# Patient Record
Sex: FEMALE | Race: WHITE | NOT HISPANIC OR LATINO | Employment: OTHER | URBAN - METROPOLITAN AREA
[De-identification: names, ages, dates, MRNs, and addresses within clinical notes are randomized per-mention and may not be internally consistent; named-entity substitution may affect disease eponyms.]

---

## 2017-06-05 RX ORDER — ESTRADIOL 0.5 MG/1
TABLET ORAL
Qty: 30 TABLET | Refills: 1 | Status: SHIPPED | OUTPATIENT
Start: 2017-06-05 | End: 2017-06-28 | Stop reason: SDUPTHER

## 2017-06-13 ENCOUNTER — APPOINTMENT (OUTPATIENT)
Dept: WOMENS IMAGING | Facility: HOSPITAL | Age: 62
End: 2017-06-13

## 2017-06-13 ENCOUNTER — PROCEDURE VISIT (OUTPATIENT)
Dept: OBSTETRICS AND GYNECOLOGY | Facility: CLINIC | Age: 62
End: 2017-06-13

## 2017-06-13 DIAGNOSIS — Z12.31 VISIT FOR SCREENING MAMMOGRAM: Primary | ICD-10-CM

## 2017-06-13 PROCEDURE — 77067 SCR MAMMO BI INCL CAD: CPT | Performed by: RADIOLOGY

## 2017-06-13 PROCEDURE — 77067 SCR MAMMO BI INCL CAD: CPT | Performed by: OBSTETRICS & GYNECOLOGY

## 2017-06-19 ENCOUNTER — TELEPHONE (OUTPATIENT)
Dept: OBSTETRICS AND GYNECOLOGY | Facility: CLINIC | Age: 62
End: 2017-06-19

## 2017-06-19 NOTE — TELEPHONE ENCOUNTER
----- Message from Diaz Joseph MD sent at 6/17/2017  1:27 PM EDT -----  Let the patient know that her mammogram was normal

## 2017-06-20 ENCOUNTER — TELEPHONE (OUTPATIENT)
Dept: OBSTETRICS AND GYNECOLOGY | Facility: CLINIC | Age: 62
End: 2017-06-20

## 2017-06-21 ENCOUNTER — TELEPHONE (OUTPATIENT)
Dept: OBSTETRICS AND GYNECOLOGY | Facility: CLINIC | Age: 62
End: 2017-06-21

## 2017-06-28 ENCOUNTER — OFFICE VISIT (OUTPATIENT)
Dept: OBSTETRICS AND GYNECOLOGY | Facility: CLINIC | Age: 62
End: 2017-06-28

## 2017-06-28 VITALS — WEIGHT: 190 LBS | HEART RATE: 82 BPM | DIASTOLIC BLOOD PRESSURE: 84 MMHG | SYSTOLIC BLOOD PRESSURE: 130 MMHG

## 2017-06-28 DIAGNOSIS — Z01.419 VISIT FOR GYNECOLOGIC EXAMINATION: Primary | ICD-10-CM

## 2017-06-28 DIAGNOSIS — N95.1 VASOMOTOR SYMPTOMS DUE TO MENOPAUSE: ICD-10-CM

## 2017-06-28 PROCEDURE — 99396 PREV VISIT EST AGE 40-64: CPT | Performed by: OBSTETRICS & GYNECOLOGY

## 2017-06-28 RX ORDER — ESTRADIOL 0.5 MG/1
0.5 TABLET ORAL DAILY
Qty: 30 TABLET | Refills: 12 | Status: SHIPPED | OUTPATIENT
Start: 2017-06-28 | End: 2018-05-23 | Stop reason: SDUPTHER

## 2017-06-28 NOTE — PROGRESS NOTES
Subjective   Taylor Larsen is a 61 y.o. female   CC: Pt here for annual.  History of Present Illness  Pt here for annual.  She is without gynecologic complaints today.  She takes oral Estrace and oral progesterone and 4 vasomotor symptoms.  She reports that her symptoms well-controlled on these medications.  She did a mammogram about 2 weeks ago that was normal.  She is uncertain of her last colonoscopy.  She has never had a DEXA scan performed.  She has no major changes in her health history since her last visit.  She takes a daily calcium supplement.  She performs regular self breast exams.  She wears her seatbelt regularly.    The following portions of the patient's history were reviewed and updated as appropriate: allergies, current medications, past family history, past medical history, past social history, past surgical history and problem list.    Review of Systems  General: No fever or chills  Constitutional: No weight loss or gain, no hair loss  HENT: No headache, no hearing loss, no tinnitus  Eyes: normal vision, no eye pain  Lungs: No cough, no shortness of breath  Heart: No chest pain, no palpitations  Abdomen: No nausea, vomiting, or diarrhea; Pos constipation  : No dysuria, no hematuria  Skin: No rashes  Lymph: No swelling  Neuro: No parathesia, no weakness  Psych: Normal though content, no hallucinations, no SI/HI    Objective   Physical Exam  Vitals:    06/28/17 1538   BP: 130/84   Pulse: 82   Weight: 190 lb (86.2 kg)   Gen: No acute distress, awake and oriented times three  HENT: Normocephalic, atraumatic, Moist mucous membranes  Eyes: PERRLA, EOMI  Neck: Supple, normal range of motion, no thyromegaly  Lungs: Normal work of breathing, lungs clear bilaterally, no crackles/wheezes  Heart: Regular rate and rhythm, no murmurs  Abdomen: soft, nontender, non distended, normoactive bowel sounds  Breast: Symmetrical. No skin changes or nipple retractions. No lumps or masses bilaterally. No tenderness  bilaterally.  Pelvic:   Normal external female genitalia, no lesions  Vagina: No blood or discharge; hardened stool noted in the rectal vault during bimanual exam  Cervix: No cervical motion tenderness, no lesions, no active bleeding, nonfriable  Uterus: Anteverted, normal size and shape, nontender  Adnexa: No masses or tenderness  Rectal: Deferred  Skin: Warm and dry, no rashes  Psych: Good judgement and insight, normal affect and mood  Neuro: CN 2-12 intact, no gross deficits    Assessment/Plan   Diagnoses and all orders for this visit:    Visit for gynecologic examination    Vasomotor symptoms due to menopause  -     estradiol (ESTRACE) 0.5 MG tablet; Take 1 tablet by mouth Daily.     patient is to continue oral progesterone as well.  She is unaware of the medication and dosage.  She will call our office when she gets home to update us.    Patient is not due for Pap smear this year.  Next Pap smear will be due after April 2018.  Mammogram was just recently done.  The patient's instructed to discuss with her primary care physician about scheduling for a screening colonoscopy if she is due.  DEXA scans starting at age 65.  Encouraged self breast exams.  Encourage weightbearing exercises.  Continue calcium supplementation.  Return to the office in one year for annual exam or sooner as needed.  Continue oral estradiol and oral progesterone for prevention of vasomotor symptoms.  Patient also with some significant constipation.  Discussed over-the-counter use of fiber supplements and stool softeners.

## 2017-06-30 ENCOUNTER — TELEPHONE (OUTPATIENT)
Dept: OBSTETRICS AND GYNECOLOGY | Facility: CLINIC | Age: 62
End: 2017-06-30

## 2017-06-30 DIAGNOSIS — N95.1 VASOMOTOR SYMPTOMS DUE TO MENOPAUSE: Primary | ICD-10-CM

## 2017-06-30 RX ORDER — MEDROXYPROGESTERONE ACETATE 2.5 MG/1
2.5 TABLET ORAL DAILY
Qty: 30 TABLET | Refills: 11 | Status: SHIPPED | OUTPATIENT
Start: 2017-06-30 | End: 2018-05-23 | Stop reason: SDUPTHER

## 2017-06-30 NOTE — TELEPHONE ENCOUNTER
Notify the patient that I sent in a prescription for this medication as well.    ----- Message from Kailee Bond sent at 6/30/2017  8:54 AM EDT -----  Contact: patient  Patient called to let you know her progesterone medication is Medroxyprogesterone 2.5 mg.

## 2018-05-23 ENCOUNTER — TELEPHONE (OUTPATIENT)
Dept: OBSTETRICS AND GYNECOLOGY | Facility: CLINIC | Age: 63
End: 2018-05-23

## 2018-05-23 DIAGNOSIS — N95.1 VASOMOTOR SYMPTOMS DUE TO MENOPAUSE: ICD-10-CM

## 2018-05-23 RX ORDER — ESTRADIOL 0.5 MG/1
0.5 TABLET ORAL DAILY
Qty: 30 TABLET | Refills: 2 | Status: SHIPPED | OUTPATIENT
Start: 2018-05-23 | End: 2018-07-25 | Stop reason: SDUPTHER

## 2018-05-23 RX ORDER — MEDROXYPROGESTERONE ACETATE 2.5 MG/1
2.5 TABLET ORAL DAILY
Qty: 30 TABLET | Refills: 2 | Status: SHIPPED | OUTPATIENT
Start: 2018-05-23 | End: 2018-07-25

## 2018-05-23 NOTE — TELEPHONE ENCOUNTER
I sent estradiol and medroxyprogesterone pills, with enough refills to last her until her next appointment with me

## 2018-07-25 ENCOUNTER — OFFICE VISIT (OUTPATIENT)
Dept: OBSTETRICS AND GYNECOLOGY | Facility: CLINIC | Age: 63
End: 2018-07-25

## 2018-07-25 VITALS
HEART RATE: 76 BPM | BODY MASS INDEX: 32.27 KG/M2 | SYSTOLIC BLOOD PRESSURE: 105 MMHG | HEIGHT: 64 IN | WEIGHT: 189 LBS | DIASTOLIC BLOOD PRESSURE: 69 MMHG

## 2018-07-25 DIAGNOSIS — N95.1 VASOMOTOR SYMPTOMS DUE TO MENOPAUSE: ICD-10-CM

## 2018-07-25 DIAGNOSIS — Z01.419 VISIT FOR GYNECOLOGIC EXAMINATION: Primary | ICD-10-CM

## 2018-07-25 DIAGNOSIS — Z12.4 SCREENING FOR CERVICAL CANCER: ICD-10-CM

## 2018-07-25 PROCEDURE — 99396 PREV VISIT EST AGE 40-64: CPT | Performed by: OBSTETRICS & GYNECOLOGY

## 2018-07-25 RX ORDER — MEDROXYPROGESTERONE ACETATE 2.5 MG/1
2.5 TABLET ORAL DAILY
Qty: 30 TABLET | Refills: 12 | Status: SHIPPED | OUTPATIENT
Start: 2018-07-25 | End: 2019-08-05 | Stop reason: SDUPTHER

## 2018-07-25 RX ORDER — ESTRADIOL 0.5 MG/1
0.5 TABLET ORAL DAILY
Qty: 30 TABLET | Refills: 12 | Status: SHIPPED | OUTPATIENT
Start: 2018-07-25 | End: 2019-08-05 | Stop reason: SDUPTHER

## 2018-07-25 NOTE — PROGRESS NOTES
"Subjective   Taylor Larsen is a 62 y.o. female.   Cc: Pt here for annual.   History of Present Illness   Pt here for annual.  Last Pap was April 2015 and was normal.  Patient is currently on combined hormone replacement therapy estradiol 0.5 mg and medroxyprogesterone 2.5 mg daily for hot flashes.  These have been working well and she would like to continue them.  She takes a daily vitamin D calcium supplement.  She is not a smoker.  She performs routine self breast exams.  Her last mmg was June 2017.    Social History   Substance Use Topics   • Smoking status: Former Smoker   • Smokeless tobacco: Never Used   • Alcohol use No     The following portions of the patient's history were reviewed and updated as appropriate: allergies, current medications, past family history, past medical history, past social history, past surgical history and problem list.    Review of Systems  General: No fever or chills  Constitutional: No weight loss or gain, no hair loss  HENT: No headache, no hearing loss, no tinnitus  Eyes: normal vision, no eye pain  Lungs: No cough, no shortness of breath  Heart: No chest pain, no palpitations  Abdomen: No nausea, vomiting, constipation or diarrhea  : No dysuria, no hematuria  Skin: No rashes  Lymph: No swelling  Neuro: No parathesia, no weakness  Psych: Normal though content, no hallucinations, no SI/HI    Objective   Physical Exam  Vitals:    07/25/18 1518   BP: 105/69   Pulse: 76   Weight: 85.7 kg (189 lb)   Height: 161.3 cm (63.5\")   Exam performed in the presence of a female chaperone  Patient has provided verbal consent to proceed with exam.    Gen: No acute distress, awake and oriented times three  HENT: Normocephalic, atraumatic, Moist mucous membranes  Eyes: PERRLA, EOMI  Neck: Supple, normal range of motion, no thyromegaly  Lungs: Normal work of breathing, lungs clear bilaterally, no crackles/wheezes  Heart: Regular rate and rhythm, no murmurs  Abdomen: soft, nontender, non distended, " normoactive bowel sounds  Breast: Symmetrical. No skin changes or nipple retractions. No lumps or masses bilaterally. No tenderness bilaterally.  Pelvic:   Normal external female genitalia, no lesions  Vagina: No blood or discharge; atrophic changes noted  Cervix: No cervical motion tenderness, no lesions, no active bleeding, nonfriable  Uterus: Anteverted, normal size and shape, nontender  Adnexa: No masses or tenderness  Rectal: Deferred  Skin: Warm and dry, no rashes  Psych: Good judgement and insight, normal affect and mood  Neuro: CN 2-12 intact, no gross deficits    Assessment/Plan   Diagnoses and all orders for this visit:    Visit for gynecologic examination  -     IGP, Apt HPV,rfx 16 / 18,45    Vasomotor symptoms due to menopause  -     medroxyPROGESTERone (PROVERA) 2.5 MG tablet; Take 1 tablet by mouth Daily.  -     estradiol (ESTRACE) 0.5 MG tablet; Take 1 tablet by mouth Daily.    Screening for cervical cancer  -     IGP, Apt HPV,rfx 16 / 18,45    Pap smear performed today.  She would like to continue on her hormone replacement therapy.  She needs to schedule mammogram.  She is not due for a DEXA scan.  Patient's encouraged to continue routine self breast exams. RTC 1 yr annual or sooner prn.

## 2018-07-30 LAB
CYTOLOGIST CVX/VAG CYTO: NORMAL
CYTOLOGY CVX/VAG DOC THIN PREP: NORMAL
DX ICD CODE: NORMAL
HIV 1 & 2 AB SER-IMP: NORMAL
HPV I/H RISK 4 DNA CVX QL PROBE+SIG AMP: NEGATIVE
OTHER STN SPEC: NORMAL
PATH REPORT.FINAL DX SPEC: NORMAL
STAT OF ADQ CVX/VAG CYTO-IMP: NORMAL

## 2018-08-01 ENCOUNTER — TELEPHONE (OUTPATIENT)
Dept: OBSTETRICS AND GYNECOLOGY | Facility: CLINIC | Age: 63
End: 2018-08-01

## 2018-08-01 NOTE — TELEPHONE ENCOUNTER
----- Message from Diaz Joseph MD sent at 7/30/2018  4:16 PM EDT -----  Notify pt that her Pap was normal.

## 2018-08-06 ENCOUNTER — PROCEDURE VISIT (OUTPATIENT)
Dept: OBSTETRICS AND GYNECOLOGY | Facility: CLINIC | Age: 63
End: 2018-08-06

## 2018-08-06 ENCOUNTER — APPOINTMENT (OUTPATIENT)
Dept: WOMENS IMAGING | Facility: HOSPITAL | Age: 63
End: 2018-08-06

## 2018-08-06 DIAGNOSIS — Z12.31 VISIT FOR SCREENING MAMMOGRAM: Primary | ICD-10-CM

## 2018-08-06 PROCEDURE — 77067 SCR MAMMO BI INCL CAD: CPT | Performed by: RADIOLOGY

## 2018-08-06 PROCEDURE — 77067 SCR MAMMO BI INCL CAD: CPT | Performed by: OBSTETRICS & GYNECOLOGY

## 2019-08-05 ENCOUNTER — TELEPHONE (OUTPATIENT)
Dept: OBSTETRICS AND GYNECOLOGY | Facility: CLINIC | Age: 64
End: 2019-08-05

## 2019-08-05 DIAGNOSIS — N95.1 VASOMOTOR SYMPTOMS DUE TO MENOPAUSE: ICD-10-CM

## 2019-08-05 RX ORDER — ESTRADIOL 0.5 MG/1
0.5 TABLET ORAL DAILY
Qty: 30 TABLET | Refills: 0 | Status: SHIPPED | OUTPATIENT
Start: 2019-08-05 | End: 2019-08-26 | Stop reason: SDUPTHER

## 2019-08-05 RX ORDER — MEDROXYPROGESTERONE ACETATE 2.5 MG/1
2.5 TABLET ORAL DAILY
Qty: 30 TABLET | Refills: 0 | Status: SHIPPED | OUTPATIENT
Start: 2019-08-05 | End: 2019-08-26 | Stop reason: SDUPTHER

## 2019-08-05 NOTE — TELEPHONE ENCOUNTER
Refill for 1 month has been sent to the patient's pharmacy.  This should get her through to her scheduled exam

## 2019-08-05 NOTE — TELEPHONE ENCOUNTER
Patient called she is former Jake pt scheduled to see Maura at the end of this month for her annual and would like to know if her prescription for estradiol (ESTRACE) 0.5 MG tablet    And medroxyPROGESTERone (PROVERA) 2.5 MG tablet     Can be refilled.

## 2019-08-26 ENCOUNTER — PROCEDURE VISIT (OUTPATIENT)
Dept: OBSTETRICS AND GYNECOLOGY | Facility: CLINIC | Age: 64
End: 2019-08-26

## 2019-08-26 ENCOUNTER — OFFICE VISIT (OUTPATIENT)
Dept: OBSTETRICS AND GYNECOLOGY | Facility: CLINIC | Age: 64
End: 2019-08-26

## 2019-08-26 ENCOUNTER — APPOINTMENT (OUTPATIENT)
Dept: WOMENS IMAGING | Facility: HOSPITAL | Age: 64
End: 2019-08-26

## 2019-08-26 VITALS
SYSTOLIC BLOOD PRESSURE: 122 MMHG | WEIGHT: 189 LBS | BODY MASS INDEX: 33.49 KG/M2 | HEIGHT: 63 IN | DIASTOLIC BLOOD PRESSURE: 80 MMHG

## 2019-08-26 DIAGNOSIS — N95.1 VASOMOTOR SYMPTOMS DUE TO MENOPAUSE: ICD-10-CM

## 2019-08-26 DIAGNOSIS — Z12.31 VISIT FOR SCREENING MAMMOGRAM: Primary | ICD-10-CM

## 2019-08-26 DIAGNOSIS — Z01.419 VISIT FOR GYNECOLOGIC EXAMINATION: Primary | ICD-10-CM

## 2019-08-26 DIAGNOSIS — Z12.11 COLON CANCER SCREENING: ICD-10-CM

## 2019-08-26 LAB
DEVELOPER EXPIRATION DATE: NORMAL
DEVELOPER LOT NUMBER: NORMAL
EXPIRATION DATE: NORMAL
FECAL OCCULT BLOOD SCREEN, POC: NEGATIVE
Lab: NORMAL
NEGATIVE CONTROL: NEGATIVE
POSITIVE CONTROL: POSITIVE

## 2019-08-26 PROCEDURE — G0328 FECAL BLOOD SCRN IMMUNOASSAY: HCPCS | Performed by: OBSTETRICS & GYNECOLOGY

## 2019-08-26 PROCEDURE — 77067 SCR MAMMO BI INCL CAD: CPT | Performed by: RADIOLOGY

## 2019-08-26 PROCEDURE — 99396 PREV VISIT EST AGE 40-64: CPT | Performed by: OBSTETRICS & GYNECOLOGY

## 2019-08-26 PROCEDURE — 77067 SCR MAMMO BI INCL CAD: CPT | Performed by: OBSTETRICS & GYNECOLOGY

## 2019-08-26 RX ORDER — ESTRADIOL 0.5 MG/1
0.5 TABLET ORAL DAILY
Qty: 30 TABLET | Refills: 11 | Status: SHIPPED | OUTPATIENT
Start: 2019-08-26 | End: 2019-10-29

## 2019-08-26 RX ORDER — MEDROXYPROGESTERONE ACETATE 2.5 MG/1
2.5 TABLET ORAL DAILY
Qty: 30 TABLET | Refills: 11 | Status: SHIPPED | OUTPATIENT
Start: 2019-08-26 | End: 2019-10-29

## 2019-08-26 NOTE — PROGRESS NOTES
"[facility]  [address], [address]  [place], [place] [address]  Phone: [phone] / Fax:  [phone]      2019    [address] [place], [place] [address] [id number]    Tommy Osborn MD    Chief Complaint   Patient presents with   • Gynecologic Exam     Annual Exam, last pap 7-25-18 nl, mammogram today, colonoscopy 11 years ago. Patient needs refill on hormone replacement therapy.       Taylor Larsen is here for annual gynecologic exam.  HPI - Patient did mammogram today.  Patient is due for colonoscopy.  She requests refill on HRT.    History reviewed. No pertinent past medical history.    Past Surgical History:   Procedure Laterality Date   • KNEE SURGERY     • LAPAROSCOPIC CHOLECYSTECTOMY     • TUBAL ABDOMINAL LIGATION         No Known Allergies    Social History     Socioeconomic History   • Marital status:      Spouse name: Not on file   • Number of children: Not on file   • Years of education: Not on file   • Highest education level: Not on file   Tobacco Use   • Smoking status: Former Smoker   • Smokeless tobacco: Never Used   Substance and Sexual Activity   • Alcohol use: No   • Drug use: No   • Sexual activity: No     Birth control/protection: Post-menopausal       Family History   Problem Relation Age of Onset   • No Known Problems Father    • No Known Problems Mother        No LMP recorded. Patient is postmenopausal.    OB History      Para Term  AB Living    2 0 0 0 0 2    SAB TAB Ectopic Molar Multiple Live Births    0 0 0   0            Vitals:    19 1438   BP: 122/80   Weight: 85.7 kg (189 lb)   Height: 160 cm (63\")       Physical Exam   Constitutional: She appears well-developed and well-nourished.   Genitourinary: Rectum normal, vagina normal and uterus normal. Pelvic exam was performed with patient supine. There is no tenderness or lesion on the right labia. There is no tenderness or lesion on the left labia. Right adnexum does not display tenderness and does " not display fullness. Left adnexum does not display tenderness and does not display fullness. Cervix does not exhibit motion tenderness or lesion. Rectal exam shows no external hemorrhoid and guaiac negative stool.   HENT:   Right Ear: External ear normal.   Left Ear: External ear normal.   Nose: Nose normal.   Eyes: Conjunctivae are normal.   Neck: Normal range of motion. Neck supple. No thyromegaly present.   Cardiovascular: Normal rate, regular rhythm and normal heart sounds.   Pulmonary/Chest: Effort normal. No stridor. She has no wheezes. Right breast exhibits no mass and no nipple discharge. Left breast exhibits no mass and no nipple discharge.   Abdominal: Soft. There is no tenderness. There is no guarding.   Musculoskeletal: Normal range of motion. She exhibits no edema.   Neurological: She is alert. Coordination normal.   Skin: Skin is warm and dry.   Psychiatric: She has a normal mood and affect. Her behavior is normal. Judgment and thought content normal.   Vitals reviewed.      Taylor was seen today for gynecologic exam.    Diagnoses and all orders for this visit:    Visit for gynecologic examination        -      Discussed importance of regular screening and breast awareness.  Mammogram today.  Colon cancer screening  -     Ambulatory Referral to Gastroenterology  -     FOBT negative.  Vasomotor symptoms due to menopause  -     estradiol (ESTRACE) 0.5 MG tablet; Take 1 tablet by mouth Daily.  -     medroxyPROGESTERone (PROVERA) 2.5 MG tablet; Take 1 tablet by mouth Daily.        Eduardo Lorenzo MD

## 2019-09-05 ENCOUNTER — TELEPHONE (OUTPATIENT)
Dept: OBSTETRICS AND GYNECOLOGY | Facility: CLINIC | Age: 64
End: 2019-09-05

## 2019-09-05 DIAGNOSIS — N63.10 BREAST MASS, RIGHT: Primary | ICD-10-CM

## 2019-09-13 ENCOUNTER — APPOINTMENT (OUTPATIENT)
Dept: WOMENS IMAGING | Facility: HOSPITAL | Age: 64
End: 2019-09-13

## 2019-09-13 DIAGNOSIS — R92.8 ABNORMAL MAMMOGRAM: Primary | ICD-10-CM

## 2019-09-13 PROCEDURE — 77061 BREAST TOMOSYNTHESIS UNI: CPT | Performed by: RADIOLOGY

## 2019-09-13 PROCEDURE — 76641 ULTRASOUND BREAST COMPLETE: CPT | Performed by: RADIOLOGY

## 2019-09-13 PROCEDURE — G0279 TOMOSYNTHESIS, MAMMO: HCPCS | Performed by: RADIOLOGY

## 2019-09-13 PROCEDURE — 77065 DX MAMMO INCL CAD UNI: CPT | Performed by: RADIOLOGY

## 2019-09-16 ENCOUNTER — APPOINTMENT (OUTPATIENT)
Dept: WOMENS IMAGING | Facility: HOSPITAL | Age: 64
End: 2019-09-16

## 2019-09-16 DIAGNOSIS — R92.8 ABNORMAL MAMMOGRAM: ICD-10-CM

## 2019-09-16 DIAGNOSIS — N63.10 BREAST MASS, RIGHT: ICD-10-CM

## 2019-09-16 PROCEDURE — 19083 BX BREAST 1ST LESION US IMAG: CPT | Performed by: RADIOLOGY

## 2019-09-16 PROCEDURE — 19000 PUNCTURE ASPIR CYST BREAST: CPT | Performed by: RADIOLOGY

## 2019-09-19 ENCOUNTER — TELEPHONE (OUTPATIENT)
Dept: OBSTETRICS AND GYNECOLOGY | Facility: CLINIC | Age: 64
End: 2019-09-19

## 2019-09-19 DIAGNOSIS — R92.8 ABNORMAL MAMMOGRAM: ICD-10-CM

## 2019-09-19 NOTE — TELEPHONE ENCOUNTER
Patient and I discussed results.  She has an appointment with Dr Ramos.  I faxed her results to her.                Regarding: FW: Test Results Question  Contact: [phone]   See e-mail from patient   ----- Message -----  From: Taylor Larsen  Sent: 9/19/2019  11:51 AM  To: [facility]  Subject: Test Results Question                            ----- Message from Synoste Oyhart, Generic sent at 9/19/2019 11:51 AM EDT -----    When will my test results be uploaded into this system?

## 2019-09-26 ENCOUNTER — OFFICE VISIT (OUTPATIENT)
Dept: MAMMOGRAPHY | Facility: CLINIC | Age: 64
End: 2019-09-26

## 2019-09-26 ENCOUNTER — DOCUMENTATION (OUTPATIENT)
Dept: OTHER | Facility: HOSPITAL | Age: 64
End: 2019-09-26

## 2019-09-26 VITALS
HEIGHT: 63 IN | BODY MASS INDEX: 32.78 KG/M2 | SYSTOLIC BLOOD PRESSURE: 132 MMHG | DIASTOLIC BLOOD PRESSURE: 70 MMHG | WEIGHT: 185 LBS | OXYGEN SATURATION: 98 % | HEART RATE: 79 BPM

## 2019-09-26 DIAGNOSIS — Z17.0 MALIGNANT NEOPLASM OF UPPER-OUTER QUADRANT OF RIGHT BREAST IN FEMALE, ESTROGEN RECEPTOR POSITIVE (HCC): Primary | ICD-10-CM

## 2019-09-26 DIAGNOSIS — C50.411 MALIGNANT NEOPLASM OF UPPER-OUTER QUADRANT OF RIGHT BREAST IN FEMALE, ESTROGEN RECEPTOR POSITIVE (HCC): Primary | ICD-10-CM

## 2019-09-26 PROCEDURE — 99205 OFFICE O/P NEW HI 60 MIN: CPT | Performed by: SURGERY

## 2019-09-26 RX ORDER — CHOLECALCIFEROL (VITAMIN D3) 125 MCG
10 CAPSULE ORAL NIGHTLY PRN
COMMUNITY

## 2019-09-26 NOTE — PROGRESS NOTES
Chief Complaint: Taylor Larsen is a 64 y.o.. female here today for Breast Cancer (IDC Right breast)        History of Present Illness:  Patient presents with newly diagnosed breast cancer. Right IDC  She is a nice 63-year-old white female who underwent screening mammograms several weeks ago.  She was found to have a new around the mass measuring 5 mm in the right breast at the 12 o'clock position.  She went for diagnostic mammography and the small round mass became more defined.  Ultrasound revealed a 4 mm round solid mass.  At the 11 o'clock position there was also a complicated cyst versus solid mass measuring 5 mm.  The patient underwent aspiration of the 11 o'clock position which revealed no malignant cells.  There were changes consistent with benign fibrocystic disease.  The area at 12:00 was biopsied and revealed a grade 1 invasive ductal cancer that was ER positive at 90%, TN positive at 95%, and HER-2 negative.  The Ki-67 was 4.6%.  The patient's family history is significant for a maternal aunt who has had breast cancer twice.  There are also 2 maternal second cousins.  She has a brother with throat and prostate cancer.  The patient has not had any further breast biopsies and had no symptoms prior to these mammogram findings.    Review of Systems:  Review of Systems   Skin:        The patient denies any noticeable changes to the skin of the breast.    All other systems reviewed and are negative.       Past Medical and Surgical History:  Breast Biopsy History:  Patient has had the following breast biopsies:2019 Right-malignant  Breast Cancer HIstory:  Patient does not have a past medical history of breast cancer.  Breast Operations, and year:  None  Social History     Tobacco Use   Smoking Status Former Smoker   Smokeless Tobacco Never Used     Obstetric History:  Patient is postmenopausal, entered menopause naturally at age: 42   Number of pregnancies:2  Number of live births: 2  Number of abortions or  miscarriages: 0  Age of delivery of first child: 23  Patient did not breast feed.  Length of time taking birth control pills:20 years  Patient took hormone replacement during the following dates: 15+ years. Stopped 9/2019    Past Surgical History:   Procedure Laterality Date   • BREAST BIOPSY Right 09/2019    malignant   • KNEE SURGERY     • LAPAROSCOPIC CHOLECYSTECTOMY     • TUBAL ABDOMINAL LIGATION         Past Medical History:   Diagnosis Date   • Breast cancer (CMS/HCC) 09/2019    Right IDC       Prior Hospitalizations, other than for surgery or childbirth, and year:  Complications due to gallbladder surgery     Social History:  Patient is .  Patient has one daughters. and Patient has one sons.    Family History:  Family History   Problem Relation Age of Onset   • Alcohol abuse Father    • Heart failure Mother    • Diabetes Brother    • Prostate cancer Brother    • Throat cancer Brother    • Breast cancer Maternal Aunt    • Breast cancer Maternal Cousin    • Breast cancer Maternal Cousin        Vital Signs:  Vitals:    09/26/19 1334   BP: 132/70   Pulse: 79   SpO2: 98%       Medications:    Current Outpatient Prescriptions:     Current Outpatient Medications:   •  melatonin 5 MG tablet tablet, Take 10 mg by mouth., Disp: , Rfl:   •  doxylamine (UNISOM) 25 MG tablet, Take  by mouth., Disp: , Rfl:   •  estradiol (ESTRACE) 0.5 MG tablet, Take 1 tablet by mouth Daily., Disp: 30 tablet, Rfl: 11  •  medroxyPROGESTERone (PROVERA) 2.5 MG tablet, Take 1 tablet by mouth Daily., Disp: 30 tablet, Rfl: 11    Physical Examination:  General Appearance:   Patient is in no distress.  She is well kept and has an obese build.   Psychiatric:  Patient with appropriate mood and affect. Alert and oriented to self, time, and place.    Breast, RIGHT:  medium sized, symmetric with the contralateral side.  Breast skin is without erythema, edema, rashes.  There are no visible abnormalities upon inspection during the arm-raising  maneuver or with hands on hips in the sitting position. There is no nipple retraction, discharge or nipple/areolar skin changes.There is significant bruising across the upper half of the breast.  She also has a large 8 cm hematoma in the 12 o'clock position.  This certainly hampered his physical examination.    Breast, LEFT:  medium sized, symmetric with the contralateral side.  Breast skin is without erythema, edema, rashes.  There are no visible abnormalities upon inspection during the arm-raising maneuver or with hands on hips in the sitting position. There is no nipple retraction, discharge or nipple/areolar skin changes.There are no masses palpable in the sitting or supine positions.    Lymphatic:  There is no axillary, cervical, infraclavicular, or supraclavicular adenopathy bilaterally.  Eyes:  Pupils are round and reactive to light.  Cardiovascular:  Heart rate and rhythm are regular.  Respiratory:  Lungs are clear bilaterally with no crackles or wheezes in any lung field.  Gastrointestinal:  Abdomen is soft, nondistended, and nontender.  No obvious hepatosplenomegaly or abdominal mass.  There are no scars from previous surgery.    Musculoskeletal:  Good strength in all 4 extremities.   There is good range of motion in both shoulders.    Skin:  No new skin lesions or rashes on the skin excluding the breast (see breast exam above).    Cancer Staging  Primary Tumor: T1a  Regional Lymph Nodes:  N0  Distant Mets: M0  Clinical Stage Group: IA    Assessment:  1. Malignant neoplasm of upper-outer quadrant of right breast in female, estrogen receptor positive (CMS/HCC)          Plan:  She was accompanied today by her  and 2 children.  The office visit lasted 1 hour and 5 minutes with 50 minutes spent in face-to-face consultation.    We began the conversation discussing her pathology report.  We talked about the origin of most of breast cancers from either the ducts or the lobules.  The difference between  invasive and in situ disease was explained and the visual was drawn for her.  When invasion has occurred, the lymph nodes need to be evaluated.  When there is in situ disease the lymph nodes should be considered if the area of concern is widespread or it is high-grade.  We also discussed the significance of hormone receptors.  They often can give us some idea how the breast cancer will behave and potentially lead us to offer neoadjuvant chemotherapy.    Next we discussed the surgical options which include breast conserving therapy versus a mastectomy.  With breast conserving therapy we are talking about a lumpectomy with margins, lymph node evaluation, and radiation treatment.  The radiation treatment is generally given to the entire breast for 6 weeks.  The side effects consist of local skin change and potential injury to nearby structures such as the lung or the heart.  A mastectomy would involve removing the breast tissues with preservation of the pectoral muscles and evaluation of the lymph nodes if indicated.  The potential for reconstruction by either an implant or autologous tissue was discussed.  This could be performed on a delayed basis or immediately depending on a multitude of factors.  The survival rates for these 2 procedures are equivalent but there are incidences where one may be favored over the other.  There are times when a lumpectomy is not possible due to the large tumor to breast ratio or the location of the tumor.  Previous radiation to the chest wall or collagen disorders such as scleroderma would make radiation treatment and possible and therefore exclude breast conserving therapy as an option.    The patient would appear to be a candidate for genetic testing and would like to pursue that.  I also think an MRI might benefit us.  She has had second area that was biopsied and she unfortunately developed a large hematoma following her biopsy.  She is most interested in breast conserving surgery  and less some of these upcoming evaluations change things.  We have discussed what is involved with a sentinel lymph node biopsy and the possibility of positive margins requiring a return to surgery.  I will call her when the results come back.      CPT coding:    Next Appointment:  No Follow-up on file.            EMR Dragon/transcription disclaimer:    Much of this encounter note is an electronic transcription/translocation of spoken language to printed text.  The electronic translation of spoken language may permit erroneous, or at times, nonsensical words or phrases to be inadvertently transcribed.  Although I have reviewed the note from such areas, some may still exist.

## 2019-09-26 NOTE — PROGRESS NOTES
Referral received from Dr. Ramos's office. Met Taylor and her family during her surgery consult. I introduced myself and navigational services. After the consult she is leaning toward having a lumpectomy but will have an MRI and Genetic testing done prior to making a decision. Her genetic appointment is set up for Oct 1 at 8:30 am. She is comfortable with this plan and has no questions or concerns following the consult. She has a good understanding of her pathology and treatment options presented to her by Dr. Ramos.     We discussed her support system and she stated her family has been very supportive along the way and she has great friends who have been through cancer before. She stated she felt well cared for. We discussed integrative therapies and other services [facility]. I gave her a navigation folder with the following information:    [facility] Cancer Support Network, Sharing Our Stories Breast Cancer Support Group, Cancer and Restorative Exercise (CARE), [facility], Together for Breast Cancer Survival, For Women Facing Breast Cancer, Road to Recovery, [employer], [facility], Massage Therapy, Reiki Therapy, [facility] [employer] [place], Cancer Nutrition, Survivorship Clinic, Genetic Counseling, Cancer and Career.     She verbalized appreciation for navigational services and she has my contact information and will call with any questions that arise.

## 2019-09-27 ENCOUNTER — TELEPHONE (OUTPATIENT)
Dept: MAMMOGRAPHY | Facility: CLINIC | Age: 64
End: 2019-09-27

## 2019-09-27 NOTE — TELEPHONE ENCOUNTER
Patient scheduled for MRI breast on 10/3/19 arriving at 8:15 am for an 845 am appointment    Patient notified and gave v/u

## 2019-10-01 ENCOUNTER — CLINICAL SUPPORT (OUTPATIENT)
Dept: OTHER | Facility: HOSPITAL | Age: 64
End: 2019-10-01

## 2019-10-01 DIAGNOSIS — Z80.3 FAMILY HISTORY OF MALIGNANT NEOPLASM OF BREAST: ICD-10-CM

## 2019-10-01 DIAGNOSIS — Z17.0 MALIGNANT NEOPLASM OF UPPER-OUTER QUADRANT OF RIGHT BREAST IN FEMALE, ESTROGEN RECEPTOR POSITIVE (HCC): Primary | ICD-10-CM

## 2019-10-01 DIAGNOSIS — C50.411 MALIGNANT NEOPLASM OF UPPER-OUTER QUADRANT OF RIGHT BREAST IN FEMALE, ESTROGEN RECEPTOR POSITIVE (HCC): Primary | ICD-10-CM

## 2019-10-01 DIAGNOSIS — Z80.42 FAMILY HISTORY OF MALIGNANT NEOPLASM OF PROSTATE: ICD-10-CM

## 2019-10-01 PROCEDURE — G0463 HOSPITAL OUTPT CLINIC VISIT: HCPCS

## 2019-10-01 NOTE — PATIENT INSTRUCTIONS
Pt seen by Dr. Ogden for genetic counseling and testing. Lab drawn with 21g butterfly needle in Left AC x 1 attempt. Sent to [facility] for a STAT panel. Pt informed she would receive a phone call when test results.

## 2019-10-02 ENCOUNTER — TELEPHONE (OUTPATIENT)
Dept: MAMMOGRAPHY | Facility: CLINIC | Age: 64
End: 2019-10-02

## 2019-10-02 NOTE — TELEPHONE ENCOUNTER
Patient will determine surgery based on results of MRI and Genetic testing, which are scheduled for 10/1/19 and 10/3/19

## 2019-10-03 ENCOUNTER — HOSPITAL ENCOUNTER (OUTPATIENT)
Dept: MRI IMAGING | Facility: HOSPITAL | Age: 64
Discharge: HOME | End: 2019-10-03
Admitting: SURGERY

## 2019-10-03 DIAGNOSIS — Z17.0 MALIGNANT NEOPLASM OF UPPER-OUTER QUADRANT OF RIGHT BREAST IN FEMALE, ESTROGEN RECEPTOR POSITIVE (HCC): ICD-10-CM

## 2019-10-03 DIAGNOSIS — C50.411 MALIGNANT NEOPLASM OF UPPER-OUTER QUADRANT OF RIGHT BREAST IN FEMALE, ESTROGEN RECEPTOR POSITIVE (HCC): ICD-10-CM

## 2019-10-03 LAB — CREAT BLDA-MCNC: 0.8 MG/DL (ref 0.6–1.3)

## 2019-10-03 PROCEDURE — 0 GADOBENATE DIMEGLUMINE 529 MG/ML SOLUTION: Performed by: SURGERY

## 2019-10-03 PROCEDURE — 77049 MRI BREAST C-+ W/CAD BI: CPT

## 2019-10-03 PROCEDURE — A9577 INJ MULTIHANCE: HCPCS | Performed by: SURGERY

## 2019-10-03 PROCEDURE — 82565 ASSAY OF CREATININE: CPT

## 2019-10-03 RX ADMIN — GADOBENATE DIMEGLUMINE 18 ML: 529 INJECTION, SOLUTION INTRAVENOUS at 09:59

## 2019-10-07 ENCOUNTER — TELEPHONE (OUTPATIENT)
Dept: MAMMOGRAPHY | Facility: CLINIC | Age: 64
End: 2019-10-07

## 2019-10-07 NOTE — TELEPHONE ENCOUNTER
I told her the MRIs shows a 5 cm hematoma cavity with the clip in place.  There was some enhancement posteriorly but it is felt that the enhancement is reactive rather than due to cancer.  She is still prefers breast conserving surgery and I think she is a candidate for that.  Her genetics should be back in the next couple of days and she will call us when she gets the results.

## 2019-10-09 ENCOUNTER — PREP FOR SURGERY (OUTPATIENT)
Dept: OTHER | Facility: HOSPITAL | Age: 64
End: 2019-10-09

## 2019-10-09 ENCOUNTER — TELEPHONE (OUTPATIENT)
Dept: MAMMOGRAPHY | Facility: CLINIC | Age: 64
End: 2019-10-09

## 2019-10-09 DIAGNOSIS — Z17.0 MALIGNANT NEOPLASM OF UPPER-OUTER QUADRANT OF RIGHT BREAST IN FEMALE, ESTROGEN RECEPTOR POSITIVE (HCC): Primary | ICD-10-CM

## 2019-10-09 DIAGNOSIS — C50.411 MALIGNANT NEOPLASM OF UPPER-OUTER QUADRANT OF RIGHT BREAST IN FEMALE, ESTROGEN RECEPTOR POSITIVE (HCC): Primary | ICD-10-CM

## 2019-10-09 RX ORDER — ACETAMINOPHEN 500 MG
1000 TABLET ORAL ONCE
Status: CANCELLED | OUTPATIENT
Start: 2019-11-04 | End: 2019-10-09

## 2019-10-09 RX ORDER — CEFAZOLIN SODIUM 2 G/100ML
2 INJECTION, SOLUTION INTRAVENOUS ONCE
Status: CANCELLED | OUTPATIENT
Start: 2019-11-04 | End: 2019-10-09

## 2019-10-09 RX ORDER — LIDOCAINE AND PRILOCAINE 25; 25 MG/G; MG/G
CREAM TOPICAL ONCE
Status: CANCELLED | OUTPATIENT
Start: 2019-11-04 | End: 2019-10-09

## 2019-10-09 RX ORDER — DIAZEPAM 5 MG/1
10 TABLET ORAL ONCE
Status: CANCELLED | OUTPATIENT
Start: 2019-11-04 | End: 2019-10-09

## 2019-10-09 RX ORDER — SODIUM CHLORIDE, SODIUM LACTATE, POTASSIUM CHLORIDE, CALCIUM CHLORIDE 600; 310; 30; 20 MG/100ML; MG/100ML; MG/100ML; MG/100ML
100 INJECTION, SOLUTION INTRAVENOUS CONTINUOUS
Status: CANCELLED | OUTPATIENT
Start: 2019-11-04

## 2019-10-10 DIAGNOSIS — Z17.0 MALIGNANT NEOPLASM OF UPPER-OUTER QUADRANT OF RIGHT BREAST IN FEMALE, ESTROGEN RECEPTOR POSITIVE (HCC): Primary | ICD-10-CM

## 2019-10-10 DIAGNOSIS — C50.411 MALIGNANT NEOPLASM OF UPPER-OUTER QUADRANT OF RIGHT BREAST IN FEMALE, ESTROGEN RECEPTOR POSITIVE (HCC): Primary | ICD-10-CM

## 2019-10-14 PROBLEM — Z17.0 MALIGNANT NEOPLASM OF UPPER-OUTER QUADRANT OF RIGHT BREAST IN FEMALE, ESTROGEN RECEPTOR POSITIVE: Status: ACTIVE | Noted: 2019-10-14

## 2019-10-14 PROBLEM — C50.411 MALIGNANT NEOPLASM OF UPPER-OUTER QUADRANT OF RIGHT BREAST IN FEMALE, ESTROGEN RECEPTOR POSITIVE: Status: ACTIVE | Noted: 2019-10-14

## 2019-10-16 ENCOUNTER — TELEPHONE (OUTPATIENT)
Dept: MAMMOGRAPHY | Facility: CLINIC | Age: 64
End: 2019-10-16

## 2019-10-16 NOTE — TELEPHONE ENCOUNTER
Completed and returned to fax number provided by patient FMLA paperwork.     I have also mailed the original back to the patient at her home address.

## 2019-10-29 ENCOUNTER — APPOINTMENT (OUTPATIENT)
Dept: PREADMISSION TESTING | Facility: HOSPITAL | Age: 64
End: 2019-10-29

## 2019-10-29 VITALS
RESPIRATION RATE: 20 BRPM | OXYGEN SATURATION: 99 % | SYSTOLIC BLOOD PRESSURE: 127 MMHG | DIASTOLIC BLOOD PRESSURE: 77 MMHG | HEART RATE: 83 BPM | WEIGHT: 191.6 LBS | TEMPERATURE: 97.3 F | HEIGHT: 63 IN | BODY MASS INDEX: 33.95 KG/M2

## 2019-10-29 DIAGNOSIS — C50.411 MALIGNANT NEOPLASM OF UPPER-OUTER QUADRANT OF RIGHT BREAST IN FEMALE, ESTROGEN RECEPTOR POSITIVE (HCC): ICD-10-CM

## 2019-10-29 DIAGNOSIS — Z17.0 MALIGNANT NEOPLASM OF UPPER-OUTER QUADRANT OF RIGHT BREAST IN FEMALE, ESTROGEN RECEPTOR POSITIVE (HCC): ICD-10-CM

## 2019-10-29 LAB
ALBUMIN SERPL-MCNC: 3.8 G/DL (ref 3.5–5.2)
ALBUMIN/GLOB SERPL: 0.9 G/DL
ALP SERPL-CCNC: 97 U/L (ref 39–117)
ALT SERPL W P-5'-P-CCNC: 29 U/L (ref 1–33)
ANION GAP SERPL CALCULATED.3IONS-SCNC: 9.8 MMOL/L (ref 5–15)
AST SERPL-CCNC: 30 U/L (ref 1–32)
BILIRUB SERPL-MCNC: 0.4 MG/DL (ref 0.2–1.2)
BUN BLD-MCNC: 17 MG/DL (ref 8–23)
BUN/CREAT SERPL: 20.5 (ref 7–25)
CALCIUM SPEC-SCNC: 8.9 MG/DL (ref 8.6–10.5)
CHLORIDE SERPL-SCNC: 105 MMOL/L (ref 98–107)
CO2 SERPL-SCNC: 25.2 MMOL/L (ref 22–29)
CREAT BLD-MCNC: 0.83 MG/DL (ref 0.57–1)
DEPRECATED RDW RBC AUTO: 42.9 FL (ref 37–54)
ERYTHROCYTE [DISTWIDTH] IN BLOOD BY AUTOMATED COUNT: 12.3 % (ref 12.3–15.4)
GFR SERPL CREATININE-BSD FRML MDRD: 69 ML/MIN/1.73
GLOBULIN UR ELPH-MCNC: 4.3 GM/DL
GLUCOSE BLD-MCNC: 97 MG/DL (ref 65–99)
HCT VFR BLD AUTO: 38.6 % (ref 34–46.6)
HGB BLD-MCNC: 12.8 G/DL (ref 12–15.9)
MCH RBC QN AUTO: 31.1 PG (ref 26.6–33)
MCHC RBC AUTO-ENTMCNC: 33.2 G/DL (ref 31.5–35.7)
MCV RBC AUTO: 93.7 FL (ref 79–97)
PLATELET # BLD AUTO: 197 10*3/MM3 (ref 140–450)
PMV BLD AUTO: 11 FL (ref 6–12)
POTASSIUM BLD-SCNC: 3.9 MMOL/L (ref 3.5–5.2)
PROT SERPL-MCNC: 8.1 G/DL (ref 6–8.5)
RBC # BLD AUTO: 4.12 10*6/MM3 (ref 3.77–5.28)
SODIUM BLD-SCNC: 140 MMOL/L (ref 136–145)
WBC NRBC COR # BLD: 7.85 10*3/MM3 (ref 3.4–10.8)

## 2019-10-29 PROCEDURE — 36415 COLL VENOUS BLD VENIPUNCTURE: CPT

## 2019-10-29 PROCEDURE — 85027 COMPLETE CBC AUTOMATED: CPT | Performed by: SURGERY

## 2019-10-29 PROCEDURE — 80053 COMPREHEN METABOLIC PANEL: CPT | Performed by: SURGERY

## 2019-10-29 PROCEDURE — 93010 ELECTROCARDIOGRAM REPORT: CPT | Performed by: INTERNAL MEDICINE

## 2019-10-29 PROCEDURE — 93005 ELECTROCARDIOGRAM TRACING: CPT

## 2019-10-29 RX ORDER — ACETAMINOPHEN 325 MG/1
650 TABLET ORAL EVERY 6 HOURS PRN
COMMUNITY

## 2019-10-29 RX ORDER — CHLORHEXIDINE GLUCONATE 500 MG/1
CLOTH TOPICAL
COMMUNITY
End: 2019-11-04 | Stop reason: HOSPADM

## 2019-10-29 RX ORDER — IBUPROFEN 200 MG
200 TABLET ORAL EVERY 6 HOURS PRN
COMMUNITY
End: 2019-11-10

## 2019-11-04 ENCOUNTER — HOSPITAL ENCOUNTER (OUTPATIENT)
Dept: NUCLEAR MEDICINE | Facility: HOSPITAL | Age: 64
Discharge: HOME | End: 2019-11-04

## 2019-11-04 ENCOUNTER — HOSPITAL ENCOUNTER (OUTPATIENT)
Facility: HOSPITAL | Age: 64
Setting detail: HOSPITAL OUTPATIENT SURGERY
Discharge: HOME | End: 2019-11-04
Attending: SURGERY | Admitting: SURGERY

## 2019-11-04 ENCOUNTER — ANESTHESIA EVENT (OUTPATIENT)
Dept: PERIOP | Facility: HOSPITAL | Age: 64
End: 2019-11-04

## 2019-11-04 ENCOUNTER — APPOINTMENT (OUTPATIENT)
Dept: GENERAL RADIOLOGY | Facility: HOSPITAL | Age: 64
End: 2019-11-04

## 2019-11-04 ENCOUNTER — APPOINTMENT (OUTPATIENT)
Dept: MAMMOGRAPHY | Facility: HOSPITAL | Age: 64
End: 2019-11-04

## 2019-11-04 ENCOUNTER — HOSPITAL ENCOUNTER (OUTPATIENT)
Dept: MAMMOGRAPHY | Facility: HOSPITAL | Age: 64
Discharge: HOME | End: 2019-11-04

## 2019-11-04 ENCOUNTER — ANESTHESIA (OUTPATIENT)
Dept: PERIOP | Facility: HOSPITAL | Age: 64
End: 2019-11-04

## 2019-11-04 VITALS
DIASTOLIC BLOOD PRESSURE: 87 MMHG | TEMPERATURE: 97.1 F | RESPIRATION RATE: 16 BRPM | OXYGEN SATURATION: 95 % | BODY MASS INDEX: 33.39 KG/M2 | HEART RATE: 81 BPM | WEIGHT: 188.49 LBS | SYSTOLIC BLOOD PRESSURE: 145 MMHG

## 2019-11-04 DIAGNOSIS — Z17.0 MALIGNANT NEOPLASM OF UPPER-OUTER QUADRANT OF RIGHT BREAST IN FEMALE, ESTROGEN RECEPTOR POSITIVE (HCC): Primary | ICD-10-CM

## 2019-11-04 DIAGNOSIS — Z17.0 MALIGNANT NEOPLASM OF UPPER-OUTER QUADRANT OF RIGHT BREAST IN FEMALE, ESTROGEN RECEPTOR POSITIVE (HCC): ICD-10-CM

## 2019-11-04 DIAGNOSIS — C50.411 MALIGNANT NEOPLASM OF UPPER-OUTER QUADRANT OF RIGHT BREAST IN FEMALE, ESTROGEN RECEPTOR POSITIVE (HCC): ICD-10-CM

## 2019-11-04 DIAGNOSIS — C50.411 MALIGNANT NEOPLASM OF UPPER-OUTER QUADRANT OF RIGHT BREAST IN FEMALE, ESTROGEN RECEPTOR POSITIVE (HCC): Primary | ICD-10-CM

## 2019-11-04 PROCEDURE — 25010000002 FENTANYL CITRATE (PF) 100 MCG/2ML SOLUTION: Performed by: NURSE ANESTHETIST, CERTIFIED REGISTERED

## 2019-11-04 PROCEDURE — 25010000002 MIDAZOLAM PER 1 MG: Performed by: ANESTHESIOLOGY

## 2019-11-04 PROCEDURE — 25010000003 CEFAZOLIN IN DEXTROSE 2-4 GM/100ML-% SOLUTION: Performed by: SURGERY

## 2019-11-04 PROCEDURE — 88342 IMHCHEM/IMCYTCHM 1ST ANTB: CPT | Performed by: SURGERY

## 2019-11-04 PROCEDURE — 25010000002 ONDANSETRON PER 1 MG: Performed by: NURSE ANESTHETIST, CERTIFIED REGISTERED

## 2019-11-04 PROCEDURE — 0 TECHNETIUM FILTERED SULFUR COLLOID: Performed by: SURGERY

## 2019-11-04 PROCEDURE — 38525 BIOPSY/REMOVAL LYMPH NODES: CPT | Performed by: SURGERY

## 2019-11-04 PROCEDURE — 88307 TISSUE EXAM BY PATHOLOGIST: CPT | Performed by: SURGERY

## 2019-11-04 PROCEDURE — 25010000003 LIDOCAINE 1 % SOLUTION: Performed by: SURGERY

## 2019-11-04 PROCEDURE — 19301 PARTIAL MASTECTOMY: CPT | Performed by: SURGERY

## 2019-11-04 PROCEDURE — A9541 TC99M SULFUR COLLOID: HCPCS | Performed by: SURGERY

## 2019-11-04 PROCEDURE — 25010000002 DEXAMETHASONE PER 1 MG: Performed by: NURSE ANESTHETIST, CERTIFIED REGISTERED

## 2019-11-04 PROCEDURE — 38792 RA TRACER ID OF SENTINL NODE: CPT

## 2019-11-04 PROCEDURE — 76098 X-RAY EXAM SURGICAL SPECIMEN: CPT

## 2019-11-04 PROCEDURE — 25010000002 FENTANYL CITRATE (PF) 100 MCG/2ML SOLUTION: Performed by: ANESTHESIOLOGY

## 2019-11-04 PROCEDURE — 38900 IO MAP OF SENT LYMPH NODE: CPT | Performed by: SURGERY

## 2019-11-04 PROCEDURE — 25010000002 PROPOFOL 10 MG/ML EMULSION: Performed by: NURSE ANESTHETIST, CERTIFIED REGISTERED

## 2019-11-04 RX ORDER — EPHEDRINE SULFATE 50 MG/ML
5 INJECTION, SOLUTION INTRAVENOUS ONCE AS NEEDED
Status: DISCONTINUED | OUTPATIENT
Start: 2019-11-04 | End: 2019-11-04 | Stop reason: HOSPADM

## 2019-11-04 RX ORDER — FLUMAZENIL 0.1 MG/ML
0.2 INJECTION INTRAVENOUS AS NEEDED
Status: DISCONTINUED | OUTPATIENT
Start: 2019-11-04 | End: 2019-11-04 | Stop reason: HOSPADM

## 2019-11-04 RX ORDER — CEFAZOLIN SODIUM 2 G/100ML
2 INJECTION, SOLUTION INTRAVENOUS ONCE
Status: COMPLETED | OUTPATIENT
Start: 2019-11-04 | End: 2019-11-04

## 2019-11-04 RX ORDER — PROMETHAZINE HYDROCHLORIDE 25 MG/ML
6.25 INJECTION, SOLUTION INTRAMUSCULAR; INTRAVENOUS
Status: DISCONTINUED | OUTPATIENT
Start: 2019-11-04 | End: 2019-11-04 | Stop reason: HOSPADM

## 2019-11-04 RX ORDER — PROMETHAZINE HYDROCHLORIDE 25 MG/1
25 SUPPOSITORY RECTAL ONCE AS NEEDED
Status: DISCONTINUED | OUTPATIENT
Start: 2019-11-04 | End: 2019-11-04 | Stop reason: HOSPADM

## 2019-11-04 RX ORDER — HYDROCODONE BITARTRATE AND ACETAMINOPHEN 7.5; 325 MG/1; MG/1
1 TABLET ORAL ONCE AS NEEDED
Status: DISCONTINUED | OUTPATIENT
Start: 2019-11-04 | End: 2019-11-04 | Stop reason: HOSPADM

## 2019-11-04 RX ORDER — BUPIVACAINE HYDROCHLORIDE AND EPINEPHRINE 2.5; 5 MG/ML; UG/ML
INJECTION, SOLUTION EPIDURAL; INFILTRATION; INTRACAUDAL; PERINEURAL AS NEEDED
Status: DISCONTINUED | OUTPATIENT
Start: 2019-11-04 | End: 2019-11-04 | Stop reason: HOSPADM

## 2019-11-04 RX ORDER — LIDOCAINE AND PRILOCAINE 25; 25 MG/G; MG/G
CREAM TOPICAL ONCE
Status: COMPLETED | OUTPATIENT
Start: 2019-11-04 | End: 2019-11-04

## 2019-11-04 RX ORDER — DIPHENHYDRAMINE HCL 25 MG
25 CAPSULE ORAL
Status: DISCONTINUED | OUTPATIENT
Start: 2019-11-04 | End: 2019-11-04 | Stop reason: HOSPADM

## 2019-11-04 RX ORDER — ONDANSETRON 2 MG/ML
4 INJECTION INTRAMUSCULAR; INTRAVENOUS ONCE AS NEEDED
Status: COMPLETED | OUTPATIENT
Start: 2019-11-04 | End: 2019-11-04

## 2019-11-04 RX ORDER — HYDROMORPHONE HYDROCHLORIDE 1 MG/ML
0.5 INJECTION, SOLUTION INTRAMUSCULAR; INTRAVENOUS; SUBCUTANEOUS
Status: DISCONTINUED | OUTPATIENT
Start: 2019-11-04 | End: 2019-11-04 | Stop reason: HOSPADM

## 2019-11-04 RX ORDER — DIPHENHYDRAMINE HYDROCHLORIDE 50 MG/ML
12.5 INJECTION INTRAMUSCULAR; INTRAVENOUS
Status: DISCONTINUED | OUTPATIENT
Start: 2019-11-04 | End: 2019-11-04 | Stop reason: HOSPADM

## 2019-11-04 RX ORDER — OXYCODONE AND ACETAMINOPHEN 7.5; 325 MG/1; MG/1
1 TABLET ORAL ONCE AS NEEDED
Status: COMPLETED | OUTPATIENT
Start: 2019-11-04 | End: 2019-11-04

## 2019-11-04 RX ORDER — ACETAMINOPHEN 650 MG/1
650 SUPPOSITORY RECTAL ONCE AS NEEDED
Status: DISCONTINUED | OUTPATIENT
Start: 2019-11-04 | End: 2019-11-04 | Stop reason: HOSPADM

## 2019-11-04 RX ORDER — LIDOCAINE HYDROCHLORIDE 10 MG/ML
3 INJECTION, SOLUTION INFILTRATION; PERINEURAL ONCE
Status: COMPLETED | OUTPATIENT
Start: 2019-11-04 | End: 2019-11-04

## 2019-11-04 RX ORDER — PROPOFOL 10 MG/ML
VIAL (ML) INTRAVENOUS AS NEEDED
Status: DISCONTINUED | OUTPATIENT
Start: 2019-11-04 | End: 2019-11-04 | Stop reason: SURG

## 2019-11-04 RX ORDER — HYDROCODONE BITARTRATE AND ACETAMINOPHEN 5; 325 MG/1; MG/1
1-2 TABLET ORAL EVERY 4 HOURS PRN
Qty: 15 TABLET | Refills: 0 | Status: SHIPPED | OUTPATIENT
Start: 2019-11-04 | End: 2019-11-10

## 2019-11-04 RX ORDER — DIAZEPAM 5 MG/1
10 TABLET ORAL ONCE
Status: COMPLETED | OUTPATIENT
Start: 2019-11-04 | End: 2019-11-04

## 2019-11-04 RX ORDER — ACETAMINOPHEN 500 MG
1000 TABLET ORAL ONCE
Status: COMPLETED | OUTPATIENT
Start: 2019-11-04 | End: 2019-11-04

## 2019-11-04 RX ORDER — SODIUM CHLORIDE 0.9 % (FLUSH) 0.9 %
3 SYRINGE (ML) INJECTION EVERY 12 HOURS SCHEDULED
Status: DISCONTINUED | OUTPATIENT
Start: 2019-11-04 | End: 2019-11-04 | Stop reason: HOSPADM

## 2019-11-04 RX ORDER — PROMETHAZINE HYDROCHLORIDE 25 MG/1
25 TABLET ORAL ONCE AS NEEDED
Status: DISCONTINUED | OUTPATIENT
Start: 2019-11-04 | End: 2019-11-04 | Stop reason: HOSPADM

## 2019-11-04 RX ORDER — FENTANYL CITRATE 50 UG/ML
50 INJECTION, SOLUTION INTRAMUSCULAR; INTRAVENOUS
Status: DISCONTINUED | OUTPATIENT
Start: 2019-11-04 | End: 2019-11-04 | Stop reason: HOSPADM

## 2019-11-04 RX ORDER — LABETALOL HYDROCHLORIDE 5 MG/ML
5 INJECTION, SOLUTION INTRAVENOUS
Status: DISCONTINUED | OUTPATIENT
Start: 2019-11-04 | End: 2019-11-04 | Stop reason: HOSPADM

## 2019-11-04 RX ORDER — LIDOCAINE HYDROCHLORIDE 10 MG/ML
0.5 INJECTION, SOLUTION EPIDURAL; INFILTRATION; INTRACAUDAL; PERINEURAL ONCE AS NEEDED
Status: DISCONTINUED | OUTPATIENT
Start: 2019-11-04 | End: 2019-11-04 | Stop reason: HOSPADM

## 2019-11-04 RX ORDER — DEXAMETHASONE SODIUM PHOSPHATE 10 MG/ML
INJECTION INTRAMUSCULAR; INTRAVENOUS AS NEEDED
Status: DISCONTINUED | OUTPATIENT
Start: 2019-11-04 | End: 2019-11-04 | Stop reason: SURG

## 2019-11-04 RX ORDER — NALOXONE HCL 0.4 MG/ML
0.2 VIAL (ML) INJECTION AS NEEDED
Status: DISCONTINUED | OUTPATIENT
Start: 2019-11-04 | End: 2019-11-04 | Stop reason: HOSPADM

## 2019-11-04 RX ORDER — HYDRALAZINE HYDROCHLORIDE 20 MG/ML
5 INJECTION INTRAMUSCULAR; INTRAVENOUS
Status: DISCONTINUED | OUTPATIENT
Start: 2019-11-04 | End: 2019-11-04 | Stop reason: HOSPADM

## 2019-11-04 RX ORDER — PROMETHAZINE HYDROCHLORIDE 25 MG/ML
12.5 INJECTION, SOLUTION INTRAMUSCULAR; INTRAVENOUS ONCE AS NEEDED
Status: DISCONTINUED | OUTPATIENT
Start: 2019-11-04 | End: 2019-11-04 | Stop reason: HOSPADM

## 2019-11-04 RX ORDER — MIDAZOLAM HYDROCHLORIDE 1 MG/ML
1 INJECTION INTRAMUSCULAR; INTRAVENOUS
Status: DISCONTINUED | OUTPATIENT
Start: 2019-11-04 | End: 2019-11-04 | Stop reason: HOSPADM

## 2019-11-04 RX ORDER — FAMOTIDINE 10 MG/ML
20 INJECTION, SOLUTION INTRAVENOUS ONCE
Status: COMPLETED | OUTPATIENT
Start: 2019-11-04 | End: 2019-11-04

## 2019-11-04 RX ORDER — SODIUM CHLORIDE, SODIUM LACTATE, POTASSIUM CHLORIDE, CALCIUM CHLORIDE 600; 310; 30; 20 MG/100ML; MG/100ML; MG/100ML; MG/100ML
100 INJECTION, SOLUTION INTRAVENOUS CONTINUOUS
Status: DISCONTINUED | OUTPATIENT
Start: 2019-11-04 | End: 2019-11-04 | Stop reason: HOSPADM

## 2019-11-04 RX ORDER — MIDAZOLAM HYDROCHLORIDE 1 MG/ML
2 INJECTION INTRAMUSCULAR; INTRAVENOUS
Status: DISCONTINUED | OUTPATIENT
Start: 2019-11-04 | End: 2019-11-04 | Stop reason: HOSPADM

## 2019-11-04 RX ORDER — SODIUM CHLORIDE, SODIUM LACTATE, POTASSIUM CHLORIDE, CALCIUM CHLORIDE 600; 310; 30; 20 MG/100ML; MG/100ML; MG/100ML; MG/100ML
9 INJECTION, SOLUTION INTRAVENOUS CONTINUOUS
Status: DISCONTINUED | OUTPATIENT
Start: 2019-11-04 | End: 2019-11-04 | Stop reason: HOSPADM

## 2019-11-04 RX ORDER — MAGNESIUM HYDROXIDE 1200 MG/15ML
LIQUID ORAL AS NEEDED
Status: DISCONTINUED | OUTPATIENT
Start: 2019-11-04 | End: 2019-11-04 | Stop reason: HOSPADM

## 2019-11-04 RX ORDER — ACETAMINOPHEN 325 MG/1
650 TABLET ORAL ONCE AS NEEDED
Status: DISCONTINUED | OUTPATIENT
Start: 2019-11-04 | End: 2019-11-04 | Stop reason: HOSPADM

## 2019-11-04 RX ORDER — LIDOCAINE HYDROCHLORIDE 20 MG/ML
INJECTION, SOLUTION INFILTRATION; PERINEURAL AS NEEDED
Status: DISCONTINUED | OUTPATIENT
Start: 2019-11-04 | End: 2019-11-04 | Stop reason: SURG

## 2019-11-04 RX ORDER — GLYCOPYRROLATE 0.2 MG/ML
INJECTION INTRAMUSCULAR; INTRAVENOUS AS NEEDED
Status: DISCONTINUED | OUTPATIENT
Start: 2019-11-04 | End: 2019-11-04 | Stop reason: SURG

## 2019-11-04 RX ORDER — SODIUM CHLORIDE 0.9 % (FLUSH) 0.9 %
3-10 SYRINGE (ML) INJECTION AS NEEDED
Status: DISCONTINUED | OUTPATIENT
Start: 2019-11-04 | End: 2019-11-04 | Stop reason: HOSPADM

## 2019-11-04 RX ADMIN — FENTANYL CITRATE 25 MCG: 50 INJECTION INTRAMUSCULAR; INTRAVENOUS at 12:50

## 2019-11-04 RX ADMIN — OXYCODONE HYDROCHLORIDE AND ACETAMINOPHEN 1 TABLET: 7.5; 325 TABLET ORAL at 14:23

## 2019-11-04 RX ADMIN — FENTANYL CITRATE 25 MCG: 50 INJECTION INTRAMUSCULAR; INTRAVENOUS at 12:52

## 2019-11-04 RX ADMIN — DIAZEPAM 10 MG: 5 TABLET ORAL at 08:22

## 2019-11-04 RX ADMIN — LIDOCAINE HYDROCHLORIDE 40 MG: 20 INJECTION, SOLUTION INFILTRATION; PERINEURAL at 11:53

## 2019-11-04 RX ADMIN — ACETAMINOPHEN 1000 MG: 500 TABLET, FILM COATED ORAL at 08:22

## 2019-11-04 RX ADMIN — FAMOTIDINE 20 MG: 10 INJECTION INTRAVENOUS at 10:22

## 2019-11-04 RX ADMIN — DEXAMETHASONE SODIUM PHOSPHATE 8 MG: 10 INJECTION INTRAMUSCULAR; INTRAVENOUS at 12:01

## 2019-11-04 RX ADMIN — FENTANYL CITRATE 50 MCG: 50 INJECTION, SOLUTION INTRAMUSCULAR; INTRAVENOUS at 14:22

## 2019-11-04 RX ADMIN — FENTANYL CITRATE 25 MCG: 50 INJECTION INTRAMUSCULAR; INTRAVENOUS at 12:10

## 2019-11-04 RX ADMIN — TECHNETIUM TC 99M SULFUR COLLOID 1 DOSE: KIT at 10:11

## 2019-11-04 RX ADMIN — MIDAZOLAM 1 MG: 1 INJECTION INTRAMUSCULAR; INTRAVENOUS at 10:23

## 2019-11-04 RX ADMIN — FENTANYL CITRATE 25 MCG: 50 INJECTION INTRAMUSCULAR; INTRAVENOUS at 11:57

## 2019-11-04 RX ADMIN — GLYCOPYRROLATE 0.2 MG: 0.2 INJECTION INTRAMUSCULAR; INTRAVENOUS at 11:50

## 2019-11-04 RX ADMIN — LIDOCAINE AND PRILOCAINE: 25; 25 CREAM TOPICAL at 08:21

## 2019-11-04 RX ADMIN — PROPOFOL 200 MG: 10 INJECTION, EMULSION INTRAVENOUS at 11:53

## 2019-11-04 RX ADMIN — CEFAZOLIN SODIUM 2 G: 2 INJECTION, SOLUTION INTRAVENOUS at 12:00

## 2019-11-04 RX ADMIN — ONDANSETRON HYDROCHLORIDE 4 MG: 2 SOLUTION INTRAMUSCULAR; INTRAVENOUS at 11:50

## 2019-11-04 RX ADMIN — LIDOCAINE HYDROCHLORIDE 3 ML: 10 INJECTION, SOLUTION INFILTRATION; PERINEURAL at 09:42

## 2019-11-04 RX ADMIN — SODIUM CHLORIDE, POTASSIUM CHLORIDE, SODIUM LACTATE AND CALCIUM CHLORIDE 100 ML/HR: 600; 310; 30; 20 INJECTION, SOLUTION INTRAVENOUS at 10:23

## 2019-11-04 RX ADMIN — FENTANYL CITRATE 50 MCG: 50 INJECTION, SOLUTION INTRAMUSCULAR; INTRAVENOUS at 15:02

## 2019-11-04 NOTE — ANESTHESIA POSTPROCEDURE EVALUATION
Patient: Taylor Larsen    Procedure Summary     Date:  11/04/19 Room / Location:   [facility]  /  [facility]    Anesthesia Start:  1147 Anesthesia Stop:  1344    Procedure:  BREAST LUMPECTOMY WITH SENTINEL NODE BIOPSY AND NEEDLE LOCALIZATION And possible axillary dissection (Right Breast) Diagnosis:       Malignant neoplasm of upper-outer quadrant of right breast in female, estrogen receptor positive (CMS/HCC)      (Malignant neoplasm of upper-outer quadrant of right breast in female, estrogen receptor positive (CMS/HCC) [C50.411, Z17.0])    Surgeon:  Jean-Paul Ramos MD Provider:  Julia Smith MD    Anesthesia Type:  general ASA Status:  3          Anesthesia Type: general  Last vitals  BP   145/87 (11/04/19 1532)   Temp   36.2 °C (97.1 °F) (11/04/19 1500)   Pulse   81 (11/04/19 1532)   Resp   16 (11/04/19 1532)     SpO2   95 % (11/04/19 1532)     Post Anesthesia Care and Evaluation    Patient location during evaluation: PHASE II  Patient participation: complete - patient participated  Level of consciousness: awake  Pain management: adequate  Airway patency: patent  Anesthetic complications: No anesthetic complications    Cardiovascular status: acceptable  Respiratory status: acceptable  Hydration status: acceptable    Comments: /87 (BP Location: Left arm, Patient Position: Lying)   Pulse 81   Temp 36.2 °C (97.1 °F) (Temporal)   Resp 16   Wt 85.5 kg (188 lb 7.9 oz)   SpO2 95%   BMI 33.39 kg/m²

## 2019-11-04 NOTE — OP NOTE
Needle Localization Breast lumpectomy, [place] Node Biopsy Procedure Note    Name: Taylor Larsen  Age: 64 y.o.  Sex: female  :  1955  MRN: [id number]      Pre-operative Diagnosis:rightBreast cancer,     Post-operative Diagnosis: Same    Procedure:right Needle Localization Breast lumpectomy, [place] Node Biopsy with blue dye and radioactive sulfur colloid injection    Surgeon: Jean-Paul Ramos MD    Assistants: none    Anesthesia: General    Indications: This patient recently was diagnosed with right breast cancer after presenting with a mammogram abnormality.  Subsequent biopsy has revealed invasive ductal cancer.  An MRI does not show any residual enhancement and the patient prefers breast conserving surgery.      Procedure Details   The patient was seen in the Holding Room. The risks, benefits, complications, treatment options, and expected outcomes were discussed with the patient. The possibilities of reaction to medication, pulmonary aspiration, bleeding, infection, the need for additional procedures, failure to diagnose a condition, and creating a complication requiring transfusion or operation were discussed with the patient. The patient concurred with the proposed plan, giving informed consent.  The site of surgery properly noted/marked.    The patient underwent preoperative guidewire localization of a mammographic abnormality in the upper outer aspect of the right breast.  The patient was also taken to the nuclear med department where a radioisotope injection was performed in the periareolar area of the affected breast  in the usual fashion.     The patient was then taken to Operating Room; identified patient as Taylor Larsen  and the procedure verified as rightNeedle Localization  Breast lumpectomy, with sentinel node biopsy, possible axillary dissection.   A Time Out was held and the above information confirmed.    3 cc of blue dye were injected into the breast near the localization  site.     The breast was prepped and draped in standard fashion. Marcaine  0.50% with epinephrine was used to anesthetize the skin at the site of the guidewire placement and in the axilla.  A total of 30 cc was used.  An axillary incision was made in the area of the hotspot, found with the neoprobe.  2 sentinel node(s) was/were found.  Both sentinel lymph nodes were deep.  The lymph node with the highest counts was 5100.  This lymph node also had some blue.. No other nodes were found with counts over 510 and there were no other blue nodes.  Frozen section was performed.   Skin closure was performed in layers with vicryl.     A curvilinear incision was created on the breast near the localization site.  Dissection was carried down to the localized area which was completely excised.  A specimen radiograph confirmed inclusion of the preoperatively identified mammographic lesion/ clip.  Additional shave margins were obtained from areas thought to be close.  This included the superior, inferior, medial, lateral, and posterior margins. Hemostasis was achieved with cautery.  Closure was performed  with interrupted 3-0 Vicryl sutures for the deeper layers and a 4-0 Vicryl subcuticular closure.      Steri-Strips were applied. At the end of the operation, all sponge, instrument, and needle counts were correct.    Findings:  There were no unexpected findings  Estimated Blood Loss:  Minimal           Drains: none          Specimens:   ID Type Source Tests Collected by Time   A : single short stitch=superior, single long stitch=lateral, double stitch=anterior Tissue Breast, Right TISSUE PATHOLOGY EXAM Jean-Paul Ramos MD 11/4/2019 1218   B : superior; stitich marks new margin Tissue Breast, Right TISSUE PATHOLOGY EXAM Jean-Paul Ramos MD 11/4/2019 1226   C : inferior; stitch marks new margin Tissue Breast, Right TISSUE PATHOLOGY EXAM Jean-Paul Ramos MD 11/4/2019 1229   D : medial: stitch marks new margin Tissue  Breast, Right TISSUE PATHOLOGY EXAM Jean-Paul Ramos MD 11/4/2019 1232   E : lateral; stitch marks new margin Tissue Breast, Right TISSUE PATHOLOGY EXAM Jean-Paul Ramos MD 11/4/2019 1233   F : posterior; stitch marks new margin Tissue Breast, Right TISSUE PATHOLOGY EXAM Jean-Paul Ramos MD 11/4/2019 1235   G : RIGHT SENTINEL NODE #1 Tissue [place] Lymph Node TISSUE PATHOLOGY EXAM Jean-Paul Ramos MD 11/4/2019 1251   H : RIGHT SENTINEL NODE #2 Tissue [place] Lymph Node TISSUE PATHOLOGY EXAM Jean-Paul Ramos MD 11/4/2019 1302       Complications:  None; patient tolerated the procedure well.           Condition: stable

## 2019-11-04 NOTE — ANESTHESIA PROCEDURE NOTES
Airway  Urgency: elective    Date/Time: 11/4/2019 11:54 AM  Airway not difficult    General Information and Staff    Patient location during procedure: OR  Anesthesiologist: Jean-Paul Alvarenga MD  CRNA: Yudelka Castorena CRNA    Indications and Patient Condition  Indications for airway management: airway protection    Preoxygenated: yes  Mask difficulty assessment: 1 - vent by mask    Final Airway Details  Final airway type: supraglottic airway      Successful airway: unique  Size 4    Number of attempts at approach: 1  Assessment: lips, teeth, and gum same as pre-op and atraumatic intubation

## 2019-11-04 NOTE — ANESTHESIA PREPROCEDURE EVALUATION
Anesthesia Evaluation     Patient summary reviewed and Nursing notes reviewed   history of anesthetic complications: PONV  NPO Solid Status: > 8 hours  NPO Liquid Status: > 2 hours           Airway   Mallampati: II  TM distance: >3 FB  Neck ROM: full  no difficulty expected  Dental - normal exam     Pulmonary - negative pulmonary ROS and normal exam    breath sounds clear to auscultation  (-) decreased breath sounds, wheezes  Cardiovascular - normal exam  Exercise tolerance: good (4-7 METS)    Rhythm: regular  Rate: normal    (-) hypertension      Neuro/Psych- negative ROS  (-) seizures, CVA  GI/Hepatic/Renal/Endo    (+) obesity,     (-) diabetes    Musculoskeletal (-) negative ROS    Abdominal  - normal exam   Substance History - negative use  (-) alcohol use, drug use     OB/GYN negative ob/gyn ROS         Other      history of cancer                  Anesthesia Plan    ASA 3     general     intravenous induction   Anesthetic plan, all risks, benefits, and alternatives have been provided, discussed and informed consent has been obtained with: patient.    Plan discussed with CRNA.

## 2019-11-04 NOTE — H&P
History of Present Illness:    She is a nice 63-year-old white female who underwent screening mammograms several weeks ago.  She was found to have a new around the mass measuring 5 mm in the right breast at the 12 o'clock position.  She went for diagnostic mammography and the small round mass became more defined.  Ultrasound revealed a 4 mm round solid mass.  At the 11 o'clock position there was also a complicated cyst versus solid mass measuring 5 mm.  The patient underwent aspiration of the 11 o'clock position which revealed no malignant cells.  There were changes consistent with benign fibrocystic disease.  The area at 12:00 was biopsied and revealed a grade 1 invasive ductal cancer that was ER positive at 90%, MI positive at 95%, and HER-2 negative.  The Ki-67 was 4.6%.      An MRI was then performed and revealed a 5 cm hematoma cavity but no residual suspicious enhancement.          Past Medical and Surgical History:       Surgical History         Past Surgical History:   Procedure Laterality Date   • BREAST BIOPSY Right 09/2019     malignant   • KNEE SURGERY       • LAPAROSCOPIC CHOLECYSTECTOMY       • TUBAL ABDOMINAL LIGATION                Medical History        Past Medical History:   Diagnosis Date   • Breast cancer (CMS/HCC) 09/2019     Right IDC                 Social History:  Patient is .  Patient has one daughters. and Patient has one sons.       Vital Signs: Blood pressure 139/87, pulse 90, temperature 97.9       Medications:     Current Outpatient Prescriptions:      Current Outpatient Medications:   •  melatonin 5 MG tablet tablet, Take 10 mg by mouth., Disp: , Rfl:   •  doxylamine (UNISOM) 25 MG tablet, Take  by mouth., Disp: , Rfl:   •  estradiol (ESTRACE) 0.5 MG tablet, Take 1 tablet by mouth Daily., Disp: 30 tablet, Rfl: 11  •  medroxyPROGESTERone (PROVERA) 2.5 MG tablet, Take 1 tablet by mouth Daily., Disp: 30 tablet, Rfl: 11     Physical Examination:  General Appearance:   Patient is  in no distress.  She is well kept and has an obese build.   Psychiatric:  Patient with appropriate mood and affect. Alert and oriented to self, time, and place.     Breast, RIGHT:  medium sized,  Breast skin is without erythema, edema, rashes.  There is a cup in place from her recent localization         Cardiovascular:  Heart rate and rhythm are regular.  Respiratory:  Lungs are clear bilaterally with no crackles or wheezes in any lung field.  Gastrointestinal:  Abdomen is soft, nondistended, and nontender.  No obvious hepatosplenomegaly or abdominal mass.  There are no scars from previous surgery.               Assessment:  1. Malignant neoplasm of upper-outer quadrant of right breast in female, estrogen receptor positive (CMS/HCC)      Plan-the patient prefers breast conserving surgery and appears to be a good candidate for that.  She understands the risk of her return to surgery for positive margins.  She is also aware of the risk of infection, bleeding, or an anesthetic complication.

## 2019-11-06 ENCOUNTER — TELEPHONE (OUTPATIENT)
Dept: MAMMOGRAPHY | Facility: CLINIC | Age: 64
End: 2019-11-06

## 2019-11-08 ENCOUNTER — TELEPHONE (OUTPATIENT)
Dept: MAMMOGRAPHY | Facility: CLINIC | Age: 64
End: 2019-11-08

## 2019-11-08 NOTE — TELEPHONE ENCOUNTER
Daughter (Shanon Bhagat) called stating Ally has had diarrhea since Wednesday. She took Imodium yesterday and it is not helping. Daughter is worried she's dehydrated. [phone] or [phone]

## 2019-11-08 NOTE — TELEPHONE ENCOUNTER
Called and spoke to daughter. I told her Dr. Ramos also had recommended a probiotic. She is going to get that and try more fluids. I told her Dr. Ramos suggested calling her PCP or the emergency room if she feels she is too dehydrated.

## 2019-11-10 ENCOUNTER — APPOINTMENT (OUTPATIENT)
Dept: GENERAL RADIOLOGY | Facility: HOSPITAL | Age: 64
End: 2019-11-10

## 2019-11-10 ENCOUNTER — APPOINTMENT (OUTPATIENT)
Dept: CT IMAGING | Facility: HOSPITAL | Age: 64
End: 2019-11-10

## 2019-11-10 ENCOUNTER — HOSPITAL ENCOUNTER (INPATIENT)
Facility: HOSPITAL | Age: 64
LOS: 5 days | Discharge: HOME | End: 2019-11-15
Attending: EMERGENCY MEDICINE | Admitting: HOSPITALIST

## 2019-11-10 DIAGNOSIS — K52.9 ACUTE COLITIS: Primary | ICD-10-CM

## 2019-11-10 PROBLEM — D72.829 LEUKOCYTOSIS: Status: ACTIVE | Noted: 2019-11-10

## 2019-11-10 PROBLEM — R16.0 LIVER MASS: Status: ACTIVE | Noted: 2019-11-10

## 2019-11-10 PROBLEM — R19.7 DIARRHEA: Status: ACTIVE | Noted: 2019-11-10

## 2019-11-10 PROBLEM — E87.6 HYPOKALEMIA: Status: ACTIVE | Noted: 2019-11-10

## 2019-11-10 LAB
ALBUMIN SERPL-MCNC: 3.1 G/DL (ref 3.5–5.2)
ALBUMIN/GLOB SERPL: 0.7 G/DL
ALP SERPL-CCNC: 103 U/L (ref 39–117)
ALT SERPL W P-5'-P-CCNC: 15 U/L (ref 1–33)
ANION GAP SERPL CALCULATED.3IONS-SCNC: 12.3 MMOL/L (ref 5–15)
AST SERPL-CCNC: 19 U/L (ref 1–32)
BASOPHILS # BLD AUTO: 0.08 10*3/MM3 (ref 0–0.2)
BASOPHILS NFR BLD AUTO: 0.4 % (ref 0–1.5)
BILIRUB SERPL-MCNC: 0.6 MG/DL (ref 0.2–1.2)
BILIRUB UR QL STRIP: NEGATIVE
BUN BLD-MCNC: 15 MG/DL (ref 8–23)
BUN/CREAT SERPL: 16.9 (ref 7–25)
CALCIUM SPEC-SCNC: 8.4 MG/DL (ref 8.6–10.5)
CHLORIDE SERPL-SCNC: 94 MMOL/L (ref 98–107)
CLARITY UR: CLEAR
CO2 SERPL-SCNC: 29.7 MMOL/L (ref 22–29)
COLOR UR: YELLOW
CREAT BLD-MCNC: 0.89 MG/DL (ref 0.57–1)
D-LACTATE SERPL-SCNC: 1.7 MMOL/L (ref 0.5–2)
DEPRECATED RDW RBC AUTO: 40.6 FL (ref 37–54)
EOSINOPHIL # BLD AUTO: 0.23 10*3/MM3 (ref 0–0.4)
EOSINOPHIL NFR BLD AUTO: 1 % (ref 0.3–6.2)
ERYTHROCYTE [DISTWIDTH] IN BLOOD BY AUTOMATED COUNT: 12.2 % (ref 12.3–15.4)
GFR SERPL CREATININE-BSD FRML MDRD: 64 ML/MIN/1.73
GLOBULIN UR ELPH-MCNC: 4.3 GM/DL
GLUCOSE BLD-MCNC: 105 MG/DL (ref 65–99)
GLUCOSE UR STRIP-MCNC: NEGATIVE MG/DL
HCT VFR BLD AUTO: 38.8 % (ref 34–46.6)
HGB BLD-MCNC: 13.5 G/DL (ref 12–15.9)
HGB UR QL STRIP.AUTO: NEGATIVE
IMM GRANULOCYTES # BLD AUTO: 0.21 10*3/MM3 (ref 0–0.05)
IMM GRANULOCYTES NFR BLD AUTO: 0.9 % (ref 0–0.5)
KETONES UR QL STRIP: NEGATIVE
LEUKOCYTE ESTERASE UR QL STRIP.AUTO: NEGATIVE
LIPASE SERPL-CCNC: 35 U/L (ref 13–60)
LYMPHOCYTES # BLD AUTO: 3.78 10*3/MM3 (ref 0.7–3.1)
LYMPHOCYTES NFR BLD AUTO: 17 % (ref 19.6–45.3)
MCH RBC QN AUTO: 31.6 PG (ref 26.6–33)
MCHC RBC AUTO-ENTMCNC: 34.8 G/DL (ref 31.5–35.7)
MCV RBC AUTO: 90.9 FL (ref 79–97)
MONOCYTES # BLD AUTO: 2.5 10*3/MM3 (ref 0.1–0.9)
MONOCYTES NFR BLD AUTO: 11.2 % (ref 5–12)
NEUTROPHILS # BLD AUTO: 15.47 10*3/MM3 (ref 1.7–7)
NEUTROPHILS NFR BLD AUTO: 69.5 % (ref 42.7–76)
NITRITE UR QL STRIP: NEGATIVE
NRBC BLD AUTO-RTO: 0 /100 WBC (ref 0–0.2)
PH UR STRIP.AUTO: 6 [PH] (ref 5–8)
PLATELET # BLD AUTO: 238 10*3/MM3 (ref 140–450)
PMV BLD AUTO: 10.2 FL (ref 6–12)
POTASSIUM BLD-SCNC: 3 MMOL/L (ref 3.5–5.2)
PROCALCITONIN SERPL-MCNC: 0.14 NG/ML (ref 0.1–0.25)
PROT SERPL-MCNC: 7.4 G/DL (ref 6–8.5)
PROT UR QL STRIP: ABNORMAL
RBC # BLD AUTO: 4.27 10*6/MM3 (ref 3.77–5.28)
SODIUM BLD-SCNC: 136 MMOL/L (ref 136–145)
SP GR UR STRIP: >=1.03 (ref 1–1.03)
UROBILINOGEN UR QL STRIP: ABNORMAL
WBC NRBC COR # BLD: 22.27 10*3/MM3 (ref 3.4–10.8)

## 2019-11-10 PROCEDURE — 87040 BLOOD CULTURE FOR BACTERIA: CPT | Performed by: EMERGENCY MEDICINE

## 2019-11-10 PROCEDURE — 81003 URINALYSIS AUTO W/O SCOPE: CPT | Performed by: EMERGENCY MEDICINE

## 2019-11-10 PROCEDURE — 99284 EMERGENCY DEPT VISIT MOD MDM: CPT

## 2019-11-10 PROCEDURE — 25810000003 SODIUM CHLORIDE 0.9 % WITH KCL 20 MEQ 20-0.9 MEQ/L-% SOLUTION: Performed by: HOSPITALIST

## 2019-11-10 PROCEDURE — 71046 X-RAY EXAM CHEST 2 VIEWS: CPT

## 2019-11-10 PROCEDURE — 84145 PROCALCITONIN (PCT): CPT | Performed by: EMERGENCY MEDICINE

## 2019-11-10 PROCEDURE — 80053 COMPREHEN METABOLIC PANEL: CPT | Performed by: EMERGENCY MEDICINE

## 2019-11-10 PROCEDURE — 74177 CT ABD & PELVIS W/CONTRAST: CPT

## 2019-11-10 PROCEDURE — 83690 ASSAY OF LIPASE: CPT | Performed by: EMERGENCY MEDICINE

## 2019-11-10 PROCEDURE — 87493 C DIFF AMPLIFIED PROBE: CPT | Performed by: EMERGENCY MEDICINE

## 2019-11-10 PROCEDURE — 85025 COMPLETE CBC W/AUTO DIFF WBC: CPT | Performed by: EMERGENCY MEDICINE

## 2019-11-10 PROCEDURE — 0097U HC BIOFIRE FILMARRAY GI PANEL: CPT | Performed by: EMERGENCY MEDICINE

## 2019-11-10 PROCEDURE — 83605 ASSAY OF LACTIC ACID: CPT | Performed by: EMERGENCY MEDICINE

## 2019-11-10 PROCEDURE — 25010000002 LEVOFLOXACIN PER 250 MG: Performed by: EMERGENCY MEDICINE

## 2019-11-10 PROCEDURE — 25010000002 IOPAMIDOL 61 % SOLUTION: Performed by: EMERGENCY MEDICINE

## 2019-11-10 RX ORDER — ACETAMINOPHEN 325 MG/1
650 TABLET ORAL EVERY 6 HOURS PRN
Status: DISCONTINUED | OUTPATIENT
Start: 2019-11-10 | End: 2019-11-15 | Stop reason: HOSPADM

## 2019-11-10 RX ORDER — SODIUM CHLORIDE 9 MG/ML
125 INJECTION, SOLUTION INTRAVENOUS CONTINUOUS
Status: DISCONTINUED | OUTPATIENT
Start: 2019-11-10 | End: 2019-11-10

## 2019-11-10 RX ORDER — LEVOFLOXACIN 5 MG/ML
500 INJECTION, SOLUTION INTRAVENOUS ONCE
Status: COMPLETED | OUTPATIENT
Start: 2019-11-10 | End: 2019-11-10

## 2019-11-10 RX ORDER — CHOLECALCIFEROL (VITAMIN D3) 125 MCG
10 CAPSULE ORAL NIGHTLY PRN
Status: DISCONTINUED | OUTPATIENT
Start: 2019-11-10 | End: 2019-11-15 | Stop reason: HOSPADM

## 2019-11-10 RX ORDER — SODIUM CHLORIDE AND POTASSIUM CHLORIDE 150; 900 MG/100ML; MG/100ML
125 INJECTION, SOLUTION INTRAVENOUS CONTINUOUS
Status: DISCONTINUED | OUTPATIENT
Start: 2019-11-10 | End: 2019-11-15 | Stop reason: HOSPADM

## 2019-11-10 RX ORDER — ONDANSETRON 2 MG/ML
4 INJECTION INTRAMUSCULAR; INTRAVENOUS EVERY 6 HOURS PRN
Status: DISCONTINUED | OUTPATIENT
Start: 2019-11-10 | End: 2019-11-15 | Stop reason: HOSPADM

## 2019-11-10 RX ORDER — SODIUM CHLORIDE 0.9 % (FLUSH) 0.9 %
10 SYRINGE (ML) INJECTION EVERY 12 HOURS SCHEDULED
Status: DISCONTINUED | OUTPATIENT
Start: 2019-11-10 | End: 2019-11-15 | Stop reason: HOSPADM

## 2019-11-10 RX ORDER — FAMOTIDINE 10 MG/ML
20 INJECTION, SOLUTION INTRAVENOUS EVERY 12 HOURS SCHEDULED
Status: DISCONTINUED | OUTPATIENT
Start: 2019-11-10 | End: 2019-11-15 | Stop reason: HOSPADM

## 2019-11-10 RX ORDER — SODIUM CHLORIDE 0.9 % (FLUSH) 0.9 %
10 SYRINGE (ML) INJECTION AS NEEDED
Status: DISCONTINUED | OUTPATIENT
Start: 2019-11-10 | End: 2019-11-15 | Stop reason: HOSPADM

## 2019-11-10 RX ORDER — POTASSIUM CHLORIDE 750 MG/1
40 CAPSULE, EXTENDED RELEASE ORAL ONCE
Status: COMPLETED | OUTPATIENT
Start: 2019-11-10 | End: 2019-11-10

## 2019-11-10 RX ORDER — ONDANSETRON 4 MG/1
4 TABLET, FILM COATED ORAL EVERY 6 HOURS PRN
Status: DISCONTINUED | OUTPATIENT
Start: 2019-11-10 | End: 2019-11-15 | Stop reason: HOSPADM

## 2019-11-10 RX ADMIN — LEVOFLOXACIN 500 MG: 5 INJECTION, SOLUTION INTRAVENOUS at 18:22

## 2019-11-10 RX ADMIN — POTASSIUM CHLORIDE 40 MEQ: 750 CAPSULE, EXTENDED RELEASE ORAL at 21:04

## 2019-11-10 RX ADMIN — SODIUM CHLORIDE 125 ML/HR: 9 INJECTION, SOLUTION INTRAVENOUS at 17:07

## 2019-11-10 RX ADMIN — METRONIDAZOLE 500 MG: 500 INJECTION, SOLUTION INTRAVENOUS at 17:07

## 2019-11-10 RX ADMIN — SODIUM CHLORIDE, PRESERVATIVE FREE 10 ML: 5 INJECTION INTRAVENOUS at 21:05

## 2019-11-10 RX ADMIN — ACETAMINOPHEN 650 MG: 325 TABLET, FILM COATED ORAL at 23:20

## 2019-11-10 RX ADMIN — SODIUM CHLORIDE 1000 ML: 9 INJECTION, SOLUTION INTRAVENOUS at 15:09

## 2019-11-10 RX ADMIN — POTASSIUM CHLORIDE AND SODIUM CHLORIDE 125 ML/HR: 900; 150 INJECTION, SOLUTION INTRAVENOUS at 21:04

## 2019-11-10 RX ADMIN — IOPAMIDOL 85 ML: 612 INJECTION, SOLUTION INTRAVENOUS at 16:27

## 2019-11-10 RX ADMIN — FAMOTIDINE 20 MG: 10 INJECTION INTRAVENOUS at 23:20

## 2019-11-10 RX ADMIN — Medication 10 MG: at 23:20

## 2019-11-10 NOTE — ED NOTES
Pt had breast lumpectomy six days ago. Since then, she has had diarrhea despite taking imodium and probiotics.      Cha Galloway, RN  11/10/19 3025

## 2019-11-10 NOTE — ED PROVIDER NOTES
EMERGENCY DEPARTMENT ENCOUNTER    CHIEF COMPLAINT  Chief Complaint: Abd pain  History given by: Patient  History limited by: None  Room Number: 25/25  PMD: Tommy Osborn MD      HPI:  Pt is a 64 y.o. female who presents complaining of gradual onset of non-radiating intermittent diffuse abd pain associated with N/V/D and fever since 11/05/19. She reports that her sxs have been progressively worsening since onset. Pt denies SOA, CP, diaphoresis or urinary sxs. Pt denies any aggravating or alleviating factors. Pt denies any triggering factors. Pt denies having similar sxs in the past. Pt denies taking any medications for her current sxs PTA. Pt denies Being around ill contacts. Pt denies recent use of abx. Pt reports she had a breast lumpectomy on 11/04/19. Family at bedside.    Duration:  Since 11/05/19  Onset: Gradual  Timing: Intermittent   Location: Diffuse abd  Radiation: None  Intensity/Severity: Moderate  Progression: Worsening   Associated Symptoms: N/V/D and fever  Aggravating Factors: None  Alleviating Factors: None  Previous Episodes: None  Treatment before arrival: None    PAST MEDICAL HISTORY  Active Ambulatory Problems     Diagnosis Date Noted   • Visit for gynecologic examination 06/28/2017   • Vasomotor symptoms due to menopause 06/28/2017   • Screening for cervical cancer 07/25/2018   • Malignant neoplasm of upper-outer quadrant of right breast in female, estrogen receptor positive (CMS/HCC) 10/14/2019     Resolved Ambulatory Problems     Diagnosis Date Noted   • No Resolved Ambulatory Problems     Past Medical History:   Diagnosis Date   • Breast cancer (CMS/HCC) 09/2019   • GERD (gastroesophageal reflux disease)    • PONV (postoperative nausea and vomiting)    • Situational stress    • Stress headaches        PAST SURGICAL HISTORY  Past Surgical History:   Procedure Laterality Date   • BILE DUCT STENT PLACEMENT  2014   • BREAST BIOPSY Right 09/2019    malignant   • BREAST LUMPECTOMY WITH  SENTINEL NODE BIOPSY Right 2019    Procedure: BREAST LUMPECTOMY WITH SENTINEL NODE BIOPSY AND NEEDLE LOCALIZATION And possible axillary dissection;  Surgeon: Jean-Paul Ramos MD;  [place] [facility] [place] [facility]  Service: General   • KNEE ARTHROSCOPY Left    • LAPAROSCOPIC CHOLECYSTECTOMY     • TUBAL ABDOMINAL LIGATION         FAMILY HISTORY  Family History   Problem Relation Age of Onset   • Alcohol abuse Father    • Heart failure Mother    • Diabetes Brother    • Prostate cancer Brother    • Throat cancer Brother    • Breast cancer Maternal Aunt    • Breast cancer Maternal Cousin    • Breast cancer Maternal Cousin    • Malig Hyperthermia Neg Hx        SOCIAL HISTORY  Social History     Socioeconomic History   • Marital status:      Spouse name: Not on file   • Number of children: 2   • Years of education: Not on file   • Highest education level: Not on file   Occupational History     Employer: [employer]   Tobacco Use   • Smoking status: Former Smoker     Packs/day: 0.50     Years: 10.00     Pack years: 5.00     Types: Cigarettes     Last attempt to quit:      Years since quittin.8   • Smokeless tobacco: Former User   Substance and Sexual Activity   • Alcohol use: No   • Drug use: No   • Sexual activity: No     Birth control/protection: Post-menopausal       ALLERGIES  Patient has no known allergies.    REVIEW OF SYSTEMS  Review of Systems   Constitutional: Positive for fever. Negative for diaphoresis.   HENT: Negative for sore throat.    Eyes: Negative.    Respiratory: Negative for cough and shortness of breath.    Cardiovascular: Negative for chest pain.   Gastrointestinal: Positive for abdominal pain (diffuse), diarrhea, nausea and vomiting.   Genitourinary: Negative.  Negative for dysuria.   Musculoskeletal: Negative for neck pain.   Skin: Negative for rash.   Allergic/Immunologic: Negative.    Neurological: Negative for weakness, numbness and headaches.   Hematological: Negative.     Psychiatric/Behavioral: Negative.    All other systems reviewed and are negative.      PHYSICAL EXAM  ED Triage Vitals   Temp Heart Rate Resp BP SpO2   11/10/19 1313 11/10/19 1313 11/10/19 1313 11/10/19 1400 11/10/19 1313   100.3 °F (37.9 °C) 116 20 116/63 97 %      Temp src Heart Rate Source Patient Position BP Location FiO2 (%)   11/10/19 1313 -- -- -- --   Tympanic             Physical Exam   Constitutional: She is oriented to person, place, and time. No distress.   HENT:   Head: Normocephalic and atraumatic.   Eyes: EOM are normal. Pupils are equal, round, and reactive to light.   Neck: Normal range of motion. Neck supple.   Cardiovascular: Normal rate, regular rhythm, normal heart sounds and intact distal pulses.   Pulmonary/Chest: Effort normal and breath sounds normal. No respiratory distress.   Abdominal: Soft. She exhibits no distension. There is no tenderness. There is no rebound and no guarding.   Musculoskeletal: Normal range of motion. She exhibits no edema.   Neurological: She is alert and oriented to person, place, and time. She has normal sensation and normal strength.   Skin: Skin is warm and dry. No rash noted.   Psychiatric: Mood and affect normal.   Nursing note and vitals reviewed.      LAB RESULTS  Lab Results (last 24 hours)     Procedure Component Value Units Date/Time    CBC & Differential [[id number]] Collected:  11/10/19 1506    Specimen:  Blood Updated:  11/10/19 1516    Narrative:       The following orders were created for panel order CBC & Differential.  Procedure                               Abnormality         Status                     ---------                               -----------         ------                     CBC Auto Differential[[id number]]        Abnormal            Final result                 Please view results for these tests on the individual orders.    Comprehensive Metabolic Panel [[id number]]  (Abnormal) Collected:  11/10/19 1506    Specimen:  Blood Updated:   11/10/19 1546     Glucose 105 mg/dL      BUN 15 mg/dL      Creatinine 0.89 mg/dL      Sodium 136 mmol/L      Potassium 3.0 mmol/L      Chloride 94 mmol/L      CO2 29.7 mmol/L      Calcium 8.4 mg/dL      Total Protein 7.4 g/dL      Albumin 3.10 g/dL      ALT (SGPT) 15 U/L      AST (SGOT) 19 U/L      Alkaline Phosphatase 103 U/L      Total Bilirubin 0.6 mg/dL      eGFR Non African Amer 64 mL/min/1.73      Globulin 4.3 gm/dL      A/G Ratio 0.7 g/dL      BUN/Creatinine Ratio 16.9     Anion Gap 12.3 mmol/L     Narrative:       GFR Normal >60  Chronic Kidney Disease <60  Kidney Failure <15    Lipase [[id number]]  (Normal) Collected:  11/10/19 1506    Specimen:  Blood Updated:  11/10/19 1534     Lipase 35 U/L     CBC Auto Differential [[id number]]  (Abnormal) Collected:  11/10/19 1506    Specimen:  Blood Updated:  11/10/19 1516     WBC 22.27 10*3/mm3      RBC 4.27 10*6/mm3      Hemoglobin 13.5 g/dL      Hematocrit 38.8 %      MCV 90.9 fL      MCH 31.6 pg      MCHC 34.8 g/dL      RDW 12.2 %      RDW-SD 40.6 fl      MPV 10.2 fL      Platelets 238 10*3/mm3      Neutrophil % 69.5 %      Lymphocyte % 17.0 %      Monocyte % 11.2 %      Eosinophil % 1.0 %      Basophil % 0.4 %      Immature Grans % 0.9 %      Neutrophils, Absolute 15.47 10*3/mm3      Lymphocytes, Absolute 3.78 10*3/mm3      Monocytes, Absolute 2.50 10*3/mm3      Eosinophils, Absolute 0.23 10*3/mm3      Basophils, Absolute 0.08 10*3/mm3      Immature Grans, Absolute 0.21 10*3/mm3      nRBC 0.0 /100 WBC     Procalcitonin [[id number]] Collected:  11/10/19 1506    Specimen:  Blood Updated:  11/10/19 1751    Urinalysis With Microscopic If Indicated (No Culture) - Urine, Clean Catch [[id number]]  (Abnormal) Collected:  11/10/19 1644    Specimen:  Urine, Clean Catch Updated:  11/10/19 1716     Color, UA Yellow     Appearance, UA Clear     pH, UA 6.0     Specific Gravity, UA >=1.030     Glucose, UA Negative     Ketones, UA Negative     Bilirubin, UA Negative     Blood, UA  Negative     Protein, UA Trace     Leuk Esterase, UA Negative     Nitrite, UA Negative     Urobilinogen, UA 1.0 E.U./dL    Narrative:       Urine microscopic not indicated.    Lactic Acid, Plasma [[id number]] Collected:  11/10/19 1648    Specimen:  Blood Updated:  11/10/19 1655    Blood Culture - Blood, Arm, Right [[id number]] Collected:  11/10/19 1654    Specimen:  Blood from Arm, Right Updated:  11/10/19 1704    Blood Culture - Blood, Hand, Right [[id number]] Collected:  11/10/19 1654    Specimen:  Blood from Hand, Right Updated:  11/10/19 1703          I ordered the above labs and reviewed the results    RADIOLOGY  CT Abdomen Pelvis With Contrast   Final Result           1. Diffuse colitis.   2. Abnormal appearance of the liver that may reflect postsurgical change   (correlate clinically), or potentially malignant neoplasm; if this   finding has not already been definitively characterized, initial further   evaluation with hepatic MRI would be recommended.       Discussed by telephone with Dr. Velasco at 1645, 11/10/2019.       This report was finalized on 11/10/2019 4:55 PM by Dr. Franky Osullivan M.D.          XR Chest 2 View   Final Result   1. No acute process.       This report was finalized on 11/10/2019 4:39 PM by Dr. Rudi Gonzalez M.D.               I ordered the above noted radiological studies. Interpreted by radiologist. Discussed with radiologist (Dr. Osullivan). Reviewed by me in PACS.       PROCEDURES  Procedures      PROGRESS AND CONSULTS        1452: Ordered labs and CXR for further evaluation and management.    1536: Ordered CT abd/pelvis for further evaluation and management.    1646: Discussed pt case with Dr. Osullivan (radiology) who states that CT abd/pelvis show diffuse colitis.     1647: Rechecked pt. Pt is resting comfortably. Notified pt of results of CT abd/pelvis showing diffuse colitis. Discussed the plan to admit the pt for further evaluation and management. Pt and family agree  with the plan and all questions were addressed.    1649: Ordered Flagyl and Levaquin for colitis.     1706: Discussed pt case with Dr. Rodrigues ([facility]) who will admit the patient for further evaluation and management.      MEDICAL DECISION MAKING  Results were reviewed/discussed with the patient and they were also made aware of online access. Pt also made aware that some labs, such as cultures, will not be resulted during ER visit and follow up with PMD is necessary.     MDM  Number of Diagnoses or Management Options  Acute colitis:      Amount and/or Complexity of Data Reviewed  Clinical lab tests: ordered and reviewed (UA protein trace   Lipase 35  Creatinine 0.89  WBC 22.27)  Tests in the radiology section of CPT®: ordered and reviewed (CT abd/pelvis show diffuse colitis. CXR negative acute.)  Decide to obtain previous medical records or to obtain history from someone other than the patient: yes  Discuss the patient with other providers: yes (Dr. Osullivan (radiology) and Dr. Rodrigues ([facility]))           DIAGNOSIS  Final diagnoses:   Acute colitis       DISPOSITION  ADMISSION    Discussed treatment plan and reason for admission with pt/family and admitting physician.  Pt/family voiced understanding of the plan for admission for further testing/treatment as needed.         Latest Documented Vital Signs:  As of 5:52 PM  BP- 131/69 HR- 91 Temp- 100.3 °F (37.9 °C) (Tympanic) O2 sat- 93%    --  Documentation assistance provided by filemon Sanchez for Dr. Flynn Velasco MD.  Information recorded by the scribpaula was done at my direction and has been verified and validated by me.         Maryam Sanchez  11/10/19 4348       Flynn Velasco MD  11/10/19 4479

## 2019-11-11 ENCOUNTER — INPATIENT HOSPITAL (OUTPATIENT)
Dept: URBAN - METROPOLITAN AREA HOSPITAL 113 | Facility: HOSPITAL | Age: 64
End: 2019-11-11
Payer: COMMERCIAL

## 2019-11-11 ENCOUNTER — APPOINTMENT (OUTPATIENT)
Dept: MRI IMAGING | Facility: HOSPITAL | Age: 64
End: 2019-11-11

## 2019-11-11 ENCOUNTER — APPOINTMENT (OUTPATIENT)
Dept: ULTRASOUND IMAGING | Facility: HOSPITAL | Age: 64
End: 2019-11-11

## 2019-11-11 DIAGNOSIS — K52.9 NONINFECTIVE GASTROENTERITIS AND COLITIS, UNSPECIFIED: ICD-10-CM

## 2019-11-11 DIAGNOSIS — A04.72 ENTEROCOLITIS DUE TO CLOSTRIDIUM DIFFICILE, NOT SPECIFIED AS: ICD-10-CM

## 2019-11-11 DIAGNOSIS — R63.4 ABNORMAL WEIGHT LOSS: ICD-10-CM

## 2019-11-11 DIAGNOSIS — R93.2 ABNORMAL FINDINGS ON DIAGNOSTIC IMAGING OF LIVER AND BILIARY: ICD-10-CM

## 2019-11-11 DIAGNOSIS — R10.12 LEFT UPPER QUADRANT PAIN: ICD-10-CM

## 2019-11-11 DIAGNOSIS — R10.32 LEFT LOWER QUADRANT PAIN: ICD-10-CM

## 2019-11-11 LAB
ADV 40+41 DNA STL QL NAA+NON-PROBE: NOT DETECTED
ALBUMIN SERPL-MCNC: 2.8 G/DL (ref 3.5–5.2)
ALBUMIN/GLOB SERPL: 0.7 G/DL
ALP SERPL-CCNC: 99 U/L (ref 39–117)
ALPHA-FETOPROTEIN: 1.86 NG/ML (ref 0–8.3)
ALPHA-FETOPROTEIN: 2.24 NG/ML (ref 0–8.3)
ALT SERPL W P-5'-P-CCNC: 13 U/L (ref 1–33)
ANION GAP SERPL CALCULATED.3IONS-SCNC: 10 MMOL/L (ref 5–15)
ANION GAP SERPL CALCULATED.3IONS-SCNC: 11.7 MMOL/L (ref 5–15)
AST SERPL-CCNC: 16 U/L (ref 1–32)
ASTRO TYP 1-8 RNA STL QL NAA+NON-PROBE: NOT DETECTED
BILIRUB SERPL-MCNC: 0.5 MG/DL (ref 0.2–1.2)
BUN BLD-MCNC: 12 MG/DL (ref 8–23)
BUN BLD-MCNC: 12 MG/DL (ref 8–23)
BUN/CREAT SERPL: 18.2 (ref 7–25)
BUN/CREAT SERPL: 20.7 (ref 7–25)
C CAYETANENSIS DNA STL QL NAA+NON-PROBE: NOT DETECTED
C DIFF TOX GENS STL QL NAA+PROBE: POSITIVE
CALCIUM SPEC-SCNC: 7.6 MG/DL (ref 8.6–10.5)
CALCIUM SPEC-SCNC: 8.3 MG/DL (ref 8.6–10.5)
CAMPY SP DNA.DIARRHEA STL QL NAA+PROBE: NOT DETECTED
CHLORIDE SERPL-SCNC: 101 MMOL/L (ref 98–107)
CHLORIDE SERPL-SCNC: 101 MMOL/L (ref 98–107)
CO2 SERPL-SCNC: 24 MMOL/L (ref 22–29)
CO2 SERPL-SCNC: 25.3 MMOL/L (ref 22–29)
CREAT BLD-MCNC: 0.58 MG/DL (ref 0.57–1)
CREAT BLD-MCNC: 0.66 MG/DL (ref 0.57–1)
CRYPTOSP STL CULT: NOT DETECTED
DEPRECATED RDW RBC AUTO: 38.9 FL (ref 37–54)
E COLI DNA SPEC QL NAA+PROBE: NOT DETECTED
E HISTOLYT AG STL-ACNC: NOT DETECTED
EAEC PAA PLAS AGGR+AATA ST NAA+NON-PRB: NOT DETECTED
EC STX1 + STX2 GENES STL NAA+PROBE: NOT DETECTED
EPEC EAE GENE STL QL NAA+NON-PROBE: NOT DETECTED
ERYTHROCYTE [DISTWIDTH] IN BLOOD BY AUTOMATED COUNT: 12 % (ref 12.3–15.4)
ETEC LTA+ST1A+ST1B TOX ST NAA+NON-PROBE: NOT DETECTED
G LAMBLIA DNA SPEC QL NAA+PROBE: NOT DETECTED
GFR SERPL CREATININE-BSD FRML MDRD: 105 ML/MIN/1.73
GFR SERPL CREATININE-BSD FRML MDRD: 90 ML/MIN/1.73
GLOBULIN UR ELPH-MCNC: 4 GM/DL
GLUCOSE BLD-MCNC: 82 MG/DL (ref 65–99)
GLUCOSE BLD-MCNC: 92 MG/DL (ref 65–99)
HCT VFR BLD AUTO: 32.7 % (ref 34–46.6)
HGB BLD-MCNC: 11.3 G/DL (ref 12–15.9)
MAGNESIUM SERPL-MCNC: 2.1 MG/DL (ref 1.6–2.4)
MCH RBC QN AUTO: 31 PG (ref 26.6–33)
MCHC RBC AUTO-ENTMCNC: 34.6 G/DL (ref 31.5–35.7)
MCV RBC AUTO: 89.6 FL (ref 79–97)
NOROVIRUS GI+II RNA STL QL NAA+NON-PROBE: NOT DETECTED
P SHIGELLOIDES DNA STL QL NAA+PROBE: NOT DETECTED
PLATELET # BLD AUTO: 209 10*3/MM3 (ref 140–450)
PMV BLD AUTO: 10.9 FL (ref 6–12)
POTASSIUM BLD-SCNC: 3.4 MMOL/L (ref 3.5–5.2)
POTASSIUM BLD-SCNC: 3.4 MMOL/L (ref 3.5–5.2)
PROT SERPL-MCNC: 6.8 G/DL (ref 6–8.5)
RBC # BLD AUTO: 3.65 10*6/MM3 (ref 3.77–5.28)
RV RNA STL NAA+PROBE: NOT DETECTED
SALMONELLA DNA SPEC QL NAA+PROBE: NOT DETECTED
SAPO I+II+IV+V RNA STL QL NAA+NON-PROBE: NOT DETECTED
SHIGELLA SP+EIEC IPAH STL QL NAA+PROBE: NOT DETECTED
SODIUM BLD-SCNC: 135 MMOL/L (ref 136–145)
SODIUM BLD-SCNC: 138 MMOL/L (ref 136–145)
V CHOLERAE DNA SPEC QL NAA+PROBE: NOT DETECTED
VIBRIO DNA SPEC NAA+PROBE: NOT DETECTED
WBC NRBC COR # BLD: 18.65 10*3/MM3 (ref 3.4–10.8)
YERSINIA STL CULT: NOT DETECTED

## 2019-11-11 PROCEDURE — A9577 INJ MULTIHANCE: HCPCS | Performed by: INTERNAL MEDICINE

## 2019-11-11 PROCEDURE — 80048 BASIC METABOLIC PNL TOTAL CA: CPT | Performed by: HOSPITALIST

## 2019-11-11 PROCEDURE — 82105 ALPHA-FETOPROTEIN SERUM: CPT | Performed by: INTERNAL MEDICINE

## 2019-11-11 PROCEDURE — 0 GADOBENATE DIMEGLUMINE 529 MG/ML SOLUTION: Performed by: INTERNAL MEDICINE

## 2019-11-11 PROCEDURE — 25810000003 SODIUM CHLORIDE 0.9 % WITH KCL 20 MEQ 20-0.9 MEQ/L-% SOLUTION: Performed by: HOSPITALIST

## 2019-11-11 PROCEDURE — 76705 ECHO EXAM OF ABDOMEN: CPT

## 2019-11-11 PROCEDURE — 83735 ASSAY OF MAGNESIUM: CPT | Performed by: HOSPITALIST

## 2019-11-11 PROCEDURE — 74183 MRI ABD W/O CNTR FLWD CNTR: CPT

## 2019-11-11 PROCEDURE — 85027 COMPLETE CBC AUTOMATED: CPT | Performed by: HOSPITALIST

## 2019-11-11 PROCEDURE — 80053 COMPREHEN METABOLIC PANEL: CPT | Performed by: INTERNAL MEDICINE

## 2019-11-11 PROCEDURE — 82105 ALPHA-FETOPROTEIN SERUM: CPT | Performed by: HOSPITALIST

## 2019-11-11 PROCEDURE — 99254 IP/OBS CNSLTJ NEW/EST MOD 60: CPT | Performed by: INTERNAL MEDICINE

## 2019-11-11 RX ADMIN — SODIUM CHLORIDE, PRESERVATIVE FREE 10 ML: 5 INJECTION INTRAVENOUS at 09:14

## 2019-11-11 RX ADMIN — SODIUM CHLORIDE, PRESERVATIVE FREE 10 ML: 5 INJECTION INTRAVENOUS at 20:13

## 2019-11-11 RX ADMIN — VANCOMYCIN 125 MG: KIT at 09:14

## 2019-11-11 RX ADMIN — METRONIDAZOLE 500 MG: 500 INJECTION, SOLUTION INTRAVENOUS at 09:14

## 2019-11-11 RX ADMIN — POTASSIUM CHLORIDE AND SODIUM CHLORIDE 125 ML/HR: 900; 150 INJECTION, SOLUTION INTRAVENOUS at 06:29

## 2019-11-11 RX ADMIN — GADOBENATE DIMEGLUMINE 17 ML: 529 INJECTION, SOLUTION INTRAVENOUS at 11:17

## 2019-11-11 RX ADMIN — POTASSIUM CHLORIDE AND SODIUM CHLORIDE 125 ML/HR: 900; 150 INJECTION, SOLUTION INTRAVENOUS at 20:12

## 2019-11-11 RX ADMIN — VANCOMYCIN 125 MG: KIT at 20:12

## 2019-11-11 RX ADMIN — METRONIDAZOLE 500 MG: 500 INJECTION, SOLUTION INTRAVENOUS at 16:48

## 2019-11-11 RX ADMIN — ACETAMINOPHEN 650 MG: 325 TABLET, FILM COATED ORAL at 18:10

## 2019-11-11 RX ADMIN — VANCOMYCIN 125 MG: KIT at 03:42

## 2019-11-11 RX ADMIN — METRONIDAZOLE 500 MG: 500 INJECTION, SOLUTION INTRAVENOUS at 00:37

## 2019-11-11 RX ADMIN — VANCOMYCIN 125 MG: KIT at 14:29

## 2019-11-11 RX ADMIN — FAMOTIDINE 20 MG: 10 INJECTION INTRAVENOUS at 20:12

## 2019-11-11 RX ADMIN — FAMOTIDINE 20 MG: 10 INJECTION INTRAVENOUS at 09:14

## 2019-11-11 NOTE — PAYOR COMM NOTE
"Taylor Monaco (64 y.o. Female)     ATTN: NURSE REVIEW    REF#[id number]  PLEASE CALL BACK TO KIT [phone] OR [phone]  THANKS!   KIT      Date of Birth Social Security Number Address Home Phone MRN    1955  [address] [address] [phone] [id number]    Congregation Marital Status          Mosque        Admission Date Admission Type Admitting Provider Attending Provider Department, Room/Bed    11/10/19 Emergency Sean Mullen MD Cecil, Joseph R, MD [facility] [address] [place], 85/1    Discharge Date Discharge Disposition Discharge Destination                       Attending Provider:  Brian Draper MD    Allergies:  No Known Allergies    Isolation:  Spore   Infection:  C.difficile (19)   Code Status:  CPR    Ht:  160 cm (63\")   Wt:  83.9 kg (185 lb)    Admission Cmt:  None   Principal Problem:  Acute colitis [K52.9]                 Active Insurance as of 11/10/2019     Primary Coverage     Payor Plan Insurance Group Employer/Plan Group    [employer]HEM BLUE CROSS ANTHEM BLUE CROSS BLUE SHIELD PPO [id number]     Payor Plan Address Payor Plan Phone Number Payor Plan Fax Number Effective Dates    [address] [phone]  2017 - None Entered    [place] [address]       Subscriber Name Subscriber Birth Date Member ID       TAYLOR MONACO 1955 [id number]                 Emergency Contacts      (Rel.) Home Phone Work Phone Mobile Phone    Shanon Bhagat (Daughter) -- -- [phone]               History & Physical      Sean Mullen MD at 11/10/19 2000          HISTORY AND PHYSICAL   [facility]        Patient Identification:  Name: Taylor Monaco  Age: 64 y.o.  Sex: female  :  1955  MRN: [id number]                     Primary Care Physician: Tommy Osborn MD    Chief Complaint: Diarrhea    History of Present Illness:   Mrs Monaco is a pleasant 64-year-old female who just underwent " right-sided lumpectomy per Dr. Ramos this week.  She was found to have breast cancer but told she had no lymph node involvement.  She developed diarrhea which got progressively worse throughout the week.  She said to many bouts of diarrhea to count.  She states the majority of which has been completely watery though there has been some form stool as well.  She denies any abdominal pains nausea or vomiting.  She has not been taking any prolonged antibiotic regimen though she did receive perioperative antibiotics but I do not know exactly what she received.  She denies any blood in her stool denies any recent travels or changes to her diet.  In the ER CT demonstrated colitis and she was given Levaquin and Flagyl.  Additional stool studies were ordered with GI panel and C. difficile that time.  I later discussed with ER physician additional findings on the CT that was concerning for a liver mass.  Patient denies any operative issues or events in that area where the lesion was noted.  Patient states diarrhea was treated this week with probiotics and Imodium.    Past Medical History:  Past Medical History:   Diagnosis Date   • Breast cancer (CMS/HCC) 09/2019    Right IDC   • GERD (gastroesophageal reflux disease)    • PONV (postoperative nausea and vomiting)    • Situational stress     DEATH OF BROTHER 10/2019, RECENT DIAGNOSIS   • Stress headaches      Past Surgical History:  Past Surgical History:   Procedure Laterality Date   • BILE DUCT STENT PLACEMENT  2014   • BREAST BIOPSY Right 09/2019    malignant   • BREAST LUMPECTOMY WITH SENTINEL NODE BIOPSY Right 11/4/2019    Procedure: BREAST LUMPECTOMY WITH SENTINEL NODE BIOPSY AND NEEDLE LOCALIZATION And possible axillary dissection;  Surgeon: Jean-Paul Ramos MD;  [facility]: [facility];  Service: General   • KNEE ARTHROSCOPY Left    • LAPAROSCOPIC CHOLECYSTECTOMY  2014   • TUBAL ABDOMINAL LIGATION        Home Meds:  Medications Prior to Admission   Medication  Sig Dispense Refill Last Dose   • acetaminophen (TYLENOL) 325 MG tablet Take 650 mg by mouth Every 6 (Six) Hours As Needed for Mild Pain .   10/29/2019   • doxylamine (UNISOM) 25 MG tablet Take 25 mg by mouth At Night As Needed.   10/29/2019   • melatonin 5 MG tablet tablet Take 10 mg by mouth At Night As Needed.   11/3/2019 at Unknown time       Allergies:  No Known Allergies  Immunizations:    There is no immunization history on file for this patient.  Social History:   Social History     Social History Narrative   • Not on file     Social History     Socioeconomic History   • Marital status:      Spouse name: Not on file   • Number of children: 2   • Years of education: Not on file   • Highest education level: Not on file   Occupational History     Employer: [employer]   Tobacco Use   • Smoking status: Former Smoker     Packs/day: 0.50     Years: 10.00     Pack years: 5.00     Types: Cigarettes     Last attempt to quit:      Years since quittin.8   • Smokeless tobacco: Former User   Substance and Sexual Activity   • Alcohol use: No   • Drug use: No   • Sexual activity: No     Birth control/protection: Post-menopausal       Family History:  Family History   Problem Relation Age of Onset   • Alcohol abuse Father    • Heart failure Mother    • Diabetes Brother    • Prostate cancer Brother    • Throat cancer Brother    • Breast cancer Maternal Aunt    • Breast cancer Maternal Cousin    • Breast cancer Maternal Cousin    • Malig Hyperthermia Neg Hx         Review of Systems  See history of present illness and past medical history.  Patient denies fever chills night sweats.  Denies nausea vomiting or abdominal pain but admits to diarrhea.  Denies problems swallowing headache admits to generalized weakness and some dizziness at times but denies chest pain palpitations cough shortness of breath bruising bleeding loss of consciousness or any loss of function.  Remainder of ROS is  "negative.    Objective:  tMax 24 hrs: Temp (24hrs), Av.2 °F (37.9 °C), Min:100.1 °F (37.8 °C), Max:100.3 °F (37.9 °C)    Vitals Ranges:   Temp:  [100.1 °F (37.8 °C)-100.3 °F (37.9 °C)] 100.1 °F (37.8 °C)  Heart Rate:  [] 93  Resp:  [16-20] 16  BP: (110-135)/(58-74) 110/58      Exam:  /58 (BP Location: Right arm, Patient Position: Lying)   Pulse 93   Temp 100.1 °F (37.8 °C) (Oral)   Resp 16   Ht 160 cm (63\")   Wt 83.9 kg (185 lb)   SpO2 96%   BMI 32.77 kg/m²      General Appearance:    Alert, cooperative, conversational though sickly, AO x3, spouse at bedside   Head:    Normocephalic, without obvious abnormality, atraumatic   Eyes:    PERRL, conjunctiva/corneas clear, EOM's intact, both eyes   Ears:    Normal external ear canals, both ears   Nose:   Nares normal, septum midline, mucosa normal, no drainage    or sinus tenderness   Throat:   Lips, mucosa, and tongue normal though mucous membranes are quite dry   Neck:   Supple, symmetrical, trachea midline, no adenopathy;     thyroid:  no enlargement/tenderness/nodules; no carotid    bruit or JVD   Back:     Symmetric, no curvature   Lungs:     Clear to auscultation bilaterally, respirations unlabored   Chest Wall:   Right breast surgical lesions are clean and dry without any infection    Heart:    Regular rate and rhythm, S1 and S2 normal, no murmur, rub   or gallop   Abdomen:     Soft, non-tender, bowel sounds active all four quadrants,     no masses, no hepatomegaly, no splenomegaly   Extremities:   Extremities normal, atraumatic, no cyanosis or edema   Pulses:   2+ and symmetric all extremities           Neurologic:   CNII-XII intact, normal strength, no focal deficit, normal gait      .    Data Review:  Labs in chart were reviewed.             Imaging Results (All)     Procedure Component Value Units Date/Time    CT Abdomen Pelvis With Contrast [[id number]] Collected:  11/10/19 1637     Updated:  11/10/19 1658    Narrative:       CT ABDOMEN " PELVIS W CONTRAST-     INDICATIONS: Diarrhea     TECHNIQUE: Radiation dose reduction techniques were utilized, including  automated exposure control and exposure modulation based on body size.  Enhanced ABDOMEN AND PELVIS CT     COMPARISON: None available     FINDINGS:     Posterior aspect of the liver appears truncated, and heterogeneous  posteriorly, that may reflect partial right hepatectomy change,  correlate with surgical history. If there is no such surgical history,  then neoplasm would be suspected, considering presence of heterogeneity  and ductal dilatation apparent along the posterior margin of the liver,  for example a 3.2 cm region on image 26 of series 1. Recommend  comparison with prior imaging (if it can be obtained) to characterize  stability/exclude possibility of a lesion; if this finding has not  already been definitively characterized, initial further evaluation with  hepatic MRI would be recommended to assess for possible malignant  neoplasm.     Gallbladder is surgically absent                 Otherwise unremarkable appearance of the liver, spleen, adrenal glands,  pancreas, kidneys, bladder.     No bowel obstruction . Diffuse wall thickening is apparent in the colon,  suggesting nonspecific colitis in the setting of diarrhea. Appendix does  not appear inflamed.     No free intraperitoneal gas or free fluid.     Scattered small mesenteric and para-aortic lymph nodes are seen that are  not significant by size criteria.     Abdominal aorta is not aneurysmal. Aortic and other arterial  calcifications are present.     The lung bases show minimal atelectasis.     Degenerative changes are seen in the spine. No acute fracture is  identified.             Impression:             1. Diffuse colitis.  2. Abnormal appearance of the liver that may reflect postsurgical change  (correlate clinically), or potentially malignant neoplasm; if this  finding has not already been definitively characterized, initial  further  evaluation with hepatic MRI would be recommended.     Discussed by telephone with Dr. Velasco at 1645, 11/10/2019.     This report was finalized on 11/10/2019 4:55 PM by Dr. Franky Osullivan M.D.       XR Chest 2 View [[id number]] Collected:  11/10/19 1639     Updated:  11/10/19 1642    Narrative:       XR CHEST 2 VW-  11/10/2019     HISTORY: Chills, elevated white blood cell count.     Heart size is within normal limits. Lungs appear free of acute  infiltrates. Bones and soft tissues are unremarkable.       Impression:       1. No acute process.     This report was finalized on 11/10/2019 4:39 PM by Dr. Rudi Gonzalez M.D.               Assessment:    Acute colitis    Malignant neoplasm of upper-outer quadrant of right breast in female, estrogen receptor positive (CMS/HCC)    Diarrhea    Liver mass    Hypokalemia    Leukocytosis      Plan:    Colitis with liver lesion and recent past history of right-sided breast cancer   -Given Levaquin/Flagyl in ER.  Due to possibility of C. difficile colitis we will continue with Flagyl only for now until stool is further evaluated with assay/GI panel   -MRI liver - LFTs normal as is bilirubin at 0.6 - check AFP   -Consult GI   -IVF with potassium   -No sepsis present at admission    Breast cancer right-sided   -Surgical biopsy site healing appropriately and patient has outpatient follow-up with Dr. Ramos    [facility] for DVT prophylaxis    Further recommendations to follow his clinical course unfolds    Sean Mullen MD  11/10/2019  8:00 PM    Electronically signed by Sean Mullen MD at 11/10/19 2010          Emergency Department Notes      Cha Galloway RN at 11/10/19 1312        Pt had breast lumpectomy six days ago. Since then, she has had diarrhea despite taking imodium and probiotics.      Cha Galloway RN  11/10/19 1312      Electronically signed by Cha Galloway RN at 11/10/19 1312     Flynn Velasco MD at 11/10/19 7613            EMERGENCY DEPARTMENT ENCOUNTER    CHIEF COMPLAINT  Chief Complaint: Abd pain  History given by: Patient  History limited by: None  Room Number: 25/25  PMD: Tommy Osborn MD      HPI:  Pt is a 64 y.o. female who presents complaining of gradual onset of non-radiating intermittent diffuse abd pain associated with N/V/D and fever since 11/05/19. She reports that her sxs have been progressively worsening since onset. Pt denies SOA, CP, diaphoresis or urinary sxs. Pt denies any aggravating or alleviating factors. Pt denies any triggering factors. Pt denies having similar sxs in the past. Pt denies taking any medications for her current sxs PTA. Pt denies Being around ill contacts. Pt denies recent use of abx. Pt reports she had a breast lumpectomy on 11/04/19. Family at bedside.    Duration:  Since 11/05/19  Onset: Gradual  Timing: Intermittent   Location: Diffuse abd  Radiation: None  Intensity/Severity: Moderate  Progression: Worsening   Associated Symptoms: N/V/D and fever  Aggravating Factors: None  Alleviating Factors: None  Previous Episodes: None  Treatment before arrival: None    PAST MEDICAL HISTORY  Active Ambulatory Problems     Diagnosis Date Noted   • Visit for gynecologic examination 06/28/2017   • Vasomotor symptoms due to menopause 06/28/2017   • Screening for cervical cancer 07/25/2018   • Malignant neoplasm of upper-outer quadrant of right breast in female, estrogen receptor positive (CMS/HCC) 10/14/2019     Resolved Ambulatory Problems     Diagnosis Date Noted   • No Resolved Ambulatory Problems     Past Medical History:   Diagnosis Date   • Breast cancer (CMS/HCC) 09/2019   • GERD (gastroesophageal reflux disease)    • PONV (postoperative nausea and vomiting)    • Situational stress    • Stress headaches        PAST SURGICAL HISTORY  Past Surgical History:   Procedure Laterality Date   • BILE DUCT STENT PLACEMENT  2014   • BREAST BIOPSY Right 09/2019    malignant   • BREAST LUMPECTOMY WITH  SENTINEL NODE BIOPSY Right 2019    Procedure: BREAST LUMPECTOMY WITH SENTINEL NODE BIOPSY AND NEEDLE LOCALIZATION And possible axillary dissection;  Surgeon: Jean-Paul Ramos MD;  Location: [facility] OR [place];  Service: General   • KNEE ARTHROSCOPY Left    • LAPAROSCOPIC CHOLECYSTECTOMY     • TUBAL ABDOMINAL LIGATION         FAMILY HISTORY  Family History   Problem Relation Age of Onset   • Alcohol abuse Father    • Heart failure Mother    • Diabetes Brother    • Prostate cancer Brother    • Throat cancer Brother    • Breast cancer Maternal Aunt    • Breast cancer Maternal Cousin    • Breast cancer Maternal Cousin    • Malig Hyperthermia Neg Hx        SOCIAL HISTORY  Social History     Socioeconomic History   • Marital status:      Spouse name: Not on file   • Number of children: 2   • Years of education: Not on file   • Highest education level: Not on file   Occupational History     Employer: [employer]   Tobacco Use   • Smoking status: Former Smoker     Packs/day: 0.50     Years: 10.00     Pack years: 5.00     Types: Cigarettes     Last attempt to quit:      Years since quittin.8   • Smokeless tobacco: Former User   Substance and Sexual Activity   • Alcohol use: No   • Drug use: No   • Sexual activity: No     Birth control/protection: Post-menopausal       ALLERGIES  Patient has no known allergies.    REVIEW OF SYSTEMS  Review of Systems   Constitutional: Positive for fever. Negative for diaphoresis.   HENT: Negative for sore throat.    Eyes: Negative.    Respiratory: Negative for cough and shortness of breath.    Cardiovascular: Negative for chest pain.   Gastrointestinal: Positive for abdominal pain (diffuse), diarrhea, nausea and vomiting.   Genitourinary: Negative.  Negative for dysuria.   Musculoskeletal: Negative for neck pain.   Skin: Negative for rash.   Allergic/Immunologic: Negative.    Neurological: Negative for weakness, numbness and headaches.   Hematological: Negative.     Psychiatric/Behavioral: Negative.    All other systems reviewed and are negative.      PHYSICAL EXAM  ED Triage Vitals   Temp Heart Rate Resp BP SpO2   11/10/19 1313 11/10/19 1313 11/10/19 1313 11/10/19 1400 11/10/19 1313   100.3 °F (37.9 °C) 116 20 116/63 97 %      Temp src Heart Rate Source Patient Position BP Location FiO2 (%)   11/10/19 1313 -- -- -- --   Tympanic             Physical Exam   Constitutional: She is oriented to person, place, and time. No distress.   HENT:   Head: Normocephalic and atraumatic.   Eyes: EOM are normal. Pupils are equal, round, and reactive to light.   Neck: Normal range of motion. Neck supple.   Cardiovascular: Normal rate, regular rhythm, normal heart sounds and intact distal pulses.   Pulmonary/Chest: Effort normal and breath sounds normal. No respiratory distress.   Abdominal: Soft. She exhibits no distension. There is no tenderness. There is no rebound and no guarding.   Musculoskeletal: Normal range of motion. She exhibits no edema.   Neurological: She is alert and oriented to person, place, and time. She has normal sensation and normal strength.   Skin: Skin is warm and dry. No rash noted.   Psychiatric: Mood and affect normal.   Nursing note and vitals reviewed.      LAB RESULTS  Lab Results (last 24 hours)     Procedure Component Value Units Date/Time    CBC & Differential [[id number]] Collected:  11/10/19 1506    Specimen:  Blood Updated:  11/10/19 1516    Narrative:       The following orders were created for panel order CBC & Differential.  Procedure                               Abnormality         Status                     ---------                               -----------         ------                     CBC Auto Differential[[id number]]        Abnormal            Final result                 Please view results for these tests on the individual orders.    Comprehensive Metabolic Panel [[id number]]  (Abnormal) Collected:  11/10/19 1506    Specimen:  Blood Updated:   11/10/19 1546     Glucose 105 mg/dL      BUN 15 mg/dL      Creatinine 0.89 mg/dL      Sodium 136 mmol/L      Potassium 3.0 mmol/L      Chloride 94 mmol/L      CO2 29.7 mmol/L      Calcium 8.4 mg/dL      Total Protein 7.4 g/dL      Albumin 3.10 g/dL      ALT (SGPT) 15 U/L      AST (SGOT) 19 U/L      Alkaline Phosphatase 103 U/L      Total Bilirubin 0.6 mg/dL      eGFR Non African Amer 64 mL/min/1.73      Globulin 4.3 gm/dL      A/G Ratio 0.7 g/dL      BUN/Creatinine Ratio 16.9     Anion Gap 12.3 mmol/L     Narrative:       GFR Normal >60  Chronic Kidney Disease <60  Kidney Failure <15    Lipase [[id number]]  (Normal) Collected:  11/10/19 1506    Specimen:  Blood Updated:  11/10/19 1534     Lipase 35 U/L     CBC Auto Differential [[id number]]  (Abnormal) Collected:  11/10/19 1506    Specimen:  Blood Updated:  11/10/19 1516     WBC 22.27 10*3/mm3      RBC 4.27 10*6/mm3      Hemoglobin 13.5 g/dL      Hematocrit 38.8 %      MCV 90.9 fL      MCH 31.6 pg      MCHC 34.8 g/dL      RDW 12.2 %      RDW-SD 40.6 fl      MPV 10.2 fL      Platelets 238 10*3/mm3      Neutrophil % 69.5 %      Lymphocyte % 17.0 %      Monocyte % 11.2 %      Eosinophil % 1.0 %      Basophil % 0.4 %      Immature Grans % 0.9 %      Neutrophils, Absolute 15.47 10*3/mm3      Lymphocytes, Absolute 3.78 10*3/mm3      Monocytes, Absolute 2.50 10*3/mm3      Eosinophils, Absolute 0.23 10*3/mm3      Basophils, Absolute 0.08 10*3/mm3      Immature Grans, Absolute 0.21 10*3/mm3      nRBC 0.0 /100 WBC     Procalcitonin [[id number]] Collected:  11/10/19 1506    Specimen:  Blood Updated:  11/10/19 1751    Urinalysis With Microscopic If Indicated (No Culture) - Urine, Clean Catch [[id number]]  (Abnormal) Collected:  11/10/19 1644    Specimen:  Urine, Clean Catch Updated:  11/10/19 1716     Color, UA Yellow     Appearance, UA Clear     pH, UA 6.0     Specific Gravity, UA >=1.030     Glucose, UA Negative     Ketones, UA Negative     Bilirubin, UA Negative     Blood, UA  Negative     Protein, UA Trace     Leuk Esterase, UA Negative     Nitrite, UA Negative     Urobilinogen, UA 1.0 E.U./dL    Narrative:       Urine microscopic not indicated.    Lactic Acid, Plasma [[id number]] Collected:  11/10/19 1648    Specimen:  Blood Updated:  11/10/19 1655    Blood Culture - Blood, Arm, Right [[id number]] Collected:  11/10/19 1654    Specimen:  Blood from Arm, Right Updated:  11/10/19 1704    Blood Culture - Blood, Hand, Right [[id number]] Collected:  11/10/19 1654    Specimen:  Blood from Hand, Right Updated:  11/10/19 1703          I ordered the above labs and reviewed the results    RADIOLOGY  CT Abdomen Pelvis With Contrast   Final Result           1. Diffuse colitis.   2. Abnormal appearance of the liver that may reflect postsurgical change   (correlate clinically), or potentially malignant neoplasm; if this   finding has not already been definitively characterized, initial further   evaluation with hepatic MRI would be recommended.       Discussed by telephone with Dr. Velasco at 1645, 11/10/2019.       This report was finalized on 11/10/2019 4:55 PM by Dr. Franky Osullivan M.D.          XR Chest 2 View   Final Result   1. No acute process.       This report was finalized on 11/10/2019 4:39 PM by Dr. Rudi Gonzalez M.D.               I ordered the above noted radiological studies. Interpreted by radiologist. Discussed with radiologist (Dr. Osullivan). Reviewed by me in PACS.       PROCEDURES  Procedures      PROGRESS AND CONSULTS        1452: Ordered labs and CXR for further evaluation and management.    1536: Ordered CT abd/pelvis for further evaluation and management.    1646: Discussed pt case with Dr. Osullivan (radiology) who states that CT abd/pelvis show diffuse colitis.     1647: Rechecked pt. Pt is resting comfortably. Notified pt of results of CT abd/pelvis showing diffuse colitis. Discussed the plan to admit the pt for further evaluation and management. Pt and family agree  with the plan and all questions were addressed.    1649: Ordered Flagyl and Levaquin for colitis.     1706: Discussed pt case with Dr. Rodrigues ([facility]) who will admit the patient for further evaluation and management.      MEDICAL DECISION MAKING  Results were reviewed/discussed with the patient and they were also made aware of online access. Pt also made aware that some labs, such as cultures, will not be resulted during ER visit and follow up with PMD is necessary.     MDM  Number of Diagnoses or Management Options  Acute colitis:      Amount and/or Complexity of Data Reviewed  Clinical lab tests: ordered and reviewed (UA protein trace   Lipase 35  Creatinine 0.89  WBC 22.27)  Tests in the radiology section of CPT®:  ordered and reviewed (CT abd/pelvis show diffuse colitis. CXR negative acute.)  Decide to obtain previous medical records or to obtain history from someone other than the patient: yes  Discuss the patient with other providers: yes (Dr. Osullivan (radiology) and Dr. Rodrigues ([facility]))           DIAGNOSIS  Final diagnoses:   Acute colitis       DISPOSITION  ADMISSION    Discussed treatment plan and reason for admission with pt/family and admitting physician.  Pt/family voiced understanding of the plan for admission for further testing/treatment as needed.         Latest Documented Vital Signs:  As of 5:52 PM  BP- 131/69 HR- 91 Temp- 100.3 °F (37.9 °C) (Tympanic) O2 sat- 93%    --  Documentation assistance provided by filemon Sanchez for Dr. Flynn Velasco MD.  Information recorded by the scribe was done at my direction and has been verified and validated by me.         Maryam Sanchez  11/10/19 1752       Flynn Vleasco MD  11/10/19 1831      Electronically signed by Flynn Velasco MD at 11/10/19 1831     Lilibeth Rubio, RN at 11/10/19 1753        Nurse unavailable for report, will call back     Lilibeth Rubio, RN  11/10/19 1753      Electronically signed by Lilibeth Rubio, RN at 11/10/19  1753     Lilibeth Rubio, RN at 11/10/19 1814        Nurse unavailable for report, transport called     Lilibeth Rubio RN  11/10/19 1815      Electronically signed by Lilibeth Rubio RN at 11/10/19 1815       Intake & Output (last 2 days)       11/09 0701 - 11/10 0700 11/10 0701 - 11/11 0700 11/11 0701 - 11/12 0700    P.O.  660 200    I.V. (mL/kg)  1000 (11.9)     IV Piggyback  300     Total Intake(mL/kg)  1960 (23.4) 200 (2.4)    Urine (mL/kg/hr)  800 350 (0.5)    Stool  0 0    Total Output  800 350    Net  +1160 -150           Urine Unmeasured Occurrence  4 x     Stool Unmeasured Occurrence  1 x 1 x        Hospital Medications (active)       Dose Frequency Start End    acetaminophen (TYLENOL) tablet 650 mg 650 mg Every 6 Hours PRN 11/10/2019     Sig - Route: Take 2 tablets by mouth Every 6 (Six) Hours As Needed for Mild Pain . - Oral    famotidine (PEPCID) injection 20 mg 20 mg Every 12 Hours Scheduled 11/10/2019     Sig - Route: Infuse 2 mL into a venous catheter Every 12 (Twelve) Hours. - Intravenous    gadobenate dimeglumine (MULTIHANCE) injection 17 mL 17 mL Once in Imaging 11/11/2019 11/11/2019    Sig - Route: Infuse 17 mL into a venous catheter Once. - Intravenous    iopamidol (ISOVUE-300) 61 % injection 100 mL 100 mL Once in Imaging 11/10/2019 11/10/2019    Sig - Route: Infuse 100 mL into a venous catheter Once. - Intravenous    levoFLOXacin (LEVAQUIN) 500 mg/100 mL D5W (premix) (LEVAQUIN) 500 mg 500 mg Once 11/10/2019 11/10/2019    Sig - Route: Infuse 100 mL into a venous catheter 1 (One) Time. - Intravenous    melatonin tablet 10 mg 10 mg Nightly PRN 11/10/2019     Sig - Route: Take 2 tablets by mouth At Night As Needed for Sleep. - Oral    metroNIDAZOLE (FLAGYL) 500 mg/100mL IVPB 500 mg Once 11/10/2019 11/10/2019    Sig - Route: Infuse 100 mL into a venous catheter 1 (One) Time. - Intravenous    metroNIDAZOLE (FLAGYL) 500 mg/100mL IVPB 500 mg Every 8 Hours 11/11/2019 11/18/2019    Sig -  "Route: Infuse 100 mL into a venous catheter Every 8 (Eight) Hours. - Intravenous    ondansetron (ZOFRAN) injection 4 mg 4 mg Every 6 Hours PRN 11/10/2019     Sig - Route: Infuse 2 mL into a venous catheter Every 6 (Six) Hours As Needed for Nausea or Vomiting. - Intravenous    Linked Group 1:  \"Or\" Linked Group Details        ondansetron (ZOFRAN) tablet 4 mg 4 mg Every 6 Hours PRN 11/10/2019     Sig - Route: Take 1 tablet by mouth Every 6 (Six) Hours As Needed for Nausea or Vomiting. - Oral    Linked Group 1:  \"Or\" Linked Group Details        Pharmacy Consult  Continuous PRN 11/11/2019     Sig - Route: Continuous As Needed for Consult. - Does not apply    potassium chloride (MICRO-K) CR capsule 40 mEq 40 mEq Once 11/10/2019 11/10/2019    Sig - Route: Take 4 capsules by mouth 1 (One) Time. - Oral    sodium chloride 0.9 % bolus 1,000 mL 1,000 mL Once 11/10/2019 11/10/2019    Sig - Route: Infuse 1,000 mL into a venous catheter 1 (One) Time. - Intravenous    sodium chloride 0.9 % flush 10 mL 10 mL Every 12 Hours Scheduled 11/10/2019     Sig - Route: Infuse 10 mL into a venous catheter Every 12 (Twelve) Hours. - Intravenous    sodium chloride 0.9 % flush 10 mL 10 mL As Needed 11/10/2019     Sig - Route: Infuse 10 mL into a venous catheter As Needed for Line Care. - Intravenous    sodium chloride 0.9 % with KCl 20 mEq/L infusion 125 mL/hr Continuous 11/10/2019     Sig - Route: Infuse 125 mL/hr into a venous catheter Continuous. - Intravenous    vancomycin oral solution reconstituted 125 mg 125 mg Every 6 Hours Scheduled 11/11/2019 11/20/2019    Sig - Route: Take 2.5 mL by mouth Every 6 (Six) Hours. - Oral    sodium chloride 0.9 % infusion (Discontinued) 125 mL/hr Continuous 11/10/2019 11/10/2019    Sig - Route: Infuse 125 mL/hr into a venous catheter Continuous. - Intravenous    vancomycin oral solution reconstituted 125 mg (Discontinued) 125 mg Every 6 Hours Scheduled 11/11/2019 11/11/2019    Sig - Route: Take 2.5 mL by " mouth Every 6 (Six) Hours. - Oral             Physician Progress Notes (last 48 hours) (Notes from 19 1534 through 19 1534)      Brian Draper MD at 19 1212          Name: Taylor Larsen ADMIT: 11/10/2019   : 1955  PCP: Tommy Osborn MD    MRN: [id number] LOS: 1 days   AGE/SEX: 64 y.o. female  ROOM: [facility]     Subjective   Subjective   Mrs. with entered the hospital with malaise abdominal cramping recurrent bowel movements and diarrhea.  Last Monday she had undergone  lumpectomy of the right breast cancer of the breast.  At that time she received antibiotics and was discharged home.  On Tuesday she began to have loose stools frequent stools.  She tried probiotics but diarrhea persisted he came to the emergency room yesterday was admitted.  She denies localized abdominal pain fever or chills and presented to the emergency room only because she could not control the diarrhea.    The emergency room she was found to have a fever and leukocytosis; abd CT scan was performed and demonstrated:  1. Diffuse colitis.Diffuse wall thickening is apparent in the colon  2. Abnormal appearance of the liver that may reflect postsurgical change  (correlate clinically), or potentially malignant neoplasm; if this  finding has not already been definitively characterized, initial further  evaluation with hepatic MRI would be recommended.    Objective   Objective   Vital Signs  Temp:  [97.8 °F (36.6 °C)-100.3 °F (37.9 °C)] 98.9 °F (37.2 °C)  Heart Rate:  [] 91  Resp:  [16-20] 16  BP: (102-135)/(52-74) 102/52  SpO2:  [93 %-97 %] 95 %  on   ;   Device (Oxygen Therapy): room air  Body mass index is 32.77 kg/m².  Physical Exam  Gen: appears normal, anicteric in NACRD  HEENT:normocephalic, anicteric EOM intact,  Neck: supple, no thyromegaly, no JVD, no bruit  Chest clear, normal respirations, no use of accessory muscles of respiration, no cough no wheeze, no rales; breast not examined  CV: normal  precordium, normal S1, S2, no murmur  Abdomen, soft nontender, no organosplenomegaly    Neuro: cr nn II-XII intact, moves all 4 extremities well, normal station and gait  Reflexes 2+, Babinski absent,   Extremities: no edema, cyanosis or clubbing  Skin no rash    Results Review:       I reviewed the patient's new clinical results.  Results from last 7 days   Lab Units 11/11/19  0448 11/10/19  1506   WBC 10*3/mm3 18.65* 22.27*   HEMOGLOBIN g/dL 11.3* 13.5   HEMATOCRIT % 32.7* 38.8   PLATELETS 10*3/mm3 209 238     Results from last 7 days   Lab Units 11/11/19  0448 11/10/19  1506   SODIUM mmol/L 135* 136   POTASSIUM mmol/L 3.4* 3.0*   CHLORIDE mmol/L 101 94*   CO2 mmol/L 24.0 29.7*   BUN mg/dL 12 15   CREATININE mg/dL 0.58 0.89   GLUCOSE mg/dL 82 105*   Estimated Creatinine Clearance: 100.6 mL/min (by C-G formula based on SCr of 0.58 mg/dL).  Results from last 7 days   Lab Units 11/10/19  1506   ALBUMIN g/dL 3.10*   BILIRUBIN mg/dL 0.6   ALK PHOS U/L 103   AST (SGOT) U/L 19   ALT (SGPT) U/L 15     Results from last 7 days   Lab Units 11/11/19  0448 11/10/19  1506   CALCIUM mg/dL 7.6* 8.4*   ALBUMIN g/dL  --  3.10*   MAGNESIUM mg/dL 2.1  --      Results from last 7 days   Lab Units 11/10/19  1648 11/10/19  1506   PROCALCITONIN ng/mL  --  0.14   LACTATE mmol/L 1.7  --      No results found for: HGBA1C, POCGLU      famotidine 20 mg Intravenous Q12H   metroNIDAZOLE 500 mg Intravenous Q8H   sodium chloride 10 mL Intravenous Q12H   vancomycin 125 mg Oral Q6H       Pharmacy Consult     sodium chloride 0.9 % with KCl 20 mEq 125 mL/hr Last Rate: 125 mL/hr (11/11/19 0629)   Diet Regular      Assessment/Plan     Active Hospital Problems    Diagnosis  POA   • **Acute colitis [K52.9]  Yes   • Diarrhea [R19.7]  Unknown   • Liver mass [R16.0]  Unknown   • Hypokalemia [E87.6]  Unknown   • Leukocytosis [D72.829]  Unknown   • Malignant neoplasm of upper-outer quadrant of right breast in female, estrogen receptor positive (CMS/HCC)  [C50.411, Z17.0]  Not Applicable      Resolved Hospital Problems   No resolved problems to display.       Ms. Larsen is a 64 y.o.  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      Brian Draper MD  [facility]  11/11/19  12:12 PM          Electronically signed by Brian Draper MD at 11/11/19 1229       Consult Notes (last 48 hours) (Notes from 11/09/19 1534 through 11/11/19 1534)     No notes of this type exist for this encounter.

## 2019-11-11 NOTE — PLAN OF CARE
Problem: Patient Care Overview  Goal: Plan of Care Review  Outcome: Ongoing (interventions implemented as appropriate)   11/11/19 9583   Coping/Psychosocial   Plan of Care Reviewed With patient;spouse   Plan of Care Review   Progress improving   OTHER   Outcome Summary pt positive cdiff, on IV and PO abx, MRCP done today, US liver ordered for evening, no c/o pain, in cont spore isol, clear liq diet when not NPO for testing     Goal: Individualization and Mutuality  Outcome: Ongoing (interventions implemented as appropriate)    Goal: Discharge Needs Assessment  Outcome: Ongoing (interventions implemented as appropriate)      Problem: Diarrhea (Adult)  Goal: Identify Related Risk Factors and Signs and Symptoms  Outcome: Outcome(s) achieved Date Met: 11/11/19    Goal: Improved/Reduced Symptoms  Outcome: Ongoing (interventions implemented as appropriate)      Problem: Infection, Risk/Actual (Adult)  Goal: Identify Related Risk Factors and Signs and Symptoms  Outcome: Outcome(s) achieved Date Met: 11/11/19    Goal: Infection Prevention/Resolution  Outcome: Ongoing (interventions implemented as appropriate)

## 2019-11-11 NOTE — PAYOR COMM NOTE
"Taylor Monaco (64 y.o. Female)     ATTN: NURSE REVIEW    REF#[id number]  PLEASE CALL BACK TO KIT [phone] OR [phone]  THANKS!   KIT      Date of Birth Social Security Number Address Home Phone MRN    1955  [address] [facility] [address] [phone] [id number]    [employer] Marital Status          Jain        Admission Date Admission Type Admitting Provider Attending Provider Department, Room/Bed    11/10/19 Emergency Sean Mullen MD Cecil, Joseph R, MD [facility] [address] [place], 85/1    Discharge Date Discharge Disposition Discharge Destination                       Attending Provider:  Brian Draper MD    Allergies:  No Known Allergies    Isolation:  Spore   Infection:  C.difficile (19)   Code Status:  CPR    Ht:  160 cm (63\")   Wt:  83.9 kg (185 lb)    Admission Cmt:  None   Principal Problem:  Acute colitis [K52.9]                 Active Insurance as of 11/10/2019     Primary Coverage     Payor Plan Insurance Group Employer/Plan Group    ANTHEM BLUE CROSS ANTHEM BLUE CROSS BLUE SHIELD PPO [id number]     Payor Plan Address Payor Plan Phone Number Payor Plan Fax Number Effective Dates    [address] [phone]  2017 - None Entered    [place] [address]       Subscriber Name Subscriber Birth Date Member ID       TAYLOR MONACO 1955 [id number]                 Emergency Contacts      (Rel.) Home Phone Work Phone Mobile Phone    Shanon Bhagat (Daughter) -- -- [phone]               History & Physical      Sean Mullen MD at 11/10/19 2000          HISTORY AND PHYSICAL   [facility]        Patient Identification:  Name: Taylor Monaco  Age: 64 y.o.  Sex: female  :  1955  MRN: [id number]                     Primary Care Physician: Tommy Osborn MD    Chief Complaint: Diarrhea    History of Present Illness:   Mrs Monaco is a pleasant 64-year-old female who just underwent " right-sided lumpectomy per Dr. Ramos this week.  She was found to have breast cancer but told she had no lymph node involvement.  She developed diarrhea which got progressively worse throughout the week.  She said to many bouts of diarrhea to count.  She states the majority of which has been completely watery though there has been some form stool as well.  She denies any abdominal pains nausea or vomiting.  She has not been taking any prolonged antibiotic regimen though she did receive perioperative antibiotics but I do not know exactly what she received.  She denies any blood in her stool denies any recent travels or changes to her diet.  In the ER CT demonstrated colitis and she was given Levaquin and Flagyl.  Additional stool studies were ordered with GI panel and C. difficile that time.  I later discussed with ER physician additional findings on the CT that was concerning for a liver mass.  Patient denies any operative issues or events in that area where the lesion was noted.  Patient states diarrhea was treated this week with probiotics and Imodium.    Past Medical History:  Past Medical History:   Diagnosis Date   • Breast cancer (CMS/HCC) 09/2019    Right IDC   • GERD (gastroesophageal reflux disease)    • PONV (postoperative nausea and vomiting)    • Situational stress     DEATH OF BROTHER 10/2019, RECENT DIAGNOSIS   • Stress headaches      Past Surgical History:  Past Surgical History:   Procedure Laterality Date   • BILE DUCT STENT PLACEMENT  2014   • BREAST BIOPSY Right 09/2019    malignant   • BREAST LUMPECTOMY WITH SENTINEL NODE BIOPSY Right 11/4/2019    Procedure: BREAST LUMPECTOMY WITH SENTINEL NODE BIOPSY AND NEEDLE LOCALIZATION And possible axillary dissection;  Surgeon: Jean-Paul Ramos MD;  [place] [facility] [place] [facility] [place]  Service: General   • KNEE ARTHROSCOPY Left    • LAPAROSCOPIC CHOLECYSTECTOMY  2014   • TUBAL ABDOMINAL LIGATION        Home Meds:  Medications Prior to Admission   Medication  Sig Dispense Refill Last Dose   • acetaminophen (TYLENOL) 325 MG tablet Take 650 mg by mouth Every 6 (Six) Hours As Needed for Mild Pain .   10/29/2019   • doxylamine (UNISOM) 25 MG tablet Take 25 mg by mouth At Night As Needed.   10/29/2019   • melatonin 5 MG tablet tablet Take 10 mg by mouth At Night As Needed.   11/3/2019 at Unknown time       Allergies:  No Known Allergies  Immunizations:    There is no immunization history on file for this patient.  Social History:   Social History     Social History Narrative   • Not on file     Social History     Socioeconomic History   • Marital status:      Spouse name: Not on file   • Number of children: 2   • Years of education: Not on file   • Highest education level: Not on file   Occupational History     Employer: "[employer]   Tobacco Use   • Smoking status: Former Smoker     Packs/day: 0.50     Years: 10.00     Pack years: 5.00     Types: Cigarettes     Last attempt to quit:      Years since quittin.8   • Smokeless tobacco: Former User   Substance and Sexual Activity   • Alcohol use: No   • Drug use: No   • Sexual activity: No     Birth control/protection: Post-menopausal       Family History:  Family History   Problem Relation Age of Onset   • Alcohol abuse Father    • Heart failure Mother    • Diabetes Brother    • Prostate cancer Brother    • Throat cancer Brother    • Breast cancer Maternal Aunt    • Breast cancer Maternal Cousin    • Breast cancer Maternal Cousin    • Malig Hyperthermia Neg Hx         Review of Systems  See history of present illness and past medical history.  Patient denies fever chills night sweats.  Denies nausea vomiting or abdominal pain but admits to diarrhea.  Denies problems swallowing headache admits to generalized weakness and some dizziness at times but denies chest pain palpitations cough shortness of breath bruising bleeding loss of consciousness or any loss of function.  Remainder of ROS is  "negative.    Objective:  tMax 24 hrs: Temp (24hrs), Av.2 °F (37.9 °C), Min:100.1 °F (37.8 °C), Max:100.3 °F (37.9 °C)    Vitals Ranges:   Temp:  [100.1 °F (37.8 °C)-100.3 °F (37.9 °C)] 100.1 °F (37.8 °C)  Heart Rate:  [] 93  Resp:  [16-20] 16  BP: (110-135)/(58-74) 110/58      Exam:  /58 (BP Location: Right arm, Patient Position: Lying)   Pulse 93   Temp 100.1 °F (37.8 °C) (Oral)   Resp 16   Ht 160 cm (63\")   Wt 83.9 kg (185 lb)   SpO2 96%   BMI 32.77 kg/m²      General Appearance:    Alert, cooperative, conversational though sickly, AO x3, spouse at bedside   Head:    Normocephalic, without obvious abnormality, atraumatic   Eyes:    PERRL, conjunctiva/corneas clear, EOM's intact, both eyes   Ears:    Normal external ear canals, both ears   Nose:   Nares normal, septum midline, mucosa normal, no drainage    or sinus tenderness   Throat:   Lips, mucosa, and tongue normal though mucous membranes are quite dry   Neck:   Supple, symmetrical, trachea midline, no adenopathy;     thyroid:  no enlargement/tenderness/nodules; no carotid    bruit or JVD   Back:     Symmetric, no curvature   Lungs:     Clear to auscultation bilaterally, respirations unlabored   Chest Wall:   Right breast surgical lesions are clean and dry without any infection    Heart:    Regular rate and rhythm, S1 and S2 normal, no murmur, rub   or gallop   Abdomen:     Soft, non-tender, bowel sounds active all four quadrants,     no masses, no hepatomegaly, no splenomegaly   Extremities:   Extremities normal, atraumatic, no cyanosis or edema   Pulses:   2+ and symmetric all extremities           Neurologic:   CNII-XII intact, normal strength, no focal deficit, normal gait      .    Data Review:  Labs in chart were reviewed.             Imaging Results (All)     Procedure Component Value Units Date/Time    CT Abdomen Pelvis With Contrast [[id number]] Collected:  11/10/19 1637     Updated:  11/10/19 1658    Narrative:       CT ABDOMEN " PELVIS W CONTRAST-     INDICATIONS: Diarrhea     TECHNIQUE: Radiation dose reduction techniques were utilized, including  automated exposure control and exposure modulation based on body size.  Enhanced ABDOMEN AND PELVIS CT     COMPARISON: None available     FINDINGS:     Posterior aspect of the liver appears truncated, and heterogeneous  posteriorly, that may reflect partial right hepatectomy change,  correlate with surgical history. If there is no such surgical history,  then neoplasm would be suspected, considering presence of heterogeneity  and ductal dilatation apparent along the posterior margin of the liver,  for example a 3.2 cm region on image 26 of series 1. Recommend  comparison with prior imaging (if it can be obtained) to characterize  stability/exclude possibility of a lesion; if this finding has not  already been definitively characterized, initial further evaluation with  hepatic MRI would be recommended to assess for possible malignant  neoplasm.     Gallbladder is surgically absent                 Otherwise unremarkable appearance of the liver, spleen, adrenal glands,  pancreas, kidneys, bladder.     No bowel obstruction . Diffuse wall thickening is apparent in the colon,  suggesting nonspecific colitis in the setting of diarrhea. Appendix does  not appear inflamed.     No free intraperitoneal gas or free fluid.     Scattered small mesenteric and para-aortic lymph nodes are seen that are  not significant by size criteria.     Abdominal aorta is not aneurysmal. Aortic and other arterial  calcifications are present.     The lung bases show minimal atelectasis.     Degenerative changes are seen in the spine. No acute fracture is  identified.             Impression:             1. Diffuse colitis.  2. Abnormal appearance of the liver that may reflect postsurgical change  (correlate clinically), or potentially malignant neoplasm; if this  finding has not already been definitively characterized, initial  further  evaluation with hepatic MRI would be recommended.     Discussed by telephone with Dr. Velasco at 1645, 11/10/2019.     This report was finalized on 11/10/2019 4:55 PM by Dr. Franky Osullivan M.D.       XR Chest 2 View [[id number]] Collected:  11/10/19 1639     Updated:  11/10/19 1642    Narrative:       XR CHEST 2 VW-  11/10/2019     HISTORY: Chills, elevated white blood cell count.     Heart size is within normal limits. Lungs appear free of acute  infiltrates. Bones and soft tissues are unremarkable.       Impression:       1. No acute process.     This report was finalized on 11/10/2019 4:39 PM by Dr. Rudi Gonzalez M.D.               Assessment:    Acute colitis    Malignant neoplasm of upper-outer quadrant of right breast in female, estrogen receptor positive (CMS/HCC)    Diarrhea    Liver mass    Hypokalemia    Leukocytosis      Plan:    Colitis with liver lesion and recent past history of right-sided breast cancer   -Given Levaquin/Flagyl in ER.  Due to possibility of C. difficile colitis we will continue with Flagyl only for now until stool is further evaluated with assay/GI panel   -MRI liver - LFTs normal as is bilirubin at 0.6 - check AFP   -Consult GI   -IVF with potassium   -No sepsis present at admission    Breast cancer right-sided   -Surgical biopsy site healing appropriately and patient has outpatient follow-up with Dr. Ramos    [facility] [place] [place]    Further recommendations to follow his clinical course unfolds    Sean Mullen MD  11/10/2019  8:00 PM    Electronically signed by Sean Mullen MD at 11/10/19 2010          Emergency Department Notes      Cha Galloway RN at 11/10/19 1312        Pt had breast lumpectomy six days ago. Since then, she has had diarrhea despite taking imodium and probiotics.      Cha Galloway RN  11/10/19 1312      Electronically signed by Cha Galloway RN at 11/10/19 1312     Flynn Velasco MD at 11/10/19 0787            EMERGENCY DEPARTMENT ENCOUNTER    CHIEF COMPLAINT  Chief Complaint: Abd pain  History given by: Patient  History limited by: None  Room Number: 25/25  PMD: Tommy Osborn MD      HPI:  Pt is a 64 y.o. female who presents complaining of gradual onset of non-radiating intermittent diffuse abd pain associated with N/V/D and fever since 11/05/19. She reports that her sxs have been progressively worsening since onset. Pt denies SOA, CP, diaphoresis or urinary sxs. Pt denies any aggravating or alleviating factors. Pt denies any triggering factors. Pt denies having similar sxs in the past. Pt denies taking any medications for her current sxs PTA. Pt denies Being around ill contacts. Pt denies recent use of abx. Pt reports she had a breast lumpectomy on 11/04/19. Family at bedside.    Duration:  Since 11/05/19  Onset: Gradual  Timing: Intermittent   Location: Diffuse abd  Radiation: None  Intensity/Severity: Moderate  Progression: Worsening   Associated Symptoms: N/V/D and fever  Aggravating Factors: None  Alleviating Factors: None  Previous Episodes: None  Treatment before arrival: None    PAST MEDICAL HISTORY  Active Ambulatory Problems     Diagnosis Date Noted   • Visit for gynecologic examination 06/28/2017   • Vasomotor symptoms due to menopause 06/28/2017   • Screening for cervical cancer 07/25/2018   • Malignant neoplasm of upper-outer quadrant of right breast in female, estrogen receptor positive (CMS/HCC) 10/14/2019     Resolved Ambulatory Problems     Diagnosis Date Noted   • No Resolved Ambulatory Problems     Past Medical History:   Diagnosis Date   • Breast cancer (CMS/HCC) 09/2019   • GERD (gastroesophageal reflux disease)    • PONV (postoperative nausea and vomiting)    • Situational stress    • Stress headaches        PAST SURGICAL HISTORY  Past Surgical History:   Procedure Laterality Date   • BILE DUCT STENT PLACEMENT  2014   • BREAST BIOPSY Right 09/2019    malignant   • BREAST LUMPECTOMY WITH  SENTINEL NODE BIOPSY Right 2019    Procedure: BREAST LUMPECTOMY WITH SENTINEL NODE BIOPSY AND NEEDLE LOCALIZATION And possible axillary dissection;  Surgeon: Jean-Paul Ramos MD;  [place] [facility] [place] [facility] [place]  Service: General   • KNEE ARTHROSCOPY Left    • LAPAROSCOPIC CHOLECYSTECTOMY     • TUBAL ABDOMINAL LIGATION         FAMILY HISTORY  Family History   Problem Relation Age of Onset   • Alcohol abuse Father    • Heart failure Mother    • Diabetes Brother    • Prostate cancer Brother    • Throat cancer Brother    • Breast cancer Maternal Aunt    • Breast cancer Maternal Cousin    • Breast cancer Maternal Cousin    • Malig Hyperthermia Neg Hx        SOCIAL HISTORY  Social History     Socioeconomic History   • Marital status:      Spouse name: Not on file   • Number of children: 2   • Years of education: Not on file   • Highest education level: Not on file   Occupational History     Employer: [employer]   Tobacco Use   • Smoking status: Former Smoker     Packs/day: 0.50     Years: 10.00     Pack years: 5.00     Types: Cigarettes     Last attempt to quit:      Years since quittin.8   • Smokeless tobacco: Former User   Substance and Sexual Activity   • Alcohol use: No   • Drug use: No   • Sexual activity: No     Birth control/protection: Post-menopausal       ALLERGIES  Patient has no known allergies.    REVIEW OF SYSTEMS  Review of Systems   Constitutional: Positive for fever. Negative for diaphoresis.   HENT: Negative for sore throat.    Eyes: Negative.    Respiratory: Negative for cough and shortness of breath.    Cardiovascular: Negative for chest pain.   Gastrointestinal: Positive for abdominal pain (diffuse), diarrhea, nausea and vomiting.   Genitourinary: Negative.  Negative for dysuria.   Musculoskeletal: Negative for neck pain.   Skin: Negative for rash.   Allergic/Immunologic: Negative.    Neurological: Negative for weakness, numbness and headaches.   Hematological: Negative.     Psychiatric/Behavioral: Negative.    All other systems reviewed and are negative.      PHYSICAL EXAM  ED Triage Vitals   Temp Heart Rate Resp BP SpO2   11/10/19 1313 11/10/19 1313 11/10/19 1313 11/10/19 1400 11/10/19 1313   100.3 °F (37.9 °C) 116 20 116/63 97 %      Temp src Heart Rate Source Patient Position BP Location FiO2 (%)   11/10/19 1313 -- -- -- --   Tympanic             Physical Exam   Constitutional: She is oriented to person, place, and time. No distress.   HENT:   Head: Normocephalic and atraumatic.   Eyes: EOM are normal. Pupils are equal, round, and reactive to light.   Neck: Normal range of motion. Neck supple.   Cardiovascular: Normal rate, regular rhythm, normal heart sounds and intact distal pulses.   Pulmonary/Chest: Effort normal and breath sounds normal. No respiratory distress.   Abdominal: Soft. She exhibits no distension. There is no tenderness. There is no rebound and no guarding.   Musculoskeletal: Normal range of motion. She exhibits no edema.   Neurological: She is alert and oriented to person, place, and time. She has normal sensation and normal strength.   Skin: Skin is warm and dry. No rash noted.   Psychiatric: Mood and affect normal.   Nursing note and vitals reviewed.      LAB RESULTS  Lab Results (last 24 hours)     Procedure Component Value Units Date/Time    CBC & Differential [[id number]] Collected:  11/10/19 1506    Specimen:  Blood Updated:  11/10/19 1516    Narrative:       The following orders were created for panel order CBC & Differential.  Procedure                               Abnormality         Status                     ---------                               -----------         ------                     CBC Auto Differential[[id number]]        Abnormal            Final result                 Please view results for these tests on the individual orders.    Comprehensive Metabolic Panel [[id number]]  (Abnormal) Collected:  11/10/19 1506    Specimen:  Blood Updated:   11/10/19 1546     Glucose 105 mg/dL      BUN 15 mg/dL      Creatinine 0.89 mg/dL      Sodium 136 mmol/L      Potassium 3.0 mmol/L      Chloride 94 mmol/L      CO2 29.7 mmol/L      Calcium 8.4 mg/dL      Total Protein 7.4 g/dL      Albumin 3.10 g/dL      ALT (SGPT) 15 U/L      AST (SGOT) 19 U/L      Alkaline Phosphatase 103 U/L      Total Bilirubin 0.6 mg/dL      eGFR Non African Amer 64 mL/min/1.73      Globulin 4.3 gm/dL      A/G Ratio 0.7 g/dL      BUN/Creatinine Ratio 16.9     Anion Gap 12.3 mmol/L     Narrative:       GFR Normal >60  Chronic Kidney Disease <60  Kidney Failure <15    Lipase [[id number]]  (Normal) Collected:  11/10/19 1506    Specimen:  Blood Updated:  11/10/19 1534     Lipase 35 U/L     CBC Auto Differential [[id number]]  (Abnormal) Collected:  11/10/19 1506    Specimen:  Blood Updated:  11/10/19 1516     WBC 22.27 10*3/mm3      RBC 4.27 10*6/mm3      Hemoglobin 13.5 g/dL      Hematocrit 38.8 %      MCV 90.9 fL      MCH 31.6 pg      MCHC 34.8 g/dL      RDW 12.2 %      RDW-SD 40.6 fl      MPV 10.2 fL      Platelets 238 10*3/mm3      Neutrophil % 69.5 %      Lymphocyte % 17.0 %      Monocyte % 11.2 %      Eosinophil % 1.0 %      Basophil % 0.4 %      Immature Grans % 0.9 %      Neutrophils, Absolute 15.47 10*3/mm3      Lymphocytes, Absolute 3.78 10*3/mm3      Monocytes, Absolute 2.50 10*3/mm3      Eosinophils, Absolute 0.23 10*3/mm3      Basophils, Absolute 0.08 10*3/mm3      Immature Grans, Absolute 0.21 10*3/mm3      nRBC 0.0 /100 WBC     Procalcitonin [[id number]] Collected:  11/10/19 1506    Specimen:  Blood Updated:  11/10/19 1751    Urinalysis With Microscopic If Indicated (No Culture) - Urine, Clean Catch [[id number]]  (Abnormal) Collected:  11/10/19 1644    Specimen:  Urine, Clean Catch Updated:  11/10/19 1716     Color, UA Yellow     Appearance, UA Clear     pH, UA 6.0     Specific Gravity, UA >=1.030     Glucose, UA Negative     Ketones, UA Negative     Bilirubin, UA Negative     Blood, UA  Negative     Protein, UA Trace     Leuk Esterase, UA Negative     Nitrite, UA Negative     Urobilinogen, UA 1.0 E.U./dL    Narrative:       Urine microscopic not indicated.    Lactic Acid, Plasma [[id number]] Collected:  11/10/19 1648    Specimen:  Blood Updated:  11/10/19 1655    Blood Culture - Blood, Arm, Right [[id number]] Collected:  11/10/19 1654    Specimen:  Blood from Arm, Right Updated:  11/10/19 1704    Blood Culture - Blood, Hand, Right [[id number]] Collected:  11/10/19 1654    Specimen:  Blood from Hand, Right Updated:  11/10/19 1703          I ordered the above labs and reviewed the results    RADIOLOGY  CT Abdomen Pelvis With Contrast   Final Result           1. Diffuse colitis.   2. Abnormal appearance of the liver that may reflect postsurgical change   (correlate clinically), or potentially malignant neoplasm; if this   finding has not already been definitively characterized, initial further   evaluation with hepatic MRI would be recommended.       Discussed by telephone with Dr. Velasco at 1645, 11/10/2019.       This report was finalized on 11/10/2019 4:55 PM by Dr. Franky Osullivan M.D.          XR Chest 2 View   Final Result   1. No acute process.       This report was finalized on 11/10/2019 4:39 PM by Dr. Rudi Gonzalez M.D.               I ordered the above noted radiological studies. Interpreted by radiologist. Discussed with radiologist (Dr. Osullivan). Reviewed by me in PACS.       PROCEDURES  Procedures      PROGRESS AND CONSULTS        1452: Ordered labs and CXR for further evaluation and management.    1536: Ordered CT abd/pelvis for further evaluation and management.    1646: Discussed pt case with Dr. Osullivan (radiology) who states that CT abd/pelvis show diffuse colitis.     1647: Rechecked pt. Pt is resting comfortably. Notified pt of results of CT abd/pelvis showing diffuse colitis. Discussed the plan to admit the pt for further evaluation and management. Pt and family agree  with the plan and all questions were addressed.    1649: Ordered Flagyl and Levaquin for colitis.     1706: Discussed pt case with Dr. Rodrigues ([facility]) who will admit the patient for further evaluation and management.      MEDICAL DECISION MAKING  Results were reviewed/discussed with the patient and they were also made aware of online access. Pt also made aware that some labs, such as cultures, will not be resulted during ER visit and follow up with PMD is necessary.     MDM  Number of Diagnoses or Management Options  Acute colitis:      Amount and/or Complexity of Data Reviewed  Clinical lab tests: ordered and reviewed (UA protein trace   Lipase 35  Creatinine 0.89  WBC 22.27)  Tests in the radiology section of CPT®:  ordered and reviewed (CT abd/pelvis show diffuse colitis. CXR negative acute.)  Decide to obtain previous medical records or to obtain history from someone other than the patient: yes  Discuss the patient with other providers: yes (Dr. Osullivan (radiology) and Dr. Rodrigues ([facility]))           DIAGNOSIS  Final diagnoses:   Acute colitis       DISPOSITION  ADMISSION    Discussed treatment plan and reason for admission with pt/family and admitting physician.  Pt/family voiced understanding of the plan for admission for further testing/treatment as needed.         Latest Documented Vital Signs:  As of 5:52 PM  BP- 131/69 HR- 91 Temp- 100.3 °F (37.9 °C) (Tympanic) O2 sat- 93%    --  Documentation assistance provided by filemon Sanchez for Dr. Flynn Velasco MD.  Information recorded by the scribe was done at my direction and has been verified and validated by me.         Maryam Sanchez  11/10/19 1752       Flynn Velasco MD  11/10/19 1831      Electronically signed by Flynn Velasco MD at 11/10/19 1831     Lilibeth Rubio, RN at 11/10/19 1753        Nurse unavailable for report, will call back     Lilibeth Rubio, RN  11/10/19 1753      Electronically signed by Lilibeth Rubio, RN at 11/10/19  1753     Lilibeth Rubio, RN at 11/10/19 1814        Nurse unavailable for report, transport called     Lilibeth Rubio, RN  11/10/19 1815      Electronically signed by Lilibeth Rubio, RN at 11/10/19 1815       Vital Signs (last 2 days)     Date/Time   Temp   Temp src   Pulse   Resp   BP   Patient Position   SpO2    11/11/19 1443   97.3 (36.3)   --   89   16   112/50   Lying   96    11/11/19 0940   98.9 (37.2)   Oral   91   16   102/52   Lying   95    11/11/19 0557   98.8 (37.1)   Oral   97   16   122/60   Lying   97    11/11/19 0300   97.8 (36.6)   --   --   --   --   --   --    11/10/19 2314   100.3 (37.9)   --   92   16   112/66   Lying   96    11/10/19 1849   100.1 (37.8)   Oral   93   16   110/58   Lying   96    11/10/19 1832   --   --   --   16   --   --   --    11/10/19 1822   --   --   90   --   --   --   95    11/10/19 1755   --   --   --   --   128/68   --   --    11/10/19 1742   --   --   91   --   --   --   93    11/10/19 1725   --   --   93   --   131/69   --   95    11/10/19 1710   --   --   98   --   --   --   97    11/10/19 1708   --   --   --   --   135/74   --   --    11/10/19 1649   --   --   98   --   --   --   96    11/10/19 1520   --   --   89   --   --   --   96    11/10/19 1400   --   --   --   --   116/63   --   --    11/10/19 1313   100.3 (37.9)   Tympanic   116   20   --   --   97            Hospital Medications (active)       Dose Frequency Start End    acetaminophen (TYLENOL) tablet 650 mg 650 mg Every 6 Hours PRN 11/10/2019     Sig - Route: Take 2 tablets by mouth Every 6 (Six) Hours As Needed for Mild Pain . - Oral    famotidine (PEPCID) injection 20 mg 20 mg Every 12 Hours Scheduled 11/10/2019     Sig - Route: Infuse 2 mL into a venous catheter Every 12 (Twelve) Hours. - Intravenous    gadobenate dimeglumine (MULTIHANCE) injection 17 mL 17 mL Once in Imaging 11/11/2019 11/11/2019    Sig - Route: Infuse 17 mL into a venous catheter Once. - Intravenous    iopamidol  "(ISOVUE-300) 61 % injection 100 mL 100 mL Once in Imaging 11/10/2019 11/10/2019    Sig - Route: Infuse 100 mL into a venous catheter Once. - Intravenous    levoFLOXacin (LEVAQUIN) 500 mg/100 mL D5W (premix) (LEVAQUIN) 500 mg 500 mg Once 11/10/2019 11/10/2019    Sig - Route: Infuse 100 mL into a venous catheter 1 (One) Time. - Intravenous    melatonin tablet 10 mg 10 mg Nightly PRN 11/10/2019     Sig - Route: Take 2 tablets by mouth At Night As Needed for Sleep. - Oral    metroNIDAZOLE (FLAGYL) 500 mg/100mL IVPB 500 mg Once 11/10/2019 11/10/2019    Sig - Route: Infuse 100 mL into a venous catheter 1 (One) Time. - Intravenous    metroNIDAZOLE (FLAGYL) 500 mg/100mL IVPB 500 mg Every 8 Hours 11/11/2019 11/18/2019    Sig - Route: Infuse 100 mL into a venous catheter Every 8 (Eight) Hours. - Intravenous    ondansetron (ZOFRAN) injection 4 mg 4 mg Every 6 Hours PRN 11/10/2019     Sig - Route: Infuse 2 mL into a venous catheter Every 6 (Six) Hours As Needed for Nausea or Vomiting. - Intravenous    Linked Group 1:  \"Or\" Linked Group Details        ondansetron (ZOFRAN) tablet 4 mg 4 mg Every 6 Hours PRN 11/10/2019     Sig - Route: Take 1 tablet by mouth Every 6 (Six) Hours As Needed for Nausea or Vomiting. - Oral    Linked Group 1:  \"Or\" Linked Group Details        Pharmacy Consult  Continuous PRN 11/11/2019     Sig - Route: Continuous As Needed for Consult. - Does not apply    potassium chloride (MICRO-K) CR capsule 40 mEq 40 mEq Once 11/10/2019 11/10/2019    Sig - Route: Take 4 capsules by mouth 1 (One) Time. - Oral    sodium chloride 0.9 % bolus 1,000 mL 1,000 mL Once 11/10/2019 11/10/2019    Sig - Route: Infuse 1,000 mL into a venous catheter 1 (One) Time. - Intravenous    sodium chloride 0.9 % flush 10 mL 10 mL Every 12 Hours Scheduled 11/10/2019     Sig - Route: Infuse 10 mL into a venous catheter Every 12 (Twelve) Hours. - Intravenous    sodium chloride 0.9 % flush 10 mL 10 mL As Needed 11/10/2019     Sig - Route: " Infuse 10 mL into a venous catheter As Needed for Line Care. - Intravenous    sodium chloride 0.9 % with KCl 20 mEq/L infusion 125 mL/hr Continuous 11/10/2019     Sig - Route: Infuse 125 mL/hr into a venous catheter Continuous. - Intravenous    vancomycin oral solution reconstituted 125 mg 125 mg Every 6 Hours Scheduled 2019    Sig - Route: Take 2.5 mL by mouth Every 6 (Six) Hours. - Oral    sodium chloride 0.9 % infusion (Discontinued) 125 mL/hr Continuous 11/10/2019 11/10/2019    Sig - Route: Infuse 125 mL/hr into a venous catheter Continuous. - Intravenous    vancomycin oral solution reconstituted 125 mg (Discontinued) 125 mg Every 6 Hours Scheduled 2019    Sig - Route: Take 2.5 mL by mouth Every 6 (Six) Hours. - Oral             Physician Progress Notes (last 48 hours) (Notes from 19 1541 through 19 1541)      Brian Draper MD at 19 1212          Name: Taylor Larsen ADMIT: 11/10/2019   : 1955  PCP: Tommy Osborn MD    MRN: [id number] LOS: 1 days   AGE/SEX: 64 y.o. female  ROOM: [facility]     Subjective   Subjective   Mrs. with entered the hospital with malaise abdominal cramping recurrent bowel movements and diarrhea.  Last Monday she had undergone  lumpectomy of the right breast cancer of the breast.  At that time she received antibiotics and was discharged home.  On Tuesday she began to have loose stools frequent stools.  She tried probiotics but diarrhea persisted he came to the emergency room yesterday was admitted.  She denies localized abdominal pain fever or chills and presented to the emergency room only because she could not control the diarrhea.    The emergency room she was found to have a fever and leukocytosis; abd CT scan was performed and demonstrated:  1. Diffuse colitis.Diffuse wall thickening is apparent in the colon  2. Abnormal appearance of the liver that may reflect postsurgical change  (correlate clinically), or potentially  malignant neoplasm; if this  finding has not already been definitively characterized, initial further  evaluation with hepatic MRI would be recommended.    Objective   Objective   Vital Signs  Temp:  [97.8 °F (36.6 °C)-100.3 °F (37.9 °C)] 98.9 °F (37.2 °C)  Heart Rate:  [] 91  Resp:  [16-20] 16  BP: (102-135)/(52-74) 102/52  SpO2:  [93 %-97 %] 95 %  on   ;   Device (Oxygen Therapy): room air  Body mass index is 32.77 kg/m².  Physical Exam  Gen: appears normal, anicteric in NACRD  HEENT:normocephalic, anicteric EOM intact,  Neck: supple, no thyromegaly, no JVD, no bruit  Chest clear, normal respirations, no use of accessory muscles of respiration, no cough no wheeze, no rales; breast not examined  CV: normal precordium, normal S1, S2, no murmur  Abdomen, soft nontender, no organosplenomegaly    Neuro: cr nn II-XII intact, moves all 4 extremities well, normal station and gait  Reflexes 2+, Babinski absent,   Extremities: no edema, cyanosis or clubbing  Skin no rash    Results Review:       I reviewed the patient's new clinical results.  Results from last 7 days   Lab Units 11/11/19  0448 11/10/19  1506   WBC 10*3/mm3 18.65* 22.27*   HEMOGLOBIN g/dL 11.3* 13.5   HEMATOCRIT % 32.7* 38.8   PLATELETS 10*3/mm3 209 238     Results from last 7 days   Lab Units 11/11/19  0448 11/10/19  1506   SODIUM mmol/L 135* 136   POTASSIUM mmol/L 3.4* 3.0*   CHLORIDE mmol/L 101 94*   CO2 mmol/L 24.0 29.7*   BUN mg/dL 12 15   CREATININE mg/dL 0.58 0.89   GLUCOSE mg/dL 82 105*   Estimated Creatinine Clearance: 100.6 mL/min (by C-G formula based on SCr of 0.58 mg/dL).  Results from last 7 days   Lab Units 11/10/19  1506   ALBUMIN g/dL 3.10*   BILIRUBIN mg/dL 0.6   ALK PHOS U/L 103   AST (SGOT) U/L 19   ALT (SGPT) U/L 15     Results from last 7 days   Lab Units 11/11/19  0448 11/10/19  1506   CALCIUM mg/dL 7.6* 8.4*   ALBUMIN g/dL  --  3.10*   MAGNESIUM mg/dL 2.1  --      Results from last 7 days   Lab Units 11/10/19  9609  11/10/19  1506   PROCALCITONIN ng/mL  --  0.14   LACTATE mmol/L 1.7  --      No results found for: HGBA1C, POCGLU      famotidine 20 mg Intravenous Q12H   metroNIDAZOLE 500 mg Intravenous Q8H   sodium chloride 10 mL Intravenous Q12H   vancomycin 125 mg Oral Q6H       Pharmacy Consult     sodium chloride 0.9 % with KCl 20 mEq 125 mL/hr Last Rate: 125 mL/hr (11/11/19 0629)   Diet Regular      Assessment/Plan     Active Hospital Problems    Diagnosis  POA   • **Acute colitis [K52.9]  Yes   • Diarrhea [R19.7]  Unknown   • Liver mass [R16.0]  Unknown   • Hypokalemia [E87.6]  Unknown   • Leukocytosis [D72.829]  Unknown   • Malignant neoplasm of upper-outer quadrant of right breast in female, estrogen receptor positive (CMS/HCC) [C50.411, Z17.0]  Not Applicable      Resolved Hospital Problems   No resolved problems to display.       Ms. Larsen is a 64 y.o.  with a history ofbreast ca s/p lumpectomy  who has been admitted with persistent diarrhea leukocytosis    Considering recent antibiotic c.diff infection would have to be considered and she is being treated for same    Contributing to her diarrhea may be post cholecystectomy syndrome     Abn ct scan - tumor marker ordered-check liver with ultrasound poss mri if necessary  ·   Will switch to cl liquid diet until diarrhea abates and progress as tolerated  Cont metronidazole levaquin and vanc orally   Await c.diff toxin assay  Doubt systemic sepsis    VTE Prophylaxis - scd's  Code Status - Full code  [place]      Brian Draper MD  [facility]  11/11/19  12:12 PM          Electronically signed by Brian Draper MD at 11/11/19 1229       Consult Notes (last 48 hours) (Notes from 11/09/19 1541 through 11/11/19 1541)     No notes of this type exist for this encounter.

## 2019-11-11 NOTE — PROGRESS NOTES
Pharmacy consult from [facility] protocol to change PPI's to famotidine unless documented history of GI bleed or to discontinue antiperistalic agents and stool softeners/laxatives.    No changes needed.    Adryan Victoria RP

## 2019-11-11 NOTE — H&P
HISTORY AND PHYSICAL   [facility]        Patient Identification:  Name: Taylor Larsen  Age: 64 y.o.  Sex: female  :  1955  MRN: [id number]                     Primary Care Physician: Tommy Osborn MD    Chief Complaint: Diarrhea    History of Present Illness:   Mrs Larsen is a pleasant 64-year-old female who just underwent right-sided lumpectomy per Dr. Ramos this week.  She was found to have breast cancer but told she had no lymph node involvement.  She developed diarrhea which got progressively worse throughout the week.  She said to many bouts of diarrhea to count.  She states the majority of which has been completely watery though there has been some form stool as well.  She denies any abdominal pains nausea or vomiting.  She has not been taking any prolonged antibiotic regimen though she did receive perioperative antibiotics but I do not know exactly what she received.  She denies any blood in her stool denies any recent travels or changes to her diet.  In the ER CT demonstrated colitis and she was given Levaquin and Flagyl.  Additional stool studies were ordered with GI panel and C. difficile that time.  I later discussed with ER physician additional findings on the CT that was concerning for a liver mass.  Patient denies any operative issues or events in that area where the lesion was noted.  Patient states diarrhea was treated this week with probiotics and Imodium.    Past Medical History:  Past Medical History:   Diagnosis Date   • Breast cancer (CMS/HCC) 2019    Right IDC   • GERD (gastroesophageal reflux disease)    • PONV (postoperative nausea and vomiting)    • Situational stress     DEATH OF BROTHER 10/2019, RECENT DIAGNOSIS   • Stress headaches      Past Surgical History:  Past Surgical History:   Procedure Laterality Date   • BILE DUCT STENT PLACEMENT     • BREAST BIOPSY Right 2019    malignant   • BREAST LUMPECTOMY WITH SENTINEL NODE BIOPSY Right 2019     Procedure: BREAST LUMPECTOMY WITH SENTINEL NODE BIOPSY AND NEEDLE LOCALIZATION And possible axillary dissection;  Surgeon: Jean-Paul Ramos MD;  Location: [facility] OR [facility];  Service: General   • KNEE ARTHROSCOPY Left    • LAPAROSCOPIC CHOLECYSTECTOMY     • TUBAL ABDOMINAL LIGATION        Home Meds:  Medications Prior to Admission   Medication Sig Dispense Refill Last Dose   • acetaminophen (TYLENOL) 325 MG tablet Take 650 mg by mouth Every 6 (Six) Hours As Needed for Mild Pain .   10/29/2019   • doxylamine (UNISOM) 25 MG tablet Take 25 mg by mouth At Night As Needed.   10/29/2019   • melatonin 5 MG tablet tablet Take 10 mg by mouth At Night As Needed.   11/3/2019 at Unknown time       Allergies:  No Known Allergies  Immunizations:    There is no immunization history on file for this patient.  Social History:   Social History     Social History Narrative   • Not on file     Social History     Socioeconomic History   • Marital status:      Spouse name: Not on file   • Number of children: 2   • Years of education: Not on file   • Highest education level: Not on file   Occupational History     Employer: [employer]   Tobacco Use   • Smoking status: Former Smoker     Packs/day: 0.50     Years: 10.00     Pack years: 5.00     Types: Cigarettes     Last attempt to quit:      Years since quittin.8   • Smokeless tobacco: Former User   Substance and Sexual Activity   • Alcohol use: No   • Drug use: No   • Sexual activity: No     Birth control/protection: Post-menopausal       Family History:  Family History   Problem Relation Age of Onset   • Alcohol abuse Father    • Heart failure Mother    • Diabetes Brother    • Prostate cancer Brother    • Throat cancer Brother    • Breast cancer Maternal Aunt    • Breast cancer Maternal Cousin    • Breast cancer Maternal Cousin    • Malig Hyperthermia Neg Hx         Review of Systems  See history of present illness and past medical history.  Patient denies fever  "chills night sweats.  Denies nausea vomiting or abdominal pain but admits to diarrhea.  Denies problems swallowing headache admits to generalized weakness and some dizziness at times but denies chest pain palpitations cough shortness of breath bruising bleeding loss of consciousness or any loss of function.  Remainder of ROS is negative.    Objective:  tMax 24 hrs: Temp (24hrs), Av.2 °F (37.9 °C), Min:100.1 °F (37.8 °C), Max:100.3 °F (37.9 °C)    Vitals Ranges:   Temp:  [100.1 °F (37.8 °C)-100.3 °F (37.9 °C)] 100.1 °F (37.8 °C)  Heart Rate:  [] 93  Resp:  [16-20] 16  BP: (110-135)/(58-74) 110/58      Exam:  /58 (BP Location: Right arm, Patient Position: Lying)   Pulse 93   Temp 100.1 °F (37.8 °C) (Oral)   Resp 16   Ht 160 cm (63\")   Wt 83.9 kg (185 lb)   SpO2 96%   BMI 32.77 kg/m²     General Appearance:    Alert, cooperative, conversational though sickly, AO x3, spouse at bedside   Head:    Normocephalic, without obvious abnormality, atraumatic   Eyes:    PERRL, conjunctiva/corneas clear, EOM's intact, both eyes   Ears:    Normal external ear canals, both ears   Nose:   Nares normal, septum midline, mucosa normal, no drainage    or sinus tenderness   Throat:   Lips, mucosa, and tongue normal though mucous membranes are quite dry   Neck:   Supple, symmetrical, trachea midline, no adenopathy;     thyroid:  no enlargement/tenderness/nodules; no carotid    bruit or JVD   Back:     Symmetric, no curvature   Lungs:     Clear to auscultation bilaterally, respirations unlabored   Chest Wall:   Right breast surgical lesions are clean and dry without any infection    Heart:    Regular rate and rhythm, S1 and S2 normal, no murmur, rub   or gallop   Abdomen:     Soft, non-tender, bowel sounds active all four quadrants,     no masses, no hepatomegaly, no splenomegaly   Extremities:   Extremities normal, atraumatic, no cyanosis or edema   Pulses:   2+ and symmetric all extremities           Neurologic:   " CNII-XII intact, normal strength, no focal deficit, normal gait      .    Data Review:  Labs in chart were reviewed.             Imaging Results (All)     Procedure Component Value Units Date/Time    CT Abdomen Pelvis With Contrast [[id number]] Collected:  11/10/19 1637     Updated:  11/10/19 1658    Narrative:       CT ABDOMEN PELVIS W CONTRAST-     INDICATIONS: Diarrhea     TECHNIQUE: Radiation dose reduction techniques were utilized, including  automated exposure control and exposure modulation based on body size.  Enhanced ABDOMEN AND PELVIS CT     COMPARISON: None available     FINDINGS:     Posterior aspect of the liver appears truncated, and heterogeneous  posteriorly, that may reflect partial right hepatectomy change,  correlate with surgical history. If there is no such surgical history,  then neoplasm would be suspected, considering presence of heterogeneity  and ductal dilatation apparent along the posterior margin of the liver,  for example a 3.2 cm region on image 26 of series 1. Recommend  comparison with prior imaging (if it can be obtained) to characterize  stability/exclude possibility of a lesion; if this finding has not  already been definitively characterized, initial further evaluation with  hepatic MRI would be recommended to assess for possible malignant  neoplasm.     Gallbladder is surgically absent                 Otherwise unremarkable appearance of the liver, spleen, adrenal glands,  pancreas, kidneys, bladder.     No bowel obstruction . Diffuse wall thickening is apparent in the colon,  suggesting nonspecific colitis in the setting of diarrhea. Appendix does  not appear inflamed.     No free intraperitoneal gas or free fluid.     Scattered small mesenteric and para-aortic lymph nodes are seen that are  not significant by size criteria.     Abdominal aorta is not aneurysmal. Aortic and other arterial  calcifications are present.     The lung bases show minimal atelectasis.     Degenerative  changes are seen in the spine. No acute fracture is  identified.             Impression:             1. Diffuse colitis.  2. Abnormal appearance of the liver that may reflect postsurgical change  (correlate clinically), or potentially malignant neoplasm; if this  finding has not already been definitively characterized, initial further  evaluation with hepatic MRI would be recommended.     Discussed by telephone with Dr. Velasco at 1645, 11/10/2019.     This report was finalized on 11/10/2019 4:55 PM by Dr. Franky Osullivan M.D.       XR Chest 2 View [[id number]] Collected:  11/10/19 1639     Updated:  11/10/19 1642    Narrative:       XR CHEST 2 VW-  11/10/2019     HISTORY: Chills, elevated white blood cell count.     Heart size is within normal limits. Lungs appear free of acute  infiltrates. Bones and soft tissues are unremarkable.       Impression:       1. No acute process.     This report was finalized on 11/10/2019 4:39 PM by Dr. Rudi Gonzalez M.D.               Assessment:    Acute colitis    Malignant neoplasm of upper-outer quadrant of right breast in female, estrogen receptor positive (CMS/HCC)    Diarrhea    Liver mass    Hypokalemia    Leukocytosis      Plan:    Colitis with liver lesion and recent past history of right-sided breast cancer   -Given Levaquin/Flagyl in ER.  Due to possibility of C. difficile colitis we will continue with Flagyl only for now until stool is further evaluated with assay/GI panel   -MRI liver - LFTs normal as is bilirubin at 0.6 - check AFP   -Consult GI   -IVF with potassium   -No sepsis present at admission    Breast cancer right-sided   -Surgical biopsy site healing appropriately and patient has outpatient follow-up with Dr. aRmos    [facility] for DVT prophylaxis    Further recommendations to follow his clinical course unfolds    Sean Mullen MD  11/10/2019  8:00 PM

## 2019-11-11 NOTE — PROGRESS NOTES
Name: Taylor Larsen ADMIT: 11/10/2019   : 1955  PCP: Tommy Osborn MD    MRN: [id number] LOS: 1 days   AGE/SEX: 64 y.o. female  ROOM: [facility]     Subjective   Subjective   Mrs. with entered the hospital with malaise abdominal cramping recurrent bowel movements and diarrhea.  Last Monday she had undergone  lumpectomy of the right breast cancer of the breast.  At that time she received antibiotics and was discharged home.  On Tuesday she began to have loose stools frequent stools.  She tried probiotics but diarrhea persisted he came to the emergency room yesterday was admitted.  She denies localized abdominal pain fever or chills and presented to the emergency room only because she could not control the diarrhea.    The emergency room she was found to have a fever and leukocytosis; abd CT scan was performed and demonstrated:  1. Diffuse colitis.Diffuse wall thickening is apparent in the colon  2. Abnormal appearance of the liver that may reflect postsurgical change  (correlate clinically), or potentially malignant neoplasm; if this  finding has not already been definitively characterized, initial further  evaluation with hepatic MRI would be recommended.     Objective   Objective   Vital Signs  Temp:  [97.8 °F (36.6 °C)-100.3 °F (37.9 °C)] 98.9 °F (37.2 °C)  Heart Rate:  [] 91  Resp:  [16-20] 16  BP: (102-135)/(52-74) 102/52  SpO2:  [93 %-97 %] 95 %  on   ;   Device (Oxygen Therapy): room air  Body mass index is 32.77 kg/m².  Physical Exam  Gen: appears normal, anicteric in NACRD  HEENT:normocephalic, anicteric EOM intact,  Neck: supple, no thyromegaly, no JVD, no bruit  Chest clear, normal respirations, no use of accessory muscles of respiration, no cough no wheeze, no rales; breast not examined  CV: normal precordium, normal S1, S2, no murmur  Abdomen, soft nontender, no organosplenomegaly    Neuro: cr nn II-XII intact, moves all 4 extremities well, normal station and gait  Reflexes 2+, Babinski  absent,   Extremities: no edema, cyanosis or clubbing  Skin no rash    Results Review:       I reviewed the patient's new clinical results.  Results from last 7 days   Lab Units 11/11/19  0448 11/10/19  1506   WBC 10*3/mm3 18.65* 22.27*   HEMOGLOBIN g/dL 11.3* 13.5   HEMATOCRIT % 32.7* 38.8   PLATELETS 10*3/mm3 209 238     Results from last 7 days   Lab Units 11/11/19  0448 11/10/19  1506   SODIUM mmol/L 135* 136   POTASSIUM mmol/L 3.4* 3.0*   CHLORIDE mmol/L 101 94*   CO2 mmol/L 24.0 29.7*   BUN mg/dL 12 15   CREATININE mg/dL 0.58 0.89   GLUCOSE mg/dL 82 105*   Estimated Creatinine Clearance: 100.6 mL/min (by C-G formula based on SCr of 0.58 mg/dL).  Results from last 7 days   Lab Units 11/10/19  1506   ALBUMIN g/dL 3.10*   BILIRUBIN mg/dL 0.6   ALK PHOS U/L 103   AST (SGOT) U/L 19   ALT (SGPT) U/L 15     Results from last 7 days   Lab Units 11/11/19  0448 11/10/19  1506   CALCIUM mg/dL 7.6* 8.4*   ALBUMIN g/dL  --  3.10*   MAGNESIUM mg/dL 2.1  --      Results from last 7 days   Lab Units 11/10/19  1648 11/10/19  1506   PROCALCITONIN ng/mL  --  0.14   LACTATE mmol/L 1.7  --      No results found for: HGBA1C, POCGLU      famotidine 20 mg Intravenous Q12H   metroNIDAZOLE 500 mg Intravenous Q8H   sodium chloride 10 mL Intravenous Q12H   vancomycin 125 mg Oral Q6H       Pharmacy Consult     sodium chloride 0.9 % with KCl 20 mEq 125 mL/hr Last Rate: 125 mL/hr (11/11/19 0629)   Diet Regular       Assessment/Plan     Active Hospital Problems    Diagnosis  POA   • **Acute colitis [K52.9]  Yes   • Diarrhea [R19.7]  Unknown   • Liver mass [R16.0]  Unknown   • Hypokalemia [E87.6]  Unknown   • Leukocytosis [D72.829]  Unknown   • Malignant neoplasm of upper-outer quadrant of right breast in female, estrogen receptor positive (CMS/HCC) [C50.411, Z17.0]  Not Applicable      Resolved Hospital Problems   No resolved problems to display.       Ms. Larsen is a 64 y.o.  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      Brian Draper MD  [facility]  11/11/19  12:12 PM

## 2019-11-11 NOTE — CONSULTS
"Inpatient Gastroenterology Consult  Consult performed by: Sage Gonzalez MD  Consult ordered by: Sean Mullen MD          Patient Care Team:  Tommy Osborn MD as PCP - General (Family Medicine)  Beena Maurice MD as Consulting Physician (Radiation Oncology)  Jana Evans MD as Consulting Physician (Hematology and Oncology)    Chief complaint: colitis and abnormal imaging of the liver     Subjective   Patient with recent dx of node negative breast cancer presented with diarrhea,  On ct scan noted \"colitis\" since found to be c diff positive, and also questionable abnormality in the liver.  She denies any known etoh use, liver disease, hemochromatosis, diabetes, viral hepatitis, or abdominal pain .   She is feeling worn out from recent breast surgery.  She has not had a colonoscopy or upper endoscopy   History of Present Illness    Review of Systems   Constitutional: Positive for fatigue and unexpected weight change.   Gastrointestinal: Positive for diarrhea.        Past Medical History:   Diagnosis Date   • Breast cancer (CMS/HCC) 09/2019    Right IDC   • GERD (gastroesophageal reflux disease)    • PONV (postoperative nausea and vomiting)    • Situational stress     DEATH OF BROTHER 10/2019, RECENT DIAGNOSIS   • Stress headaches    ,   Past Surgical History:   Procedure Laterality Date   • BILE DUCT STENT PLACEMENT  2014   • BREAST BIOPSY Right 09/2019    malignant   • BREAST LUMPECTOMY WITH SENTINEL NODE BIOPSY Right 11/4/2019    Procedure: BREAST LUMPECTOMY WITH SENTINEL NODE BIOPSY AND NEEDLE LOCALIZATION And possible axillary dissection;  Surgeon: Jean-Paul Ramos MD;  [place] [facility] [place] [facility] [place]  Service: General   • KNEE ARTHROSCOPY Left    • LAPAROSCOPIC CHOLECYSTECTOMY  2014   • TUBAL ABDOMINAL LIGATION     ,   Family History   Problem Relation Age of Onset   • Alcohol abuse Father    • Heart failure Mother    • Diabetes Brother    • Prostate cancer Brother    • Throat cancer Brother  "   • Breast cancer Maternal Aunt    • Breast cancer Maternal Cousin    • Breast cancer Maternal Cousin    • Malig Hyperthermia Neg Hx    ,   Social History     Tobacco Use   • Smoking status: Former Smoker     Packs/day: 0.50     Years: 10.00     Pack years: 5.00     Types: Cigarettes     Last attempt to quit:      Years since quittin.8   • Smokeless tobacco: Former User   Substance Use Topics   • Alcohol use: No   • Drug use: No   ,   Medications Prior to Admission   Medication Sig Dispense Refill Last Dose   • acetaminophen (TYLENOL) 325 MG tablet Take 650 mg by mouth Every 6 (Six) Hours As Needed for Mild Pain .   10/29/2019   • doxylamine (UNISOM) 25 MG tablet Take 25 mg by mouth At Night As Needed.   10/29/2019   • melatonin 5 MG tablet tablet Take 10 mg by mouth At Night As Needed.   11/3/2019 at Unknown time   , Scheduled Meds:    famotidine 20 mg Intravenous Q12H   metroNIDAZOLE 500 mg Intravenous Q8H   sodium chloride 10 mL Intravenous Q12H   vancomycin 125 mg Oral Q6H   , Continuous Infusions:    Pharmacy Consult     sodium chloride 0.9 % with KCl 20 mEq 125 mL/hr Last Rate: 125 mL/hr (19 0629)   , PRN Meds:  •  acetaminophen  •  melatonin  •  ondansetron **OR** ondansetron  •  Pharmacy Consult  •  sodium chloride and Allergies:  Patient has no known allergies.    Objective      Vital Signs  Temp:  [97.8 °F (36.6 °C)-100.3 °F (37.9 °C)] 98.8 °F (37.1 °C)  Heart Rate:  [] 97  Resp:  [16-20] 16  BP: (110-135)/(58-74) 122/60    Physical Exam   Constitutional: She appears well-developed and well-nourished.   HENT:   Mouth/Throat: Oropharynx is clear and moist.   Eyes: Conjunctivae are normal.   Cardiovascular: Normal rate and regular rhythm.   Pulmonary/Chest: Effort normal and breath sounds normal.   Abdominal: Soft. Bowel sounds are normal.   Skin: Skin is warm and dry.   Psychiatric: She has a normal mood and affect.       Results Review:    I reviewed the patient's new clinical  results.        Assessment/Plan       Acute colitis    Malignant neoplasm of upper-outer quadrant of right breast in female, estrogen receptor positive (CMS/HCC)    Diarrhea    Liver mass    Hypokalemia    Leukocytosis      Assessment:  (Liver abnormality  Review suggest this is the result of right hepatic vein thrombosis  Certainly a concern for a hypercoagulable condition    Clostridia difficile colits ).     Plan:   (Will check alpha fetoprotein and ca19-9   Have ordered a hematology consult  Suspect his is chronic given the liver lobe atrophy  Will follow).       I discussed the patients findings and my recommendations with patient and nursing staff    Sage Gonzalez MD  11/11/19  8:20 AM    Time: Critical care 22 min

## 2019-11-11 NOTE — PLAN OF CARE
Problem: Patient Care Overview  Goal: Plan of Care Review  Outcome: Ongoing (interventions implemented as appropriate)   11/11/19 8854   Coping/Psychosocial   Plan of Care Reviewed With patient   Plan of Care Review   Progress no change   OTHER   Outcome Summary vss, with loose stool, stool sample sent to lab came back +c-diff, oral vanc started last night, voiding freely, up with assist, no c/o pain, on contact spore isolation.     Goal: Individualization and Mutuality  Outcome: Ongoing (interventions implemented as appropriate)    Goal: Discharge Needs Assessment  Outcome: Ongoing (interventions implemented as appropriate)    Goal: Interprofessional Rounds/Family Conf  Outcome: Ongoing (interventions implemented as appropriate)      Problem: Diarrhea (Adult)  Goal: Identify Related Risk Factors and Signs and Symptoms  Outcome: Ongoing (interventions implemented as appropriate)    Goal: Improved/Reduced Symptoms  Outcome: Ongoing (interventions implemented as appropriate)

## 2019-11-12 DIAGNOSIS — Z17.0 MALIGNANT NEOPLASM OF UPPER-OUTER QUADRANT OF RIGHT BREAST IN FEMALE, ESTROGEN RECEPTOR POSITIVE (HCC): Primary | ICD-10-CM

## 2019-11-12 DIAGNOSIS — D72.829 LEUKOCYTOSIS, UNSPECIFIED TYPE: ICD-10-CM

## 2019-11-12 DIAGNOSIS — C50.411 MALIGNANT NEOPLASM OF UPPER-OUTER QUADRANT OF RIGHT BREAST IN FEMALE, ESTROGEN RECEPTOR POSITIVE (HCC): Primary | ICD-10-CM

## 2019-11-12 DIAGNOSIS — E87.6 HYPOKALEMIA: ICD-10-CM

## 2019-11-12 DIAGNOSIS — Z78.0 POST-MENOPAUSAL: ICD-10-CM

## 2019-11-12 PROBLEM — A04.72: Status: ACTIVE | Noted: 2019-11-12

## 2019-11-12 LAB
ANISOCYTOSIS BLD QL: ABNORMAL
BASOPHILS # BLD MANUAL: 0.15 10*3/MM3 (ref 0–0.2)
BASOPHILS NFR BLD AUTO: 1 % (ref 0–1.5)
CANCER AG19-9 SERPL-ACNC: 15.8 U/ML
D-LACTATE SERPL-SCNC: 0.8 MMOL/L (ref 0.5–2)
DEPRECATED RDW RBC AUTO: 40.6 FL (ref 37–54)
EOSINOPHIL # BLD MANUAL: 0.29 10*3/MM3 (ref 0–0.4)
EOSINOPHIL NFR BLD MANUAL: 2 % (ref 0.3–6.2)
ERYTHROCYTE [DISTWIDTH] IN BLOOD BY AUTOMATED COUNT: 12.1 % (ref 12.3–15.4)
HCT VFR BLD AUTO: 31.2 % (ref 34–46.6)
HGB BLD-MCNC: 10.6 G/DL (ref 12–15.9)
LYMPHOCYTES # BLD MANUAL: 1.76 10*3/MM3 (ref 0.7–3.1)
LYMPHOCYTES NFR BLD MANUAL: 12 % (ref 19.6–45.3)
LYMPHOCYTES NFR BLD MANUAL: 6 % (ref 5–12)
MCH RBC QN AUTO: 30.7 PG (ref 26.6–33)
MCHC RBC AUTO-ENTMCNC: 34 G/DL (ref 31.5–35.7)
MCV RBC AUTO: 90.4 FL (ref 79–97)
MONOCYTES # BLD AUTO: 0.88 10*3/MM3 (ref 0.1–0.9)
NEUTROPHILS # BLD AUTO: 11.59 10*3/MM3 (ref 1.7–7)
NEUTROPHILS NFR BLD MANUAL: 79 % (ref 42.7–76)
PLAT MORPH BLD: NORMAL
PLATELET # BLD AUTO: 208 10*3/MM3 (ref 140–450)
PMV BLD AUTO: 10.5 FL (ref 6–12)
POLYCHROMASIA BLD QL SMEAR: ABNORMAL
RBC # BLD AUTO: 3.45 10*6/MM3 (ref 3.77–5.28)
SPHEROCYTES BLD QL SMEAR: ABNORMAL
WBC MORPH BLD: NORMAL
WBC NRBC COR # BLD: 14.67 10*3/MM3 (ref 3.4–10.8)

## 2019-11-12 PROCEDURE — 85007 BL SMEAR W/DIFF WBC COUNT: CPT | Performed by: INTERNAL MEDICINE

## 2019-11-12 PROCEDURE — 85025 COMPLETE CBC W/AUTO DIFF WBC: CPT | Performed by: INTERNAL MEDICINE

## 2019-11-12 PROCEDURE — 99223 1ST HOSP IP/OBS HIGH 75: CPT | Performed by: INTERNAL MEDICINE

## 2019-11-12 PROCEDURE — 83605 ASSAY OF LACTIC ACID: CPT | Performed by: INTERNAL MEDICINE

## 2019-11-12 PROCEDURE — 99233 SBSQ HOSP IP/OBS HIGH 50: CPT | Performed by: INTERNAL MEDICINE

## 2019-11-12 PROCEDURE — 86301 IMMUNOASSAY TUMOR CA 19-9: CPT | Performed by: INTERNAL MEDICINE

## 2019-11-12 PROCEDURE — 25810000003 SODIUM CHLORIDE 0.9 % WITH KCL 20 MEQ 20-0.9 MEQ/L-% SOLUTION: Performed by: HOSPITALIST

## 2019-11-12 RX ADMIN — METRONIDAZOLE 500 MG: 500 INJECTION, SOLUTION INTRAVENOUS at 16:54

## 2019-11-12 RX ADMIN — METRONIDAZOLE 500 MG: 500 INJECTION, SOLUTION INTRAVENOUS at 09:16

## 2019-11-12 RX ADMIN — VANCOMYCIN 125 MG: KIT at 03:34

## 2019-11-12 RX ADMIN — VANCOMYCIN 125 MG: KIT at 09:16

## 2019-11-12 RX ADMIN — Medication 10 MG: at 22:14

## 2019-11-12 RX ADMIN — POTASSIUM CHLORIDE AND SODIUM CHLORIDE 125 ML/HR: 900; 150 INJECTION, SOLUTION INTRAVENOUS at 16:38

## 2019-11-12 RX ADMIN — VANCOMYCIN 125 MG: KIT at 20:46

## 2019-11-12 RX ADMIN — FAMOTIDINE 20 MG: 10 INJECTION INTRAVENOUS at 09:16

## 2019-11-12 RX ADMIN — VANCOMYCIN 125 MG: KIT at 15:14

## 2019-11-12 RX ADMIN — SODIUM CHLORIDE, PRESERVATIVE FREE 10 ML: 5 INJECTION INTRAVENOUS at 09:16

## 2019-11-12 RX ADMIN — FAMOTIDINE 20 MG: 10 INJECTION INTRAVENOUS at 20:46

## 2019-11-12 RX ADMIN — METRONIDAZOLE 500 MG: 500 INJECTION, SOLUTION INTRAVENOUS at 00:00

## 2019-11-12 RX ADMIN — SODIUM CHLORIDE, PRESERVATIVE FREE 10 ML: 5 INJECTION INTRAVENOUS at 20:47

## 2019-11-12 RX ADMIN — POTASSIUM CHLORIDE AND SODIUM CHLORIDE 125 ML/HR: 900; 150 INJECTION, SOLUTION INTRAVENOUS at 07:23

## 2019-11-12 NOTE — PROGRESS NOTES
"Name: Taylor Larsen ADMIT: 11/10/2019   : 1955  PCP: Tommy Osborn MD    MRN: [id number] LOS: 2 days   AGE/SEX: 64 y.o. female  ROOM: [facility]     Subjective   Subjective   She is feeling much better-she is still having multiple loose stools often denies fever or abdominal pain    Abnormal liver-she and her  report that underwent cholecystectomy being 2019 complicated by ductal leakage with resultant drainage and repair.  The surgeon advised her that he \"nicked her duct\"    Review of systems otherwise noncontributory     Objective   Objective   Vital Signs  Temp:  [97.3 °F (36.3 °C)-99.9 °F (37.7 °C)] 98.9 °F (37.2 °C)  Heart Rate:  [85-92] 86  Resp:  [16] 16  BP: ()/(50-68) 124/68  SpO2:  [96 %-97 %] 96 %  on   ;   Device (Oxygen Therapy): room air  Body mass index is 32.77 kg/m².  Physical Exam  Gen: appears normal, anicteric in NACRD  HEENT:normocephalic, anicteric EOM intact,  Neck: supple, no thyromegaly, no JVD, no bruit  Chest clear, normal respirations, no use of accessory muscles of respiration, no cough no wheeze, no rales; breast not examined  CV: normal precordium, normal S1, S2, no murmur  Abdomen, soft nontender, no organosplenomegaly  Neuro: cr nn II-XII intact, moves all 4 extremities well, normal station and gait  Reflexes 2+, Babinski absent,   Extremities: no edema, cyanosis or clubbing  Skin no rash    Results Review:       I reviewed the patient's new clinical results.  Results from last 7 days   Lab Units 19  0556 19  0448 11/10/19  1506   WBC 10*3/mm3 14.67* 18.65* 22.27*   HEMOGLOBIN g/dL 10.6* 11.3* 13.5   HEMATOCRIT % 31.2* 32.7* 38.8   PLATELETS 10*3/mm3 208 209 238     Results from last 7 days   Lab Units 19  1247 19  0448 11/10/19  1506   SODIUM mmol/L 138 135* 136   POTASSIUM mmol/L 3.4* 3.4* 3.0*   CHLORIDE mmol/L 101 101 94*   CO2 mmol/L 25.3 24.0 29.7*   BUN mg/dL 12 12 15   CREATININE mg/dL 0.66 0.58 0.89   GLUCOSE mg/dL 92 82 105* "   Estimated Creatinine Clearance: 88.4 mL/min (by C-G formula based on SCr of 0.66 mg/dL).  Results from last 7 days   Lab Units 11/11/19  1247 11/10/19  1506   ALBUMIN g/dL 2.80* 3.10*   BILIRUBIN mg/dL 0.5 0.6   ALK PHOS U/L 99 103   AST (SGOT) U/L 16 19   ALT (SGPT) U/L 13 15     Results from last 7 days   Lab Units 11/11/19  1247 11/11/19  0448 11/10/19  1506   CALCIUM mg/dL 8.3* 7.6* 8.4*   ALBUMIN g/dL 2.80*  --  3.10*   MAGNESIUM mg/dL  --  2.1  --      Results from last 7 days   Lab Units 11/12/19  0556 11/10/19  1648 11/10/19  1506   PROCALCITONIN ng/mL  --   --  0.14   LACTATE mmol/L 0.8 1.7  --      No results found for: HGBA1C, POCGLU      famotidine 20 mg Intravenous Q12H   metroNIDAZOLE 500 mg Intravenous Q8H   sodium chloride 10 mL Intravenous Q12H   vancomycin 125 mg Oral Q6H       Pharmacy Consult     sodium chloride 0.9 % with KCl 20 mEq 125 mL/hr Last Rate: 125 mL/hr (11/12/19 0723)   Diet Clear Liquid       Assessment/Plan     Active Hospital Problems    Diagnosis  POA   • **Acute colitis [K52.9]  Yes   • Enterocolitis due to Clostridioides difficile [A04.72]  Yes   • Diarrhea [R19.7]  Unknown   • Liver mass [R16.0]  Unknown   • Hypokalemia [E87.6]  Unknown   • Leukocytosis [D72.829]  Unknown   • Malignant neoplasm of upper-outer quadrant of right breast in female, estrogen receptor positive (CMS/HCC) [C50.411, Z17.0]  Not Applicable      Resolved Hospital Problems   No resolved problems to display.       Ms. Larsen is a 64 y.o.  with a history of breast cancer who has been admitted with chorea and leukocytosis-C. difficile positive  Abnormal liver imaging-the history of complicated cholecystectomy spring 2019  Possible portal vein thrombosis-chronic    Frequency of stools has not appreciably diminished but her WBC is lower;  Likely progress nonetheless  ·   Discussed with Dr. Preston Rodgers and Dr. Matt Gonzalez    VTE Prophylaxis -scd's  Code Status - Full code  Disposition - Anticipate discharge to  home when stable      Brian Draper MD  [facility]  11/12/19  1:24 PM

## 2019-11-12 NOTE — PROGRESS NOTES
"   LOS: 2 days   Patient Care Team:  Tommy Osborn MD as PCP - General (Family Medicine)  Beena Maurice MD as Consulting Physician (Radiation Oncology)  Jana Evans MD as Consulting Physician (Hematology and Oncology)    Chief Complaint:  Liver abnormality     Subjective     History of Present Illness  Patient feels better, less frequent stools. No abdominal pain     Subjective    History taken from: patient chart family    Objective     Vital Signs  Temp:  [97.3 °F (36.3 °C)-99.9 °F (37.7 °C)] 98.9 °F (37.2 °C)  Heart Rate:  [85-92] 86  Resp:  [16] 16  BP: ()/(50-68) 124/68    Objective:  General Appearance:  Well-appearing and comfortable.    Vital signs: (most recent): Blood pressure 124/68, pulse 86, temperature 98.9 °F (37.2 °C), temperature source Oral, resp. rate 16, height 160 cm (63\"), weight 83.9 kg (185 lb), SpO2 96 %.  Vital signs are normal.    Output: Producing urine and producing stool.    Lungs:  Normal effort.    Heart: Normal rate.    Abdomen: Abdomen is soft.  There is no abdominal tenderness.     Neurological: Patient is alert.    Skin:  Warm.              Results Review:     I reviewed the patient's new clinical results.  I reviewed the patient's new imaging results and agree with the interpretation.  Discussed with DR ADAMS     Medication Review: DONE    Assessment/Plan       Acute colitis    Malignant neoplasm of upper-outer quadrant of right breast in female, estrogen receptor positive (CMS/HCC)    Diarrhea    Liver mass    Hypokalemia    Leukocytosis      Assessment:  (Abnormal imaging of the liver - findings consistent with right portal vein thrombosis  Appears chronic based on atrophy of right liver   Clostridia difficile colitis ).     Plan:   (Current rx for the clostridia difficile, eventual transition to vancomycin only  Have requested a hematology consult for formal recommendation on treatment,  And work up of hypercoagulable state.   Stable from GI " viewpoint,  Case discussed with Dr Draper ).       Sage Gonzalez MD  11/12/19  11:33 AM    Time: Critical care 20 min

## 2019-11-12 NOTE — PLAN OF CARE
Problem: Patient Care Overview  Goal: Plan of Care Review  Outcome: Ongoing (interventions implemented as appropriate)   11/12/19 0616   Coping/Psychosocial   Plan of Care Reviewed With patient   Plan of Care Review   Progress improving   OTHER   Outcome Summary vss, liver US done last night, heating pad applied to lt knee, up with assist, on po vanc and iv flagyl, tolerating clear liquid diet, continue to monitor the pt.     Goal: Individualization and Mutuality  Outcome: Ongoing (interventions implemented as appropriate)    Goal: Discharge Needs Assessment  Outcome: Ongoing (interventions implemented as appropriate)    Goal: Interprofessional Rounds/Family Conf  Outcome: Ongoing (interventions implemented as appropriate)      Problem: Diarrhea (Adult)  Goal: Improved/Reduced Symptoms  Outcome: Ongoing (interventions implemented as appropriate)      Problem: Infection, Risk/Actual (Adult)  Goal: Infection Prevention/Resolution  Outcome: Ongoing (interventions implemented as appropriate)

## 2019-11-12 NOTE — PROGRESS NOTES
Discharge Planning Assessment  [facility]     Patient Name: Taylor Larsen  MRN: [id number]  Today's Date: 11/12/2019    Admit Date: 11/10/2019    Discharge Needs Assessment     Row Name 11/12/19 1640       Living Environment    Lives With  spouse    Name(s) of Who Lives With Patient  -- : Burt    Current Living Arrangements  home/apartment/condo    Primary Care Provided by  self    Provides Primary Care For  no one    Family Caregiver if Needed  spouse    Family Caregiver Names  -- : Burt    Quality of Family Relationships  supportive    Able to Return to Prior Arrangements  yes       Resource/Environmental Concerns    Resource/Environmental Concerns  none    Transportation Concerns  car, none       Transition Planning    Patient/Family Anticipates Transition to  home with family    Patient/Family Anticipated Services at Transition  none    Transportation Anticipated  family or friend will provide       Discharge Needs Assessment    Readmission Within the Last 30 Days  current reason for admission unrelated to previous admission    Concerns to be Addressed  no discharge needs identified;adjustment to diagnosis/illness    Equipment Currently Used at Home  none    Anticipated Changes Related to Illness  none    Equipment Needed After Discharge  none        Discharge Plan     Row Name 11/12/19 1635       Plan    Plan  Home w/o needs, Oral Vancomycin through [facility]     Patient/Family in Agreement with Plan  yes    Plan Comments  I met with pt and her  Burt, pt said she did not want her  listed as a contact. She said if needed her dtr Shanon Bhagat is her emergency contact.  Pt said her  took FMLA to be home with her due to lumpectomy last week.  Pt said she is IADL, uses no DME, has never had home health and does not anticiapte the need.  Pt said she has good insurance but is agreeable to have Vancomycin filled through  Infusion pharmacy (Amanda with  [facility] [phone] notified, she said she will call later with the cost).  Pt said she does not anticipate any discharge needs.   Pt denies having POA / HCS.     Darling Kingsley RN      Demographic Summary     Row Name 11/12/19 1640       General Information    Admission Type  inpatient    Arrived From  home    Referral Source  admission list    Reason for Consult  discharge planning    Preferred Language  English     Used During This Interaction  no       Contact Information    Permission Granted to Share Info With  family/designee;facility         Functional Status     Row Name 11/12/19 1640       Functional Status, IADL    Medications  independent    Meal Preparation  independent    Housekeeping  independent    Laundry  independent    Shopping  independent     Patient Forms     Row Name 11/12/19 1641       Patient Forms    Provider Choice List  Delivered    Delivered to  Patient    Method of delivery  In person            Darling Kingsley, RN

## 2019-11-12 NOTE — PROGRESS NOTES
Orders entered for CBC, CMP to be done at MD visit in one month, DEXA scan to be done one week prior to MD visit in one month per in-basket message Dr. Rodgers

## 2019-11-12 NOTE — CONSULTS
.     REASON FOR CONSULTATION:   Portal vein thrombosis.  Evaluate for hypercoagulable state.  Provide an opinion on any further workup or treatment                             REQUESTING PHYSICIAN: Sean Mullen MD  RECORDS OBTAINED:  Records of the patients history including those from the electronic medical record were reviewed and summarized in detail.    HISTORY OF PRESENT ILLNESS:  The patient is a 64 y.o. year old female  who is here for follow-up with the above-mentioned history.    She has a new patient appointment scheduled with Dr. Evans from our practice on 11/19/2019 due to a new diagnosis of breast cancer.    Came to ER on 11/10/2019 with abdominal pain associated with nausea and vomiting and diarrhea and fever since 11/5/2019.  CT scan read as colitis.  Found to be C. difficile positive.    CT 11/10/2019: Diffuse colitis.  Abnormal appearance of liver.  Recommended hepatic MRI.  MRI liver 11/11/2019 with MRCP: Complete occlusion of right branch of portal vein, occlusion of proximal right hepatic duct with atrophy of right hepatic lobe and segmental biliary dilation.  Radiologist stated findings could be related to prior resection of the majority of the right hepatic lobe or changes related to right portal vein thrombosis and/or bile duct obstruction.  No definite suspicious hepatic lesion identified.  Prominent LAD throughout the visualized upper abdomen etiology uncertain.  Follow-up recommended.    She denies any history of cirrhosis or any history of bleeding problems.  No other clotting besides this portal vein thrombosis.    Past Medical History:   Diagnosis Date   • Breast cancer (CMS/HCC) 09/2019    Right IDC   • GERD (gastroesophageal reflux disease)    • PONV (postoperative nausea and vomiting)    • Situational stress     DEATH OF BROTHER 10/2019, RECENT DIAGNOSIS   • Stress headaches      Past Surgical History:   Procedure Laterality Date   • BILE DUCT STENT PLACEMENT  2014   •  BREAST BIOPSY Right 09/2019    malignant   • BREAST LUMPECTOMY WITH SENTINEL NODE BIOPSY Right 11/4/2019    Procedure: BREAST LUMPECTOMY WITH SENTINEL NODE BIOPSY AND NEEDLE LOCALIZATION And possible axillary dissection;  Surgeon: Jean-Paul Ramos MD;  Location: [facility] OR [facility] [place];  Service: General   • KNEE ARTHROSCOPY Left    • LAPAROSCOPIC CHOLECYSTECTOMY  2014   • TUBAL ABDOMINAL LIGATION         MEDICATIONS    Current Facility-Administered Medications:   •  acetaminophen (TYLENOL) tablet 650 mg, 650 mg, Oral, Q6H PRN, Sean Mullen MD, 650 mg at 11/11/19 1810  •  famotidine (PEPCID) injection 20 mg, 20 mg, Intravenous, Q12H, Sean Mullen MD, 20 mg at 11/12/19 0916  •  melatonin tablet 10 mg, 10 mg, Oral, Nightly PRN, Sean Mullen MD, 10 mg at 11/10/19 2320  •  metroNIDAZOLE (FLAGYL) 500 mg/100mL IVPB, 500 mg, Intravenous, Q8H, Sean Mullen MD, 500 mg at 11/12/19 0916  •  ondansetron (ZOFRAN) tablet 4 mg, 4 mg, Oral, Q6H PRN **OR** ondansetron (ZOFRAN) injection 4 mg, 4 mg, Intravenous, Q6H PRN, Sean Mullen MD  •  Pharmacy Consult, , Does not apply, Continuous PRN, Annabelle Grier, APRN  •  sodium chloride 0.9 % flush 10 mL, 10 mL, Intravenous, Q12H, Sean Mullen MD, 10 mL at 11/12/19 0916  •  sodium chloride 0.9 % flush 10 mL, 10 mL, Intravenous, PRN, Sean Mullen MD  •  sodium chloride 0.9 % with KCl 20 mEq/L infusion, 125 mL/hr, Intravenous, Continuous, Sean Mullen MD, Last Rate: 125 mL/hr at 11/12/19 0723, 125 mL/hr at 11/12/19 0723  •  vancomycin oral solution reconstituted 125 mg, 125 mg, Oral, Q6H, Amarjit Camilo MD, 125 mg at 11/12/19 0916    ALLERGIES:   No Known Allergies    SOCIAL HISTORY:       Social History     Socioeconomic History   • Marital status:      Spouse name: Not on file   • Number of children: 2   • Years of education: Not on file   • Highest education level: Not on file   Occupational History     Employer:  [employer]   Tobacco Use   • Smoking status: Former Smoker     Packs/day: 0.50     Years: 10.00     Pack years: 5.00     Types: Cigarettes     Last attempt to quit: 2014     Years since quittin.8   • Smokeless tobacco: Former User   Substance and Sexual Activity   • Alcohol use: No   • Drug use: No   • Sexual activity: No     Birth control/protection: Post-menopausal         FAMILY HISTORY:  Family History   Problem Relation Age of Onset   • Alcohol abuse Father    • Heart failure Mother    • Diabetes Brother    • Prostate cancer Brother    • Throat cancer Brother    • Breast cancer Maternal Aunt    • Breast cancer Maternal Cousin    • Breast cancer Maternal Cousin    • Malig Hyperthermia Neg Hx        REVIEW OF SYSTEMS:  Review of Systems   Constitutional: Negative for activity change.   HENT: Negative for nosebleeds and trouble swallowing.    Respiratory: Negative for shortness of breath and wheezing.    Cardiovascular: Negative for chest pain and palpitations.   Gastrointestinal: Negative for constipation, diarrhea and nausea.   Genitourinary: Negative for dysuria and hematuria.   Musculoskeletal: Negative for arthralgias and myalgias.   Skin: Negative for rash and wound.   Neurological: Negative for seizures and syncope.   Hematological: Negative for adenopathy. Does not bruise/bleed easily.   Psychiatric/Behavioral: Negative for confusion.              Vitals:    19 1701 19 2258 19 0513 19 1100   BP: 96/54 112/60 122/66 124/68   BP Location: Left arm Left arm Left arm Left arm   Patient Position: Lying Lying Lying Lying   Pulse: 87 85 92 86   Resp: 16 16 16 16   Temp: 99 °F (37.2 °C) 97.3 °F (36.3 °C) 99.9 °F (37.7 °C) 98.9 °F (37.2 °C)   TempSrc: Oral Oral Oral Oral   SpO2: 96% 97% 96% 96%   Weight:       Height:         No flowsheet data found.   PHYSICAL EXAM:    CONSTITUTIONAL:  Vital signs reviewed.  No distress, looks comfortable.  Overweight  EYES:  Conjunctiva and lids  unremarkable.  PERRLA  EARS,NOSE,MOUTH,THROAT:  Ears and nose appear unremarkable.  Lips, teeth, gums appear unremarkable.  RESPIRATORY:  Normal respiratory effort.  Lungs clear to auscultation bilaterally.  CARDIOVASCULAR:  Normal S1, S2.  No murmurs rubs or gallops.  No significant lower extremity edema.  GASTROINTESTINAL: Abdomen appears unremarkable.  Nontender.  No hepatomegaly.  No splenomegaly.  LYMPHATIC:  No cervical, supraclavicular, axillary lymphadenopathy.  NEURO: cranial nerves 2-12 grossly intact.  No focal deficits.  Appears to have equal strength all 4 extremities.  MUSCULOSKELETAL:  Unremarkable digits/nails.  No cyanosis or clubbing.  No apparent joint deformities.  SKIN:  Warm.  No rashes.  PSYCHIATRIC:  Normal judgment and insight.  Normal mood and affect.     RECENT LABS:        WBC   Date Value Ref Range Status   11/12/2019 14.67 (H) 3.40 - 10.80 10*3/mm3 Final   11/11/2019 18.65 (H) 3.40 - 10.80 10*3/mm3 Final   11/10/2019 22.27 (H) 3.40 - 10.80 10*3/mm3 Final     Hemoglobin   Date Value Ref Range Status   11/12/2019 10.6 (L) 12.0 - 15.9 g/dL Final   11/11/2019 11.3 (L) 12.0 - 15.9 g/dL Final   11/10/2019 13.5 12.0 - 15.9 g/dL Final     Platelets   Date Value Ref Range Status   11/12/2019 208 140 - 450 10*3/mm3 Final   11/11/2019 209 140 - 450 10*3/mm3 Final   11/10/2019 238 140 - 450 10*3/mm3 Final       Assessment/Plan   *Portal vein thrombosis (right branch of portal vein).  No evidence of cirrhosis on MRI or ultrasound.  Dr. Berna Gonzalez, GI note, has no mention of cirrhosis.  Hormonal therapy for breast cancer has a slight increased risk of thrombosis.  Therefore, I think it is in her best interest to undergo anticoagulation.  Generally, Lovenox or Coumadin are the preferred agents.    *Possible bile duct occlusion seen on MRCP 11/11/2019.  I discussed with Dr. Berna Gonzalez.  He has asked Dr. Marinelli to look at her MRCP to see if he feels she needs an ERCP.  CA-19-9 and AFP normal    *C.  difficile colitis.  Received 1 dose of antibiotics for breast surgery.    On Flagyl    *Leukocytosis, likely due to C. difficile colitis.    *Anemia.  Likely due to C. difficile colitis.    *Prominent nodes within the hepatogastric ligament and around the celiac axis and in the retroperitoneum on MRI abdomen 11/11/2019.  · Etiology uncertain.  Radiologist recommended follow-up    *Right breast cancer.  Invasive ductal adenocarcinoma.  Upper inner quadrant  · Right lumpectomy by Dr. Ramos 11/4/2019.  1:00.  Overall grade 1.  Greatest dimension 5 mm.  Single focus of invasive carcinoma.  No lymphovascular invasion.  Margins negative.  0 out of 2 nodes involved.  · [id number], stage 1  · ER 91%.  IL 96%.  HER-2 negative.  Ki-67 4.7% (favorable).  · (Initial needle biopsy 9/16/2019: Low-grade invasive ductal carcinoma, grade 1.  5 mm in greatest dimension.)  · (Initial mammogram measured the lesion at 5 mm)  · Do not recommend chemotherapy.  I do recommend hormonal therapy for 5 years.  Hopefully, aromatase inhibitors if DEXA scan allows (with portal vein thrombosis would like to avoid tamoxifen).    Plan  · Hypercoagulable labs today  · No signs of a myeloproliferative disease such as polycythemia vera as the cause of the portal vein thrombosis since she does not have an issue with polycythemia.  Her leukocytosis is felt to be due to the C. difficile colitis.  · I discussed this case with Dr. Draper, hospitalist caring for her.  We both agree with her active C. difficile colitis to wait to begin anticoagulation until his C. difficile colitis improves due to risk of hemorrhage.    · Hospitalist service to  begin Lovenox and Coumadin when he feels it is reasonable from a C. difficile colitis standpoint.  · Await Dr. Berna Gonzalez's discussion with Dr. Marinelli to see if she needs any further studies such as ERCP due to possible biliary occlusion seen on MRCP.  · Plan CT abdomen roughly 3 months (mid February) to follow-up  prominent upper abdominal nodes seen on MRI abdomen 11/11/2019  · Tomorrow I will check with her to see if she wants our office to manage her Coumadin or her PCP.  · Tomorrow I will also see if she wants an Oncotype DX assay performed.  Minimum size criteria is 5 mm.  Her tumor was 5 mm.  This can tell us if her tumor is more aggressive than otherwise expected which would be an indication for adjuvant chemotherapy.  · I sent a message to our office for the following:  · Cancel the new patient appointment with Dr. Evans  · Appointment with me in about 1 month with CBC and CMP.  1 week prior needs DEXA scan    Records reviewed and summarized.  Daughter assisted with history.

## 2019-11-13 LAB
F5 GENE MUT ANL BLD/T: NORMAL
FACTOR II, DNA ANALYSIS: NORMAL

## 2019-11-13 PROCEDURE — 86146 BETA-2 GLYCOPROTEIN ANTIBODY: CPT | Performed by: INTERNAL MEDICINE

## 2019-11-13 PROCEDURE — 85306 CLOT INHIBIT PROT S FREE: CPT | Performed by: INTERNAL MEDICINE

## 2019-11-13 PROCEDURE — 81241 F5 GENE: CPT | Performed by: INTERNAL MEDICINE

## 2019-11-13 PROCEDURE — 99232 SBSQ HOSP IP/OBS MODERATE 35: CPT | Performed by: INTERNAL MEDICINE

## 2019-11-13 PROCEDURE — 99233 SBSQ HOSP IP/OBS HIGH 50: CPT | Performed by: INTERNAL MEDICINE

## 2019-11-13 PROCEDURE — 85303 CLOT INHIBIT PROT C ACTIVITY: CPT | Performed by: INTERNAL MEDICINE

## 2019-11-13 PROCEDURE — 85613 RUSSELL VIPER VENOM DILUTED: CPT | Performed by: INTERNAL MEDICINE

## 2019-11-13 PROCEDURE — 85732 THROMBOPLASTIN TIME PARTIAL: CPT | Performed by: INTERNAL MEDICINE

## 2019-11-13 PROCEDURE — 81240 F2 GENE: CPT | Performed by: INTERNAL MEDICINE

## 2019-11-13 PROCEDURE — 85670 THROMBIN TIME PLASMA: CPT | Performed by: INTERNAL MEDICINE

## 2019-11-13 PROCEDURE — 86147 CARDIOLIPIN ANTIBODY EA IG: CPT | Performed by: INTERNAL MEDICINE

## 2019-11-13 PROCEDURE — 85705 THROMBOPLASTIN INHIBITION: CPT | Performed by: INTERNAL MEDICINE

## 2019-11-13 PROCEDURE — 85300 ANTITHROMBIN III ACTIVITY: CPT | Performed by: INTERNAL MEDICINE

## 2019-11-13 PROCEDURE — 25810000003 SODIUM CHLORIDE 0.9 % WITH KCL 20 MEQ 20-0.9 MEQ/L-% SOLUTION: Performed by: HOSPITALIST

## 2019-11-13 RX ADMIN — POTASSIUM CHLORIDE AND SODIUM CHLORIDE 125 ML/HR: 900; 150 INJECTION, SOLUTION INTRAVENOUS at 21:01

## 2019-11-13 RX ADMIN — ACETAMINOPHEN 650 MG: 325 TABLET, FILM COATED ORAL at 00:35

## 2019-11-13 RX ADMIN — POTASSIUM CHLORIDE AND SODIUM CHLORIDE 125 ML/HR: 900; 150 INJECTION, SOLUTION INTRAVENOUS at 11:33

## 2019-11-13 RX ADMIN — VANCOMYCIN 125 MG: KIT at 17:28

## 2019-11-13 RX ADMIN — METRONIDAZOLE 500 MG: 500 INJECTION, SOLUTION INTRAVENOUS at 10:02

## 2019-11-13 RX ADMIN — VANCOMYCIN 125 MG: KIT at 21:01

## 2019-11-13 RX ADMIN — METRONIDAZOLE 500 MG: 500 INJECTION, SOLUTION INTRAVENOUS at 00:35

## 2019-11-13 RX ADMIN — FAMOTIDINE 20 MG: 10 INJECTION INTRAVENOUS at 10:02

## 2019-11-13 RX ADMIN — VANCOMYCIN 125 MG: KIT at 10:01

## 2019-11-13 RX ADMIN — VANCOMYCIN 125 MG: KIT at 02:14

## 2019-11-13 RX ADMIN — SODIUM CHLORIDE, PRESERVATIVE FREE 10 ML: 5 INJECTION INTRAVENOUS at 21:01

## 2019-11-13 RX ADMIN — FAMOTIDINE 20 MG: 10 INJECTION INTRAVENOUS at 21:01

## 2019-11-13 RX ADMIN — METRONIDAZOLE 500 MG: 500 INJECTION, SOLUTION INTRAVENOUS at 17:28

## 2019-11-13 RX ADMIN — POTASSIUM CHLORIDE AND SODIUM CHLORIDE 125 ML/HR: 900; 150 INJECTION, SOLUTION INTRAVENOUS at 02:13

## 2019-11-13 RX ADMIN — Medication 10 MG: at 23:20

## 2019-11-13 NOTE — PROGRESS NOTES
Subjective          REASON FOR FOLLOWUP/CHIEF COMPLAINT:              Breast cancer, portal vein thrombosis     HISTORY OF PRESENT ILLNESS:   No new events overnight.  One bowel movement so far today (it is currently 11:30 AM).  The bowel movement was beginning to have some solid components.  For bowel movements yesterday.  These were watery.     Past Medical History, Past Surgical History, Social History, Family History have been reviewed and are without significant changes except as mentioned.     Review of Systems   Review of Systems   Constitutional: Negative for activity change.   HENT: Negative for nosebleeds and trouble swallowing.    Respiratory: Negative for shortness of breath and wheezing.    Cardiovascular: Negative for chest pain and palpitations.   Gastrointestinal: Negative for constipation, diarrhea and nausea.   Genitourinary: Negative for dysuria and hematuria.   Musculoskeletal: Negative for arthralgias and myalgias.   Skin: Negative for rash and wound.   Neurological: Negative for seizures and syncope.   Hematological: Negative for adenopathy. Does not bruise/bleed easily.   Psychiatric/Behavioral: Negative for confusion.         Medications:  The current medication list was reviewed in the EMR     ALLERGIES:  No Known Allergies        Objective           Vitals   Vitals:     11/12/19 1805 11/12/19 2258 11/13/19 0608 11/13/19 1001   BP: 116/60 110/62 120/70 136/46   BP Location: Left arm Left arm Left arm Left arm   Patient Position: Lying Lying Lying Lying   Pulse: 82 87 84 80   Resp: 16 16 16 16   Temp: 98.2 °F (36.8 °C) 100.3 °F (37.9 °C) 97 °F (36.1 °C) 98.4 °F (36.9 °C)   TempSrc: Oral Oral Oral Oral   SpO2: 99% 95% 98% 98%   Weight:           Height:                 Physical Exam    CONSTITUTIONAL:  Vital signs reviewed.  No distress, looks comfortable.  EYES:  Conjunctiva and lids unremarkable.  PERRLA  EARS,NOSE,MOUTH,THROAT:  Ears and nose appear unremarkable.  Lips, teeth, gums  appear unremarkable.  RESPIRATORY:  Normal respiratory effort.  Lungs clear to auscultation bilaterally.  CARDIOVASCULAR:  Normal S1, S2.  No murmurs rubs or gallops.  No significant lower extremity edema.  GASTROINTESTINAL: Abdomen appears unremarkable.  Nontender.  No hepatomegaly.  No splenomegaly.  NEURO: cranial nerves 2-12 grossly intact.  No focal deficits.  Appears to have equal strength all 4 extremities.  MUSCULOSKELETAL:  Unremarkable digits/nails.  No cyanosis or clubbing.  SKIN:  Warm.  No rashes.  PSYCHIATRIC:  Normal judgment and insight.  Normal mood and affect.        RECENT LABS:        WBC   Date Value Ref Range Status   11/12/2019 14.67 (H) 3.40 - 10.80 10*3/mm3 Final   11/11/2019 18.65 (H) 3.40 - 10.80 10*3/mm3 Final   11/10/2019 22.27 (H) 3.40 - 10.80 10*3/mm3 Final            Hemoglobin   Date Value Ref Range Status   11/12/2019 10.6 (L) 12.0 - 15.9 g/dL Final   11/11/2019 11.3 (L) 12.0 - 15.9 g/dL Final   11/10/2019 13.5 12.0 - 15.9 g/dL Final            Platelets   Date Value Ref Range Status   11/12/2019 208 140 - 450 10*3/mm3 Final   11/11/2019 209 140 - 450 10*3/mm3 Final   11/10/2019 238 140 - 450 10*3/mm3 Final         Assessment/Plan     ASSESSMENT/PLAN:     *Portal vein thrombosis (right branch of portal vein).  No evidence of cirrhosis on MRI or ultrasound.  Dr. Berna Gonzalez, GI note, has no mention of cirrhosis.  Hormonal therapy for breast cancer has a slight increased risk of thrombosis.  Therefore, I think it is in her best interest to undergo anticoagulation.  Generally, Lovenox or Coumadin are the preferred agents.  Begin Lovenox and Coumadin when the hospitalist group feels is appropriate (regarding diarrhea from C. difficile)     *Possible bile duct occlusion seen on MRCP 11/11/2019.  CA-19-9 and AFP normal  Dr. Berna Gonzalez reviewed with Dr. Marinelli.  He states the MRCP findings are likely due to a bile duct injury in 2014 during cholecystectomy.  He states no ERCP is  needed.     *C. difficile colitis.  Received 1 dose of antibiotics for breast surgery.    On Flagyl and oral vancomycin     *Leukocytosis, likely due to C. difficile colitis.  No labs today     *Anemia.  Likely due to C. difficile colitis.  No labs today     *Prominent nodes within the hepatogastric ligament and around the celiac axis and in the retroperitoneum on MRI abdomen 11/11/2019.  · Etiology uncertain.  Radiologist recommended follow-up     *Right breast cancer.  Invasive ductal adenocarcinoma.  Upper inner quadrant  · Right lumpectomy by Dr. Ramos 11/4/2019.  1:00.  Overall grade 1.  Greatest dimension 5 mm.  Single focus of invasive carcinoma.  No lymphovascular invasion.  Margins negative.  0 out of 2 nodes involved.  · [id number], stage 1  · ER 91%.  DE 96%.  HER-2 negative.  Ki-67 4.7% (favorable).  · (Initial needle biopsy 9/16/2019: Low-grade invasive ductal carcinoma, grade 1.  5 mm in greatest dimension.)  · (Initial mammogram measured the lesion at 5 mm)  · From the information we currently know about her cancer, I do not recommend chemotherapy.  I do recommend hormonal therapy for 5 years.  Hopefully, aromatase inhibitors if DEXA scan allows (with portal vein thrombosis would like to avoid tamoxifen).  · She would like an Oncotype DX assay to see if she is at higher risk than known at this time because if she is she would consider adjuvant chemotherapy.     Plan  · Hypercoagulable labs today  · Hospitalist service to  begin Lovenox and Coumadin when he feels it is reasonable from a C. difficile colitis standpoint.  · Plan CT abdomen roughly 3 months (mid February) to follow-up prominent upper abdominal nodes seen on MRI abdomen 11/11/2019  · She wants our office to manage Coumadin upon discharge.  Goal INR 2-3.  Stop Lovenox when INR >2  · I have requested an Oncotype DX assay  · She states she has an appointment with radiation medicine  · I sent a message to our office for the  following:  ? Appointment with me in about 1 month with CBC and CMP.  1 week prior needs DEXA scan  § At this visit we will will review the hypercoagulable labs and the Oncotype DX assay results in the DEXA scan.      assisted with history

## 2019-11-13 NOTE — PLAN OF CARE
Problem: Patient Care Overview  Goal: Plan of Care Review  Outcome: Ongoing (interventions implemented as appropriate)   11/13/19 0519   Coping/Psychosocial   Plan of Care Reviewed With patient   Plan of Care Review   Progress improving   OTHER   Outcome Summary vss, no c/o pain on her lt knee, multiple episode of small loose brown stool, up with assist to bedside commode, tolerating clear liquid diet.     Goal: Individualization and Mutuality  Outcome: Ongoing (interventions implemented as appropriate)    Goal: Discharge Needs Assessment  Outcome: Ongoing (interventions implemented as appropriate)    Goal: Interprofessional Rounds/Family Conf  Outcome: Ongoing (interventions implemented as appropriate)      Problem: Diarrhea (Adult)  Goal: Improved/Reduced Symptoms  Outcome: Ongoing (interventions implemented as appropriate)      Problem: Infection, Risk/Actual (Adult)  Goal: Infection Prevention/Resolution  Outcome: Ongoing (interventions implemented as appropriate)

## 2019-11-13 NOTE — PROGRESS NOTES
"   LOS: 3 days   Patient Care Team:  Tommy Osborn MD as PCP - General (Family Medicine)  Beena Maurice MD as Consulting Physician (Radiation Oncology)  Jana Evans MD as Consulting Physician (Hematology and Oncology)    Chief Complaint: diarrhea     Subjective     History of Present Illness    Subjective:  Symptoms:  Improved.    Diet:  Poor intake.    Activity level: Impaired due to weakness.    Pain:  She complains of pain that is mild.      diarhrea improving, hungry! Wants solid food   History taken from: patient chart family    Objective     Vital Signs  Temp:  [97 °F (36.1 °C)-100.3 °F (37.9 °C)] 97 °F (36.1 °C)  Heart Rate:  [82-90] 84  Resp:  [16] 16  BP: (110-128)/(60-70) 120/70    Objective:  General Appearance:  Well-appearing.    Vital signs: (most recent): Blood pressure 136/46, pulse 80, temperature 98.4 °F (36.9 °C), temperature source Oral, resp. rate 16, height 160 cm (63\"), weight 83.9 kg (185 lb), SpO2 98 %.  Vital signs are normal.    Output: Producing urine.    Lungs:  Normal effort.    Heart: Normal rate.    Abdomen: Abdomen is soft.  There is left upper quadrant and left lower quadrant tenderness. There is no rebound tenderness.  There is no guarding.     Skin:  Warm and dry.              Results Review:     I reviewed the patient's new clinical results.    Medication Review: done    Assessment/Plan       Acute colitis    Malignant neoplasm of upper-outer quadrant of right breast in female, estrogen receptor positive (CMS/HCC)    Diarrhea    Liver mass    Hypokalemia    Leukocytosis    Enterocolitis due to Clostridioides difficile      Assessment:  (Right portal vein thrombosis  Right hepatic duct stenosis- suspect from prior ductal injury at time of cholycystectomy (2014)  Clostridia difficile colitis ).     Plan:   (Current rx,  Ok to convert to vancomycin only at discharge  I have reviewed case at length with Dr Marinelli , given hx of prior ductal injury suspect right hepatic " duct injury has cause the current finding on imaging,  No ERCP is needed  Defer to Dr code on Ac for the portal vein thrombosis and the workup for hypercoagulable state. ).       Sage Gonzalez MD  11/13/19  8:08 AM    Time: Critical care 25 min

## 2019-11-13 NOTE — PROGRESS NOTES
Discharge Planning Assessment  [facility]     Patient Name: Taylor Larsen  MRN: [id number]  Today's Date: 11/13/2019    Admit Date: 11/10/2019      Discharge Plan     Row Name 11/13/19 1042       Plan    Plan  Home w/o needs    Plan Comments  Call received from Jazmine with [facility] that the cost of Vancomycin will be $10 using pt's insurance and it will be delivered to nurses station today, pt updated.  Darling Kingsley, RN

## 2019-11-13 NOTE — PROGRESS NOTES
"DAILY PROGRESS NOTE  [facility]    Patient Identification:  Name: Taylor Larsen  Age: 64 y.o.  Sex: female  :  1955  MRN: [id number]         Primary Care Physician: Tommy Osborn MD    Subjective: patient is improving; diarrhea is subsiding  Interval History: follow up for c diff colitis, breast cancer, portal vein thrombosis, liver mass,  hypokalemia    Objective:    Scheduled Meds:  famotidine 20 mg Intravenous Q12H   metroNIDAZOLE 500 mg Intravenous Q8H   sodium chloride 10 mL Intravenous Q12H   vancomycin 125 mg Oral Q6H     Continuous Infusions:  Pharmacy Consult     sodium chloride 0.9 % with KCl 20 mEq 125 mL/hr Last Rate: 125 mL/hr (19 1133)       Vital signs in last 24 hours:  Temp:  [97 °F (36.1 °C)-100.3 °F (37.9 °C)] 98.4 °F (36.9 °C)  Heart Rate:  [80-87] 80  Resp:  [16] 16  BP: (110-136)/(46-70) 136/46    Intake/Output:    Intake/Output Summary (Last 24 hours) at 2019 1840  Last data filed at 2019 1800  Gross per 24 hour   Intake 3260 ml   Output 1000 ml   Net 2260 ml       Exam:  /46 (BP Location: Left arm, Patient Position: Lying)   Pulse 80   Temp 98.4 °F (36.9 °C) (Oral)   Resp 16   Ht 160 cm (63\")   Wt 83.9 kg (185 lb)   SpO2 98%   BMI 32.77 kg/m²     General Appearance:    Alert, cooperative, no distress, AAOx3                          Head:    Normocephalic, without obvious abnormality, atraumatic                           Eyes:    PERRL, conjunctiva/corneas clear, EOM's intact, both eyes                         Throat:   Lips, tongue, gums normal; oral mucosa pink and moist                           Neck:   Supple, symmetrical, trachea midline, no JVD                         Lungs:    Decreased breath sounds bilaterally, respirations unlabored                 Chest Wall:    No tenderness or deformity                          Heart:    Regular rate and rhythm, S1 and S2 normal, no murmur,no  Rub                                      or " gallop                  Abdomen:     Soft, non-tender, bowel sounds active, no masses, no                                                        organomegaly                  Extremities:   Extremities normal, atraumatic, no cyanosis or edema                        Pulses:   Pulses palpable in all extremities                            Skin:   Skin is warm and dry,  no rashes or palpable lesions                  Neurologic:   CNII-XII intact, motor strength grossly intact, sensation grossly                                         intact to light touch, no focal deficits noted       Data Review:  Labs in chart were reviewed.  Lab Results   Component Value Date    WBC 14.67 (H) 11/12/2019    HGB 10.6 (L) 11/12/2019    HCT 31.2 (L) 11/12/2019     11/12/2019     Lab Results   Component Value Date     11/11/2019    K 3.4 (L) 11/11/2019     11/11/2019    CO2 25.3 11/11/2019    BUN 12 11/11/2019    CREATININE 0.66 11/11/2019    GLUCOSE 92 11/11/2019     Lab Results   Component Value Date    CALCIUM 8.3 (L) 11/11/2019    MG 2.1 11/11/2019     Lab Results   Component Value Date    AST 16 11/11/2019    ALT 13 11/11/2019    ALKPHOS 99 11/11/2019     Patient Active Problem List   Diagnosis Code   • Visit for gynecologic examination Z01.419   • Vasomotor symptoms due to menopause N95.1   • Screening for cervical cancer Z12.4   • Malignant neoplasm of upper-outer quadrant of right breast in female, estrogen receptor positive (CMS/HCC) C50.411, Z17.0   • Acute colitis K52.9   • Diarrhea R19.7   • Liver mass R16.0   • Hypokalemia E87.6   • Leukocytosis D72.829   • Enterocolitis due to Clostridioides difficile A04.72       Assessment:    Acute colitis    Malignant neoplasm of upper-outer quadrant of right breast in female, estrogen receptor positive (CMS/HCC)    Diarrhea    Liver mass    Hypokalemia    Leukocytosis    Enterocolitis due to Clostridioides difficile  anemia    Plan:  Will start lovenox tonight    Start coumadin tomorrow  Colitis is improving  She will go home with po vanc  hgb stable  Replace potassium  Medium risk  Labs and notes reviewed  Laure Taylor MD  11/13/2019  6:40 PM

## 2019-11-13 NOTE — PAYOR COMM NOTE
"Taylor Monaco (64 y.o. Female)     ATTN: NURSE REVIEW    REF#[id number]   PLEASE CALL BACK TO KIT [phone] OR [phone]  THANKS!   KIT      Date of Birth Social Security Number Address Home Phone MRN    1955  [address] [facility] [address] [phone] [id number]    [employer] Marital Status          Caodaism        Admission Date Admission Type Admitting Provider Attending Provider Department, Room/Bed    11/10/19 Emergency Sean Mullen MD Broaddus, Emmett J., MD [address], P685/1    Discharge Date Discharge Disposition Discharge Destination                       Attending Provider:  Eliel Liu MD    Allergies:  No Known Allergies    Isolation:  Spore   Infection:  C.difficile (11/11/19)   Code Status:  CPR    Ht:  160 cm (63\")   Wt:  83.9 kg (185 lb)    Admission Cmt:  None   Principal Problem:  Acute colitis [K52.9]                 Active Insurance as of 11/10/2019     Primary Coverage     Payor Plan Insurance Group Employer/Plan Group    ANTHEM BLUE CROSS ANTHEM BLUE CROSS BLUE SHIELD PPO [id number]     Payor Plan Address Payor Plan Phone Number Payor Plan Fax Number Effective Dates    [address] [phone]  8/1/2017 - None Entered    [facility] [id number]       Subscriber Name Subscriber Birth Date Member ID       TAYLOR MONACO 1955 [id number]                 Emergency Contacts      (Rel.) Home Phone Work Phone Mobile Phone    Shanon Bhagat (Daughter) -- -- [phone]            Vital Signs (last day)     Date/Time   Temp   Temp src   Pulse   Resp   BP   Patient Position   SpO2    11/13/19 1001   98.4 (36.9)   Oral   80   16   136/46   Lying   98    11/13/19 0608   97 (36.1)   Oral   84   16   120/70   Lying   98    11/12/19 2258   100.3 (37.9)   Oral   87   16   110/62   Lying   95    11/12/19 1805   98.2 (36.8)   Oral   82   16   116/60   Lying   99    11/12/19 1404   98.8 (37.1)   Oral " "  90   16   128/66   Lying   98    11/12/19 1100   98.9 (37.2)   Oral   86   16   124/68   Lying   96    11/12/19 0513   99.9 (37.7)   Oral   92   16   122/66   Lying   96              Intake & Output (last day)       11/12 0701 - 11/13 0700 11/13 0701 - 11/14 0700    P.O. 990 480    I.V. (mL/kg) 1000 (11.9) 1000 (11.9)    IV Piggyback      Total Intake(mL/kg) 1990 (23.7) 1480 (17.6)    Urine (mL/kg/hr) 500 (0.2) 200 (0.4)    Stool 0     Total Output 500 200    Net +1490 +1280          Urine Unmeasured Occurrence 5 x     Stool Unmeasured Occurrence 5 x         Hospital Medications (active)       Dose Frequency Start End    acetaminophen (TYLENOL) tablet 650 mg 650 mg Every 6 Hours PRN 11/10/2019     Sig - Route: Take 2 tablets by mouth Every 6 (Six) Hours As Needed for Mild Pain . - Oral    famotidine (PEPCID) injection 20 mg 20 mg Every 12 Hours Scheduled 11/10/2019     Sig - Route: Infuse 2 mL into a venous catheter Every 12 (Twelve) Hours. - Intravenous    melatonin tablet 10 mg 10 mg Nightly PRN 11/10/2019     Sig - Route: Take 2 tablets by mouth At Night As Needed for Sleep. - Oral    metroNIDAZOLE (FLAGYL) 500 mg/100mL IVPB 500 mg Every 8 Hours 11/11/2019 11/18/2019    Sig - Route: Infuse 100 mL into a venous catheter Every 8 (Eight) Hours. - Intravenous    ondansetron (ZOFRAN) injection 4 mg 4 mg Every 6 Hours PRN 11/10/2019     Sig - Route: Infuse 2 mL into a venous catheter Every 6 (Six) Hours As Needed for Nausea or Vomiting. - Intravenous    Linked Group 1:  \"Or\" Linked Group Details        ondansetron (ZOFRAN) tablet 4 mg 4 mg Every 6 Hours PRN 11/10/2019     Sig - Route: Take 1 tablet by mouth Every 6 (Six) Hours As Needed for Nausea or Vomiting. - Oral    Linked Group 1:  \"Or\" Linked Group Details        Pharmacy Consult  Continuous PRN 11/11/2019     Sig - Route: Continuous As Needed for Consult. - Does not apply    sodium chloride 0.9 % flush 10 mL 10 mL Every 12 Hours Scheduled 11/10/2019     Sig - " Route: Infuse 10 mL into a venous catheter Every 12 (Twelve) Hours. - Intravenous    sodium chloride 0.9 % flush 10 mL 10 mL As Needed 11/10/2019     Sig - Route: Infuse 10 mL into a venous catheter As Needed for Line Care. - Intravenous    sodium chloride 0.9 % with KCl 20 mEq/L infusion 125 mL/hr Continuous 11/10/2019     Sig - Route: Infuse 125 mL/hr into a venous catheter Continuous. - Intravenous    vancomycin oral solution reconstituted 125 mg 125 mg Every 6 Hours Scheduled 11/11/2019 11/20/2019    Sig - Route: Take 2.5 mL by mouth Every 6 (Six) Hours. - Oral             Physician Progress Notes (last 24 hours) (Notes from 11/12/19 1332 through 11/13/19 1332)      Code, Aguilar VANCE II, MD at 11/13/19 1055            Subjective      REASON FOR FOLLOWUP/CHIEF COMPLAINT:  Breast cancer, portal vein thrombosis    HISTORY OF PRESENT ILLNESS:   No new events overnight.  One bowel movement so far today (it is currently 11:30 AM).  The bowel movement was beginning to have some solid components.  For bowel movements yesterday.  These were watery.    Past Medical History, Past Surgical History, Social History, Family History have been reviewed and are without significant changes except as mentioned.    Review of Systems   Review of Systems   Constitutional: Negative for activity change.   HENT: Negative for nosebleeds and trouble swallowing.    Respiratory: Negative for shortness of breath and wheezing.    Cardiovascular: Negative for chest pain and palpitations.   Gastrointestinal: Negative for constipation, diarrhea and nausea.   Genitourinary: Negative for dysuria and hematuria.   Musculoskeletal: Negative for arthralgias and myalgias.   Skin: Negative for rash and wound.   Neurological: Negative for seizures and syncope.   Hematological: Negative for adenopathy. Does not bruise/bleed easily.   Psychiatric/Behavioral: Negative for confusion.       Medications:  The current medication list was reviewed in the  [facility]    ALLERGIES:  No Known Allergies    Objective       Vitals:    11/12/19 1805 11/12/19 2258 11/13/19 0608 11/13/19 1001   BP: 116/60 110/62 120/70 136/46   BP Location: Left arm Left arm Left arm Left arm   Patient Position: Lying Lying Lying Lying   Pulse: 82 87 84 80   Resp: 16 16 16 16   Temp: 98.2 °F (36.8 °C) 100.3 °F (37.9 °C) 97 °F (36.1 °C) 98.4 °F (36.9 °C)   TempSrc: Oral Oral Oral Oral   SpO2: 99% 95% 98% 98%   Weight:       Height:         Physical Exam    CONSTITUTIONAL:  Vital signs reviewed.  No distress, looks comfortable.  EYES:  Conjunctiva and lids unremarkable.  PERRLA  EARS,NOSE,MOUTH,THROAT:  Ears and nose appear unremarkable.  Lips, teeth, gums appear unremarkable.  RESPIRATORY:  Normal respiratory effort.  Lungs clear to auscultation bilaterally.  CARDIOVASCULAR:  Normal S1, S2.  No murmurs rubs or gallops.  No significant lower extremity edema.  GASTROINTESTINAL: Abdomen appears unremarkable.  Nontender.  No hepatomegaly.  No splenomegaly.  NEURO: cranial nerves 2-12 grossly intact.  No focal deficits.  Appears to have equal strength all 4 extremities.  MUSCULOSKELETAL:  Unremarkable digits/nails.  No cyanosis or clubbing.  SKIN:  Warm.  No rashes.  PSYCHIATRIC:  Normal judgment and insight.  Normal mood and affect.       RECENT LABS:  WBC   Date Value Ref Range Status   11/12/2019 14.67 (H) 3.40 - 10.80 10*3/mm3 Final   11/11/2019 18.65 (H) 3.40 - 10.80 10*3/mm3 Final   11/10/2019 22.27 (H) 3.40 - 10.80 10*3/mm3 Final     Hemoglobin   Date Value Ref Range Status   11/12/2019 10.6 (L) 12.0 - 15.9 g/dL Final   11/11/2019 11.3 (L) 12.0 - 15.9 g/dL Final   11/10/2019 13.5 12.0 - 15.9 g/dL Final     Platelets   Date Value Ref Range Status   11/12/2019 208 140 - 450 10*3/mm3 Final   11/11/2019 209 140 - 450 10*3/mm3 Final   11/10/2019 238 140 - 450 10*3/mm3 Final     Assessment/Plan  ASSESSMENT/PLAN:    *Portal vein thrombosis (right branch of portal vein).  No evidence of cirrhosis on MRI or  ultrasound.  Dr. Berna Gonzalez, GI note, has no mention of cirrhosis.  Hormonal therapy for breast cancer has a slight increased risk of thrombosis.  Therefore, I think it is in her best interest to undergo anticoagulation.  Generally, Lovenox or Coumadin are the preferred agents.  Begin Lovenox and Coumadin when the hospitalist group feels is appropriate (regarding diarrhea from C. difficile)     *Possible bile duct occlusion seen on MRCP 11/11/2019.  CA-19-9 and AFP normal  Dr. Berna Gonzalez reviewed with Dr. Marinelli.  He states the MRCP findings are likely due to a bile duct injury in 2014 during cholecystectomy.  He states no ERCP is needed.     *C. difficile colitis.  Received 1 dose of antibiotics for breast surgery.    On Flagyl and oral vancomycin     *Leukocytosis, likely due to C. difficile colitis.  No labs today     *Anemia.  Likely due to C. difficile colitis.  No labs today     *Prominent nodes within the hepatogastric ligament and around the celiac axis and in the retroperitoneum on MRI abdomen 11/11/2019.  · Etiology uncertain.  Radiologist recommended follow-up     *Right breast cancer.  Invasive ductal adenocarcinoma.  Upper inner quadrant  · Right lumpectomy by Dr. Ramos 11/4/2019.  1:00.  Overall grade 1.  Greatest dimension 5 mm.  Single focus of invasive carcinoma.  No lymphovascular invasion.  Margins negative.  0 out of 2 nodes involved.  · [id number], stage 1  · ER 91%.  MN 96%.  HER-2 negative.  Ki-67 4.7% (favorable).  · (Initial needle biopsy 9/16/2019: Low-grade invasive ductal carcinoma, grade 1.  5 mm in greatest dimension.)  · (Initial mammogram measured the lesion at 5 mm)  · From the information we currently know about her cancer, I do not recommend chemotherapy.  I do recommend hormonal therapy for 5 years.  Hopefully, aromatase inhibitors if DEXA scan allows (with portal vein thrombosis would like to avoid tamoxifen).  · She would like an Oncotype DX assay to see if she is at higher  "risk than known at this time because if she is she would consider adjuvant chemotherapy.     Plan  · Hypercoagulable labs today  · Hospitalist service to  begin Lovenox and Coumadin when he feels it is reasonable from a C. difficile colitis standpoint.  · Plan CT abdomen roughly 3 months (mid February) to follow-up prominent upper abdominal nodes seen on MRI abdomen 11/11/2019  · She wants our office to manage Coumadin upon discharge.  Goal INR 2-3.  Stop Lovenox when INR >2  · I have requested an Oncotype DX assay  · She states she has an appointment with radiation medicine  · I sent a message to our office for the following:  ? Appointment with me in about 1 month with CBC and CMP.  1 week prior needs DEXA scan  ? At this visit we will will review the hypercoagulable labs and the Oncotype DX assay results in the DEXA scan.     assisted with history    Electronically signed by Aguilar Rodgers II, MD at 11/13/19 1132     Sage Gonzalez MD at 11/13/19 0808           LOS: 3 days   Patient Care Team:  Tommy Osborn MD as PCP - General (Family Medicine)  Beena Maurice MD as Consulting Physician (Radiation Oncology)  Jana Evans MD as Consulting Physician (Hematology and Oncology)    Chief Complaint: diarrhea     Subjective     History of Present Illness    Subjective:  Symptoms:  Improved.    Diet:  Poor intake.    Activity level: Impaired due to weakness.    Pain:  She complains of pain that is mild.      diarhrea improving, hungry! Wants solid food   History taken from: patient chart family    Objective     Vital Signs  Temp:  [97 °F (36.1 °C)-100.3 °F (37.9 °C)] 97 °F (36.1 °C)  Heart Rate:  [82-90] 84  Resp:  [16] 16  BP: (110-128)/(60-70) 120/70    Objective:  General Appearance:  Well-appearing.    Vital signs: (most recent): Blood pressure 136/46, pulse 80, temperature 98.4 °F (36.9 °C), temperature source Oral, resp. rate 16, height 160 cm (63\"), weight 83.9 kg (185 lb), SpO2 98 %.  Vital " signs are normal.    Output: Producing urine.    Lungs:  Normal effort.    Heart: Normal rate.    Abdomen: Abdomen is soft.  There is left upper quadrant and left lower quadrant tenderness. There is no rebound tenderness.  There is no guarding.     Skin:  Warm and dry.              Results Review:     I reviewed the patient's new clinical results.    Medication Review: done    Assessment/Plan       Acute colitis    Malignant neoplasm of upper-outer quadrant of right breast in female, estrogen receptor positive (CMS/HCC)    Diarrhea    Liver mass    Hypokalemia    Leukocytosis    Enterocolitis due to Clostridioides difficile      Assessment:  (Right portal vein thrombosis  Right hepatic duct stenosis- suspect from prior ductal injury at time of cholycystectomy (2014)  Clostridia difficile colitis ).     Plan:   (Current rx,  Ok to convert to vancomycin only at discharge  I have reviewed case at length with Dr Marinelli , given hx of prior ductal injury suspect right hepatic duct injury has cause the current finding on imaging,  No ERCP is needed  Defer to Dr code on Ac for the portal vein thrombosis and the workup for hypercoagulable state. ).       Sage Gonzalez MD  11/13/19  8:08 AM    Time: Critical care 25 min        Electronically signed by Sage Gonzalez MD at 11/13/19 1146       Consult Notes (last 24 hours) (Notes from 11/12/19 1332 through 11/13/19 1332)     No notes of this type exist for this encounter.

## 2019-11-14 LAB
ANION GAP SERPL CALCULATED.3IONS-SCNC: 10.7 MMOL/L (ref 5–15)
BUN BLD-MCNC: 7 MG/DL (ref 8–23)
BUN/CREAT SERPL: 12.3 (ref 7–25)
CALCIUM SPEC-SCNC: 7.5 MG/DL (ref 8.6–10.5)
CARDIOLIPIN IGA SER IA-ACNC: <9 APL U/ML (ref 0–11)
CARDIOLIPIN IGG SER IA-ACNC: 10 GPL U/ML (ref 0–14)
CARDIOLIPIN IGM SER IA-ACNC: 14 MPL U/ML (ref 0–12)
CHLORIDE SERPL-SCNC: 104 MMOL/L (ref 98–107)
CO2 SERPL-SCNC: 22.3 MMOL/L (ref 22–29)
CREAT BLD-MCNC: 0.57 MG/DL (ref 0.57–1)
DEPRECATED RDW RBC AUTO: 40.3 FL (ref 37–54)
ERYTHROCYTE [DISTWIDTH] IN BLOOD BY AUTOMATED COUNT: 12 % (ref 12.3–15.4)
GFR SERPL CREATININE-BSD FRML MDRD: 107 ML/MIN/1.73
GLUCOSE BLD-MCNC: 90 MG/DL (ref 65–99)
HCT VFR BLD AUTO: 31.3 % (ref 34–46.6)
HGB BLD-MCNC: 10.8 G/DL (ref 12–15.9)
MAGNESIUM SERPL-MCNC: 1.9 MG/DL (ref 1.6–2.4)
MCH RBC QN AUTO: 31.7 PG (ref 26.6–33)
MCHC RBC AUTO-ENTMCNC: 34.5 G/DL (ref 31.5–35.7)
MCV RBC AUTO: 91.8 FL (ref 79–97)
PLATELET # BLD AUTO: 197 10*3/MM3 (ref 140–450)
PMV BLD AUTO: 10.1 FL (ref 6–12)
POTASSIUM BLD-SCNC: 3.6 MMOL/L (ref 3.5–5.2)
RBC # BLD AUTO: 3.41 10*6/MM3 (ref 3.77–5.28)
SODIUM BLD-SCNC: 137 MMOL/L (ref 136–145)
WBC NRBC COR # BLD: 10.63 10*3/MM3 (ref 3.4–10.8)

## 2019-11-14 PROCEDURE — 99233 SBSQ HOSP IP/OBS HIGH 50: CPT | Performed by: INTERNAL MEDICINE

## 2019-11-14 PROCEDURE — 80048 BASIC METABOLIC PNL TOTAL CA: CPT | Performed by: INTERNAL MEDICINE

## 2019-11-14 PROCEDURE — 25010000002 ENOXAPARIN PER 10 MG: Performed by: INTERNAL MEDICINE

## 2019-11-14 PROCEDURE — 99232 SBSQ HOSP IP/OBS MODERATE 35: CPT | Performed by: INTERNAL MEDICINE

## 2019-11-14 PROCEDURE — 85027 COMPLETE CBC AUTOMATED: CPT | Performed by: INTERNAL MEDICINE

## 2019-11-14 PROCEDURE — 25810000003 SODIUM CHLORIDE 0.9 % WITH KCL 20 MEQ 20-0.9 MEQ/L-% SOLUTION: Performed by: HOSPITALIST

## 2019-11-14 PROCEDURE — 83735 ASSAY OF MAGNESIUM: CPT | Performed by: INTERNAL MEDICINE

## 2019-11-14 RX ADMIN — METRONIDAZOLE 500 MG: 500 INJECTION, SOLUTION INTRAVENOUS at 01:05

## 2019-11-14 RX ADMIN — METRONIDAZOLE 500 MG: 500 INJECTION, SOLUTION INTRAVENOUS at 17:39

## 2019-11-14 RX ADMIN — VANCOMYCIN 125 MG: KIT at 03:26

## 2019-11-14 RX ADMIN — FAMOTIDINE 20 MG: 10 INJECTION INTRAVENOUS at 10:35

## 2019-11-14 RX ADMIN — VANCOMYCIN 125 MG: KIT at 20:34

## 2019-11-14 RX ADMIN — ENOXAPARIN SODIUM 80 MG: 80 INJECTION SUBCUTANEOUS at 20:34

## 2019-11-14 RX ADMIN — FAMOTIDINE 20 MG: 10 INJECTION INTRAVENOUS at 20:34

## 2019-11-14 RX ADMIN — POTASSIUM CHLORIDE AND SODIUM CHLORIDE 125 ML/HR: 900; 150 INJECTION, SOLUTION INTRAVENOUS at 06:15

## 2019-11-14 RX ADMIN — POTASSIUM CHLORIDE AND SODIUM CHLORIDE 125 ML/HR: 900; 150 INJECTION, SOLUTION INTRAVENOUS at 15:47

## 2019-11-14 RX ADMIN — VANCOMYCIN 125 MG: KIT at 10:34

## 2019-11-14 RX ADMIN — METRONIDAZOLE 500 MG: 500 INJECTION, SOLUTION INTRAVENOUS at 10:34

## 2019-11-14 RX ADMIN — VANCOMYCIN 125 MG: KIT at 14:57

## 2019-11-14 RX ADMIN — Medication 10 MG: at 22:08

## 2019-11-14 NOTE — PROGRESS NOTES
For today's note, I accidentally edited yesterday's note and signed yesterday's note with the edited changes from today.    To fix this, I copied those changes into a new note which serves as today's note and I copied the original note from yesterday into an additional addendum on yesterday's note to serve as the note from yesterday.    I have signed off this case

## 2019-11-14 NOTE — PROGRESS NOTES
REASON FOR FOLLOWUP/CHIEF COMPLAINT:  Breast cancer, portal vein thrombosis    HISTORY OF PRESENT ILLNESS:     Only one bowel movement yesterday.  No bowel movement so far today.  Eating regular food and tolerating this well.    Past Medical History, Past Surgical History, Social History, Family History have been reviewed and are without significant changes except as mentioned.    Review of Systems   Review of Systems   Constitutional: Negative for activity change.   HENT: Negative for nosebleeds and trouble swallowing.    Respiratory: Negative for shortness of breath and wheezing.    Cardiovascular: Negative for chest pain and palpitations.   Gastrointestinal: Negative for constipation, diarrhea and nausea.   Genitourinary: Negative for dysuria and hematuria.   Musculoskeletal: Negative for arthralgias and myalgias.   Skin: Negative for rash and wound.   Neurological: Negative for seizures and syncope.   Hematological: Negative for adenopathy. Does not bruise/bleed easily.   Psychiatric/Behavioral: Negative for confusion.       Medications:  The current medication list was reviewed in the EMR    ALLERGIES:  No Known Allergies           Vitals:    11/13/19 2100 11/14/19 0138 11/14/19 0533 11/14/19 1000   BP: 132/56 128/66 122/58 136/48   BP Location: Left arm Left arm Left arm Right arm   Patient Position: Sitting Lying Lying Sitting   Pulse: 82 78 70 80   Resp: 16 16 16 16   Temp: 98.1 °F (36.7 °C) 97.5 °F (36.4 °C) 98 °F (36.7 °C) 98.2 °F (36.8 °C)   TempSrc: Oral Oral Oral Oral   SpO2: 98% 98% 98% 98%   Weight:       Height:         Physical Exam    CONSTITUTIONAL:  Vital signs reviewed.  No distress, looks comfortable.  EYES:  Conjunctiva and lids unremarkable.  PERRLA  EARS,NOSE,MOUTH,THROAT:  Ears and nose appear unremarkable.  Lips, teeth, gums appear unremarkable.  RESPIRATORY:  Normal respiratory effort.  Lungs clear to auscultation bilaterally.  CARDIOVASCULAR:  Normal S1, S2.  No murmurs rubs or  gallops.  No significant lower extremity edema.  GASTROINTESTINAL: Abdomen appears unremarkable.  Nontender.  No hepatomegaly.  No splenomegaly.  NEURO: cranial nerves 2-12 grossly intact.  No focal deficits.  Appears to have equal strength all 4 extremities.  MUSCULOSKELETAL:  Unremarkable digits/nails.  No cyanosis or clubbing.  SKIN:  Warm.  No rashes.  PSYCHIATRIC:  Normal judgment and insight.  Normal mood and affect.     RECENT LABS:  WBC   Date Value Ref Range Status   11/14/2019 10.63 3.40 - 10.80 10*3/mm3 Final   11/12/2019 14.67 (H) 3.40 - 10.80 10*3/mm3 Final     Hemoglobin   Date Value Ref Range Status   11/14/2019 10.8 (L) 12.0 - 15.9 g/dL Final   11/12/2019 10.6 (L) 12.0 - 15.9 g/dL Final     Platelets   Date Value Ref Range Status   11/14/2019 197 140 - 450 10*3/mm3 Final   11/12/2019 208 140 - 450 10*3/mm3 Final       ASSESSMENT/PLAN:    *Portal vein thrombosis (right branch of portal vein).  No evidence of cirrhosis on MRI or ultrasound.  Dr. Berna Gonzalez, GI note, has no mention of cirrhosis.  Hormonal therapy for breast cancer has a slight increased risk of thrombosis.  Therefore, I think it is in her best interest to undergo anticoagulation.  Generally, Lovenox or Coumadin are the preferred agents.  Begin Lovenox and Coumadin when the hospitalist group feels is appropriate (regarding diarrhea from C. difficile)     *Possible bile duct occlusion seen on MRCP 11/11/2019.  CA-19-9 and AFP normal  Dr. Berna Gonzalez reviewed with Dr. Marinelli.  He states the MRCP findings are likely due to a bile duct injury in 2014 during cholecystectomy.  He states no ERCP is needed.     *C. difficile colitis.  Received 1 dose of antibiotics for breast surgery.    On Flagyl and oral vancomycin     *Leukocytosis, likely due to C. difficile colitis.  WBC 10.6, down to normal now     *Anemia.  Likely due to C. difficile colitis.  Hb 10.8, stable     *Prominent nodes within the hepatogastric ligament and around the celiac axis  and in the retroperitoneum on MRI abdomen 11/11/2019.  · Etiology uncertain.  Radiologist recommended follow-up     *Right breast cancer.  Invasive ductal adenocarcinoma.  Upper inner quadrant  · Right lumpectomy by Dr. Ramos 11/4/2019.  1:00.  Overall grade 1.  Greatest dimension 5 mm.  Single focus of invasive carcinoma.  No lymphovascular invasion.  Margins negative.  0 out of 2 nodes involved.  · [id number], stage 1  · ER 91%.  IL 96%.  HER-2 negative.  Ki-67 4.7% (favorable).  · (Initial needle biopsy 9/16/2019: Low-grade invasive ductal carcinoma, grade 1.  5 mm in greatest dimension.)  · (Initial mammogram measured the lesion at 5 mm)  · From the information we currently know about her cancer, I do not recommend chemotherapy.  I do recommend hormonal therapy for 5 years.  Hopefully, aromatase inhibitors if DEXA scan allows (with portal vein thrombosis would like to avoid tamoxifen).  · She would like an Oncotype DX assay to see if she is at higher risk than known at this time because if she is she would consider adjuvant chemotherapy.     Plan  · Hypercoagulable labs pending  · Hospitalist service to  begin Lovenox and Coumadin when he feels it is reasonable from a C. difficile colitis standpoint.  · Plan CT abdomen roughly 3 months (mid February) to follow-up prominent upper abdominal nodes seen on MRI abdomen 11/11/2019  · She wants our office to manage Coumadin upon discharge.  Goal INR 2-3.  Stop Lovenox when INR >2  · I have requested an Oncotype DX assay  · She states she has an appointment with radiation medicine  · I sent a message to our office for the following:  ? Appointment with me in about 1 month with CBC and CMP.  1 week prior needs DEXA scan  ? At this visit we will will review the hypercoagulable labs and the Oncotype DX assay results in the DEXA scan.  ? INR on Monday and Thursday of next week (11/18/2019 and 11/21/2019) and weekly thereafter until the appointment with me.  INR on the day  of the appointment with me as well.  ? Goal INR is 2-3.     I don't think I'm adding much at this point.  Will sign off.  Please call if I can be of any further assistance.  Thanks for the opportunity to help.

## 2019-11-14 NOTE — PROGRESS NOTES
"   LOS: 4 days   Patient Care Team:  Tommy Osborn MD as PCP - General (Family Medicine)  Beena Maurice MD as Consulting Physician (Radiation Oncology)  Jana Evans MD as Consulting Physician (Hematology and Oncology)    Chief Complaint:   Clostridia difficile, diarrhea, abnormal imaging of the liver     Subjective     History of Present Illness    Subjective:  Symptoms:  Improved.    Diet:  Adequate intake.    Activity level: Impaired due to weakness.    Pain:  She complains of pain that is mild.        History taken from: patient chart    Objective     Vital Signs  Temp:  [97.5 °F (36.4 °C)-98.4 °F (36.9 °C)] 98 °F (36.7 °C)  Heart Rate:  [70-82] 70  Resp:  [16] 16  BP: (122-136)/(46-66) 122/58    Objective:  General Appearance:  Ill-appearing and comfortable.    Vital signs: (most recent): Blood pressure 136/48, pulse 80, temperature 98.2 °F (36.8 °C), temperature source Oral, resp. rate 16, height 160 cm (63\"), weight 83.9 kg (185 lb), SpO2 98 %.    Output: Producing urine.    HEENT: Normal HEENT exam.    Lungs:  Normal effort.    Heart: Normal rate.    Neurological: Patient is alert.              Results Review:     I reviewed the patient's new clinical results.    Medication Review: done    Assessment/Plan       Acute colitis    Malignant neoplasm of upper-outer quadrant of right breast in female, estrogen receptor positive (CMS/HCC)    Diarrhea    Liver mass    Hypokalemia    Leukocytosis    Enterocolitis due to Clostridioides difficile      Assessment:  (Portal vein thrombosis  Prior bile duct injury- review of ercp notes the finding thought to be artifactual and injection under pressure showed all ok.    c diff colitiis).     Plan:   (Ok to discharge  Continue po vancomycin for another week or so  Anticoagulation per hematology).       Sage Gonzalez MD  11/14/19  8:05 AM    Time: Critical care 20 min      "

## 2019-11-14 NOTE — PROGRESS NOTES
"Pharmacy Consult for Enoxaparin Dosing    Taylor Larsen is a 64 y.o. female 83.9 kg (185 lb).    Pharmacy consulted to dose per Dr. Taylor's request.  Indication: DVT/PE (active thrombosis); portal vein thrombosis     Home Anticoagulation/Antiplatelet Medications: none  Active Inpatient Anticoagulation/Antiplatelet Orders: none    Estimated Creatinine Clearance: 102.3 mL/min (by C-G formula based on SCr of 0.57 mg/dL).  Body mass index is 32.77 kg/m².    Creatinine   Date Value Ref Range Status   11/14/2019 0.57 0.57 - 1.00 mg/dL Final     Platelets   Date Value Ref Range Status   11/14/2019 197 140 - 450 10*3/mm3 Final   11/12/2019 208 140 - 450 10*3/mm3 Final     Hemoglobin   Date Value Ref Range Status   11/14/2019 10.8 (L) 12.0 - 15.9 g/dL Final   11/12/2019 10.6 (L) 12.0 - 15.9 g/dL Final     No results found for: INR    PLAN:  1) Begin enoxaparin 80 mg (~1 mg/kg) SC q12h based on indication and current CrCl >30 mL/min.  2) Recommend monitoring platelets at least every 72 hours while on LMWH per hospital policy.   3) Will discontinue \"Pharmacy Consult\" placeholder order at this time but will continue to follow for daily adjustments as needed based on changes in SCr/CrCl.     Pharmacy will continue to follow and adjust as needed.      Thank you for this consult,     Tommy Norwood, PharmD, JAYNA, BCPS    "

## 2019-11-14 NOTE — PLAN OF CARE
Problem: Patient Care Overview  Goal: Plan of Care Review  Outcome: Ongoing (interventions implemented as appropriate)   11/14/19 0400   Coping/Psychosocial   Plan of Care Reviewed With patient   Plan of Care Review   Progress improving   OTHER   Outcome Summary VSS. Dirrhea improved. Due medications given. Diet tolerated. Slept on and off.      Goal: Individualization and Mutuality  Outcome: Ongoing (interventions implemented as appropriate)    Goal: Discharge Needs Assessment  Outcome: Ongoing (interventions implemented as appropriate)    Goal: Interprofessional Rounds/Family Conf  Outcome: Ongoing (interventions implemented as appropriate)      Problem: Diarrhea (Adult)  Goal: Improved/Reduced Symptoms  Outcome: Ongoing (interventions implemented as appropriate)      Problem: Infection, Risk/Actual (Adult)  Goal: Infection Prevention/Resolution  Outcome: Ongoing (interventions implemented as appropriate)

## 2019-11-15 VITALS
BODY MASS INDEX: 32.78 KG/M2 | HEIGHT: 63 IN | RESPIRATION RATE: 16 BRPM | SYSTOLIC BLOOD PRESSURE: 114 MMHG | WEIGHT: 185 LBS | TEMPERATURE: 98.1 F | OXYGEN SATURATION: 96 % | DIASTOLIC BLOOD PRESSURE: 60 MMHG | HEART RATE: 78 BPM

## 2019-11-15 LAB
AT III PPP CHRO-ACNC: 108 % (ref 90–134)
B2 GLYCOPROT1 IGA SER-ACNC: <9 GPI IGA UNITS (ref 0–25)
B2 GLYCOPROT1 IGG SER-ACNC: <9 GPI IGG UNITS (ref 0–20)
B2 GLYCOPROT1 IGM SER-ACNC: <9 GPI IGM UNITS (ref 0–32)
BACTERIA SPEC AEROBE CULT: NORMAL
BACTERIA SPEC AEROBE CULT: NORMAL
INR PPP: 1.26 (ref 0.9–1.1)
LA NT DPL PPP: 46.8 SEC (ref 0–55)
LA NT DPL/LA NT HPL PPP-RTO: 0.9 RATIO (ref 0–1.4)
LA NT PLATELET PPP: 39.2 SEC (ref 0–51.9)
LUPUS ANTICOAGULANT REFLEX: NORMAL
PROT C ACT/NOR PPP: 44 % (ref 86–163)
PROT S ACT/NOR PPP: 14 % (ref 70–127)
PROT S FREE PPP-ACNC: 90 % (ref 49–138)
PROTHROMBIN TIME: 15.5 SECONDS (ref 11.7–14.2)
SCREEN DRVVT: 33.8 SEC (ref 0–47)
THROMBIN TIME: 16.5 SEC (ref 0–23)

## 2019-11-15 PROCEDURE — 85610 PROTHROMBIN TIME: CPT | Performed by: INTERNAL MEDICINE

## 2019-11-15 PROCEDURE — 25810000003 SODIUM CHLORIDE 0.9 % WITH KCL 20 MEQ 20-0.9 MEQ/L-% SOLUTION: Performed by: HOSPITALIST

## 2019-11-15 PROCEDURE — 25010000002 ENOXAPARIN PER 10 MG: Performed by: INTERNAL MEDICINE

## 2019-11-15 RX ORDER — WARFARIN SODIUM 5 MG/1
TABLET ORAL
Qty: 10 TABLET | Refills: 0 | Status: SHIPPED | OUTPATIENT
Start: 2019-11-15 | End: 2019-11-18 | Stop reason: SDUPTHER

## 2019-11-15 RX ORDER — WARFARIN SODIUM 5 MG/1
5 TABLET ORAL
Status: DISCONTINUED | OUTPATIENT
Start: 2019-11-15 | End: 2019-11-15

## 2019-11-15 RX ORDER — WARFARIN SODIUM 5 MG/1
5 TABLET ORAL
Status: DISCONTINUED | OUTPATIENT
Start: 2019-11-15 | End: 2019-11-15 | Stop reason: HOSPADM

## 2019-11-15 RX ADMIN — POTASSIUM CHLORIDE AND SODIUM CHLORIDE 125 ML/HR: 900; 150 INJECTION, SOLUTION INTRAVENOUS at 10:23

## 2019-11-15 RX ADMIN — METRONIDAZOLE 500 MG: 500 INJECTION, SOLUTION INTRAVENOUS at 00:41

## 2019-11-15 RX ADMIN — ENOXAPARIN SODIUM 80 MG: 80 INJECTION SUBCUTANEOUS at 08:28

## 2019-11-15 RX ADMIN — POTASSIUM CHLORIDE AND SODIUM CHLORIDE 125 ML/HR: 900; 150 INJECTION, SOLUTION INTRAVENOUS at 00:41

## 2019-11-15 RX ADMIN — VANCOMYCIN 125 MG: KIT at 08:28

## 2019-11-15 RX ADMIN — VANCOMYCIN 125 MG: KIT at 03:41

## 2019-11-15 RX ADMIN — VANCOMYCIN 125 MG: KIT at 15:48

## 2019-11-15 RX ADMIN — WARFARIN SODIUM 5 MG: 5 TABLET ORAL at 17:51

## 2019-11-15 RX ADMIN — METRONIDAZOLE 500 MG: 500 INJECTION, SOLUTION INTRAVENOUS at 08:28

## 2019-11-15 RX ADMIN — FAMOTIDINE 20 MG: 10 INJECTION INTRAVENOUS at 08:28

## 2019-11-15 NOTE — PLAN OF CARE
Problem: Patient Care Overview  Goal: Plan of Care Review  Outcome: Ongoing (interventions implemented as appropriate)   11/15/19 0434   Coping/Psychosocial   Plan of Care Reviewed With patient   Plan of Care Review   Progress improving   OTHER   Outcome Summary No complaints of pain. VSS. Diarrhea improving. Reg diet. Abx given. Cont to monitor.      Goal: Individualization and Mutuality  Outcome: Ongoing (interventions implemented as appropriate)    Goal: Discharge Needs Assessment  Outcome: Ongoing (interventions implemented as appropriate)    Goal: Interprofessional Rounds/Family Conf  Outcome: Ongoing (interventions implemented as appropriate)      Problem: Diarrhea (Adult)  Goal: Improved/Reduced Symptoms  Outcome: Ongoing (interventions implemented as appropriate)      Problem: Infection, Risk/Actual (Adult)  Goal: Infection Prevention/Resolution  Outcome: Ongoing (interventions implemented as appropriate)

## 2019-11-15 NOTE — PROGRESS NOTES
Discharge Planning Assessment  [facility]     Patient Name: Taylor Larsen  MRN: [id number]  Today's Date: 11/15/2019    Admit Date: 11/10/2019      Discharge Plan     Row Name 11/15/19 1742       Plan    Plan Comments  Discharge order, no discharge needs identified    Final Discharge Disposition Code  01 - home or self-care     Expected Discharge Date and Time     Expected Discharge Date Expected Discharge Time    Nov 15, 2019      Darling Kingsley RN

## 2019-11-15 NOTE — PROGRESS NOTES
Pharmacy Consult: Warfarin Dosing/ Monitoring    Taylor Larsen is a 64 y.o. female, estimated creatinine clearance is 102.3 mL/min (by C-G formula based on SCr of 0.57 mg/dL). weighing 83.9 kg (185 lb).     has a past medical history of Breast cancer (CMS/HCC) (2019), GERD (gastroesophageal reflux disease), PONV (postoperative nausea and vomiting), Situational stress, and Stress headaches.    Social History     Tobacco Use   • Smoking status: Former Smoker     Packs/day: 0.50     Years: 10.00     Pack years: 5.00     Types: Cigarettes     Last attempt to quit:      Years since quittin.8   • Smokeless tobacco: Former User   Substance Use Topics   • Alcohol use: No   • Drug use: No       Results from last 7 days   Lab Units 19  0604 19  0556 19  0448 11/10/19  1506   HEMOGLOBIN g/dL 10.8* 10.6* 11.3* 13.5   HEMATOCRIT % 31.3* 31.2* 32.7* 38.8   PLATELETS 10*3/mm3 197 208 209 238     Results from last 7 days   Lab Units 19  0604 19  1247 19  0448 11/10/19  1506   SODIUM mmol/L 137 138 135* 136   POTASSIUM mmol/L 3.6 3.4* 3.4* 3.0*   CHLORIDE mmol/L 104 101 101 94*   CO2 mmol/L 22.3 25.3 24.0 29.7*   BUN mg/dL 7* 12 12 15   CREATININE mg/dL 0.57 0.66 0.58 0.89   CALCIUM mg/dL 7.5* 8.3* 7.6* 8.4*   BILIRUBIN mg/dL  --  0.5  --  0.6   ALK PHOS U/L  --  99  --  103   ALT (SGPT) U/L  --  13  --  15   AST (SGOT) U/L  --  16  --  19   GLUCOSE mg/dL 90 92 82 105*     Anticoagulation history: 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 Started therapeutic Lovenox today, starting warfarin today.     Hospital Anticoagulation:  Consulting provider: Claudia  Start date: 11/15  Indication: hepatic vein thrombosis  Target INR: 2-3  Expected duration: unknown   Bridge Therapy: Yes              and enoxapain 80 mg (1 mg/kg) SC BID   Date 11/15            INR Pending             Warfarin dose               Potential drug interactions:   - metronidazole: High likelihood of  increasing serum concentration of warfarin. Per GI, plan is to discontinue this on discharge.     Relevant nutrition status: regular diet    Education complete?/ Date:     Assessment/Plan:  1) Begin warfarin at 5 mg PO daily pending baseline INR. Carefully monitor INR in response to starting warfarin with recent metronidazole course.     2) INR ordered daily with AM labs.     Pharmacy will continue to follow until discharge or discontinuation of warfarin.     Tommy Norwood PharmD, JAYNA, BCPS  11/15/19 3:50 PM    INR resulted as 1.26, will continue with warfarin 5 mg PO once tonight.  Clinical pharmacist to follow in AM.    Roberto AlvarezD, BCIDP, BCPS  11/15/2019  1640

## 2019-11-15 NOTE — PROGRESS NOTES
"DAILY PROGRESS NOTE  [facility]    Patient Identification:  Name: Taylor Larsen  Age: 64 y.o.  Sex: female  :  1955  MRN: [id number]         Primary Care Physician: Tommy Osborn MD    Subjective: patient is sitting up; feels better; diarrhea has almost resolved; she has tolerated a diet  Interval History: follow up for c diff colitis, portal vein thrombosis, breast cancer, anemia    Objective:    Scheduled Meds:  enoxaparin 1 mg/kg Subcutaneous Q12H   famotidine 20 mg Intravenous Q12H   metroNIDAZOLE 500 mg Intravenous Q8H   sodium chloride 10 mL Intravenous Q12H   vancomycin 125 mg Oral Q6H     Continuous Infusions:  Pharmacy Consult     sodium chloride 0.9 % with KCl 20 mEq 125 mL/hr Last Rate: 125 mL/hr (19 1547)       Vital signs in last 24 hours:  Temp:  [97.5 °F (36.4 °C)-98.2 °F (36.8 °C)] 97.8 °F (36.6 °C)  Heart Rate:  [70-82] 76  Resp:  [16] 16  BP: (122-140)/(48-66) 140/64    Intake/Output:    Intake/Output Summary (Last 24 hours) at 2019 193  Last data filed at 2019 1800  Gross per 24 hour   Intake 4160 ml   Output 2300 ml   Net 1860 ml       Exam:  /64 (BP Location: Right arm, Patient Position: Lying)   Pulse 76   Temp 97.8 °F (36.6 °C) (Oral)   Resp 16   Ht 160 cm (63\")   Wt 83.9 kg (185 lb)   SpO2 99%   BMI 32.77 kg/m²     General Appearance:    Alert, cooperative, no distress, AAOx3                          Head:    Normocephalic, without obvious abnormality, atraumatic                           Eyes:    PERRL, conjunctiva/corneas clear, EOM's intact, both eyes                         Throat:   Lips, tongue, gums normal; oral mucosa pink and moist                           Neck:   Supple, symmetrical, trachea midline, no JVD                         Lungs:    Decreased breath sounds bilaterally, respirations unlabored                 Chest Wall:    No tenderness or deformity                          Heart:    Regular rate and rhythm, S1 " and S2 normal, no murmur,no  Rub                                      or gallop                  Abdomen:     Soft, non-tender, bowel sounds active, no masses, no                                                        organomegaly                  Extremities:   Extremities normal, atraumatic, no cyanosis or edema                        Pulses:   Pulses palpable in all extremities                            Skin:   Skin is warm and dry,  no rashes or palpable lesions                  Neurologic:   CNII-XII intact, motor strength grossly intact, sensation grossly                                         intact to light touch, no focal deficits noted       Data Review:  Labs in chart were reviewed.  Lab Results   Component Value Date    WBC 10.63 11/14/2019    HGB 10.8 (L) 11/14/2019    HCT 31.3 (L) 11/14/2019     11/14/2019     Lab Results   Component Value Date     11/14/2019    K 3.6 11/14/2019     11/14/2019    CO2 22.3 11/14/2019    BUN 7 (L) 11/14/2019    CREATININE 0.57 11/14/2019    GLUCOSE 90 11/14/2019     Lab Results   Component Value Date    CALCIUM 7.5 (L) 11/14/2019    MG 1.9 11/14/2019     No results found for: AST, ALT, ALKPHOS  No results found for: APTT, INR  Patient Active Problem List   Diagnosis Code   • Visit for gynecologic examination Z01.419   • Vasomotor symptoms due to menopause N95.1   • Screening for cervical cancer Z12.4   • Malignant neoplasm of upper-outer quadrant of right breast in female, estrogen receptor positive (CMS/HCC) C50.411, Z17.0   • Acute colitis K52.9   • Diarrhea R19.7   • Liver mass R16.0   • Hypokalemia E87.6   • Leukocytosis D72.829   • Enterocolitis due to Clostridioides difficile A04.72       Assessment:    Acute colitis    Malignant neoplasm of upper-outer quadrant of right breast in female, estrogen receptor positive (CMS/HCC)    Diarrhea    Liver mass    Hypokalemia    Leukocytosis    Enterocolitis due to Clostridioides  difficile      Plan:  Start lovenox  Start coumadin tomorrow  [facility]  If tolerated and affordable could consider d.c tomorrow  Potassium is better  D.w patient  Has po vanc for c diff colitis  Medium risk  Notes and labs reviewed    Laure Taylor MD  11/14/2019  7:37 PM

## 2019-11-15 NOTE — PROGRESS NOTES
Discharge Planning Assessment  [facility]     Patient Name: Taylor Larsen  MRN: [id number]  Today's Date: 11/15/2019    Admit Date: 11/10/2019      Discharge Plan     Row Name 11/15/19 0928       Plan    Plan  Home w/o needs    Plan Comments  I updated pt that per Malcolm @ [facility] [phone] the cost of Rx for Lovenox is $10.  Pt confirms plans is home w/o needs.  Vancomycin delivered by [facility] is in the medication refrigerator to be provided to pt as discharge, the copay on Vanc was also $10.  Darling Kingsley, JUANITO Kingsley, RN

## 2019-11-16 ENCOUNTER — READMISSION MANAGEMENT (OUTPATIENT)
Dept: CALL CENTER | Facility: HOSPITAL | Age: 64
End: 2019-11-16

## 2019-11-16 NOTE — DISCHARGE SUMMARY
PHYSICIAN DISCHARGE SUMMARY                                                                        [facility]    Patient Identification:  Name: Taylor Larsen  Age: 64 y.o.  Sex: female  :  1955  MRN: [id number]  Primary Care Physician: Tommy Osborn MD    Admit date: 11/10/2019  Discharge date and time:11/15/19    Discharged Condition: good    Discharge Diagnoses:  Acute colitis    Malignant neoplasm of upper-outer quadrant of right breast in female, estrogen receptor positive (CMS/HCC)    Diarrhea    Liver mass    Hypokalemia    Leukocytosis    Enterocolitis due to Clostridioides difficile   portal vein thrombosis  Patient Active Problem List   Diagnosis Code   • Visit for gynecologic examination Z01.419   • Vasomotor symptoms due to menopause N95.1   • Screening for cervical cancer Z12.4   • Malignant neoplasm of upper-outer quadrant of right breast in female, estrogen receptor positive (CMS/HCC) C50.411, Z17.0   • Acute colitis K52.9   • Diarrhea R19.7   • Liver mass R16.0   • Hypokalemia E87.6   • Leukocytosis D72.829   • Enterocolitis due to Clostridioides difficile A04.72       PMHX:   Past Medical History:   Diagnosis Date   • Breast cancer (CMS/HCC) 2019    Right IDC   • GERD (gastroesophageal reflux disease)    • PONV (postoperative nausea and vomiting)    • Situational stress     DEATH OF BROTHER 10/2019, RECENT DIAGNOSIS   • Stress headaches      PSHX:   Past Surgical History:   Procedure Laterality Date   • BILE DUCT STENT PLACEMENT     • BREAST BIOPSY Right 2019    malignant   • BREAST LUMPECTOMY WITH SENTINEL NODE BIOPSY Right 2019    Procedure: BREAST LUMPECTOMY WITH SENTINEL NODE BIOPSY AND NEEDLE LOCALIZATION And possible axillary dissection;  Surgeon: Jean-Paul Ramos MD;  [place] [facility] [place] [facility] [place]  Service: General   • KNEE ARTHROSCOPY Left    • LAPAROSCOPIC CHOLECYSTECTOMY     •  TUBAL ABDOMINAL LIGATION         Hospital Course: Taylor Larsen  Was admitted with diarrhea; she was recently diagnosed with breast cancer and had surgery; she tested positive for c diff colitis; workup also revealed portal vein thrombosis; she was started on po vanc and lovenox as well as coumadin; her diarrhea has resolved and she will be going home on lovenox to bridge while coumadin is being loaded; she will also follow up with her pcp and finish her course of po vancomycin    Consults:     Consults     Date and Time Order Name Status Description    11/11/2019 1608 Hematology & Oncology Inpatient Consult Completed     11/11/2019 0030 Inpatient Gastroenterology Consult Completed     11/10/2019 1649 [facility] (on-call MD unless specified) Details Completed         Results from last 7 days   Lab Units 11/14/19  0604   WBC 10*3/mm3 10.63   HEMOGLOBIN g/dL 10.8*   HEMATOCRIT % 31.3*   PLATELETS 10*3/mm3 197     Results from last 7 days   Lab Units 11/14/19  0604   SODIUM mmol/L 137   POTASSIUM mmol/L 3.6   CHLORIDE mmol/L 104   CO2 mmol/L 22.3   BUN mg/dL 7*   CREATININE mg/dL 0.57   GLUCOSE mg/dL 90   CALCIUM mg/dL 7.5*     Significant Diagnostic Studies: Labs in chart were reviewed.  Imaging Results (All)     Procedure Component Value Units Date/Time    MRI abdomen w wo contrast mrcp [[id number]] Collected:  11/11/19 1256     Updated:  11/12/19 1058    Narrative:       MRI OF THE LIVER WITH AND WITHOUT CONTRAST, MRCP     HISTORY: Characterize the abnormality seen on recent CT for the liver.  Characterize liver mass.     TECHNIQUE: MRI of the abdomen was performed utilizing a dedicated liver  protocol. Axial 2-D FIESTA, in and out of phase FSPGR, axial  fat-saturated T2 imaging was acquired. Axial thin section precontrast  and dynamic enhanced postcontrast T1 weighted LAVA imaging was acquired.  Thick slab coronal MRCP imaging was acquired in multiple obliquities  through the biliary tree. Coronal thin section fat  saturated T2 and 3-D  MRCP imaging was acquired.      COMPARISON: CT abdomen and pelvis from 11/10/2019.     FINDINGS: There has either been postsurgical resection or complete  atrophy of the right hepatic lobe. There is segmental biliary dilatation  within the residual right hepatic lobe with atrophy/complete occlusion  of the right branch of the portal vein. The left hepatic lobe  demonstrates normal signal intensity. The spleen is mildly enlarged  measuring 13 cm in craniocaudal dimension and 12.6 cm in AP dimension.  Following administration of contrast there is mild heterogenous  enhancement seen on the arterial phase with nearly isointense  enhancement on the venous phase within the left hepatic lobe. No  suspicious hepatic lesion is identified. The left branch of the portal  vein and the main portal vein and the splenoportal confluence is patent.     MRCP IMAGES: No biliary dilatation within the left hepatic lobe. Dilated  bowel radicals are seen within the small atrophic residual right hepatic  lobe with what appears to be complete obstruction/occlusion of the  proximal right hepatic duct. The common bile duct demonstrates normal  caliber without intraluminal filling defect. The pancreatic duct  demonstrates normal caliber.     The pancreas, bilateral adrenal glands and visualized kidneys are  normal. There are prominent lymph nodes identified within the  hepatogastric ligament and around the celiac axis and in the  retroperitoneum. Index left para-aortic lymph node at the level of the  left renal hilum measures up to 1.1 cm in short axis dimension. No  ascites is seen.       Impression:       1. On the provided MR images, there is complete occlusion of the right  branch of the portal vein, occlusion of the proximal right hepatic duct  with atrophy of the right hepatic lobe and segmental biliary dilatation.  These findings could be related to prior resection of majority of the  right hepatic lobe and or  changes related to right portal vein thrombus  and/or bile duct obstruction. Correlation with remote imaging would be  most helpful. No definite suspicious hepatic lesion is identified. In  the absence of remote imaging follow-up is recommended.  2. There is prominent lymphadenopathy seen throughout the visualized  upper abdomen. The etiology is uncertain and follow-up is recommended.     This report was finalized on 11/12/2019 10:55 AM by Dr. Radha Reynolds M.D.        Liver [[id number]] Collected:  11/11/19 2301     Updated:  11/12/19 0400    Narrative:       LIVER ULTRASOUND     CLINICAL HISTORY: Abnormal imaging study.     FINDINGS: Longitudinal and transverse images of the liver obtained. The  right hepatic lobe is largely atretic as was described on recent MRI  exam. The remainder of the hepatic parenchyma is somewhat heterogeneous  with vague areas of increased parenchymal echogenicity but this is most  likely due to variations in fatty content. A discrete mass lesion is not  identified. There is no biliary dilation. Absent gallbladder. No  ascites.             Impression:       Atretic appearance of the right hepatic lobe, particularly  posteriorly which could be postsurgical. Parenchymal heterogeneity  otherwise is favored to be due to variations in fatty content.     This report was finalized on 11/12/2019 3:57 AM by Amarjit Agudelo M.D.       CT Abdomen Pelvis With Contrast [[id number]] Collected:  11/10/19 1637     Updated:  11/10/19 1658    Narrative:       CT ABDOMEN PELVIS W CONTRAST-     INDICATIONS: Diarrhea     TECHNIQUE: Radiation dose reduction techniques were utilized, including  automated exposure control and exposure modulation based on body size.  Enhanced ABDOMEN AND PELVIS CT     COMPARISON: None available     FINDINGS:     Posterior aspect of the liver appears truncated, and heterogeneous  posteriorly, that may reflect partial right hepatectomy change,  correlate with surgical history. If  there is no such surgical history,  then neoplasm would be suspected, considering presence of heterogeneity  and ductal dilatation apparent along the posterior margin of the liver,  for example a 3.2 cm region on image 26 of series 1. Recommend  comparison with prior imaging (if it can be obtained) to characterize  stability/exclude possibility of a lesion; if this finding has not  already been definitively characterized, initial further evaluation with  hepatic MRI would be recommended to assess for possible malignant  neoplasm.     Gallbladder is surgically absent                 Otherwise unremarkable appearance of the liver, spleen, adrenal glands,  pancreas, kidneys, bladder.     No bowel obstruction . Diffuse wall thickening is apparent in the colon,  suggesting nonspecific colitis in the setting of diarrhea. Appendix does  not appear inflamed.     No free intraperitoneal gas or free fluid.     Scattered small mesenteric and para-aortic lymph nodes are seen that are  not significant by size criteria.     Abdominal aorta is not aneurysmal. Aortic and other arterial  calcifications are present.     The lung bases show minimal atelectasis.     Degenerative changes are seen in the spine. No acute fracture is  identified.             Impression:             1. Diffuse colitis.  2. Abnormal appearance of the liver that may reflect postsurgical change  (correlate clinically), or potentially malignant neoplasm; if this  finding has not already been definitively characterized, initial further  evaluation with hepatic MRI would be recommended.     Discussed by telephone with Dr. Velasco at 1645, 11/10/2019.     This report was finalized on 11/10/2019 4:55 PM by Dr. Franky Osullivan M.D.       XR Chest 2 View [[id number]] Collected:  11/10/19 1639     Updated:  11/10/19 1642    Narrative:       XR CHEST 2 VW-  11/10/2019     HISTORY: Chills, elevated white blood cell count.     Heart size is within normal limits.  "Lungs appear free of acute  infiltrates. Bones and soft tissues are unremarkable.       Impression:       1. No acute process.     This report was finalized on 11/10/2019 4:39 PM by Dr. Rudi Gonzalez M.D.           /60 (BP Location: Left arm, Patient Position: Lying)   Pulse 78   Temp 98.1 °F (36.7 °C) (Oral)   Resp 16   Ht 160 cm (63\")   Wt 83.9 kg (185 lb)   SpO2 96%   BMI 32.77 kg/m²     Discharge Exam:  General Appearance:    Alert, cooperative, no distress, AAOx3                          Head:    Normocephalic, without obvious abnormality, atraumatic                          Eyes:    PERRL, conjunctiva/corneas clear, EOM's intact, both eyes                        Throat:   Lips, tongue, gums normal; oral mucosa pink and moist                          Neck:   Supple, symmetrical, trachea midline, no JVD                        Lungs:     Clear to auscultation bilaterally, respirations unlabored                Chest Wall:    No tenderness or deformity                        Heart:    Regular rate and rhythm, S1 and S2 normal, no murmur,no  Rub                                       or gallop                  Abdomen:     Soft, non-tender, bowel sounds active, no masses, no                                                        organomegaly                  Extremities:   Extremities normal, atraumatic, no cyanosis or edema, pulses                                           palpable in all extremities                             Skin:   Skin is warm and dry,  no rashes or palpable lesions                  Neurologic:   CNII-XII intact, motor strength grossly intact, sensation grossly                                           intact to light touch, no focal deficits noted     Disposition:  Home    Patient Instructions:      Discharge Medications      New Medications      Instructions Start Date   enoxaparin 80 MG/0.8ML solution syringe  Commonly known as:  LOVENOX   80 mg, Subcutaneous, Every 12 " Hours      warfarin 5 MG tablet  Commonly known as:  COUMADIN   Take daily         Continue These Medications      Instructions Start Date   acetaminophen 325 MG tablet  Commonly known as:  TYLENOL   650 mg, Oral, Every 6 Hours PRN      doxylamine 25 MG tablet  Commonly known as:  UNISOM   25 mg, Oral, Nightly PRN      melatonin 5 MG tablet tablet   10 mg, Oral, Nightly PRN             Future Appointments   Date Time Provider Department Center   11/18/2019  9:45 AM Jean-Paul Ramos MD [facility] None   11/18/2019 10:50 [facility]   11/18/2019 11:20 AM Mara Clayton APRN [facility]   11/21/2019 12:00 PM LAB CHAIR 3 [facility] LAB [facility]   11/21/2019 12:30 PM RN REV 2 [facility] INFUS [facility]   11/25/2019  9:30 AM Beena Maurice MD [facility] RO [facility] None   11/25/2019 10:40 AM LAB CHAIR 3 [facility] LAB [facility]   11/25/2019 11:00 AM RN REV 2 [facility] INFUS [facility]   12/2/2019 10:00 AM ROSA DEXA 1  ROSA DEXA ROSA   12/2/2019 12:00 PM LAB CHAIR 2 [facility]   12/2/2019 12:30 PM RN REV 1 [facility] INFUS [facility]   12/9/2019  7:50 AM LAB CHAIR 4 [facility]   12/9/2019  8:20 AM Aguilar Rodgers II, MD [facility] CBC [place]     Follow-up Information     Tommy Osborn MD .    Specialty:  Family Medicine  Contact information:  [address]  [address]  [facility] [address]  [phone]                 Discharge Order (From admission, onward)    Start     Ordered    11/15/19 1729  Discharge patient  Once     Expected Discharge Date:  11/15/19    Discharge Disposition:  Home or Self Care    Physician of Record for Attribution - Please select from Treatment Team:  RAMIRO SLAUGHTER [[id number]]    Review needed by CMO to determine Physician of Record:  No       Question Answer Comment   Physician of Record for Attribution - Please select from Treatment Team RAMIRO SLAUGHTER    Review needed by CMO to determine Physician of Record No         11/15/19 1728          Total time spent discharging patient including evaluation,post hospitalization follow up,  medication and post hospitalization instructions and education total time exceeds 30 minutes.    Signed:  Laure Taylor MD  11/15/2019  7:46 PM

## 2019-11-16 NOTE — OUTREACH NOTE
Prep Survey      Responses   Facility patient discharged from?  [facility]   Is patient eligible?  Yes   Discharge diagnosis  Enterocolitis due to Clostridioides difficile   Does the patient have one of the following disease processes/diagnoses(primary or secondary)?  Other   Does the patient have Home health ordered?  No   Is there a DME ordered?  No   General alerts for this patient   recently diagnosed with breast cancer    Prep survey completed?  Yes          Jennyfer Connell RN

## 2019-11-18 ENCOUNTER — OFFICE VISIT (OUTPATIENT)
Dept: ONCOLOGY | Facility: CLINIC | Age: 64
End: 2019-11-18

## 2019-11-18 ENCOUNTER — OFFICE VISIT (OUTPATIENT)
Dept: MAMMOGRAPHY | Facility: CLINIC | Age: 64
End: 2019-11-18

## 2019-11-18 ENCOUNTER — LAB (OUTPATIENT)
Dept: LAB | Facility: HOSPITAL | Age: 64
End: 2019-11-18

## 2019-11-18 VITALS
DIASTOLIC BLOOD PRESSURE: 82 MMHG | WEIGHT: 187.2 LBS | SYSTOLIC BLOOD PRESSURE: 137 MMHG | HEART RATE: 86 BPM | BODY MASS INDEX: 33.17 KG/M2 | TEMPERATURE: 98.3 F | RESPIRATION RATE: 16 BRPM | HEIGHT: 63 IN | OXYGEN SATURATION: 96 %

## 2019-11-18 VITALS — SYSTOLIC BLOOD PRESSURE: 128 MMHG | DIASTOLIC BLOOD PRESSURE: 80 MMHG

## 2019-11-18 DIAGNOSIS — C50.411 MALIGNANT NEOPLASM OF UPPER-OUTER QUADRANT OF RIGHT BREAST IN FEMALE, ESTROGEN RECEPTOR POSITIVE (HCC): Primary | ICD-10-CM

## 2019-11-18 DIAGNOSIS — Z79.02 ENCOUNTER FOR LONG-TERM USE OF ANTIPLATELETS/ANTITHROMBOTICS: Primary | ICD-10-CM

## 2019-11-18 DIAGNOSIS — Z17.0 MALIGNANT NEOPLASM OF UPPER-OUTER QUADRANT OF RIGHT BREAST IN FEMALE, ESTROGEN RECEPTOR POSITIVE (HCC): Primary | ICD-10-CM

## 2019-11-18 LAB
BASOPHILS # BLD AUTO: 0.04 10*3/MM3 (ref 0–0.2)
BASOPHILS NFR BLD AUTO: 0.4 % (ref 0–1.5)
DEPRECATED RDW RBC AUTO: 44.5 FL (ref 37–54)
EOSINOPHIL # BLD AUTO: 0.28 10*3/MM3 (ref 0–0.4)
EOSINOPHIL NFR BLD AUTO: 2.8 % (ref 0.3–6.2)
ERYTHROCYTE [DISTWIDTH] IN BLOOD BY AUTOMATED COUNT: 13.1 % (ref 12.3–15.4)
HCT VFR BLD AUTO: 37.7 % (ref 34–46.6)
HGB BLD-MCNC: 12.7 G/DL (ref 12–15.9)
IMM GRANULOCYTES # BLD AUTO: 0.18 10*3/MM3 (ref 0–0.05)
IMM GRANULOCYTES NFR BLD AUTO: 1.8 % (ref 0–0.5)
INR PPP: 2.3 (ref 0.9–1.1)
LYMPHOCYTES # BLD AUTO: 3.14 10*3/MM3 (ref 0.7–3.1)
LYMPHOCYTES NFR BLD AUTO: 31.5 % (ref 19.6–45.3)
MCH RBC QN AUTO: 31.7 PG (ref 26.6–33)
MCHC RBC AUTO-ENTMCNC: 33.7 G/DL (ref 31.5–35.7)
MCV RBC AUTO: 94 FL (ref 79–97)
MONOCYTES # BLD AUTO: 0.69 10*3/MM3 (ref 0.1–0.9)
MONOCYTES NFR BLD AUTO: 6.9 % (ref 5–12)
NEUTROPHILS # BLD AUTO: 5.65 10*3/MM3 (ref 1.7–7)
NEUTROPHILS NFR BLD AUTO: 56.6 % (ref 42.7–76)
NRBC BLD AUTO-RTO: 0 /100 WBC (ref 0–0.2)
PLATELET # BLD AUTO: 209 10*3/MM3 (ref 140–450)
PMV BLD AUTO: 9.8 FL (ref 6–12)
PROTHROMBIN TIME: 27.4 SECONDS (ref 11–13.5)
RBC # BLD AUTO: 4.01 10*6/MM3 (ref 3.77–5.28)
WBC NRBC COR # BLD: 9.98 10*3/MM3 (ref 3.4–10.8)

## 2019-11-18 PROCEDURE — 99024 POSTOP FOLLOW-UP VISIT: CPT | Performed by: SURGERY

## 2019-11-18 PROCEDURE — 85025 COMPLETE CBC W/AUTO DIFF WBC: CPT

## 2019-11-18 PROCEDURE — G0463 HOSPITAL OUTPT CLINIC VISIT: HCPCS | Performed by: NURSE PRACTITIONER

## 2019-11-18 PROCEDURE — 85610 PROTHROMBIN TIME: CPT

## 2019-11-18 PROCEDURE — 36415 COLL VENOUS BLD VENIPUNCTURE: CPT

## 2019-11-18 PROCEDURE — 99214 OFFICE O/P EST MOD 30 MIN: CPT | Performed by: NURSE PRACTITIONER

## 2019-11-18 RX ORDER — VANCOMYCIN HCL IN 5 % DEXTROSE 1 G/100 ML
PLASTIC BAG, INJECTION (ML) INTRAVENOUS
COMMUNITY
End: 2019-12-13

## 2019-11-18 RX ORDER — WARFARIN SODIUM 5 MG/1
TABLET ORAL
Qty: 30 TABLET | Refills: 2 | Status: ON HOLD | OUTPATIENT
Start: 2019-11-18 | End: 2019-12-18 | Stop reason: SDUPTHER

## 2019-11-18 NOTE — PAYOR COMM NOTE
"Taylor Monaco (64 y.o. Female)     ATTN: MARY   REF#[id number]   D/C SUMMARY  THANKS!   KIT [phone] OR [phone]      Date of Birth Social Security Number Address Home Phone MRN    1955  [address] [facility] [phone] [id number]    Congregation Marital Status          Hindu        Admission Date Admission Type Admitting Provider Attending Provider Department, Room/Bed    11/10/19 Emergency Sean Mullen MD  [facility] [address] [place], P685/1    Discharge Date Discharge Disposition Discharge Destination        11/15/2019 Home or Self Care              Attending Provider:  (none)   Allergies:  No Known Allergies    Isolation:  Spore   Infection:  C.difficile (19)   Code Status:  Prior    Ht:  160 cm (63\")   Wt:  83.9 kg (185 lb)    Admission Cmt:  None   Principal Problem:  Acute colitis [K52.9]                 Active Insurance as of 11/10/2019     Primary Coverage     Payor Plan Insurance Group Employer/Plan Group    [employer] CROSS BLUE SHIELD PPO [id number]     Payor Plan Address Payor Plan Phone Number Payor Plan Fax Number Effective Dates    [address] [phone]  2017 - None Entered    [facility] [id number]       Subscriber Name Subscriber Birth Date Member ID       TAYLOR MONACO 1955 [id number]                 Emergency Contacts      (Rel.) Home Phone Work Phone Mobile Phone    Shanon Bhagat (Daughter) -- -- [phone]               Discharge Summary      Laure Taylor MD at 11/15/19 1845                                                                             PHYSICIAN DISCHARGE SUMMARY                                                                        [facility]    Patient Identification:  Name: Taylor Monaco  Age: 64 y.o.  Sex: female  :  1955  MRN: [id number]  Primary Care Physician: Tommy Osborn MD    Admit date: " 11/10/2019  Discharge date and time:11/15/19    Discharged Condition: good    Discharge Diagnoses:  Acute colitis    Malignant neoplasm of upper-outer quadrant of right breast in female, estrogen receptor positive (CMS/HCC)    Diarrhea    Liver mass    Hypokalemia    Leukocytosis    Enterocolitis due to Clostridioides difficile   portal vein thrombosis  Patient Active Problem List   Diagnosis Code   • Visit for gynecologic examination Z01.419   • Vasomotor symptoms due to menopause N95.1   • Screening for cervical cancer Z12.4   • Malignant neoplasm of upper-outer quadrant of right breast in female, estrogen receptor positive (CMS/HCC) C50.411, Z17.0   • Acute colitis K52.9   • Diarrhea R19.7   • Liver mass R16.0   • Hypokalemia E87.6   • Leukocytosis D72.829   • Enterocolitis due to Clostridioides difficile A04.72       PMHX:   Past Medical History:   Diagnosis Date   • Breast cancer (CMS/HCC) 09/2019    Right IDC   • GERD (gastroesophageal reflux disease)    • PONV (postoperative nausea and vomiting)    • Situational stress     DEATH OF BROTHER 10/2019, RECENT DIAGNOSIS   • Stress headaches      PSHX:   Past Surgical History:   Procedure Laterality Date   • BILE DUCT STENT PLACEMENT  2014   • BREAST BIOPSY Right 09/2019    malignant   • BREAST LUMPECTOMY WITH SENTINEL NODE BIOPSY Right 11/4/2019    Procedure: BREAST LUMPECTOMY WITH SENTINEL NODE BIOPSY AND NEEDLE LOCALIZATION And possible axillary dissection;  Surgeon: Jean-Paul Ramos MD;  Location: [facility] OR [facility];  Service: General   • KNEE ARTHROSCOPY Left    • LAPAROSCOPIC CHOLECYSTECTOMY  2014   • TUBAL ABDOMINAL LIGATION         Hospital Course: Taylor Larsen  Was admitted with diarrhea; she was recently diagnosed with breast cancer and had surgery; she tested positive for c diff colitis; workup also revealed portal vein thrombosis; she was started on po vanc and lovenox as well as coumadin; her diarrhea has resolved and she will be going home on  lovenox to bridge while coumadin is being loaded; she will also follow up with her pcp and finish her course of po vancomycin    Consults:     Consults     Date and Time Order Name Status Description    11/11/2019 1608 Hematology & Oncology Inpatient Consult Completed     11/11/2019 0030 Inpatient Gastroenterology Consult Completed     11/10/2019 1649 [facility] (on-call MD unless specified) Details Completed         Results from last 7 days   Lab Units 11/14/19  0604   WBC 10*3/mm3 10.63   HEMOGLOBIN g/dL 10.8*   HEMATOCRIT % 31.3*   PLATELETS 10*3/mm3 197     Results from last 7 days   Lab Units 11/14/19  0604   SODIUM mmol/L 137   POTASSIUM mmol/L 3.6   CHLORIDE mmol/L 104   CO2 mmol/L 22.3   BUN mg/dL 7*   CREATININE mg/dL 0.57   GLUCOSE mg/dL 90   CALCIUM mg/dL 7.5*     Significant Diagnostic Studies: Labs in chart were reviewed.  Imaging Results (All)     Procedure Component Value Units Date/Time    MRI abdomen w wo contrast mrcp [[id number]] Collected:  11/11/19 1256     Updated:  11/12/19 1058    Narrative:       MRI OF THE LIVER WITH AND WITHOUT CONTRAST, MRCP     HISTORY: Characterize the abnormality seen on recent CT for the liver.  Characterize liver mass.     TECHNIQUE: MRI of the abdomen was performed utilizing a dedicated liver  protocol. Axial 2-D FIESTA, in and out of phase FSPGR, axial  fat-saturated T2 imaging was acquired. Axial thin section precontrast  and dynamic enhanced postcontrast T1 weighted LAVA imaging was acquired.  Thick slab coronal MRCP imaging was acquired in multiple obliquities  through the biliary tree. Coronal thin section fat saturated T2 and 3-D  MRCP imaging was acquired.      COMPARISON: CT abdomen and pelvis from 11/10/2019.     FINDINGS: There has either been postsurgical resection or complete  atrophy of the right hepatic lobe. There is segmental biliary dilatation  within the residual right hepatic lobe with atrophy/complete occlusion  of the right branch of the portal vein.  The left hepatic lobe  demonstrates normal signal intensity. The spleen is mildly enlarged  measuring 13 cm in craniocaudal dimension and 12.6 cm in AP dimension.  Following administration of contrast there is mild heterogenous  enhancement seen on the arterial phase with nearly isointense  enhancement on the venous phase within the left hepatic lobe. No  suspicious hepatic lesion is identified. The left branch of the portal  vein and the main portal vein and the splenoportal confluence is patent.     MRCP IMAGES: No biliary dilatation within the left hepatic lobe. Dilated  bowel radicals are seen within the small atrophic residual right hepatic  lobe with what appears to be complete obstruction/occlusion of the  proximal right hepatic duct. The common bile duct demonstrates normal  caliber without intraluminal filling defect. The pancreatic duct  demonstrates normal caliber.     The pancreas, bilateral adrenal glands and visualized kidneys are  normal. There are prominent lymph nodes identified within the  hepatogastric ligament and around the celiac axis and in the  retroperitoneum. Index left para-aortic lymph node at the level of the  left renal hilum measures up to 1.1 cm in short axis dimension. No  ascites is seen.       Impression:       1. On the provided MR images, there is complete occlusion of the right  branch of the portal vein, occlusion of the proximal right hepatic duct  with atrophy of the right hepatic lobe and segmental biliary dilatation.  These findings could be related to prior resection of majority of the  right hepatic lobe and or changes related to right portal vein thrombus  and/or bile duct obstruction. Correlation with remote imaging would be  most helpful. No definite suspicious hepatic lesion is identified. In  the absence of remote imaging follow-up is recommended.  2. There is prominent lymphadenopathy seen throughout the visualized  upper abdomen. The etiology is uncertain and  follow-up is recommended.     This report was finalized on 11/12/2019 10:55 AM by Dr. Radha Reynolds M.D.        Liver [[id number]] Collected:  11/11/19 2301     Updated:  11/12/19 0400    Narrative:       LIVER ULTRASOUND     CLINICAL HISTORY: Abnormal imaging study.     FINDINGS: Longitudinal and transverse images of the liver obtained. The  right hepatic lobe is largely atretic as was described on recent MRI  exam. The remainder of the hepatic parenchyma is somewhat heterogeneous  with vague areas of increased parenchymal echogenicity but this is most  likely due to variations in fatty content. A discrete mass lesion is not  identified. There is no biliary dilation. Absent gallbladder. No  ascites.             Impression:       Atretic appearance of the right hepatic lobe, particularly  posteriorly which could be postsurgical. Parenchymal heterogeneity  otherwise is favored to be due to variations in fatty content.     This report was finalized on 11/12/2019 3:57 AM by Amarjit Agudelo M.D.       CT Abdomen Pelvis With Contrast [[id number]] Collected:  11/10/19 1637     Updated:  11/10/19 1658    Narrative:       CT ABDOMEN PELVIS W CONTRAST-     INDICATIONS: Diarrhea     TECHNIQUE: Radiation dose reduction techniques were utilized, including  automated exposure control and exposure modulation based on body size.  Enhanced ABDOMEN AND PELVIS CT     COMPARISON: None available     FINDINGS:     Posterior aspect of the liver appears truncated, and heterogeneous  posteriorly, that may reflect partial right hepatectomy change,  correlate with surgical history. If there is no such surgical history,  then neoplasm would be suspected, considering presence of heterogeneity  and ductal dilatation apparent along the posterior margin of the liver,  for example a 3.2 cm region on image 26 of series 1. Recommend  comparison with prior imaging (if it can be obtained) to characterize  stability/exclude possibility of a lesion;  "if this finding has not  already been definitively characterized, initial further evaluation with  hepatic MRI would be recommended to assess for possible malignant  neoplasm.     Gallbladder is surgically absent                 Otherwise unremarkable appearance of the liver, spleen, adrenal glands,  pancreas, kidneys, bladder.     No bowel obstruction . Diffuse wall thickening is apparent in the colon,  suggesting nonspecific colitis in the setting of diarrhea. Appendix does  not appear inflamed.     No free intraperitoneal gas or free fluid.     Scattered small mesenteric and para-aortic lymph nodes are seen that are  not significant by size criteria.     Abdominal aorta is not aneurysmal. Aortic and other arterial  calcifications are present.     The lung bases show minimal atelectasis.     Degenerative changes are seen in the spine. No acute fracture is  identified.             Impression:             1. Diffuse colitis.  2. Abnormal appearance of the liver that may reflect postsurgical change  (correlate clinically), or potentially malignant neoplasm; if this  finding has not already been definitively characterized, initial further  evaluation with hepatic MRI would be recommended.     Discussed by telephone with Dr. Velasco at 1645, 11/10/2019.     This report was finalized on 11/10/2019 4:55 PM by Dr. Franky Osullivan M.D.       XR Chest 2 View [[id number]] Collected:  11/10/19 1639     Updated:  11/10/19 1642    Narrative:       XR CHEST 2 VW-  11/10/2019     HISTORY: Chills, elevated white blood cell count.     Heart size is within normal limits. Lungs appear free of acute  infiltrates. Bones and soft tissues are unremarkable.       Impression:       1. No acute process.     This report was finalized on 11/10/2019 4:39 PM by Dr. Rudi Gonzalez M.D.           /60 (BP Location: Left arm, Patient Position: Lying)   Pulse 78   Temp 98.1 °F (36.7 °C) (Oral)   Resp 16   Ht 160 cm (63\")   Wt 83.9 " kg (185 lb)   SpO2 96%   BMI 32.77 kg/m²      Discharge Exam:  General Appearance:    Alert, cooperative, no distress, AAOx3                          Head:    Normocephalic, without obvious abnormality, atraumatic                          Eyes:    PERRL, conjunctiva/corneas clear, EOM's intact, both eyes                        Throat:   Lips, tongue, gums normal; oral mucosa pink and moist                          Neck:   Supple, symmetrical, trachea midline, no JVD                        Lungs:     Clear to auscultation bilaterally, respirations unlabored                Chest Wall:    No tenderness or deformity                        Heart:    Regular rate and rhythm, S1 and S2 normal, no murmur,no  Rub                                       or gallop                  Abdomen:     Soft, non-tender, bowel sounds active, no masses, no                                                        organomegaly                  Extremities:   Extremities normal, atraumatic, no cyanosis or edema, pulses                                           palpable in all extremities                             Skin:   Skin is warm and dry,  no rashes or palpable lesions                  Neurologic:   CNII-XII intact, motor strength grossly intact, sensation grossly                                           intact to light touch, no focal deficits noted     Disposition:  Home    Patient Instructions:      Discharge Medications      New Medications      Instructions Start Date   enoxaparin 80 MG/0.8ML solution syringe  Commonly known as:  LOVENOX   80 mg, Subcutaneous, Every 12 Hours      warfarin 5 MG tablet  Commonly known as:  COUMADIN   Take daily         Continue These Medications      Instructions Start Date   acetaminophen 325 MG tablet  Commonly known as:  TYLENOL   650 mg, Oral, Every 6 Hours PRN      doxylamine 25 MG tablet  Commonly known as:  UNISOM   25 mg, Oral, Nightly PRN      melatonin 5 MG tablet tablet   10 mg, Oral,  Nightly PRN             Future Appointments   Date Time Provider Department Center   11/18/2019  9:45 AM Jean-Paul Ramos MD [facility] None   11/18/2019 10:50 AM LAB CHAIR 2 [facility]   11/18/2019 11:20 AM Mara Clayton APRN [facility]   11/21/2019 12:00 PM LAB CHAIR 3 [facility]   11/21/2019 12:30 PM RN REV 2 [facility] INFUS [facility]   11/25/2019  9:30 AM Beena Maurice MD [facility] RO [facility] None   11/25/2019 10:40 AM LAB CHAIR 3 [facility]   11/25/2019 11:00 AM RN REV 2 CBC [facility] INFUS [facility]   12/2/2019 10:00 AM ROSA DEXA 1 BH [facility] DEXA ROSA   12/2/2019 12:00 PM LAB CHAIR 2 [facility]   12/2/2019 12:30 PM RN REV 1 CBC [facility] INFUS [facility]   12/9/2019  7:50 AM LAB CHAIR 4 CBC [facility] LAB [facility]   12/9/2019  8:20 AM Aguilar Rodgers II, MD [facility] CBC [facility] CBC [facility]     Follow-up Information     Tommy Osborn MD .    Specialty:  Family Medicine  Contact information:  [address] [place] [id number]  [phone]                 Discharge Order (From admission, onward)    Start     Ordered    11/15/19 1729  Discharge patient  Once     Expected Discharge Date:  11/15/19    Discharge Disposition:  Home or Self Care    Physician of Record for Attribution - Please select from Treatment Team:  RAMIRO SLAUGHTER [[id number]]    Review needed by CMO to determine Physician of Record:  No       Question Answer Comment   Physician of Record for Attribution - Please select from Treatment Team RAMIRO SLAUGHTER    Review needed by CMO to determine Physician of Record No        11/15/19 1728          Total time spent discharging patient including evaluation,post hospitalization follow up,  medication and post hospitalization instructions and education total time exceeds 30 minutes.    Signed:  Laure Taylor MD  11/15/2019  7:46 PM      Electronically signed by Laure Taylor MD at 11/15/19 1950

## 2019-11-18 NOTE — PROGRESS NOTES
Chief Complaint: Taylor Larsen is a  64 y.o. female, initially referred by No ref. provider found , who is here today for a postoperative visit.    History of Present Illness:  In the interim,Taylor Larsen has had the following procedure and resultant pathology report: She is undergone a needle localized right breast conserving surgery.  The path report reveals a 3 mm tumor that is grade 1.  The margins are widely clear and the 2 lymph nodes were benign.  Unfortunately she developed diarrhea and eventually C. difficile colitis requiring hospitalization.  She remains on vancomycin and Flagyl and is much improved.  We only gave her 1 dose of antibiotics before surgery.    She has noted no redness, warmth,drainage, swelling at the incision site. Denies fever or chills.      Current Outpatient Medications:   •  acetaminophen (TYLENOL) 325 MG tablet, Take 650 mg by mouth Every 6 (Six) Hours As Needed for Mild Pain ., Disp: , Rfl:   •  doxylamine (UNISOM) 25 MG tablet, Take 25 mg by mouth At Night As Needed., Disp: , Rfl:   •  enoxaparin (LOVENOX) 80 MG/0.8ML solution syringe, Inject 0.8 mL under the skin into the appropriate area as directed Every 12 (Twelve) Hours., Disp: 14 syringe, Rfl: 0  •  melatonin 5 MG tablet tablet, Take 10 mg by mouth At Night As Needed., Disp: , Rfl:   •  Vancomycin HCl-Dextrose (VANCOMYCIN HCL IN DEXTROSE) 1-5 GM/250ML-% solution, Infuse  into a venous catheter., Disp: , Rfl:   •  warfarin (COUMADIN) 5 MG tablet, Take daily, Disp: 30 tablet, Rfl: 2  Physical examination  Right breast- the lumpectomy site appears to be healing well without any significant signs of infection or hematoma.  The axillary incision is also doing well.  Assessment:  Right breast cancer status post breast conserving surgery.  Aside from her colitis, she has done relatively well.  Appointments have been made for her to see the medical and the radiation oncologist.    Plan:  I would like to see her back in 2 months  hopefully after she is completed radiation.          EMR Dragon/transcription disclaimer:    Much of this encounter note is an electronic transcription/translocation of spoken language to printed text.  The electronic translation of spoken language may permit erroneous, or at times, nonsensical words or phrases to be inadvertently transcribed.  Although I have reviewed the note from such areas, some may still exist.

## 2019-11-18 NOTE — PROGRESS NOTES
"  Subjective  breast cancer, portal vein thrombosis    HISTORY OF PRESENT ILLNESS:     History of Present Illness she is a 64-year-old female recently diagnosed breast cancer who presented to the ER on 11/10/2019 with abdominal pain associate with nausea vomiting and diarrhea.  She had a CT scan that was colitis and found to be positive for C. difficile.  At that time she also had an abnormal appearance of the liver and MRI of the liver on 11/11/2019 showed occlusion of the right branch the portal vein, occlusion of proximal right hepatic duct with atrophy of right hepatic lobe and segmental biliary dilation.  She was seen by Dr. Rodgers and started on anticoagulation by the hospitalist on 11/15/2019 when she had some improvement in her colitis.    She had prominent upper abdominal node seen on MRI of the abdomen that will be followed in 3 months by CT scans.    Discussions with Dr. Rodgers we did proceed with Oncotype DX assay that is pending results.  We will follow-up with Dr. Rodgers in approximately 1 month to review these results and decide treatment approach at that time.    Patient was started on both warfarin and Lovenox on 11/15/2019.  She continues on oral vancomycin.  She is been taking 5 mg of warfarin daily.    Past Medical History, Past Surgical History, Social History, Family History have been reviewed and are without significant changes except as mentioned.    Review of Systems   Constitutional: Positive for fatigue.   HENT: Negative for nosebleeds.    Gastrointestinal: Negative for anal bleeding and blood in stool.   Hematological: Does not bruise/bleed easily.          Medications:  The current medication list was reviewed in the EMR    ALLERGIES:  No Known Allergies    Objective      Vitals:    11/18/19 1138   BP: 137/82   Pulse: 86   Resp: 16   Temp: 98.3 °F (36.8 °C)   SpO2: 96%   Weight: 84.9 kg (187 lb 3.2 oz)   Height: 161 cm (63.39\")  Comment: new ht w/shoes     Current Status 11/18/2019   ECOG " score 0       Physical Exam   Constitutional: She appears well-developed and well-nourished. No distress.   HENT:   Head: Normocephalic.   Nose: Nose normal.   Eyes: Conjunctivae are normal. Pupils are equal, round, and reactive to light.   Cardiovascular: Normal rate and regular rhythm.   No murmur heard.  Pulmonary/Chest: Effort normal and breath sounds normal. No respiratory distress.   Skin: Skin is warm and dry.   Psychiatric: She has a normal mood and affect. Her behavior is normal.   Vitals reviewed.      RECENT LABS:  Hematology WBC   Date Value Ref Range Status   11/18/2019 9.98 3.40 - 10.80 10*3/mm3 Final     RBC   Date Value Ref Range Status   11/18/2019 4.01 3.77 - 5.28 10*6/mm3 Final     Hemoglobin   Date Value Ref Range Status   11/18/2019 12.7 12.0 - 15.9 g/dL Final     Hematocrit   Date Value Ref Range Status   11/18/2019 37.7 34.0 - 46.6 % Final     Platelets   Date Value Ref Range Status   11/18/2019 209 140 - 450 10*3/mm3 Final              Assessment/Plan   *Portal vein thrombosis (right branch of portal vein) no evidence of cirrhosis on MRI or ultrasound.  GI note has no mention of cirrhosis.  Hormonal therapy for breast cancer with slight increased risk of thrombosis.  Therefore we proceeded with anticoagulation.    · The hospitalist began Lovenox and Coumadin on 11/15/2019.  The patient has been taking 5 mg of Coumadin daily.    · She is therapeutic, with an INR of 2.3 today and therefore her Lovenox may be discontinued.  She will continue Coumadin 5 mg daily with recheck on Thursday.  · Goal INR 2-3.    *Right breast cancer.  Invasive ductal adenocarcinoma.  Upper inner quadrant  · Right lumpectomy by Dr. Ramos 11/4/2019.  1:00.  Overall grade 1.  Greatest dimension 5 mm.  Single focus of invasive carcinoma.  No lymphovascular invasion.  Margins negative.  0 out of 2 nodes involved.  · [id number], stage 1  · ER 91%.  NM 96%.  HER-2 negative.  Ki-67 4.7% (favorable).  · (Initial needle  biopsy 9/16/2019: Low-grade invasive ductal carcinoma, grade 1.  5 mm in greatest dimension.)  · (Initial mammogram measured the lesion at 5 mm)  · From the information we currently know about her cancer, Dr. Rodgers did not recommend chemotherapy. He did recommend hormonal therapy for 5 years.  Hopefully, aromatase inhibitors if DEXA scan allows (with portal vein thrombosis would like to avoid tamoxifen).  · She would like an Oncotype DX assay to see if she is at higher risk than known at this time because if she is she would consider adjuvant chemotherapy.    *Possible bile duct occlusion seen on MRCP 11/11/2019.  CA 19-9 AFP normal.  Dr. Sage Owens reviewed with Dr. Marinelli.  He states MRCP findings are likely due to bile duct injury in 2014 during cholecystectomy.  He states no ERCP is needed.    *C. difficile colitis.  Received 1 dose of antibiotics for breast surgery.  Patient was on Flagyl and oral vancomycin.  Flagyl discontinued at discharge.  Oral vancomycin continues.    *Prominent nodes within the hepatic gastric ligament and around the celiac axis and in the retroperitoneum on MRI abdomen 11/11/2019.  · Etiology uncertain.  Radiologist recommends follow-up.  Anticipate CT abdomen pelvis in February 2020.    *Leukocytosis and anemia.  Resolved now and felt to be secondary to C. difficile colitis.      PLAN  1. Return to the office on Thursday for repeat PT/INR with RN review.  2. Discontinue Lovenox and continue Coumadin 5 mg daily.  3. Follow-up with Dr. Rodgers on 12/9/2019.  At this appointment they will review the hypercoagulable labs in the Oncotype DX results as well as a DEXA scan.  4. DEXA scan on 12/2/2019.      We spent approximately 25 minutes discussing the use of warfarin, monitoring, dietary considerations, and the need to alert us for new drugs due to possible interactions as well as holding in the future for any surgical procedures.                 11/18/2019      CC:

## 2019-11-19 ENCOUNTER — APPOINTMENT (OUTPATIENT)
Dept: ONCOLOGY | Facility: CLINIC | Age: 64
End: 2019-11-19

## 2019-11-19 ENCOUNTER — APPOINTMENT (OUTPATIENT)
Dept: LAB | Facility: HOSPITAL | Age: 64
End: 2019-11-19

## 2019-11-19 ENCOUNTER — READMISSION MANAGEMENT (OUTPATIENT)
Dept: CALL CENTER | Facility: HOSPITAL | Age: 64
End: 2019-11-19

## 2019-11-19 NOTE — OUTREACH NOTE
Medical Week 1 Survey      Responses   Facility patient discharged from?  [facility]   Does the patient have one of the following disease processes/diagnoses(primary or secondary)?  Other   Is there a successful TCM telephone encounter documented?  No   Week 1 attempt successful?  No   Unsuccessful attempts  Attempt 1          Maldonado Castro RN

## 2019-11-21 ENCOUNTER — CLINICAL SUPPORT (OUTPATIENT)
Dept: ONCOLOGY | Facility: HOSPITAL | Age: 64
End: 2019-11-21

## 2019-11-21 ENCOUNTER — READMISSION MANAGEMENT (OUTPATIENT)
Dept: CALL CENTER | Facility: HOSPITAL | Age: 64
End: 2019-11-21

## 2019-11-21 ENCOUNTER — LAB (OUTPATIENT)
Dept: LAB | Facility: HOSPITAL | Age: 64
End: 2019-11-21

## 2019-11-21 DIAGNOSIS — Z79.02 ENCOUNTER FOR LONG-TERM USE OF ANTIPLATELETS/ANTITHROMBOTICS: Primary | ICD-10-CM

## 2019-11-21 LAB
INR PPP: 3 (ref 0.9–1.1)
LAB AP CASE REPORT: NORMAL
LAB AP SPECIAL STAINS: NORMAL
LAB AP SYNOPTIC CHECKLIST: NORMAL
Lab: NORMAL
PATH REPORT.ADDENDUM SPEC: NORMAL
PATH REPORT.FINAL DX SPEC: NORMAL
PATH REPORT.GROSS SPEC: NORMAL
PROTHROMBIN TIME: 35.6 SECONDS (ref 11–13.5)

## 2019-11-21 PROCEDURE — 85610 PROTHROMBIN TIME: CPT

## 2019-11-21 NOTE — PROGRESS NOTES
INR 3.0 today.  Range for her is 2-3.  Pt just started coumadin on Friday 11/15/19.  She saw NP Mara Monday, who instructed her to stop lovenox and take coumadin 5mg daily, which pt has been.  I s/w FAWAD Terry and she instructed pt to take coumadin 2.5mg today (Thursday), 5mg Friday, 5mg Saturday and 5mg Sunday and recheck INR Monday (pt will be on a Monday scheduled then).  Not placed in standing stone at this time bc she has only had 5 doses of coumadin.  Pt is to be put in Standing Stone on Monday when she returns for recheck.  Written coumadin dosing through Monday given to pt and confirmed next appts with her and her .  She v/u of the above and will call with any questions or concerns.    Coumadin:  11/15 (Friday): 5mg  11/16 (Saturday): 5mg  11/17 (Sunday): 5mg  11/18 (Monday): 5mg  11/19 (Tuesday): 5mg  11/20 (Wednesday): 5mg  11/21 (Thursday): 2.5mg  11/22 (Friday): 5mg  11/23 (Saturday): 5mg  11/24 (Sunday): 5mg  11/25: Recheck INR.  Place in  at this time.

## 2019-11-21 NOTE — OUTREACH NOTE
Medical Week 1 Survey      Responses   Facility patient discharged from?  [place]   Does the patient have one of the following disease processes/diagnoses(primary or secondary)?  Other   Is there a successful TCM telephone encounter documented?  No   Week 1 attempt successful?  Yes   Call start time  0757   Call end time  0801   General alerts for this patient   recently diagnosed with breast cancer    Discharge diagnosis  Enterocolitis due to Clostridioides difficile   Is patient permission given to speak with other caregiver?  Yes   List who call center can speak with  Shanon Lowery reviewed with patient/caregiver?  Yes   Is the patient having any side effects they believe may be caused by any medication additions or changes?  No   Does the patient have all medications ordered at discharge?  Yes   Is the patient taking all medications as directed (includes completed medication regime)?  Yes   Medication comments  Was able to stop the lovenox, her levels were where therapeutic.    Does the patient have a primary care provider?   Yes   Does the patient have an appointment with their PCP within 7 days of discharge?  Yes   Has the patient kept scheduled appointments due by today?  N/A   Comments  Has an appt today and having labs as well.   Has home health visited the patient within 72 hours of discharge?  N/A   Psychosocial issues?  No   Did the patient receive a copy of their discharge instructions?  Yes   Nursing interventions  Reviewed instructions with patient   What is the patient's perception of their health status since discharge?  Improving   Is the patient/caregiver able to teach back signs and symptoms related to disease process for when to call PCP?  Yes   Is the patient/caregiver able to teach back signs and symptoms related to disease process for when to call 911?  Yes   Is the patient/caregiver able to teach back the hierarchy of who to call/visit for symptoms/problems? PCP, Specialist,  Home health nurse, Urgent Care, ED, 911  Yes   Week 1 call completed?  Yes   Wrap up additional comments  Seeing doctor and having labs today. Lovenox was stopped, her levels are therapeutic.           Danette Beck RN

## 2019-11-25 ENCOUNTER — CLINICAL SUPPORT (OUTPATIENT)
Dept: ONCOLOGY | Facility: HOSPITAL | Age: 64
End: 2019-11-25

## 2019-11-25 ENCOUNTER — APPOINTMENT (OUTPATIENT)
Dept: RADIATION ONCOLOGY | Facility: HOSPITAL | Age: 64
End: 2019-11-25

## 2019-11-25 ENCOUNTER — APPOINTMENT (OUTPATIENT)
Dept: LAB | Facility: HOSPITAL | Age: 64
End: 2019-11-25

## 2019-11-25 ENCOUNTER — CONSULT (OUTPATIENT)
Dept: RADIATION ONCOLOGY | Facility: HOSPITAL | Age: 64
End: 2019-11-25

## 2019-11-25 VITALS
SYSTOLIC BLOOD PRESSURE: 128 MMHG | HEART RATE: 99 BPM | DIASTOLIC BLOOD PRESSURE: 72 MMHG | WEIGHT: 183 LBS | OXYGEN SATURATION: 97 % | BODY MASS INDEX: 32.02 KG/M2

## 2019-11-25 DIAGNOSIS — Z79.02 ENCOUNTER FOR LONG-TERM USE OF ANTIPLATELETS/ANTITHROMBOTICS: Primary | ICD-10-CM

## 2019-11-25 DIAGNOSIS — C50.411 MALIGNANT NEOPLASM OF UPPER-OUTER QUADRANT OF RIGHT BREAST IN FEMALE, ESTROGEN RECEPTOR POSITIVE (HCC): Primary | ICD-10-CM

## 2019-11-25 DIAGNOSIS — Z17.0 MALIGNANT NEOPLASM OF UPPER-OUTER QUADRANT OF RIGHT BREAST IN FEMALE, ESTROGEN RECEPTOR POSITIVE (HCC): Primary | ICD-10-CM

## 2019-11-25 LAB
INR PPP: 3.6 (ref 0.9–1.1)
PROTHROMBIN TIME: 43.7 SECONDS (ref 11–13.5)

## 2019-11-25 PROCEDURE — G0463 HOSPITAL OUTPT CLINIC VISIT: HCPCS | Performed by: RADIOLOGY

## 2019-11-25 PROCEDURE — 99243 OFF/OP CNSLTJ NEW/EST LOW 30: CPT | Performed by: RADIOLOGY

## 2019-11-25 PROCEDURE — 85610 PROTHROMBIN TIME: CPT

## 2019-11-25 PROCEDURE — 77263 THER RADIOLOGY TX PLNG CPLX: CPT | Performed by: RADIOLOGY

## 2019-11-25 NOTE — PROGRESS NOTES
DIAGNOSIS and REASON FOR CONSULTATION: Invasive Ductal Carcinoma of the Right Breast - T1a N0 M0 - for advice and recommendations regarding the diagnosis      CHIEF COMPLAINT:  For advice and recommendations regarding Malignant neoplasm of upper-outer quadrant of right breast in female, estrogen receptor positive (CMS/HCC)     HISTORY OF PRESENT ILLNESS:  The patient is a 64 y.o. year old female who was found to have an abnormality on routine screening mammogram dated August 26, 2019.  This revealed a new 5 mm mass in the right breast at 12:00.  Diagnostic mammogram and ultrasound completed on September 13, 2019 confirmed a small round mass measuring 5 mm which was solid as well as a complicated cyst versus solid mass measuring 5 x 3 x 4 mm at the 11 o'clock position of the right breast.    She underwent aspiration of the cyst and ultrasound-guided biopsy on September 16, 2010.  The cyst had no malignant cells but the biopsy from the 12 o'clock position revealed an invasive low-grade ductal carcinoma measuring 5 mm in greatest dimension.  The tissue was found to be positive for estrogen receptors at 90%, positive for progesterone receptors at 95% and negative for the HER-2/gerson oncogene with a favorable Ki-67.    Given her significant family history with a maternal aunt diagnosed twice and 2 maternal second cousins with breast cancer, she underwent genetic testing which revealed only variance of uncertain significance.  Bilateral breast MRI completed on October 3, 2019 showed a hematoma measuring 5.3 x 3.3 x 2.6 cm with the clip noted at the area of the biopsy.  Focal enhancement was noted along the posterior aspect measuring 1.6 x 1.9 x 1.4 cm felt to be postbiopsy change.  No other evidence of abnormality was appreciated in either breast or axilla.    Therefore she went to surgery on November 4, 2019 and underwent a right sided lumpectomy and sentinel lymph node biopsy.  The final breast pathology revealed invasive  ductal carcinoma measuring 3 x 3 mm, grade 1, with negative margins initially and no further evidence of disease on additional margins.  In addition 0 of 2 sentinel nodes were involved.  Therefore she appears to have a pathologic T1a N0 hormone receptor positive low-grade invasive ductal carcinoma.    Unfortunately she developed C. difficile colitis postoperatively requiring hospitalization and she remains on vancomycin and Flagyl though much improved.  During that hospitalization she was found to have an abnormality on CT of the abdomen and MRI of the abdomen on November 11, 2019 showed atrophy of the right hepatic lobe with segmental biliary dilatation within the residual right hepatic lobe and complete occlusion of the right branch of the portal vein, mild enlargement of the spleen without suspicious hepatic lesion, complete occlusion of the proximal right hepatic duct.  She was felt to have portal vein thrombosis and began anticoagulation.  She was seen by Dr. Rodgers during that hospitalization who ordered an Oncotype DX test and recommended follow-up CAT scan of the abdomen in roughly 3 months to evaluate the lymphadenopathy and bile duct dilatation. I was asked to see the patient at the request of the referring provider noted above for advice and recommendations regarding this diagnosis.     Clinically, she is doing well. She will return to work next week and is maintaining all her daily activities. She feels well and only complains of a mild itch in the breast.     Performance Status: (1) Restricted in physically strenuous activity, ambulatory and able to do work of light nature  Objective   Past Medical History: she  has a past medical history of Breast cancer (CMS/HCC) (09/2019), GERD (gastroesophageal reflux disease), PONV (postoperative nausea and vomiting), Situational stress, and Stress headaches.    Past Surgical History:  she has a past surgical history that includes Tubal ligation; Laparoscopic  cholecystectomy (2014); Bile duct stent placement (2014); Knee arthroscopy (Left); Breast biopsy (Right, 09/2019); and breast lumpectomy with sentinel node biopsy (Right, 11/4/2019).    Meds:    Current Outpatient Medications:   •  acetaminophen (TYLENOL) 325 MG tablet, Take 650 mg by mouth Every 6 (Six) Hours As Needed for Mild Pain ., Disp: , Rfl:   •  doxylamine (UNISOM) 25 MG tablet, Take 25 mg by mouth At Night As Needed., Disp: , Rfl:   •  melatonin 5 MG tablet tablet, Take 10 mg by mouth At Night As Needed., Disp: , Rfl:   •  Vancomycin HCl-Dextrose (VANCOMYCIN HCL IN DEXTROSE) 1-5 GM/250ML-% solution, Infuse  into a venous catheter., Disp: , Rfl:   •  warfarin (COUMADIN) 5 MG tablet, Take daily, Disp: 30 tablet, Rfl: 2  •  enoxaparin (LOVENOX) 80 MG/0.8ML solution syringe, Inject 0.8 mL under the skin into the appropriate area as directed Every 12 (Twelve) Hours., Disp: 14 syringe, Rfl: 0    Allergies:  No Known Allergies    Family History:  her family history includes Alcohol abuse in her father; Breast cancer in her maternal aunt, maternal cousin, and maternal cousin; Diabetes in her brother; Heart failure in her mother; Prostate cancer in her brother; Throat cancer in her brother.    Social History:  she  reports that she quit smoking about 5 years ago. Her smoking use included cigarettes. She has a 5.00 pack-year smoking history. She has quit using smokeless tobacco. She reports that she does not drink alcohol or use drugs.    Pertinent Findings on   Review of Systems   Constitutional: Negative for appetite change, chills, diaphoresis, fatigue, fever and unexpected weight change.   HENT:   Negative for hearing loss, lump/mass, mouth sores, nosebleeds, sore throat, tinnitus, trouble swallowing and voice change.    Eyes: Negative for eye problems and icterus.   Respiratory: Negative for chest tightness, cough, hemoptysis, shortness of breath and wheezing.    Cardiovascular: Negative for chest pain, leg  swelling and palpitations.   Gastrointestinal: Negative for abdominal distention, abdominal pain, blood in stool, constipation, diarrhea, nausea, rectal pain and vomiting.   Endocrine: Negative for hot flashes.   Genitourinary: Negative for bladder incontinence, difficulty urinating, dyspareunia, dysuria, frequency, hematuria, menstrual problem, nocturia, pelvic pain, vaginal bleeding and vaginal discharge.    Musculoskeletal: Negative for arthralgias, back pain, flank pain, gait problem, myalgias, neck pain and neck stiffness.   Skin: Negative for itching, rash and wound.   Neurological: Negative for dizziness, extremity weakness, gait problem, headaches, light-headedness, numbness, seizures and speech difficulty.   Hematological: Negative for adenopathy. Does not bruise/bleed easily.   Psychiatric/Behavioral: Negative for confusion, decreased concentration, depression, sleep disturbance and suicidal ideas. The patient is not nervous/anxious.    :  Vitals:    11/25/19 0937   BP: 128/72   Pulse: 99   SpO2: 97%   Weight: 83 kg (183 lb)   PainSc: 0-No pain     Pertinent Findings on:  Physical Exam   Constitutional: She is oriented to person, place, and time. She appears well-developed and well-nourished. She is cooperative.   HENT:   Head: Normocephalic.   Neck: Normal range of motion. No erythema present.   Pulmonary/Chest: Breath sounds normal. She has no wheezes. She exhibits no mass, no tenderness and no edema. Right breast exhibits no inverted nipple, no mass, no nipple discharge, no skin change and no tenderness. Left breast exhibits no inverted nipple, no mass, no nipple discharge, no skin change and no tenderness. There is no breast swelling.   The left breast is without abnormality as is the left axilla. The right breast shows a healing lumpectomy incision over the upper central aspect of the breast. There is no skin or nipple abnormality, no warmth or erythema.     Musculoskeletal: Normal range of motion.    Lymphadenopathy:     She has no cervical adenopathy.     She has no axillary adenopathy.        Right axillary: No pectoral adenopathy present.        Left axillary: No pectoral adenopathy present.       Right: No supraclavicular adenopathy present.        Left: No supraclavicular adenopathy present.   The axillary incision is also healing well. There is no palpable axillary, supraclavicular or cervical lymphadenopathy appreciated. No lymphedema is noted in either upper extremity.   Neurological: She is alert and oriented to person, place, and time.   Psychiatric: Her mood appears not anxious. Her affect is not angry. She does not exhibit a depressed mood.          Assessment: Invasive Ductal Carcinoma of the Right Breast, Stage T1a N0  This assessment comes from my review of the imaging, pathology, physician notes and other pertinent information as mentioned.    Plan:   We reviewed the specifics of her clinical, imaging and pathology findings today and also talked through the role of radiation therapy in her breast conservation treatment.  We will need to await the results of her Oncotype DX testing but we are anticipating no chemotherapy; though given her family history she wishes to be aggressive with her postop treatment.    I recommended, given the above, that we embark on a course of postoperative whole breast radiation therapy consisting of 4256 cGy aimed at the breast given over 16 treatments. We will then plan on boosting the tumor bed region as I will delineate it on her treatment planning scan with an additional 1000 cGy given in five treatments. The above course of treatment should be completed in 4 1/2 weeks.    We discussed the specifics, logistics and side effects of this course of treatment  which may involve acutely, irritation of the skin, including erythema and possibly moist desquamation, tenderness of the musculature of the chest wall and mild fatigue. We discussed the long term possibility of  mild hyperpigmentation and fibrosis of the breast, mild increased incidence of rib fracture and radiation pneumonitis.  We also discussed the importance of our treatment planning process in protecting these underlying structures as much as possible, specifically ribs and lung and I believe all her questions were answered to her satisfaction.      We will start our treatment planning process with a CAT scan, which we will hopefully complete after her visit with Dr. Rodgers to review the Oncotype results on December 9th.          I spent greater than 40 minutes in face-to-face time with the patient and 25 minutes of that time were spent in counseling and coordination of care, including review of imaging and pathology; indications, goals, logistics, alternatives and risks - both common and rare - for my recommendations as well as surveillance and potential outcomes.    Referring Provider:  Jean-Paul Ramos MD  Patient Care Team:  Tommy Osborn MD as PCP - General (Family Medicine)  Beena Maurice MD as Consulting Physician (Radiation Oncology)  Jana Evans MD as Consulting Physician (Hematology and Oncology)  Sage Gonzalez MD as Referring Physician (Gastroenterology)

## 2019-11-25 NOTE — PROGRESS NOTES
INR 3.6 today. Pt confirmed previous coumadin dosing. Denies any missed doses. Placed in [facility]. Pt denies bleeding. Reviewed dosing with Mara Clayton NP. Per Mara, have pt take 2.5mg M,W,F and 5mg all other days. Return next week for recheck. Informed pt and  of this. Dosing instructions given to pt. Educated pt on calling for s/s of bleeding. They v/u.

## 2019-12-02 ENCOUNTER — APPOINTMENT (OUTPATIENT)
Dept: LAB | Facility: HOSPITAL | Age: 64
End: 2019-12-02

## 2019-12-02 ENCOUNTER — READMISSION MANAGEMENT (OUTPATIENT)
Dept: CALL CENTER | Facility: HOSPITAL | Age: 64
End: 2019-12-02

## 2019-12-02 ENCOUNTER — HOSPITAL ENCOUNTER (OUTPATIENT)
Dept: BONE DENSITY | Facility: HOSPITAL | Age: 64
Discharge: HOME | End: 2019-12-02
Admitting: INTERNAL MEDICINE

## 2019-12-02 ENCOUNTER — CLINICAL SUPPORT (OUTPATIENT)
Dept: ONCOLOGY | Facility: HOSPITAL | Age: 64
End: 2019-12-02

## 2019-12-02 DIAGNOSIS — Z79.02 ENCOUNTER FOR LONG-TERM USE OF ANTIPLATELETS/ANTITHROMBOTICS: Primary | ICD-10-CM

## 2019-12-02 DIAGNOSIS — Z78.0 POST-MENOPAUSAL: ICD-10-CM

## 2019-12-02 DIAGNOSIS — C50.411 MALIGNANT NEOPLASM OF UPPER-OUTER QUADRANT OF RIGHT BREAST IN FEMALE, ESTROGEN RECEPTOR POSITIVE (HCC): ICD-10-CM

## 2019-12-02 DIAGNOSIS — Z17.0 MALIGNANT NEOPLASM OF UPPER-OUTER QUADRANT OF RIGHT BREAST IN FEMALE, ESTROGEN RECEPTOR POSITIVE (HCC): ICD-10-CM

## 2019-12-02 LAB
INR PPP: 3.1 (ref 0.9–1.1)
PROTHROMBIN TIME: 37.1 SECONDS (ref 11–13.5)

## 2019-12-02 PROCEDURE — 77080 DXA BONE DENSITY AXIAL: CPT

## 2019-12-02 PROCEDURE — 85610 PROTHROMBIN TIME: CPT

## 2019-12-02 NOTE — OUTREACH NOTE
Medical Week 2 Survey      Responses   Facility patient discharged from?  [facility]   Does the patient have one of the following disease processes/diagnoses(primary or secondary)?  Other   Week 2 attempt successful?  Yes   Call start time  1150   Call end time  1153   Meds reviewed with patient/caregiver?  Yes   Is the patient having any side effects they believe may be caused by any medication additions or changes?  No   Does the patient have all medications ordered at discharge?  Yes   Is the patient taking all medications as directed (includes completed medication regime)?  Yes   Does the patient have a primary care provider?   Yes   Has the patient kept scheduled appointments due by today?  Yes   Has home health visited the patient within 72 hours of discharge?  N/A   Psychosocial issues?  No   Did the patient receive a copy of their discharge instructions?  Yes   Nursing interventions  Reviewed instructions with patient, Educated on MyChart   What is the patient's perception of their health status since discharge?  Improving   Is the patient/caregiver able to teach back signs and symptoms related to disease process for when to call PCP?  Yes   Is the patient/caregiver able to teach back signs and symptoms related to disease process for when to call 911?  Yes   Is the patient/caregiver able to teach back the hierarchy of who to call/visit for symptoms/problems? PCP, Specialist, Home health nurse, Urgent Care, ED, 911  Yes   Week 2 Call Completed?  Yes   Graduated  Yes   Graduated/Revoked comments  States is doing well-denies any problems at this time with bowels. States keeping her appts. Denies any complaints today-states will call if any questions/concerns.          Mayda Solis RN

## 2019-12-02 NOTE — PROGRESS NOTES
INR 3.1. Prior dose confirmed. Per [facility] pt is to take 5 mg of Coumadin on Sun, Tue,Thur and 2.5 mg all other days.  Pt has no complaints. Educated pt on Coumadin and multiple written educational pieces give.  Copy of labs and AVS given to pt and f/u appt reviewed. Pt is instructed to call the office with any concerns or new symptoms prior to next visit. Pt vu.

## 2019-12-09 ENCOUNTER — OFFICE VISIT (OUTPATIENT)
Dept: ONCOLOGY | Facility: CLINIC | Age: 64
End: 2019-12-09

## 2019-12-09 ENCOUNTER — LAB (OUTPATIENT)
Dept: LAB | Facility: HOSPITAL | Age: 64
End: 2019-12-09

## 2019-12-09 VITALS
HEIGHT: 63 IN | OXYGEN SATURATION: 97 % | TEMPERATURE: 98.6 F | RESPIRATION RATE: 14 BRPM | HEART RATE: 94 BPM | BODY MASS INDEX: 32.53 KG/M2 | WEIGHT: 183.6 LBS | DIASTOLIC BLOOD PRESSURE: 82 MMHG | SYSTOLIC BLOOD PRESSURE: 127 MMHG

## 2019-12-09 DIAGNOSIS — Z78.0 POST-MENOPAUSAL: ICD-10-CM

## 2019-12-09 DIAGNOSIS — D72.829 LEUKOCYTOSIS, UNSPECIFIED TYPE: ICD-10-CM

## 2019-12-09 DIAGNOSIS — Z17.0 MALIGNANT NEOPLASM OF UPPER-INNER QUADRANT OF RIGHT BREAST IN FEMALE, ESTROGEN RECEPTOR POSITIVE (HCC): Primary | ICD-10-CM

## 2019-12-09 DIAGNOSIS — Z17.0 MALIGNANT NEOPLASM OF UPPER-OUTER QUADRANT OF RIGHT BREAST IN FEMALE, ESTROGEN RECEPTOR POSITIVE (HCC): ICD-10-CM

## 2019-12-09 DIAGNOSIS — Z79.02 ENCOUNTER FOR LONG-TERM USE OF ANTIPLATELETS/ANTITHROMBOTICS: Primary | ICD-10-CM

## 2019-12-09 DIAGNOSIS — C50.411 MALIGNANT NEOPLASM OF UPPER-OUTER QUADRANT OF RIGHT BREAST IN FEMALE, ESTROGEN RECEPTOR POSITIVE (HCC): ICD-10-CM

## 2019-12-09 DIAGNOSIS — C50.211 MALIGNANT NEOPLASM OF UPPER-INNER QUADRANT OF RIGHT BREAST IN FEMALE, ESTROGEN RECEPTOR POSITIVE (HCC): Primary | ICD-10-CM

## 2019-12-09 DIAGNOSIS — E87.6 HYPOKALEMIA: ICD-10-CM

## 2019-12-09 LAB
ALBUMIN SERPL-MCNC: 3.6 G/DL (ref 3.5–5.2)
ALBUMIN/GLOB SERPL: 0.8 G/DL (ref 1.1–2.4)
ALP SERPL-CCNC: 120 U/L (ref 38–116)
ALT SERPL W P-5'-P-CCNC: 19 U/L (ref 0–33)
ANION GAP SERPL CALCULATED.3IONS-SCNC: 11.7 MMOL/L (ref 5–15)
AST SERPL-CCNC: 32 U/L (ref 0–32)
BASOPHILS # BLD AUTO: 0.07 10*3/MM3 (ref 0–0.2)
BASOPHILS NFR BLD AUTO: 0.7 % (ref 0–1.5)
BILIRUB SERPL-MCNC: 0.6 MG/DL (ref 0.2–1.2)
BUN BLD-MCNC: 15 MG/DL (ref 6–20)
BUN/CREAT SERPL: 18.5 (ref 7.3–30)
CALCIUM SPEC-SCNC: 9 MG/DL (ref 8.5–10.2)
CHLORIDE SERPL-SCNC: 103 MMOL/L (ref 98–107)
CO2 SERPL-SCNC: 25.3 MMOL/L (ref 22–29)
CREAT BLD-MCNC: 0.81 MG/DL (ref 0.6–1.1)
DEPRECATED RDW RBC AUTO: 46.6 FL (ref 37–54)
EOSINOPHIL # BLD AUTO: 0.6 10*3/MM3 (ref 0–0.4)
EOSINOPHIL NFR BLD AUTO: 6.1 % (ref 0.3–6.2)
ERYTHROCYTE [DISTWIDTH] IN BLOOD BY AUTOMATED COUNT: 13.1 % (ref 12.3–15.4)
GFR SERPL CREATININE-BSD FRML MDRD: 71 ML/MIN/1.73
GLOBULIN UR ELPH-MCNC: 4.3 GM/DL (ref 1.8–3.5)
GLUCOSE BLD-MCNC: 104 MG/DL (ref 74–124)
HCT VFR BLD AUTO: 41 % (ref 34–46.6)
HGB BLD-MCNC: 13.3 G/DL (ref 12–15.9)
IMM GRANULOCYTES # BLD AUTO: 0.02 10*3/MM3 (ref 0–0.05)
IMM GRANULOCYTES NFR BLD AUTO: 0.2 % (ref 0–0.5)
INR PPP: 2.6 (ref 0.9–1.1)
LYMPHOCYTES # BLD AUTO: 3.63 10*3/MM3 (ref 0.7–3.1)
LYMPHOCYTES NFR BLD AUTO: 36.7 % (ref 19.6–45.3)
MCH RBC QN AUTO: 31.1 PG (ref 26.6–33)
MCHC RBC AUTO-ENTMCNC: 32.4 G/DL (ref 31.5–35.7)
MCV RBC AUTO: 96 FL (ref 79–97)
MONOCYTES # BLD AUTO: 1.01 10*3/MM3 (ref 0.1–0.9)
MONOCYTES NFR BLD AUTO: 10.2 % (ref 5–12)
NEUTROPHILS # BLD AUTO: 4.57 10*3/MM3 (ref 1.7–7)
NEUTROPHILS NFR BLD AUTO: 46.1 % (ref 42.7–76)
NRBC BLD AUTO-RTO: 0 /100 WBC (ref 0–0.2)
PLATELET # BLD AUTO: 223 10*3/MM3 (ref 140–450)
PMV BLD AUTO: 10 FL (ref 6–12)
POTASSIUM BLD-SCNC: 3.8 MMOL/L (ref 3.5–4.7)
PROT SERPL-MCNC: 7.9 G/DL (ref 6.3–8)
PROTHROMBIN TIME: 31.5 SECONDS (ref 11–13.5)
RBC # BLD AUTO: 4.27 10*6/MM3 (ref 3.77–5.28)
SODIUM BLD-SCNC: 140 MMOL/L (ref 134–145)
WBC NRBC COR # BLD: 9.9 10*3/MM3 (ref 3.4–10.8)

## 2019-12-09 PROCEDURE — 85025 COMPLETE CBC W/AUTO DIFF WBC: CPT

## 2019-12-09 PROCEDURE — 85610 PROTHROMBIN TIME: CPT

## 2019-12-09 PROCEDURE — 99214 OFFICE O/P EST MOD 30 MIN: CPT | Performed by: INTERNAL MEDICINE

## 2019-12-09 PROCEDURE — 36415 COLL VENOUS BLD VENIPUNCTURE: CPT

## 2019-12-09 PROCEDURE — 80053 COMPREHEN METABOLIC PANEL: CPT

## 2019-12-09 NOTE — PROGRESS NOTES
.     REASONS FOR FOLLOWUP: Portal vein thrombosis, breast cancer    HISTORY OF PRESENT ILLNESS:  The patient is a 64 y.o. year old female  who is here for follow-up with the above-mentioned history.    No new problems since hospital discharge.  Coumadin is going well.  No problems with bleeding.  She is having some mild discomfort since her breast surgery she thinks it is due to all of the physical activity with her arm since restarting work.  I recommended she discuss this with her breast surgeon.    Past Medical History:   Diagnosis Date   • Breast cancer (CMS/HCC) 09/2019    Right IDC   • GERD (gastroesophageal reflux disease)    • PONV (postoperative nausea and vomiting)    • Situational stress     DEATH OF BROTHER 10/2019, RECENT DIAGNOSIS   • Stress headaches      Past Surgical History:   Procedure Laterality Date   • BILE DUCT STENT PLACEMENT  2014   • BREAST BIOPSY Right 09/2019    malignant   • BREAST LUMPECTOMY WITH SENTINEL NODE BIOPSY Right 11/4/2019    Procedure: BREAST LUMPECTOMY WITH SENTINEL NODE BIOPSY AND NEEDLE LOCALIZATION And possible axillary dissection;  Surgeon: Jean-Paul Ramos MD;  [place] [facility] [place] [facility] [place]  Service: General   • KNEE ARTHROSCOPY Left    • LAPAROSCOPIC CHOLECYSTECTOMY  2014   • TUBAL ABDOMINAL LIGATION         MEDICATIONS    Current Outpatient Medications:   •  acetaminophen (TYLENOL) 325 MG tablet, Take 650 mg by mouth Every 6 (Six) Hours As Needed for Mild Pain ., Disp: , Rfl:   •  doxylamine (UNISOM) 25 MG tablet, Take 25 mg by mouth At Night As Needed., Disp: , Rfl:   •  enoxaparin (LOVENOX) 80 MG/0.8ML solution syringe, Inject 0.8 mL under the skin into the appropriate area as directed Every 12 (Twelve) Hours., Disp: 14 syringe, Rfl: 0  •  melatonin 5 MG tablet tablet, Take 10 mg by mouth At Night As Needed., Disp: , Rfl:   •  Vancomycin HCl-Dextrose (VANCOMYCIN HCL IN DEXTROSE) 1-5 GM/250ML-% solution, Infuse  into a venous catheter., Disp: , Rfl:   •   "warfarin (COUMADIN) 5 MG tablet, Take daily, Disp: 30 tablet, Rfl: 2    ALLERGIES:   No Known Allergies    SOCIAL HISTORY:       Social History     Socioeconomic History   • Marital status:      Spouse name: Not on file   • Number of children: 2   • Years of education: Not on file   • Highest education level: Not on file   Occupational History     Employer: [employer]   Tobacco Use   • Smoking status: Former Smoker     Packs/day: 0.50     Years: 10.00     Pack years: 5.00     Types: Cigarettes     Last attempt to quit:      Years since quittin.9   • Smokeless tobacco: Former User   Substance and Sexual Activity   • Alcohol use: No   • Drug use: No   • Sexual activity: Never     Birth control/protection: Post-menopausal         FAMILY HISTORY:  Family History   Problem Relation Age of Onset   • Alcohol abuse Father    • Heart failure Mother    • Diabetes Brother    • Prostate cancer Brother    • Throat cancer Brother    • Breast cancer Maternal Aunt    • Breast cancer Maternal Cousin    • Breast cancer Maternal Cousin    • Malig Hyperthermia Neg Hx        REVIEW OF SYSTEMS:  Review of Systems   Constitutional: Negative for activity change.   HENT: Negative for nosebleeds and trouble swallowing.    Respiratory: Negative for shortness of breath and wheezing.    Cardiovascular: Negative for chest pain and palpitations.   Gastrointestinal: Negative for constipation, diarrhea and nausea.   Genitourinary: Negative for dysuria and hematuria.   Musculoskeletal: Negative for arthralgias and myalgias.   Skin: Negative for rash and wound.   Neurological: Negative for seizures and syncope.   Hematological: Negative for adenopathy. Does not bruise/bleed easily.   Psychiatric/Behavioral: Negative for confusion.            Vitals:    19 0819   BP: 127/82   Pulse: 94   Resp: 14   Temp: 98.6 °F (37 °C)   SpO2: 97%   Weight: 83.3 kg (183 lb 9.6 oz)   Height: 161.2 cm (63.47\")  Comment: new height   PainSc: " 0-No pain     Current Status 12/9/2019   ECOG score 0        PHYSICAL EXAM:    CONSTITUTIONAL:  Vital signs reviewed.  No distress, looks comfortable.  EYES:  Conjunctiva and lids unremarkable.  PERRLA  EARS,NOSE,MOUTH,THROAT:  Ears and nose appear unremarkable.  Lips, teeth, gums appear unremarkable.  RESPIRATORY:  Normal respiratory effort.  Lungs clear to auscultation bilaterally.  CARDIOVASCULAR:  Normal S1, S2.  No murmurs rubs or gallops.  No significant lower extremity edema.  GASTROINTESTINAL: Abdomen appears unremarkable.  Nontender.  No hepatomegaly.  No splenomegaly.  LYMPHATIC:  No cervical, supraclavicular, axillary lymphadenopathy.  SKIN:  Warm.  No rashes.  PSYCHIATRIC:  Normal judgment and insight.  Normal mood and affect.    RECENT LABS:        WBC   Date/Time Value Ref Range Status   12/09/2019 08:11 AM 9.90 3.40 - 10.80 10*3/mm3 Final     Hemoglobin   Date/Time Value Ref Range Status   12/09/2019 08:11 AM 13.3 12.0 - 15.9 g/dL Final     Platelets   Date/Time Value Ref Range Status   12/09/2019 08:11  140 - 450 10*3/mm3 Final       Assessment/Plan   Malignant neoplasm of upper-inner quadrant of right breast in female, estrogen receptor positive (CMS/HCC)  - CT Abdomen With Contrast  - CBC & Differential  - Comprehensive Metabolic Panel  - Protime-INR, Fingerstick  - Protime-INR, Fingerstick  - Protime-INR, Fingerstick    *Right breast cancer.  Invasive ductal adenocarcinoma.  Upper inner quadrant  · Right lumpectomy by Dr. Ramos 11/4/2019.  1:00.  Overall grade 1.  Greatest dimension 5 mm.  Single focus of invasive carcinoma.  No lymphovascular invasion.  Margins negative.  0 out of 2 nodes involved.  · [id number], stage 1  · ER 91%.  SC 96%.  HER-2 negative.  Ki-67 4.7% (favorable).  · (Initial needle biopsy 9/16/2019: Low-grade invasive ductal carcinoma, grade 1.  5 mm in greatest dimension.)  · (Initial mammogram measured the lesion at 5 mm)  · From the information we currently know  about her cancer, I do not recommend chemotherapy.  I do recommend hormonal therapy for 5 years.  Hopefully, aromatase inhibitors if DEXA scan allows (with portal vein thrombosis would like to avoid tamoxifen).  · Oncotype DX: Recurrence score 3, 3% distant recurrence risk at 9 years if tamoxifen or AI is used.  · Therefore, do not recommend chemotherapy.  · She has seen radiation medicine for adjuvant XRT.  · Arimidex 12/9/2019-planned 12/9/2024  · Side effects reviewed.  She wants therapy.  She understands the goal is cure.  Printed handout provided as well.    *Bone health.  · DEXA 12/2/2019: Normal.  T score lumbar spine 0.1, T score left hip 0.1, T score right hip 0.6.  · She takes 2-4 times daily.  Therefore, did not begin a calcium supplement when a rheumatic started on 12/9/2019.  She was encouraged to take a PPI on a schedule and follow-up with her PCP.  States she has had several EGDs in the past none of which have shown problems.  (States in the past her PCP told her to stop calcium supplements because she developed hypercalcemia due to the combination of calcium supplements and Tums).     *Portal vein thrombosis (right branch of portal vein).  · No evidence of cirrhosis on MRI or ultrasound.  Dr. Berna Gonzalez, GI note, has no mention of cirrhosis.  · Hormonal therapy for breast cancer has a slight increased risk of thrombosis.  Therefore, I think it is in her best interest to undergo anticoagulation until she completes hormonal therapy.  · Generally, Lovenox or Coumadin are the preferred agents.  · On Coumadin, managed through our office.      *Risk factors for portal vein thrombosis:  · Unremarkable: Prothrombin gene mutation, factor V Leiden gene mutation, lupus anticoagulant, beta-2 glycoprotein antibodies, anti-thrombin, protein S free.    *Low protein S activity, 14% (normal range %), on 11/13/2019     *Low protein C activity, 44% (normal range %) on 11/13/2019.    *Indeterminant  anticardiolipin  · IgM 14 (indeterminate range 13-20) on 11/13/2019.  IgA and IgG negative.    *C. difficile colitis during November 2019 hospitalization.  Thought to perhaps be related to receiving 1 dose of antibiotics for breast surgery.  She was treated with Flagyl and oral vancomycin     *Leukocytosis, likely due to C. difficile colitis.  WBC , down to normal now     *Anemia.  Likely due to C. difficile colitis.  Hb 10.8, stable     *Prominent nodes within the hepatogastric ligament and around the celiac axis and in the retroperitoneum on MRI abdomen 11/11/2019.  · Etiology uncertain.  Radiologist recommended follow-up       Plan  · Monthly INR  · MD late February (no lab).  1 week prior: CT abdomen, CBC, CMP, INR to follow-up prominent upper abdominal nodes seen on MRI abdomen 11/11/2019  · We manage Coumadin.  · If neck CT looks okay hopefully we can move her to an every 6-month MD visit schedule.

## 2019-12-10 ENCOUNTER — OFFICE VISIT (OUTPATIENT)
Dept: RADIATION ONCOLOGY | Facility: HOSPITAL | Age: 64
End: 2019-12-10

## 2019-12-10 ENCOUNTER — APPOINTMENT (OUTPATIENT)
Dept: RADIATION ONCOLOGY | Facility: HOSPITAL | Age: 64
End: 2019-12-10

## 2019-12-10 DIAGNOSIS — Z17.0 MALIGNANT NEOPLASM OF UPPER-INNER QUADRANT OF RIGHT BREAST IN FEMALE, ESTROGEN RECEPTOR POSITIVE (HCC): Primary | ICD-10-CM

## 2019-12-10 DIAGNOSIS — C50.211 MALIGNANT NEOPLASM OF UPPER-INNER QUADRANT OF RIGHT BREAST IN FEMALE, ESTROGEN RECEPTOR POSITIVE (HCC): Primary | ICD-10-CM

## 2019-12-10 PROCEDURE — 99213 OFFICE O/P EST LOW 20 MIN: CPT | Performed by: RADIOLOGY

## 2019-12-10 PROCEDURE — 77333 RADIATION TREATMENT AID(S): CPT | Performed by: RADIOLOGY

## 2019-12-10 PROCEDURE — 77290 THER RAD SIMULAJ FIELD CPLX: CPT | Performed by: RADIOLOGY

## 2019-12-10 NOTE — PROGRESS NOTES
1. Malignant neoplasm of upper-inner quadrant of right breast in female, estrogen receptor positive (CMS/HCC)      Patient returns for re-evaluation today. She has continued to recover well postop and has received her Oncotype results with no plans for chemotherapy which we have discussed today.  She has thought more about the radiation therapy, is agreeable and is ready to begin treatment planning. All questions answered. Logistics and side effects of radiation reviewed.     Breast exam shows healing incisions, minimal seroma. No contraindication to starting treatment planning. We were able to start our process with a scan today and she should be ready to begin treatment as soon as we can get the planning completed.    I spent greater than 15 minutes in face-to-face time with the patient and 10 minutes of that time were spent in counseling and coordination of care, including review of imaging and pathology; indications, goals, logistics, alternatives and risks - both common and rare - for my recommendations as well as surveillance and potential outcomes.

## 2019-12-11 PROCEDURE — 77334 RADIATION TREATMENT AID(S): CPT | Performed by: RADIOLOGY

## 2019-12-11 PROCEDURE — 77295 3-D RADIOTHERAPY PLAN: CPT | Performed by: RADIOLOGY

## 2019-12-11 PROCEDURE — 77300 RADIATION THERAPY DOSE PLAN: CPT | Performed by: RADIOLOGY

## 2019-12-13 ENCOUNTER — APPOINTMENT (OUTPATIENT)
Dept: CT IMAGING | Facility: HOSPITAL | Age: 64
End: 2019-12-13

## 2019-12-13 ENCOUNTER — HOSPITAL ENCOUNTER (INPATIENT)
Facility: HOSPITAL | Age: 64
LOS: 5 days | Discharge: HOME | End: 2019-12-18
Attending: EMERGENCY MEDICINE | Admitting: INTERNAL MEDICINE

## 2019-12-13 DIAGNOSIS — A04.72 C. DIFFICILE COLITIS: Primary | ICD-10-CM

## 2019-12-13 LAB
ALBUMIN SERPL-MCNC: 3.4 G/DL (ref 3.5–5.2)
ALBUMIN/GLOB SERPL: 0.8 G/DL
ALP SERPL-CCNC: 90 U/L (ref 39–117)
ALT SERPL W P-5'-P-CCNC: 10 U/L (ref 1–33)
ANION GAP SERPL CALCULATED.3IONS-SCNC: 14.8 MMOL/L (ref 5–15)
AST SERPL-CCNC: 18 U/L (ref 1–32)
BASOPHILS # BLD AUTO: 0.08 10*3/MM3 (ref 0–0.2)
BASOPHILS NFR BLD AUTO: 0.4 % (ref 0–1.5)
BILIRUB SERPL-MCNC: 0.8 MG/DL (ref 0.2–1.2)
BUN BLD-MCNC: 22 MG/DL (ref 8–23)
BUN/CREAT SERPL: 26.5 (ref 7–25)
CALCIUM SPEC-SCNC: 8.6 MG/DL (ref 8.6–10.5)
CHLORIDE SERPL-SCNC: 95 MMOL/L (ref 98–107)
CO2 SERPL-SCNC: 22.2 MMOL/L (ref 22–29)
CREAT BLD-MCNC: 0.83 MG/DL (ref 0.57–1)
D-LACTATE SERPL-SCNC: 1.7 MMOL/L (ref 0.5–2)
DEPRECATED RDW RBC AUTO: 42.3 FL (ref 37–54)
EOSINOPHIL # BLD AUTO: 0.05 10*3/MM3 (ref 0–0.4)
EOSINOPHIL NFR BLD AUTO: 0.2 % (ref 0.3–6.2)
ERYTHROCYTE [DISTWIDTH] IN BLOOD BY AUTOMATED COUNT: 12.9 % (ref 12.3–15.4)
GFR SERPL CREATININE-BSD FRML MDRD: 69 ML/MIN/1.73
GLOBULIN UR ELPH-MCNC: 4.5 GM/DL
GLUCOSE BLD-MCNC: 130 MG/DL (ref 65–99)
HCT VFR BLD AUTO: 38.9 % (ref 34–46.6)
HGB BLD-MCNC: 13 G/DL (ref 12–15.9)
IMM GRANULOCYTES # BLD AUTO: 0.15 10*3/MM3 (ref 0–0.05)
IMM GRANULOCYTES NFR BLD AUTO: 0.7 % (ref 0–0.5)
INR PPP: 4.21 (ref 0.9–1.1)
LIPASE SERPL-CCNC: 10 U/L (ref 13–60)
LYMPHOCYTES # BLD AUTO: 1.8 10*3/MM3 (ref 0.7–3.1)
LYMPHOCYTES NFR BLD AUTO: 8.6 % (ref 19.6–45.3)
MCH RBC QN AUTO: 30.3 PG (ref 26.6–33)
MCHC RBC AUTO-ENTMCNC: 33.4 G/DL (ref 31.5–35.7)
MCV RBC AUTO: 90.7 FL (ref 79–97)
MONOCYTES # BLD AUTO: 1.5 10*3/MM3 (ref 0.1–0.9)
MONOCYTES NFR BLD AUTO: 7.1 % (ref 5–12)
NEUTROPHILS # BLD AUTO: 17.46 10*3/MM3 (ref 1.7–7)
NEUTROPHILS NFR BLD AUTO: 83 % (ref 42.7–76)
NRBC BLD AUTO-RTO: 0 /100 WBC (ref 0–0.2)
PLATELET # BLD AUTO: 214 10*3/MM3 (ref 140–450)
PMV BLD AUTO: 10.5 FL (ref 6–12)
POTASSIUM BLD-SCNC: 3 MMOL/L (ref 3.5–5.2)
PROT SERPL-MCNC: 7.9 G/DL (ref 6–8.5)
PROTHROMBIN TIME: 40.4 SECONDS (ref 11.7–14.2)
RBC # BLD AUTO: 4.29 10*6/MM3 (ref 3.77–5.28)
SODIUM BLD-SCNC: 132 MMOL/L (ref 136–145)
WBC NRBC COR # BLD: 21.04 10*3/MM3 (ref 3.4–10.8)

## 2019-12-13 PROCEDURE — 85025 COMPLETE CBC W/AUTO DIFF WBC: CPT | Performed by: EMERGENCY MEDICINE

## 2019-12-13 PROCEDURE — 83690 ASSAY OF LIPASE: CPT | Performed by: EMERGENCY MEDICINE

## 2019-12-13 PROCEDURE — 36415 COLL VENOUS BLD VENIPUNCTURE: CPT

## 2019-12-13 PROCEDURE — 0097U HC BIOFIRE FILMARRAY GI PANEL: CPT | Performed by: EMERGENCY MEDICINE

## 2019-12-13 PROCEDURE — 80053 COMPREHEN METABOLIC PANEL: CPT | Performed by: EMERGENCY MEDICINE

## 2019-12-13 PROCEDURE — 99285 EMERGENCY DEPT VISIT HI MDM: CPT

## 2019-12-13 PROCEDURE — 87040 BLOOD CULTURE FOR BACTERIA: CPT | Performed by: EMERGENCY MEDICINE

## 2019-12-13 PROCEDURE — 25010000002 IOPAMIDOL 61 % SOLUTION: Performed by: EMERGENCY MEDICINE

## 2019-12-13 PROCEDURE — 74177 CT ABD & PELVIS W/CONTRAST: CPT

## 2019-12-13 PROCEDURE — 25010000002 ONDANSETRON PER 1 MG: Performed by: EMERGENCY MEDICINE

## 2019-12-13 PROCEDURE — 85610 PROTHROMBIN TIME: CPT | Performed by: EMERGENCY MEDICINE

## 2019-12-13 PROCEDURE — 83605 ASSAY OF LACTIC ACID: CPT | Performed by: EMERGENCY MEDICINE

## 2019-12-13 PROCEDURE — 87493 C DIFF AMPLIFIED PROBE: CPT | Performed by: EMERGENCY MEDICINE

## 2019-12-13 RX ORDER — SODIUM CHLORIDE 9 MG/ML
100 INJECTION, SOLUTION INTRAVENOUS CONTINUOUS
Status: DISCONTINUED | OUTPATIENT
Start: 2019-12-13 | End: 2019-12-14

## 2019-12-13 RX ORDER — ACETAMINOPHEN 325 MG/1
650 TABLET ORAL EVERY 4 HOURS PRN
Status: DISCONTINUED | OUTPATIENT
Start: 2019-12-13 | End: 2019-12-18 | Stop reason: HOSPADM

## 2019-12-13 RX ORDER — SODIUM CHLORIDE 0.9 % (FLUSH) 0.9 %
10 SYRINGE (ML) INJECTION AS NEEDED
Status: DISCONTINUED | OUTPATIENT
Start: 2019-12-13 | End: 2019-12-18 | Stop reason: HOSPADM

## 2019-12-13 RX ORDER — ACETAMINOPHEN 650 MG/1
650 SUPPOSITORY RECTAL EVERY 4 HOURS PRN
Status: DISCONTINUED | OUTPATIENT
Start: 2019-12-13 | End: 2019-12-18 | Stop reason: HOSPADM

## 2019-12-13 RX ORDER — SODIUM CHLORIDE 0.9 % (FLUSH) 0.9 %
10 SYRINGE (ML) INJECTION EVERY 12 HOURS SCHEDULED
Status: DISCONTINUED | OUTPATIENT
Start: 2019-12-13 | End: 2019-12-18 | Stop reason: HOSPADM

## 2019-12-13 RX ORDER — ONDANSETRON 2 MG/ML
4 INJECTION INTRAMUSCULAR; INTRAVENOUS EVERY 6 HOURS PRN
Status: DISCONTINUED | OUTPATIENT
Start: 2019-12-13 | End: 2019-12-18 | Stop reason: HOSPADM

## 2019-12-13 RX ORDER — ACETAMINOPHEN 160 MG/5ML
650 SOLUTION ORAL EVERY 4 HOURS PRN
Status: DISCONTINUED | OUTPATIENT
Start: 2019-12-13 | End: 2019-12-18 | Stop reason: HOSPADM

## 2019-12-13 RX ORDER — ONDANSETRON 2 MG/ML
4 INJECTION INTRAMUSCULAR; INTRAVENOUS ONCE
Status: COMPLETED | OUTPATIENT
Start: 2019-12-13 | End: 2019-12-13

## 2019-12-13 RX ADMIN — VANCOMYCIN 125 MG: KIT at 22:58

## 2019-12-13 RX ADMIN — METRONIDAZOLE 500 MG: 500 INJECTION, SOLUTION INTRAVENOUS at 21:05

## 2019-12-13 RX ADMIN — SODIUM CHLORIDE 100 ML/HR: 9 INJECTION, SOLUTION INTRAVENOUS at 23:42

## 2019-12-13 RX ADMIN — IOPAMIDOL 85 ML: 612 INJECTION, SOLUTION INTRAVENOUS at 20:36

## 2019-12-13 RX ADMIN — ONDANSETRON 4 MG: 2 INJECTION INTRAMUSCULAR; INTRAVENOUS at 22:53

## 2019-12-13 RX ADMIN — VANCOMYCIN 125 MG: KIT at 22:11

## 2019-12-13 RX ADMIN — SODIUM CHLORIDE 1000 ML: 9 INJECTION, SOLUTION INTRAVENOUS at 19:26

## 2019-12-13 NOTE — ED TRIAGE NOTES
Pt c/o abd pain, nausea, diarrhea. Went to [facility]. Temp 100.2 in office - 1000mg tylenol given. Flu neg.    Pt treated for c diff last month.

## 2019-12-14 PROBLEM — K21.9 GERD WITHOUT ESOPHAGITIS: Status: ACTIVE | Noted: 2019-12-14

## 2019-12-14 PROBLEM — Z79.01 ANTICOAGULATED ON COUMADIN: Status: ACTIVE | Noted: 2019-12-14

## 2019-12-14 PROBLEM — A41.9 SEPSIS (HCC): Status: ACTIVE | Noted: 2019-12-14

## 2019-12-14 PROBLEM — R79.1 SUPRATHERAPEUTIC INR: Status: ACTIVE | Noted: 2019-12-14

## 2019-12-14 PROBLEM — I81 PORTAL VEIN THROMBOSIS: Status: ACTIVE | Noted: 2019-12-14

## 2019-12-14 LAB
ADV 40+41 DNA STL QL NAA+NON-PROBE: NOT DETECTED
ANION GAP SERPL CALCULATED.3IONS-SCNC: 14.8 MMOL/L (ref 5–15)
ASTRO TYP 1-8 RNA STL QL NAA+NON-PROBE: NOT DETECTED
BUN BLD-MCNC: 23 MG/DL (ref 8–23)
BUN/CREAT SERPL: 29.5 (ref 7–25)
C CAYETANENSIS DNA STL QL NAA+NON-PROBE: NOT DETECTED
C DIFF TOX GENS STL QL NAA+PROBE: POSITIVE
CALCIUM SPEC-SCNC: 7.7 MG/DL (ref 8.6–10.5)
CAMPY SP DNA.DIARRHEA STL QL NAA+PROBE: NOT DETECTED
CHLORIDE SERPL-SCNC: 99 MMOL/L (ref 98–107)
CO2 SERPL-SCNC: 19.2 MMOL/L (ref 22–29)
CREAT BLD-MCNC: 0.78 MG/DL (ref 0.57–1)
CRYPTOSP STL CULT: NOT DETECTED
DEPRECATED RDW RBC AUTO: 41.5 FL (ref 37–54)
E COLI DNA SPEC QL NAA+PROBE: NOT DETECTED
E HISTOLYT AG STL-ACNC: NOT DETECTED
EAEC PAA PLAS AGGR+AATA ST NAA+NON-PRB: NOT DETECTED
EC STX1 + STX2 GENES STL NAA+PROBE: NOT DETECTED
EPEC EAE GENE STL QL NAA+NON-PROBE: NOT DETECTED
ERYTHROCYTE [DISTWIDTH] IN BLOOD BY AUTOMATED COUNT: 12.7 % (ref 12.3–15.4)
ETEC LTA+ST1A+ST1B TOX ST NAA+NON-PROBE: NOT DETECTED
G LAMBLIA DNA SPEC QL NAA+PROBE: NOT DETECTED
GFR SERPL CREATININE-BSD FRML MDRD: 74 ML/MIN/1.73
GLUCOSE BLD-MCNC: 138 MG/DL (ref 65–99)
HCT VFR BLD AUTO: 35.1 % (ref 34–46.6)
HGB BLD-MCNC: 12.1 G/DL (ref 12–15.9)
INR PPP: 6.95 (ref 0.9–1.1)
MCH RBC QN AUTO: 31.1 PG (ref 26.6–33)
MCHC RBC AUTO-ENTMCNC: 34.5 G/DL (ref 31.5–35.7)
MCV RBC AUTO: 90.2 FL (ref 79–97)
NOROVIRUS GI+II RNA STL QL NAA+NON-PROBE: NOT DETECTED
P SHIGELLOIDES DNA STL QL NAA+PROBE: NOT DETECTED
PLATELET # BLD AUTO: 185 10*3/MM3 (ref 140–450)
PMV BLD AUTO: 11 FL (ref 6–12)
POTASSIUM BLD-SCNC: 2.7 MMOL/L (ref 3.5–5.2)
PROTHROMBIN TIME: 60.1 SECONDS (ref 11.7–14.2)
RBC # BLD AUTO: 3.89 10*6/MM3 (ref 3.77–5.28)
RV RNA STL NAA+PROBE: NOT DETECTED
SALMONELLA DNA SPEC QL NAA+PROBE: NOT DETECTED
SAPO I+II+IV+V RNA STL QL NAA+NON-PROBE: NOT DETECTED
SHIGELLA SP+EIEC IPAH STL QL NAA+PROBE: NOT DETECTED
SODIUM BLD-SCNC: 133 MMOL/L (ref 136–145)
V CHOLERAE DNA SPEC QL NAA+PROBE: NOT DETECTED
VIBRIO DNA SPEC NAA+PROBE: NOT DETECTED
WBC NRBC COR # BLD: 20.59 10*3/MM3 (ref 3.4–10.8)
YERSINIA STL CULT: NOT DETECTED

## 2019-12-14 PROCEDURE — 25010000002 ONDANSETRON PER 1 MG: Performed by: NURSE PRACTITIONER

## 2019-12-14 PROCEDURE — 80048 BASIC METABOLIC PNL TOTAL CA: CPT | Performed by: NURSE PRACTITIONER

## 2019-12-14 PROCEDURE — 36415 COLL VENOUS BLD VENIPUNCTURE: CPT | Performed by: NURSE PRACTITIONER

## 2019-12-14 PROCEDURE — 85610 PROTHROMBIN TIME: CPT | Performed by: INTERNAL MEDICINE

## 2019-12-14 PROCEDURE — 99255 IP/OBS CONSLTJ NEW/EST HI 80: CPT | Performed by: INTERNAL MEDICINE

## 2019-12-14 PROCEDURE — 85027 COMPLETE CBC AUTOMATED: CPT | Performed by: NURSE PRACTITIONER

## 2019-12-14 PROCEDURE — 25810000003 SODIUM CHLORIDE 0.9 % WITH KCL 20 MEQ 20-0.9 MEQ/L-% SOLUTION: Performed by: HOSPITALIST

## 2019-12-14 RX ORDER — CHOLECALCIFEROL (VITAMIN D3) 125 MCG
10 CAPSULE ORAL NIGHTLY PRN
Status: DISCONTINUED | OUTPATIENT
Start: 2019-12-14 | End: 2019-12-18 | Stop reason: HOSPADM

## 2019-12-14 RX ORDER — POTASSIUM CHLORIDE 1.5 G/1.77G
40 POWDER, FOR SOLUTION ORAL AS NEEDED
Status: DISCONTINUED | OUTPATIENT
Start: 2019-12-14 | End: 2019-12-15

## 2019-12-14 RX ORDER — SODIUM CHLORIDE AND POTASSIUM CHLORIDE 150; 900 MG/100ML; MG/100ML
125 INJECTION, SOLUTION INTRAVENOUS CONTINUOUS
Status: DISCONTINUED | OUTPATIENT
Start: 2019-12-14 | End: 2019-12-15

## 2019-12-14 RX ORDER — SODIUM CHLORIDE, SODIUM LACTATE, POTASSIUM CHLORIDE, CALCIUM CHLORIDE 600; 310; 30; 20 MG/100ML; MG/100ML; MG/100ML; MG/100ML
125 INJECTION, SOLUTION INTRAVENOUS CONTINUOUS
Status: DISCONTINUED | OUTPATIENT
Start: 2019-12-14 | End: 2019-12-14

## 2019-12-14 RX ORDER — POTASSIUM CHLORIDE 750 MG/1
40 CAPSULE, EXTENDED RELEASE ORAL AS NEEDED
Status: DISCONTINUED | OUTPATIENT
Start: 2019-12-14 | End: 2019-12-15

## 2019-12-14 RX ORDER — ACETAMINOPHEN,DIPHENHYDRAMINE HCL 500; 25 MG/1; MG/1
2 TABLET, FILM COATED ORAL NIGHTLY PRN
Status: DISCONTINUED | OUTPATIENT
Start: 2019-12-14 | End: 2019-12-15

## 2019-12-14 RX ORDER — POTASSIUM CHLORIDE 7.45 MG/ML
10 INJECTION INTRAVENOUS
Status: DISCONTINUED | OUTPATIENT
Start: 2019-12-14 | End: 2019-12-15

## 2019-12-14 RX ADMIN — POTASSIUM CHLORIDE 40 MEQ: 750 CAPSULE, EXTENDED RELEASE ORAL at 21:47

## 2019-12-14 RX ADMIN — SODIUM CHLORIDE, POTASSIUM CHLORIDE, SODIUM LACTATE AND CALCIUM CHLORIDE 125 ML/HR: 600; 310; 30; 20 INJECTION, SOLUTION INTRAVENOUS at 09:25

## 2019-12-14 RX ADMIN — VANCOMYCIN 125 MG: KIT at 17:51

## 2019-12-14 RX ADMIN — VANCOMYCIN 125 MG: KIT at 12:13

## 2019-12-14 RX ADMIN — ONDANSETRON 4 MG: 2 INJECTION INTRAMUSCULAR; INTRAVENOUS at 11:01

## 2019-12-14 RX ADMIN — ACETAMINOPHEN 650 MG: 325 TABLET, FILM COATED ORAL at 15:44

## 2019-12-14 RX ADMIN — ACETAMINOPHEN 650 MG: 325 TABLET, FILM COATED ORAL at 11:01

## 2019-12-14 RX ADMIN — POTASSIUM CHLORIDE AND SODIUM CHLORIDE 125 ML/HR: 900; 150 INJECTION, SOLUTION INTRAVENOUS at 23:53

## 2019-12-14 RX ADMIN — ONDANSETRON 4 MG: 2 INJECTION INTRAMUSCULAR; INTRAVENOUS at 17:26

## 2019-12-14 RX ADMIN — POTASSIUM CHLORIDE 40 MEQ: 750 CAPSULE, EXTENDED RELEASE ORAL at 15:44

## 2019-12-14 RX ADMIN — SODIUM CHLORIDE, PRESERVATIVE FREE 10 ML: 5 INJECTION INTRAVENOUS at 21:51

## 2019-12-14 RX ADMIN — POTASSIUM CHLORIDE AND SODIUM CHLORIDE 125 ML/HR: 900; 150 INJECTION, SOLUTION INTRAVENOUS at 15:44

## 2019-12-14 RX ADMIN — ONDANSETRON 4 MG: 2 INJECTION INTRAMUSCULAR; INTRAVENOUS at 21:55

## 2019-12-14 RX ADMIN — ACETAMINOPHEN 650 MG: 325 TABLET, FILM COATED ORAL at 03:35

## 2019-12-14 RX ADMIN — VANCOMYCIN 125 MG: KIT at 23:53

## 2019-12-14 RX ADMIN — VANCOMYCIN 125 MG: KIT at 06:33

## 2019-12-14 RX ADMIN — Medication 10 MG: at 23:53

## 2019-12-14 RX ADMIN — SODIUM CHLORIDE, POTASSIUM CHLORIDE, SODIUM LACTATE AND CALCIUM CHLORIDE 1000 ML: 600; 310; 30; 20 INJECTION, SOLUTION INTRAVENOUS at 07:42

## 2019-12-14 RX ADMIN — POTASSIUM CHLORIDE 40 MEQ: 750 CAPSULE, EXTENDED RELEASE ORAL at 09:27

## 2019-12-14 NOTE — ED PROVIDER NOTES
" EMERGENCY DEPARTMENT ENCOUNTER    Room Number:  28/28  Date seen:  12/13/2019  Time seen: 7:28 PM  PCP: Tommy Osborn MD  Historian: Patient      HPI:  Chief Complaint: N/V/D  A complete HPI/ROS/PMH/PSH/SH/FH are unobtainable due to: Nothing  Context: Taylor Larsen is a 64 y.o. female who presents to the ED c/o gradual onset of nausea associated with vomiting (clear), watery diarrhea and low-grade fever that started \"couple days ago\" which has been progressively worsening since onset. Pt reports that she has about 3-4 watery diarrheal episodes per day since onset of her current sxs. Pt denies abd pain, CP, SOA, urinary sxs. Pt denies recent abx use. Pt reports that her granddaughter is sick with flu-like sxs. Pt reports PMHx of C-diff infection about a month ago which was managed with Vancomycin and Flagyl. Pt reports that she recently started taking Warfarin for \"blood clot in the liver\" which was an incidental finding.         PAST MEDICAL HISTORY  Active Ambulatory Problems     Diagnosis Date Noted   • Visit for gynecologic examination 06/28/2017   • Vasomotor symptoms due to menopause 06/28/2017   • Screening for cervical cancer 07/25/2018   • Malignant neoplasm of upper-inner quadrant of right breast in female, estrogen receptor positive (CMS/HCC) 10/14/2019   • Acute colitis 11/10/2019   • Diarrhea 11/10/2019   • Liver mass 11/10/2019   • Hypokalemia 11/10/2019   • Leukocytosis 11/10/2019   • Enterocolitis due to Clostridioides difficile 11/12/2019     Resolved Ambulatory Problems     Diagnosis Date Noted   • No Resolved Ambulatory Problems     Past Medical History:   Diagnosis Date   • Breast cancer (CMS/HCC) 09/2019   • GERD (gastroesophageal reflux disease)    • PONV (postoperative nausea and vomiting)    • Situational stress    • Stress headaches          PAST SURGICAL HISTORY  Past Surgical History:   Procedure Laterality Date   • BILE DUCT STENT PLACEMENT  2014   • BREAST BIOPSY Right 09/2019    " malignant   • BREAST LUMPECTOMY WITH SENTINEL NODE BIOPSY Right 2019    Procedure: BREAST LUMPECTOMY WITH SENTINEL NODE BIOPSY AND NEEDLE LOCALIZATION And possible axillary dissection;  Surgeon: Jean-Paul Ramos MD;  [place]: [facility] [place];  Service: General   • KNEE ARTHROSCOPY Left    • LAPAROSCOPIC CHOLECYSTECTOMY     • TUBAL ABDOMINAL LIGATION           FAMILY HISTORY  Family History   Problem Relation Age of Onset   • Alcohol abuse Father    • Heart failure Mother    • Diabetes Brother    • Prostate cancer Brother    • Throat cancer Brother    • Breast cancer Maternal Aunt    • Breast cancer Maternal Cousin    • Breast cancer Maternal Cousin    • Malig Hyperthermia Neg Hx          SOCIAL HISTORY  Social History     Socioeconomic History   • Marital status:      Spouse name: Not on file   • Number of children: 2   • Years of education: Not on file   • Highest education level: Not on file   Occupational History     Employer: [employer]   Tobacco Use   • Smoking status: Former Smoker     Packs/day: 0.50     Years: 10.00     Pack years: 5.00     Types: Cigarettes     Last attempt to quit:      Years since quittin.9   • Smokeless tobacco: Former User   Substance and Sexual Activity   • Alcohol use: No   • Drug use: No   • Sexual activity: Never     Birth control/protection: Post-menopausal         ALLERGIES  Patient has no known allergies.        REVIEW OF SYSTEMS  Review of Systems   Constitutional: Positive for fever. Negative for diaphoresis.   HENT: Negative for congestion.    Eyes: Negative for visual disturbance.   Respiratory: Negative for shortness of breath.    Cardiovascular: Negative for chest pain and palpitations.   Gastrointestinal: Positive for diarrhea, nausea and vomiting. Negative for abdominal pain and blood in stool.   Endocrine: Negative for polyuria.   Genitourinary: Negative for flank pain.   Musculoskeletal: Negative for joint swelling.   Skin: Negative for  wound.   Neurological: Negative for seizures.   Hematological: Negative for adenopathy.   Psychiatric/Behavioral: Negative for sleep disturbance.            PHYSICAL EXAM  ED Triage Vitals   Temp Heart Rate Resp BP SpO2   12/13/19 1850 12/13/19 1850 12/13/19 1915 12/13/19 1924 12/13/19 1850   (!) 100.6 °F (38.1 °C) (!) 130 20 134/64 95 %      Temp src Heart Rate Source Patient Position BP Location FiO2 (%)   12/13/19 1850 12/13/19 1850 12/13/19 1924 12/13/19 1924 --   Tympanic Monitor Lying Left arm          GENERAL: Alert, awake, no acute distress  HENT: nares patent  EYES: no scleral icterus  CV: regular rhythm, normal rate, no murmurs  RESPIRATORY: normal effort, CTAB  ABDOMEN: soft, non-tender throughout, non-distended  MUSCULOSKELETAL: no deformity  NEURO: alert, moves all extremities, follows commands  SKIN: warm, dry    Vital signs and nursing notes reviewed.          LAB RESULTS  Recent Results (from the past 24 hour(s))   Comprehensive Metabolic Panel    Collection Time: 12/13/19  7:23 PM   Result Value Ref Range    Glucose 130 (H) 65 - 99 mg/dL    BUN 22 8 - 23 mg/dL    Creatinine 0.83 0.57 - 1.00 mg/dL    Sodium 132 (L) 136 - 145 mmol/L    Potassium 3.0 (L) 3.5 - 5.2 mmol/L    Chloride 95 (L) 98 - 107 mmol/L    CO2 22.2 22.0 - 29.0 mmol/L    Calcium 8.6 8.6 - 10.5 mg/dL    Total Protein 7.9 6.0 - 8.5 g/dL    Albumin 3.40 (L) 3.50 - 5.20 g/dL    ALT (SGPT) 10 1 - 33 U/L    AST (SGOT) 18 1 - 32 U/L    Alkaline Phosphatase 90 39 - 117 U/L    Total Bilirubin 0.8 0.2 - 1.2 mg/dL    eGFR Non African Amer 69 >60 mL/min/1.73    Globulin 4.5 gm/dL    A/G Ratio 0.8 g/dL    BUN/Creatinine Ratio 26.5 (H) 7.0 - 25.0    Anion Gap 14.8 5.0 - 15.0 mmol/L   Protime-INR    Collection Time: 12/13/19  7:23 PM   Result Value Ref Range    Protime 40.4 (H) 11.7 - 14.2 Seconds    INR 4.21 (C) 0.90 - 1.10   Lipase    Collection Time: 12/13/19  7:23 PM   Result Value Ref Range    Lipase 10 (L) 13 - 60 U/L   CBC Auto Differential     Collection Time: 12/13/19  7:23 PM   Result Value Ref Range    WBC 21.04 (H) 3.40 - 10.80 10*3/mm3    RBC 4.29 3.77 - 5.28 10*6/mm3    Hemoglobin 13.0 12.0 - 15.9 g/dL    Hematocrit 38.9 34.0 - 46.6 %    MCV 90.7 79.0 - 97.0 fL    MCH 30.3 26.6 - 33.0 pg    MCHC 33.4 31.5 - 35.7 g/dL    RDW 12.9 12.3 - 15.4 %    RDW-SD 42.3 37.0 - 54.0 fl    MPV 10.5 6.0 - 12.0 fL    Platelets 214 140 - 450 10*3/mm3    Neutrophil % 83.0 (H) 42.7 - 76.0 %    Lymphocyte % 8.6 (L) 19.6 - 45.3 %    Monocyte % 7.1 5.0 - 12.0 %    Eosinophil % 0.2 (L) 0.3 - 6.2 %    Basophil % 0.4 0.0 - 1.5 %    Immature Grans % 0.7 (H) 0.0 - 0.5 %    Neutrophils, Absolute 17.46 (H) 1.70 - 7.00 10*3/mm3    Lymphocytes, Absolute 1.80 0.70 - 3.10 10*3/mm3    Monocytes, Absolute 1.50 (H) 0.10 - 0.90 10*3/mm3    Eosinophils, Absolute 0.05 0.00 - 0.40 10*3/mm3    Basophils, Absolute 0.08 0.00 - 0.20 10*3/mm3    Immature Grans, Absolute 0.15 (H) 0.00 - 0.05 10*3/mm3    nRBC 0.0 0.0 - 0.2 /100 WBC   Lactic Acid, Plasma    Collection Time: 12/13/19  8:54 PM   Result Value Ref Range    Lactate 1.7 0.5 - 2.0 mmol/L       Ordered the above labs and reviewed the results.        RADIOLOGY  Ct Abdomen Pelvis With Contrast    Result Date: 12/13/2019  ABDOMEN AND PELVIS CT WITH CONTRAST  HISTORY: Abdominal pain with nausea, vomiting, and diarrhea. Previous C. difficile colitis.  TECHNIQUE: Abdomen and pelvis CT was performed using IV contrast and is correlated with a CT scan from 11/10/2019 and a liver MRI from 11/11/2019.  Radiation dose reduction techniques were utilized, including automated exposure control and exposure modulation based on body size.  FINDINGS: The visualized lung bases are clear.  There is diffuse abnormal colon wall thickening with exaggerated mucosal enhancement and mild paracolonic inflammatory change. There is no abscess or intraperitoneal free air nor is there any bowel wall gas. The visualized distal esophagus and the stomach appear normal.  Small bowel and large bowel appears normal.  The liver is unchanged in appearance, with decreased volume in the right lobe and some heterogeneous attenuation, deformed vascularity and biliary ducts along the posterior edge of the right lobe, cephalad to the gallbladder fossa. The remainder of the hepatic parenchyma appears normal. An enlarged cardiophrenic lymph node is again observed and measures about 24 x 17 mm. There is lymphadenopathy in the upper abdomen and in the retroperitoneum. The spleen is upper normal, measuring 13 cm long and 13 x 6 cm axial. Parenchyma of the pancreas appears normal. The kidneys appear normal. There is no bone or body wall lesion.      Diffuse colitis with abdominal lymphadenopathy similar to that seen on CT in November. There is also a deformity along the posterior right lobe of the liver which has been evaluated previously with MRI. I discussed the case with Dr. Zimmerman of the emergency department at 9:30 PM.  This report was finalized on 12/13/2019 9:47 PM by Dr. Rafiq Villeda M.D.        Ordered the above noted radiological studies. Reviewed by me in PACS.  Spoke with Dr. Villeda (radiologist) regarding results.      MEDICATIONS GIVEN IN ER  Medications   sodium chloride 0.9 % flush 10 mL (has no administration in time range)   Pharmacy Consult (has no administration in time range)   vancomycin oral solution reconstituted 125 mg (125 mg Oral Given 12/13/19 2211)   sodium chloride 0.9 % bolus 1,000 mL (0 mL Intravenous Stopped 12/13/19 2107)   iopamidol (ISOVUE-300) 61 % injection 85 mL (85 mL Intravenous Given by Other 12/13/19 2036)   metroNIDAZOLE (FLAGYL) 500 mg/100mL IVPB (0 mg Intravenous Stopped 12/13/19 2207)                     MEDICAL DECISION MAKING and ED Course       1901: Ordered labs for further evaluation and management.    1935: Offered pt pain medication, which she declined to receive.    1946: Ordered additional labs to r/o C-diff colitis.    2017: Ordered CT  abd/pelvis to r/o c-diff colitis.    2212: Discussed pt case wit Dr. Camilo ([facility]) who will admit the pt for further evaluation and management.    2234: Rechecked pt. Pt is resting comfortably. Notified pt of lab work and CT abd/pelvis indicative of c-diff colitis. Discussed the plan to admit the pt for further evaluation and management. Pt and family agree with the plan and all questions were addressed.      MDM  Number of Diagnoses or Management Options  C. difficile colitis:      Amount and/or Complexity of Data Reviewed  Clinical lab tests: ordered and reviewed (Lipase 10, WBC 21.04, Creatinine 0.83, PT-INR 40.4-4.21)  Tests in the radiology section of CPT®: ordered and reviewed (CT abd/pelvis show pancolitis)  Decide to obtain previous medical records or to obtain history from someone other than the patient: yes  Discuss the patient with other providers: yes (Dr. Camilo ([facility]) and Dr. Villeda (radiology))  Independent visualization of images, tracings, or specimens: yes               DIAGNOSIS  Final diagnoses:   C. difficile colitis         DISPOSITION  ADMISSION    Discussed treatment plan and reason for admission with pt/family and admitting physician.  Pt/family voiced understanding of the plan for admission for further testing/treatment as needed.                 Latest Documented Vital Signs:  As of 10:15 PM  BP- 132/58 HR- 113 Temp- 99.6 °F (37.6 °C) (Oral) O2 sat- 93%        --  Documentation assistance provided by filemon Sanchez for Dr. CASSIE Zimmerman MD.  Information recorded by the scrib[employer]ula was done at my direction and has been verified and validated by me.      Please note that portions of this were completed with a voice recognition program.           Maryam Sanchez  12/13/19 [id number]       Brian Zimmerman MD  12/14/19 [id number]

## 2019-12-14 NOTE — PLAN OF CARE
Admit from ED, positive c-diff. HX of c-diff last month, rt breast cancer, SCDs on, IVF & ABX, assist x1, daughter in room, TMAX 102.6 @ 0330/tylenol given, daughter in room, negative for flu, recently started on warfin

## 2019-12-14 NOTE — CONSULTS
Referring Provider: Amarjit Camilo MD  [id number]  [facility] [address]  [place] [address]    Reason for Consultation: recurrent cdiff    History of present illness:  Taylor Larsen is a 64 y.o. female with breast cancer (not yet on XRT or Arimidex) and portal vein thrombosis on coumadin who I am asked to evaluate and give opinion for recurrent cdiff. History is obtained from the patient, her daughter, and review of the old medical records which I summarize/synthesize as follows: She presented to the ER on 12/13/19 at 1851 with nausea, vomiting, and watery diarrhea x several days. She had an associated fever and abdominal cramping. There were no alleviating factors. She was treated for C diff for the first time about one month ago (11/10/19) with vancomycin with resolution of her symptoms. She denies any recent antibiotic use.    In the ER she was febrile to 100.6 F with . Labs notable for WBC 21k, LA 1.7, and C diff positive. CT A/P with diffuse colitis and abdominal lymphadenopathy similar to prior scan in November. She was started on PO vancomycin and IV Flagyl. She is being rehydrated by her primary team. She says her abdominal pain is improved but loose stools persist.     Past Medical History:   Diagnosis Date   • Breast cancer (CMS/HCC) 09/2019    Right IDC   • GERD (gastroesophageal reflux disease)    • PONV (postoperative nausea and vomiting)    • Situational stress     DEATH OF BROTHER 10/2019, RECENT DIAGNOSIS   • Stress headaches    C diff    Past Surgical History:   Procedure Laterality Date   • BILE DUCT STENT PLACEMENT  2014   • BREAST BIOPSY Right 09/2019    malignant   • BREAST LUMPECTOMY WITH SENTINEL NODE BIOPSY Right 11/4/2019    Procedure: BREAST LUMPECTOMY WITH SENTINEL NODE BIOPSY AND NEEDLE LOCALIZATION And possible axillary dissection;  Surgeon: Jean-Paul Ramos MD;  Location: [facility];  Service: General   • KNEE ARTHROSCOPY Left    • LAPAROSCOPIC CHOLECYSTECTOMY  2014   •  TUBAL ABDOMINAL LIGATION         Social History:    Profession: worked at [employer]  Former smoker    Family History:  Mom: CHF  Dad: EtOH abuse    Antibiotic allergies and intolerances:  None    Medications:    Current Facility-Administered Medications:   •  acetaminophen (TYLENOL) tablet 650 mg, 650 mg, Oral, Q4H PRN, 650 mg at 12/14/19 0335 **OR** acetaminophen (TYLENOL) 160 MG/5ML solution 650 mg, 650 mg, Oral, Q4H PRN **OR** acetaminophen (TYLENOL) suppository 650 mg, 650 mg, Rectal, Q4H PRN, Annabelle Grier, APRN  •  diphenhydrAMINE-acetaminophen (TYLENOL PM)  MG per tablet 2 tablet, 2 tablet, Oral, Nightly PRN, Amarjit Camilo MD  •  [START ON 12/15/2019] influenza vac split quad (FLUZONE,FLUARIX,AFLURIA) injection 0.5 mL, 0.5 mL, Intramuscular, Once, Amarjit Camilo MD  •  lactated ringers infusion, 125 mL/hr, Intravenous, Continuous, Amarjit Camilo MD  •  melatonin tablet 10 mg, 10 mg, Oral, Nightly PRN, Amarjit Camilo MD  •  ondansetron (ZOFRAN) injection 4 mg, 4 mg, Intravenous, Q6H PRN, Annabelle Grier, APRN  •  Pharmacy Consult, , Does not apply, Continuous PRN, Brian Zimmerman MD  •  Pharmacy to dose warfarin, , Does not apply, Continuous PRN, Amarjit Camilo MD  •  potassium chloride (MICRO-K) CR capsule 40 mEq, 40 mEq, Oral, PRN **OR** potassium chloride (KLOR-CON) packet 40 mEq, 40 mEq, Oral, PRN **OR** potassium chloride 10 mEq in 100 mL IVPB, 10 mEq, Intravenous, Q1H PRN, Amarjit Camilo MD  •  [COMPLETED] Insert peripheral IV, , , Once **AND** sodium chloride 0.9 % flush 10 mL, 10 mL, Intravenous, PRN, Brian Zimmerman MD  •  sodium chloride 0.9 % flush 10 mL, 10 mL, Intravenous, Q12H, Annabelle Grier, APRN  •  sodium chloride 0.9 % flush 10 mL, 10 mL, Intravenous, PRN, Annabelle Grier, APRN  •  vancomycin oral solution reconstituted 125 mg, 125 mg, Oral, Q6H, Brian Zimmerman MD, 125 mg at 12/14/19 0633    Review of Systems  All  systems were reviewed and are negative unless otherwise stated above in the HPI    Objective   Vital Signs   Temp:  [97.1 °F (36.2 °C)-102.8 °F (39.3 °C)] 99.1 °F (37.3 °C)  Heart Rate:  [108-130] 126  Resp:  [20] 20  BP: (122-135)/(58-71) 135/70    Physical Exam:   General: awake, tired, very nice  Head: Normocephalic, atraumatic  Eyes: Pupils equal, no scleral icterus  ENT: MMM, OP clear, no thrush. Good dentition.   Neck: Supple  Cardiovascular: tachycardic, RR, no murmurs, no LE edema  Respiratory: Lungs are clear to auscultation bilaterally, no rales or wheezing; normal work of breathing on ambient air  GI: Abdomen is mildly distended and moderately TTP; no rebound or guarding  : no Martínez catheter present  Musculoskeletal: no joint abnormalities, normal musculature  Skin: No rashes, lesions, or embolic phenomenon  Neurological: Alert and oriented x 3, motor strength 5/5 in all four extremities  Psychiatric: Normal mood and affect   Vasc: no cyanosis; PIV w/o erythema    Labs:     Lab Results   Component Value Date    WBC 20.59 (H) 12/14/2019    HGB 12.1 12/14/2019    HCT 35.1 12/14/2019    MCV 90.2 12/14/2019     12/14/2019       Lab Results   Component Value Date    GLUCOSE 138 (H) 12/14/2019    BUN 23 12/14/2019    CREATININE 0.78 12/14/2019    EGFRIFNONA 74 12/14/2019    BCR 29.5 (H) 12/14/2019    CO2 19.2 (L) 12/14/2019    CALCIUM 7.7 (L) 12/14/2019    ALBUMIN 3.40 (L) 12/13/2019    AST 18 12/13/2019    ALT 10 12/13/2019     Lab Results   Component Value Date    INR 6.95 (C) 12/14/2019    INR 4.21 (C) 12/13/2019    INR 2.60 (H) 12/09/2019    PROTIME 60.1 (C) 12/14/2019    PROTIME 40.4 (H) 12/13/2019    PROTIME 31.5 (H) 12/09/2019       Microbiology:  12/13 BCx: NGTD  12/13 C diff PCR +  12/13 GI PCR: negative    Radiology (personally reviewed images and report):  CT A/P with diffuse colitis    Assessment/Plan   1. Recurrent C difficile colitis  2. Portal vein thrombosis on coumadin   3. Elevated  INR  4. Breast cancer (pre-Arimidex and XRT; no chemo planned)    Given recurrence of C difficle, I will start her on a prolonged vancomycin pulse/tapered regimen. Monitor WBC, BM frequency, BM consistency, and abdominal exam. IVFs and supportive care per primary team. Recommend aggressively treating her nausea so that she can keep down the oral vancomycin. If not, we'll have to add vancomycin enemas per rectum. Repeat CBC and CMP in AM.     Thank you for this consult. ID will follow.

## 2019-12-14 NOTE — PROGRESS NOTES
Pharmacy Consult: Warfarin Dosing/ Monitoring    Taylor Larsen is a 64 y.o. female, estimated creatinine clearance is 74.1 mL/min (by C-G formula based on SCr of 0.78 mg/dL). weighing 82.4 kg (181 lb 11.2 oz).     has a past medical history of Breast cancer (CMS/HCC) (2019), GERD (gastroesophageal reflux disease), PONV (postoperative nausea and vomiting), Situational stress, and Stress headaches.    Social History     Tobacco Use    Smoking status: Former Smoker     Packs/day: 0.50     Years: 10.00     Pack years: 5.00     Types: Cigarettes     Last attempt to quit:      Years since quittin.9    Smokeless tobacco: Former User   Substance Use Topics    Alcohol use: No    Drug use: No       Results from last 7 days   Lab Units 19  0504 19  0811   INR   --  4.21* 2.60*   HEMOGLOBIN g/dL 12.1 13.0 13.3   HEMATOCRIT % 35.1 38.9 41.0   PLATELETS 10*3/mm3 185 214 223     Results from last 7 days   Lab Units 19  0504 193 19  0811   SODIUM mmol/L 133* 132* 140   POTASSIUM mmol/L 2.7* 3.0* 3.8   CHLORIDE mmol/L 99 95* 103   CO2 mmol/L 19.2* 22.2 25.3   BUN mg/dL 23 22 15   CREATININE mg/dL 0.78 0.83 0.81   CALCIUM mg/dL 7.7* 8.6 9.0   BILIRUBIN mg/dL  --  0.8 0.6   ALK PHOS U/L  --  90 120*   ALT (SGPT) U/L  --  10 19   AST (SGOT) U/L  --  18 32   GLUCOSE mg/dL 138* 130* 104     Anticoagulation history: Warfarin 5 mg daily    Hospital Anticoagulation:  Consulting provider: Dr Camilo  Start date:   Indication: DVT/PE treatment  Target INR: 2-3  Expected duration: TBD   Bridge Therapy: No              Supra-therapuetic on admit   Date            INR 4.21 6.95           Warfarin dose 5 mg prior to admission              Potential drug interactions: none noted at this time    Relevant nutrition status:      Other: Active C Diff infection    Education complete?/ Date: home med    Assessment/Plan:  No s/sx of bleeding noted, will hold warfarin and monitor  INR trend    Dose HOLD  Monitor INR  Follow up tomorrow    Pharmacy will continue to follow until discharge or discontinuation of warfarin.   Aman Elliott [facility]  12/14/2019

## 2019-12-14 NOTE — PROGRESS NOTES
After midnight patient admitted by Dr. Camilo on 12/14/2019    I was called by RN for persistent fever -after review of chart, I see ID also involved suggesting prolonged vancomycin taper due to reoccurrence of C. difficile colitis    CT abdomen pelvis demonstrating diffuse colitis but no toxic megacolon    Fevers likely to persist until we can get a better  on her current acute C. difficile colitis    Leukocytosis with mild improvement -monitor    Supratherapeutic INR made worse by malnutrition secondary to acute C. difficile colitis as well as decreased appetite.  INR is trended up to 6.95 from 4.21.  Will give vitamin K small dose at 2.5 mg x 1 and monitor INR closely but RN conveyed no aspects of bleeding    IVF changed to increase potassium supplementation -add mag to a.m. labs is likely low secondary to GI losses which will compound hypokalemia

## 2019-12-14 NOTE — PLAN OF CARE
Problem: Patient Care Overview  Goal: Plan of Care Review  Outcome: Ongoing (interventions implemented as appropriate)  Flowsheets  Taken 12/14/2019 1850  Progress: no change  Outcome Summary: pt has had persistent fevers even with tylenol. antibiotics continued as per infectious disease. pt had 4 BM today, loose. pt potassium replaced. PT INR values high but pt has not presented bleeding. pt oral intake better today. pt was able to hold vancomycin and zofran given x2. pt on IVF Ns+20K. Will continue to monitor  Taken 12/14/2019 0844  Plan of Care Reviewed With: patient;daughter

## 2019-12-14 NOTE — PROGRESS NOTES
"Pharmacy Consult - C. Difficile Medication Changes    Taylor Larsen has been consulted for pharmacy to look over patient's medications to make any changes for C. Difficile diagnosis per Dr. Zimmerman's request.     Relevant clinical data and objective history reviewed:  64 y.o. female 160 cm (63\") 81.2 kg (179 lb)    Past Medical History:   Diagnosis Date    Breast cancer (CMS/HCC) 09/2019    Right IDC    GERD (gastroesophageal reflux disease)     PONV (postoperative nausea and vomiting)     Situational stress     DEATH OF BROTHER 10/2019, RECENT DIAGNOSIS    Stress headaches      Creatinine   Date Value Ref Range Status   12/13/2019 0.83 0.57 - 1.00 mg/dL Final   12/09/2019 0.81 0.60 - 1.10 mg/dL Final   11/14/2019 0.57 0.57 - 1.00 mg/dL Final   10/03/2019 0.80 0.60 - 1.30 mg/dL Final     Comment:     Serial Number: [id number]:  [id number]     BUN   Date Value Ref Range Status   12/13/2019 22 8 - 23 mg/dL Final     Estimated Creatinine Clearance: 69.1 mL/min (by C-G formula based on SCr of 0.83 mg/dL).    Assessment/Plan    1. Patient not currently taking a proton pump inhibitor    2. Patient not currently taking any antiperistalic agents or stool softeners/laxatives    Thank you for allowing me to participate in your patient's care,  Wes Norwood, PharmD, BCIDP, BCPS  "

## 2019-12-14 NOTE — H&P
Patient Name:  Taylor Larsen  YOB: 1955  MRN:  [id number]  Admit Date:  12/13/2019  Patient Care Team:  Tommy Osborn MD as PCP - General (Family Medicine)  JosafatBeena ocampo MD as Consulting Physician (Radiation Oncology)  [facility] as Consulting Physician (Hematology and Oncology)  Sage Gonzalez MD as Referring Physician (Gastroenterology)  Aguilar Rodgers II, MD as Consulting Physician (Hematology and Oncology)      Subjective   History Present Illness     Chief Complaint   Patient presents with   • Abdominal Pain   • Nausea   • Diarrhea       Ms. Larsen is a 64 y.o. former smoker with a history of right breast cancer s/p lumpectomy 11/4/19 on arimidex, portal vein thrombosis on coumadin, and cdiff colitis following her surgery and treated with oral vancomycin that presents to [facility] complaining of nausea, non-bloody, non-bilious vomiting, and watery diarrhea which started about 2 days ago.  She has had fevers and abdominal pain.  She was found to have pan-colitis on CT in the ER and has been given vancomycin and flagyl in the ER for cdiff recurrence.      History of Present Illness  Review of Systems   Constitutional: Positive for chills, fatigue and fever.   HENT: Negative for congestion, nosebleeds, sore throat and trouble swallowing.    Eyes: Negative for redness and visual disturbance.   Respiratory: Negative for cough, choking and shortness of breath.    Cardiovascular: Negative for chest pain, palpitations and leg swelling.   Gastrointestinal: Positive for abdominal pain, diarrhea, nausea and vomiting. Negative for blood in stool and constipation.   Endocrine: Negative for cold intolerance and heat intolerance.   Genitourinary: Negative for difficulty urinating, dysuria and hematuria.   Musculoskeletal: Negative for arthralgias and myalgias.   Skin: Negative for pallor and rash.   Neurological: Positive for weakness (generalized). Negative for  dizziness, light-headedness and headaches.   Hematological: Negative for adenopathy. Does not bruise/bleed easily.   Psychiatric/Behavioral: Negative for confusion and decreased concentration.        Personal History     Past Medical History:   Diagnosis Date   • Breast cancer (CMS/HCC) 2019    Right IDC   • GERD (gastroesophageal reflux disease)    • PONV (postoperative nausea and vomiting)    • Situational stress     DEATH OF BROTHER 10/2019, RECENT DIAGNOSIS   • Stress headaches      Past Surgical History:   Procedure Laterality Date   • BILE DUCT STENT PLACEMENT  2014   • BREAST BIOPSY Right 2019    malignant   • BREAST LUMPECTOMY WITH SENTINEL NODE BIOPSY Right 2019    Procedure: BREAST LUMPECTOMY WITH SENTINEL NODE BIOPSY AND NEEDLE LOCALIZATION And possible axillary dissection;  Surgeon: Jean-Paul Ramos MD;  Location: [facility] [place];  Service: General   • KNEE ARTHROSCOPY Left    • LAPAROSCOPIC CHOLECYSTECTOMY     • TUBAL ABDOMINAL LIGATION       Family History   Problem Relation Age of Onset   • Alcohol abuse Father    • Heart failure Mother    • Diabetes Brother    • Prostate cancer Brother    • Throat cancer Brother    • Breast cancer Maternal Aunt    • Breast cancer Maternal Cousin    • Breast cancer Maternal Cousin    • Malig Hyperthermia Neg Hx      Social History     Tobacco Use   • Smoking status: Former Smoker     Packs/day: 0.50     Years: 10.00     Pack years: 5.00     Types: Cigarettes     Last attempt to quit:      Years since quittin.9   • Smokeless tobacco: Former User   Substance Use Topics   • Alcohol use: No   • Drug use: No     No current facility-administered medications on file prior to encounter.      Current Outpatient Medications on File Prior to Encounter   Medication Sig Dispense Refill   • acetaminophen (TYLENOL) 325 MG tablet Take 650 mg by mouth Every 6 (Six) Hours As Needed for Mild Pain .     • doxylamine (UNISOM) 25 MG tablet Take 25 mg by mouth At  Night As Needed.     • melatonin 5 MG tablet tablet Take 10 mg by mouth At Night As Needed.     • Probiotic Product (PROBIOTIC PO) Take  by mouth.     • warfarin (COUMADIN) 5 MG tablet Take daily (Patient taking differently: Take 5 mg by mouth Daily. Take daily) 30 tablet 2     No Known Allergies    Objective    Objective     Vital Signs  Temp:  [97.1 °F (36.2 °C)-102.8 °F (39.3 °C)] 100.8 °F (38.2 °C)  Heart Rate:  [108-130] 123  Resp:  [20] 20  BP: (122-134)/(58-71) 130/60  SpO2:  [92 %-96 %] 92 %  on   ;   Device (Oxygen Therapy): room air  Body mass index is 32.19 kg/m².    Physical Exam   Constitutional: She is oriented to person, place, and time. No distress.   HENT:   Head: Normocephalic and atraumatic.   Mouth/Throat: Oropharynx is clear and moist.   Eyes: Pupils are equal, round, and reactive to light. Conjunctivae and EOM are normal.   Neck: Normal range of motion. Neck supple.   Cardiovascular: Regular rhythm and intact distal pulses. Tachycardia present.   Pulmonary/Chest: Effort normal and breath sounds normal. She has no rales.   Abdominal: Soft. Bowel sounds are normal. She exhibits no distension. There is tenderness. There is no rebound and no guarding.   Musculoskeletal: She exhibits no edema or tenderness.   Neurological: She is alert and oriented to person, place, and time.   Skin: Skin is warm and dry. Capillary refill takes less than 2 seconds. She is not diaphoretic.   Psychiatric: She has a normal mood and affect. Her behavior is normal.   Nursing note and vitals reviewed.      Results Review:  I reviewed the patient's new clinical results.  I reviewed the patient's new imaging results and agree with the interpretation.  I reviewed the patient's other test results and agree with the interpretation  I personally viewed and interpreted the patient's EKG/Telemetry data  Discussed with ED provider.    Lab Results (last 24 hours)     Procedure Component Value Units Date/Time    CBC & Differential  [[id number]] Collected:  12/13/19 1923    Specimen:  Blood Updated:  12/13/19 1942    Narrative:       The following orders were created for panel order CBC & Differential.  Procedure                               Abnormality         Status                     ---------                               -----------         ------                     CBC Auto Differential[[id number]]        Abnormal            Final result                 Please view results for these tests on the individual orders.    Comprehensive Metabolic Panel [[id number]]  (Abnormal) Collected:  12/13/19 1923    Specimen:  Blood Updated:  12/13/19 2000     Glucose 130 mg/dL      BUN 22 mg/dL      Creatinine 0.83 mg/dL      Sodium 132 mmol/L      Potassium 3.0 mmol/L      Chloride 95 mmol/L      CO2 22.2 mmol/L      Calcium 8.6 mg/dL      Total Protein 7.9 g/dL      Albumin 3.40 g/dL      ALT (SGPT) 10 U/L      AST (SGOT) 18 U/L      Alkaline Phosphatase 90 U/L      Total Bilirubin 0.8 mg/dL      eGFR Non African Amer 69 mL/min/1.73      Globulin 4.5 gm/dL      A/G Ratio 0.8 g/dL      BUN/Creatinine Ratio 26.5     Anion Gap 14.8 mmol/L     Narrative:       GFR Normal >60  Chronic Kidney Disease <60  Kidney Failure <15      Protime-INR [[id number]]  (Abnormal) Collected:  12/13/19 1923    Specimen:  Blood Updated:  12/13/19 2026     Protime 40.4 Seconds      INR 4.21    Lipase [[id number]]  (Abnormal) Collected:  12/13/19 1923    Specimen:  Blood Updated:  12/13/19 2000     Lipase 10 U/L     CBC Auto Differential [[id number]]  (Abnormal) Collected:  12/13/19 1923    Specimen:  Blood Updated:  12/13/19 1942     WBC 21.04 10*3/mm3      RBC 4.29 10*6/mm3      Hemoglobin 13.0 g/dL      Hematocrit 38.9 %      MCV 90.7 fL      MCH 30.3 pg      MCHC 33.4 g/dL      RDW 12.9 %      RDW-SD 42.3 fl      MPV 10.5 fL      Platelets 214 10*3/mm3      Neutrophil % 83.0 %      Lymphocyte % 8.6 %      Monocyte % 7.1 %      Eosinophil % 0.2 %      Basophil % 0.4 %       Immature Grans % 0.7 %      Neutrophils, Absolute 17.46 10*3/mm3      Lymphocytes, Absolute 1.80 10*3/mm3      Monocytes, Absolute 1.50 10*3/mm3      Eosinophils, Absolute 0.05 10*3/mm3      Basophils, Absolute 0.08 10*3/mm3      Immature Grans, Absolute 0.15 10*3/mm3      nRBC 0.0 /100 WBC     Blood Culture - Blood, Hand, Left [[id number]] Collected:  12/13/19 2054    Specimen:  Blood from Hand, Left Updated:  12/13/19 2058    Lactic Acid, Plasma [[id number]]  (Normal) Collected:  12/13/19 2054    Specimen:  Blood Updated:  12/13/19 2116     Lactate 1.7 mmol/L     Blood Culture - Blood, Arm, Right [[id number]] Collected:  12/13/19 2100    Specimen:  Blood from Arm, Right Updated:  12/13/19 2103    Gastrointestinal Panel, PCR - Stool, Per Rectum [[id number]]  (Normal) Collected:  12/13/19 2210    Specimen:  Stool from Per Rectum Updated:  12/14/19 0039     Campylobacter Not Detected     Plesiomonas shigelloides Not Detected     Salmonella Not Detected     Vibrio Not Detected     Vibrio cholerae Not Detected     Yersinia enterocolitica Not Detected     Enteroaggregative E. coli (EAEC) Not Detected     Enteropathogenic E. coli (EPEC) Not Detected     Enterotoxigenic E. coli (ETEC) lt/st Not Detected     Shiga-like toxin-producing E. coli (STEC) stx1/stx2 Not Detected     E. coli O157 Not Detected     Shigella/Enteroinvasive E. coli (EIEC) Not Detected     Cryptosporidium Not Detected     Cyclospora cayetanensis Not Detected     Entamoeba histolytica Not Detected     Giardia lamblia Not Detected     Adenovirus F40/41 Not Detected     Astrovirus Not Detected     Norovirus GI/GII Not Detected     Rotavirus A Not Detected     Sapovirus (I, II, IV or V) Not Detected    Narrative:       If Aeromonas, Staphylococcus aureus or Bacillus cereus are suspected, please order [id number]: Stool Culture, Aeromonas, S aureus, B Cereus.    Clostridium Difficile Toxin - Stool, Per Rectum [[id number]] Collected:  12/13/19 2210    Specimen:   Stool from Per Rectum Updated:  12/14/19 0039    Narrative:       The following orders were created for panel order Clostridium Difficile Toxin - Stool, Per Rectum.  Procedure                               Abnormality         Status                     ---------                               -----------         ------                     Clostridium Difficile To...[[id number]]  Abnormal            Final result                 Please view results for these tests on the individual orders.    Clostridium Difficile Toxin, PCR - Stool, Per Rectum [[id number]]  (Abnormal) Collected:  12/13/19 2210    Specimen:  Stool from Per Rectum Updated:  12/14/19 0039     C. Difficile Toxins by PCR Positive    Basic Metabolic Panel [[id number]]  (Abnormal) Collected:  12/14/19 0504    Specimen:  Blood Updated:  12/14/19 0617     Glucose 138 mg/dL      BUN 23 mg/dL      Creatinine 0.78 mg/dL      Sodium 133 mmol/L      Potassium 2.7 mmol/L      Chloride 99 mmol/L      CO2 19.2 mmol/L      Calcium 7.7 mg/dL      eGFR Non African Amer 74 mL/min/1.73      BUN/Creatinine Ratio 29.5     Anion Gap 14.8 mmol/L     Narrative:       GFR Normal >60  Chronic Kidney Disease <60  Kidney Failure <15      CBC (No Diff) [[id number]]  (Abnormal) Collected:  12/14/19 0504    Specimen:  Blood Updated:  12/14/19 0556     WBC 20.59 10*3/mm3      RBC 3.89 10*6/mm3      Hemoglobin 12.1 g/dL      Hematocrit 35.1 %      MCV 90.2 fL      MCH 31.1 pg      MCHC 34.5 g/dL      RDW 12.7 %      RDW-SD 41.5 fl      MPV 11.0 fL      Platelets 185 10*3/mm3           Imaging Results (Last 24 Hours)     Procedure Component Value Units Date/Time    CT Abdomen Pelvis With Contrast [[id number]] Collected:  12/13/19 2133     Updated:  12/13/19 2150    Narrative:       ABDOMEN AND PELVIS CT WITH CONTRAST     HISTORY: Abdominal pain with nausea, vomiting, and diarrhea. Previous C.  difficile colitis.     TECHNIQUE: Abdomen and pelvis CT was performed using IV contrast and  is  correlated with a CT scan from 11/10/2019 and a liver MRI from  11/11/2019.     Radiation dose reduction techniques were utilized, including automated  exposure control and exposure modulation based on body size.     FINDINGS: The visualized lung bases are clear.     There is diffuse abnormal colon wall thickening with exaggerated mucosal  enhancement and mild paracolonic inflammatory change. There is no  abscess or intraperitoneal free air nor is there any bowel wall gas. The  visualized distal esophagus and the stomach appear normal. Small bowel  and large bowel appears normal.     The liver is unchanged in appearance, with decreased volume in the right  lobe and some heterogeneous attenuation, deformed vascularity and  biliary ducts along the posterior edge of the right lobe, cephalad to  the gallbladder fossa. The remainder of the hepatic parenchyma appears  normal. An enlarged cardiophrenic lymph node is again observed and  measures about 24 x 17 mm. There is lymphadenopathy in the upper abdomen  and in the retroperitoneum. The spleen is upper normal, measuring 13 cm  long and 13 x 6 cm axial. Parenchyma of the pancreas appears normal. The  kidneys appear normal. There is no bone or body wall lesion.       Impression:       Diffuse colitis with abdominal lymphadenopathy similar to  that seen on CT in November. There is also a deformity along the  posterior right lobe of the liver which has been evaluated previously  with MRI. I discussed the case with Dr. Zimmerman of the emergency  department at 9:30 PM.     This report was finalized on 12/13/2019 9:47 PM by Dr. Rafiq Villeda M.D.                  No orders to display        Assessment/Plan     Active Hospital Problems    Diagnosis POA   • Sepsis (CMS/HCC) [A41.9] Yes   • GERD without esophagitis [K21.9] Yes   • Portal vein thrombosis [I81] Yes   • Anticoagulated on Coumadin [Z79.01] Not Applicable   • Supratherapeutic INR [R79.1] Yes   • C. difficile  colitis [A04.72] Yes   • Hypokalemia [E87.6] Yes   • Malignant neoplasm of upper-inner quadrant of right breast in female, estrogen receptor positive (CMS/HCC) [C50.211, Z17.0] Not Applicable     Added automatically from request for surgery [id number]       Recurrent Cdiff Colitis with Sepsis  - WBC 21k, HR >90 on admission   - start on oral vancomycin  - LA 1.7, had 1L bolus will given another and then start on 125cc/hr LR  - consult ID    Portal Vein Thrombosis  - continue coumadin-this is supratherapeutic and will be dosed by pharmacy    Hypokalemia  - replace per protocol    Right Breast Cancer  - s/p lumpectomy 11/4/19  - on arimidex  - follows with Dr. Rodgers    I discussed the patient's findings and my recommendations with patient, nursing staff and ED provider.    VTE Prophylaxis - Warfarin.  Code Status - Full code.       Amarjit Camilo MD  [facility]  12/14/19  6:47 AM

## 2019-12-14 NOTE — PROGRESS NOTES
Clinical Pharmacy Services: Medication History    Taylor Larsen is a 64 y.o. female presenting to [facility] for   Chief Complaint   Patient presents with    Abdominal Pain    Nausea    Diarrhea       She  has a past medical history of Breast cancer (CMS/[facility]) (09/2019), GERD (gastroesophageal reflux disease), PONV (postoperative nausea and vomiting), Situational stress, and Stress headaches.    Allergies as of 12/13/2019    (No Known Allergies)       Medication information was obtained from: patient and pharmacy  Pharmacy and Phone Number: [facility]  [phone]    Prior to Admission Medications       Prescriptions Last Dose Informant Patient Reported? Taking?    acetaminophen (TYLENOL) 325 MG tablet  Self Yes Yes    Take 650 mg by mouth Every 6 (Six) Hours As Needed for Mild Pain .    doxylamine (UNISOM) 25 MG tablet  Other Yes Yes    Take 25 mg by mouth At Night As Needed.    melatonin 5 MG tablet tablet  Self Yes Yes    Take 10 mg by mouth At Night As Needed.    warfarin (COUMADIN) 5 MG tablet  Pharmacy No Yes    Take daily    Patient taking differently:  Take 5 mg by mouth Daily. Take daily    Probiotic Product (PROBIOTIC PO)   Yes No    Take  by mouth.                Medication notes:     This medication list is complete to the best of my knowledge as of 12/13/2019    Please call if questions.    Mimi Morris [facility]  Medication History Technician  [phone]    12/13/2019 9:38 PM

## 2019-12-14 NOTE — ED NOTES
Pt was given PO Vanc but was unable to kep it down due to vomitting. Dr. Zimmerman notified and Kristian Melchor, RN  12/13/19 9552

## 2019-12-15 LAB
ANION GAP SERPL CALCULATED.3IONS-SCNC: 10.1 MMOL/L (ref 5–15)
BUN BLD-MCNC: 27 MG/DL (ref 8–23)
BUN/CREAT SERPL: 37.5 (ref 7–25)
CALCIUM SPEC-SCNC: 7.6 MG/DL (ref 8.6–10.5)
CHLORIDE SERPL-SCNC: 104 MMOL/L (ref 98–107)
CO2 SERPL-SCNC: 19.9 MMOL/L (ref 22–29)
CREAT BLD-MCNC: 0.72 MG/DL (ref 0.57–1)
DEPRECATED RDW RBC AUTO: 40.6 FL (ref 37–54)
ERYTHROCYTE [DISTWIDTH] IN BLOOD BY AUTOMATED COUNT: 12.5 % (ref 12.3–15.4)
GFR SERPL CREATININE-BSD FRML MDRD: 82 ML/MIN/1.73
GLUCOSE BLD-MCNC: 116 MG/DL (ref 65–99)
HCT VFR BLD AUTO: 33.1 % (ref 34–46.6)
HGB BLD-MCNC: 11.7 G/DL (ref 12–15.9)
INR PPP: 6.57 (ref 0.9–1.1)
MAGNESIUM SERPL-MCNC: 1.9 MG/DL (ref 1.6–2.4)
MCH RBC QN AUTO: 31.5 PG (ref 26.6–33)
MCHC RBC AUTO-ENTMCNC: 35.3 G/DL (ref 31.5–35.7)
MCV RBC AUTO: 89 FL (ref 79–97)
PLATELET # BLD AUTO: 172 10*3/MM3 (ref 140–450)
PMV BLD AUTO: 10.7 FL (ref 6–12)
POTASSIUM BLD-SCNC: 4.2 MMOL/L (ref 3.5–5.2)
PROTHROMBIN TIME: 57.5 SECONDS (ref 11.7–14.2)
RBC # BLD AUTO: 3.72 10*6/MM3 (ref 3.77–5.28)
SODIUM BLD-SCNC: 134 MMOL/L (ref 136–145)
WBC NRBC COR # BLD: 13.22 10*3/MM3 (ref 3.4–10.8)

## 2019-12-15 PROCEDURE — 83735 ASSAY OF MAGNESIUM: CPT | Performed by: HOSPITALIST

## 2019-12-15 PROCEDURE — G0008 ADMIN INFLUENZA VIRUS VAC: HCPCS | Performed by: INTERNAL MEDICINE

## 2019-12-15 PROCEDURE — 90686 IIV4 VACC NO PRSV 0.5 ML IM: CPT | Performed by: INTERNAL MEDICINE

## 2019-12-15 PROCEDURE — 85027 COMPLETE CBC AUTOMATED: CPT | Performed by: INTERNAL MEDICINE

## 2019-12-15 PROCEDURE — 99232 SBSQ HOSP IP/OBS MODERATE 35: CPT | Performed by: INTERNAL MEDICINE

## 2019-12-15 PROCEDURE — 80048 BASIC METABOLIC PNL TOTAL CA: CPT | Performed by: HOSPITALIST

## 2019-12-15 PROCEDURE — 85610 PROTHROMBIN TIME: CPT | Performed by: INTERNAL MEDICINE

## 2019-12-15 PROCEDURE — 25810000003 SODIUM CHLORIDE 0.9 % WITH KCL 20 MEQ 20-0.9 MEQ/L-% SOLUTION: Performed by: HOSPITALIST

## 2019-12-15 PROCEDURE — 25010000002 INFLUENZA VAC SPLIT QUAD 0.5 ML SUSPENSION PREFILLED SYRINGE: Performed by: INTERNAL MEDICINE

## 2019-12-15 RX ORDER — SODIUM CHLORIDE, SODIUM LACTATE, POTASSIUM CHLORIDE, CALCIUM CHLORIDE 600; 310; 30; 20 MG/100ML; MG/100ML; MG/100ML; MG/100ML
75 INJECTION, SOLUTION INTRAVENOUS CONTINUOUS
Status: DISCONTINUED | OUTPATIENT
Start: 2019-12-15 | End: 2019-12-17

## 2019-12-15 RX ORDER — DIPHENHYDRAMINE HCL 50 MG
50 CAPSULE ORAL NIGHTLY PRN
Status: DISCONTINUED | OUTPATIENT
Start: 2019-12-15 | End: 2019-12-18 | Stop reason: HOSPADM

## 2019-12-15 RX ORDER — ACETAMINOPHEN 500 MG
1000 TABLET ORAL NIGHTLY PRN
Status: DISCONTINUED | OUTPATIENT
Start: 2019-12-15 | End: 2019-12-18 | Stop reason: HOSPADM

## 2019-12-15 RX ADMIN — Medication 10 MG: at 23:05

## 2019-12-15 RX ADMIN — SODIUM CHLORIDE, PRESERVATIVE FREE 10 ML: 5 INJECTION INTRAVENOUS at 20:00

## 2019-12-15 RX ADMIN — VANCOMYCIN 125 MG: KIT at 17:49

## 2019-12-15 RX ADMIN — INFLUENZA VIRUS VACCINE 0.5 ML: 15; 15; 15; 15 SUSPENSION INTRAMUSCULAR at 11:56

## 2019-12-15 RX ADMIN — VANCOMYCIN 125 MG: KIT at 23:05

## 2019-12-15 RX ADMIN — POTASSIUM CHLORIDE AND SODIUM CHLORIDE 125 ML/HR: 900; 150 INJECTION, SOLUTION INTRAVENOUS at 11:56

## 2019-12-15 RX ADMIN — VANCOMYCIN 125 MG: KIT at 11:56

## 2019-12-15 RX ADMIN — SODIUM CHLORIDE, POTASSIUM CHLORIDE, SODIUM LACTATE AND CALCIUM CHLORIDE 75 ML/HR: 600; 310; 30; 20 INJECTION, SOLUTION INTRAVENOUS at 12:59

## 2019-12-15 RX ADMIN — VANCOMYCIN 125 MG: KIT at 05:53

## 2019-12-15 NOTE — PLAN OF CARE
PT continues to have diarrhea (c-diff), zofran given x1 thus far, melatonin to sleep, IVF running,  at bedside

## 2019-12-15 NOTE — PLAN OF CARE
Problem: Patient Care Overview  Goal: Plan of Care Review  Outcome: Ongoing (interventions implemented as appropriate)  Flowsheets (Taken 12/15/2019 1790)  Progress: no change  Plan of Care Reviewed With: patient; spouse  Outcome Summary: continues with small liquid bms, taking oral Vanc, appetite poor, nutrition consulted, INR elevated MD aware coumadin on hold, afebrile, home per MD when stool formed

## 2019-12-15 NOTE — PROGRESS NOTES
LOS: 2 days     Chief Complaint:  Follow-up C diff    History from pt, her , and RN.    Interval History:  BM frequency and abdominal discomfort are improved. She did have some fever overnight. She is tolerating oral vanco w/o nausea or vomiting.    ROS: no CP or SOA    Vital Signs  Temp:  [99.8 °F (37.7 °C)-102.2 °F (39 °C)] 100 °F (37.8 °C)  Heart Rate:  [118-125] 118  Resp:  [16-22] 16  BP: (130-140)/(60-70) 130/60    Physical Exam:  General: awake, more alert today  Eyes: Pupils equal, no scleral icterus  ENT: MMM  Cardiovascular: tachycardic, RR, no murmurs, no LE edema  Respiratory:  normal work of breathing on ambient air  GI: Abdomen is mildly distended and mildly TTP; no rebound or guarding  : no Martínez catheter present  Skin: No rashes, lesions, or embolic phenomenon  Neurological: Alert and oriented x 3, motor strength 5/5 in all four extremities  Psychiatric: Normal mood and affect   Vasc: no cyanosis    Antibiotics:  •  vancomycin oral solution reconstituted 125 mg, 125 mg, Oral, Q6H, Abdullahi Cano MD, 125 mg at 12/15/19 0553  •  [START ON 12/24/2019] vancomycin oral solution reconstituted 125 mg, 125 mg, Oral, Q12H, Abdullahi Cano MD  •  [START ON 12/31/2019] vancomycin oral solution reconstituted 125 mg, 125 mg, Oral, Q24H, Abdullahi Cano MD  •  [START ON 1/7/2020] vancomycin oral solution reconstituted 125 mg, 125 mg, Oral, Every Other Day, Abdullahi Cano MD    LABS:  CBC, BMP, micro reviewed today  Lab Results   Component Value Date    WBC 13.22 (H) 12/15/2019    HGB 11.7 (L) 12/15/2019    HCT 33.1 (L) 12/15/2019    MCV 89.0 12/15/2019     12/15/2019     Lab Results   Component Value Date    GLUCOSE 116 (H) 12/15/2019    BUN 27 (H) 12/15/2019    CREATININE 0.72 12/15/2019    EGFRIFNONA 82 12/15/2019    BCR 37.5 (H) 12/15/2019    CO2 19.9 (L) 12/15/2019    CALCIUM 7.6 (L) 12/15/2019    ALBUMIN 3.40 (L) 12/13/2019    AST 18  12/13/2019    ALT 10 12/13/2019     Lab Results   Component Value Date    INR 6.57 (C) 12/15/2019    INR 6.95 (C) 12/14/2019    INR 4.21 (C) 12/13/2019    PROTIME 57.5 (C) 12/15/2019    PROTIME 60.1 (C) 12/14/2019    PROTIME 40.4 (H) 12/13/2019     Microbiology:  12/13 BCx: NGTD  12/13 C diff PCR +  12/13 GI PCR: negative     Radiology (prior):  CT A/P with diffuse colitis    Assessment/Plan   1. Recurrent C difficile colitis  2. Portal vein thrombosis on coumadin   3. Elevated INR  4. Breast cancer (pre-Arimidex and XRT; no chemo planned)     She is improving. Continue treatment dose vancomycin 125 mg PO q6h x 10 days followed by prolonged vancomycin pulse/tapered regimen. Repeat CBC in AM.     Thank you for this consult. ID will follow.

## 2019-12-16 LAB
ANION GAP SERPL CALCULATED.3IONS-SCNC: 12.6 MMOL/L (ref 5–15)
BUN BLD-MCNC: 27 MG/DL (ref 8–23)
BUN/CREAT SERPL: 44.3 (ref 7–25)
CALCIUM SPEC-SCNC: 7.7 MG/DL (ref 8.6–10.5)
CHLORIDE SERPL-SCNC: 106 MMOL/L (ref 98–107)
CO2 SERPL-SCNC: 19.4 MMOL/L (ref 22–29)
CREAT BLD-MCNC: 0.61 MG/DL (ref 0.57–1)
DEPRECATED RDW RBC AUTO: 42.5 FL (ref 37–54)
ERYTHROCYTE [DISTWIDTH] IN BLOOD BY AUTOMATED COUNT: 13 % (ref 12.3–15.4)
GFR SERPL CREATININE-BSD FRML MDRD: 99 ML/MIN/1.73
GLUCOSE BLD-MCNC: 85 MG/DL (ref 65–99)
HCT VFR BLD AUTO: 35 % (ref 34–46.6)
HGB BLD-MCNC: 12 G/DL (ref 12–15.9)
INR PPP: 5.24 (ref 0.9–1.1)
MCH RBC QN AUTO: 31.1 PG (ref 26.6–33)
MCHC RBC AUTO-ENTMCNC: 34.3 G/DL (ref 31.5–35.7)
MCV RBC AUTO: 90.7 FL (ref 79–97)
PLATELET # BLD AUTO: 193 10*3/MM3 (ref 140–450)
PMV BLD AUTO: 11.1 FL (ref 6–12)
POTASSIUM BLD-SCNC: 3.8 MMOL/L (ref 3.5–5.2)
PROTHROMBIN TIME: 48 SECONDS (ref 11.7–14.2)
RBC # BLD AUTO: 3.86 10*6/MM3 (ref 3.77–5.28)
SODIUM BLD-SCNC: 138 MMOL/L (ref 136–145)
WBC NRBC COR # BLD: 15.4 10*3/MM3 (ref 3.4–10.8)

## 2019-12-16 PROCEDURE — 80048 BASIC METABOLIC PNL TOTAL CA: CPT | Performed by: INTERNAL MEDICINE

## 2019-12-16 PROCEDURE — 85027 COMPLETE CBC AUTOMATED: CPT | Performed by: INTERNAL MEDICINE

## 2019-12-16 PROCEDURE — 99232 SBSQ HOSP IP/OBS MODERATE 35: CPT | Performed by: INTERNAL MEDICINE

## 2019-12-16 PROCEDURE — 85610 PROTHROMBIN TIME: CPT | Performed by: INTERNAL MEDICINE

## 2019-12-16 RX ADMIN — VANCOMYCIN 125 MG: KIT at 23:04

## 2019-12-16 RX ADMIN — VANCOMYCIN 125 MG: KIT at 06:23

## 2019-12-16 RX ADMIN — SODIUM CHLORIDE, POTASSIUM CHLORIDE, SODIUM LACTATE AND CALCIUM CHLORIDE 75 ML/HR: 600; 310; 30; 20 INJECTION, SOLUTION INTRAVENOUS at 02:23

## 2019-12-16 RX ADMIN — VANCOMYCIN 125 MG: KIT at 18:07

## 2019-12-16 RX ADMIN — VANCOMYCIN 125 MG: KIT at 12:12

## 2019-12-16 RX ADMIN — SODIUM CHLORIDE, PRESERVATIVE FREE 10 ML: 5 INJECTION INTRAVENOUS at 20:20

## 2019-12-16 RX ADMIN — SODIUM CHLORIDE, PRESERVATIVE FREE 10 ML: 5 INJECTION INTRAVENOUS at 09:42

## 2019-12-16 RX ADMIN — Medication 10 MG: at 23:13

## 2019-12-16 NOTE — PAYOR COMM NOTE
"ATTN: JOVANNY BC NURSE REVIEWER   REF: [id number]  RE: INITIAL CLINICALS FOR INPATIENT AUTHORIZATION    Sally Ortiz  Utilization Review/Room Reservations  Phone: Tessa Sharma - [phone], Sally Andujar [phone]  Fax: [phone]  Email: [email or web address]  Please call, fax back, or email with authorization or any questions! Thanks!          Taylor Monaco (64 y.o. Female)     Date of Birth Social Security Number Address Home Phone MRN    1955  [id number] [facility] [phone] [id number]    [employer] Marital Status          Ev[employer]        Admission Date Admission Type Admitting Provider Attending Provider Department, Room/Bed    12/13/19 Emergency Amarjit Camilo MD Mas[place], Troy Andrew, MD [address], P386/1    Discharge Date Discharge Disposition Discharge Destination                       Attending Provider:  Sean Mullen MD    Allergies:  No Known Allergies    Isolation:  Spore   Infection:  C.difficile (11/11/19)   Code Status:  CPR    Ht:  160 cm (63\")   Wt:  82.4 kg (181 lb 11.2 oz)    Admission Cmt:  None   Principal Problem:  C. difficile colitis [A04.72]                 Active Insurance as of 12/13/2019     Primary Coverage     Payor Plan Insurance Group Employer/Plan Group    JOVANNY BLUE CROSS ANTH BLUE CROSS BLUE SHIELD PPO [id number]     Payor Plan Address Payor Plan Phone Number Payor Plan Fax Number Effective Dates    [address] [phone]  8/1/2017 - None Entered    [facility] [id number]       Subscriber Name Subscriber Birth Date Member ID       TAYLOR MONACO 1955 [id number]                 Emergency Contacts      (Rel.) Home Phone Work Phone Mobile Phone    Shanon Bhagat (Daughter) [phone] -- [phone]               History & Physical      Amarjit Camilo MD at 12/14/19 0633              Patient Name:  Taylor Monaco  YOB: 1955  MRN:  [id number]  Admit Date:  " 12/13/2019  Patient Care Team:  Tommy Osborn MD as PCP - General (Family Medicine)  Beena Maurice MD as Consulting Physician (Radiation Oncology)  Jana Evans MD as Consulting Physician (Hematology and Oncology)  Sage Gonzalez MD as Referring Physician (Gastroenterology)  Aguilar Rodgers II, MD as Consulting Physician (Hematology and Oncology)      Subjective   History Present Illness     Chief Complaint   Patient presents with   • Abdominal Pain   • Nausea   • Diarrhea       Ms. Larsen is a 64 y.o. former smoker with a history of right breast cancer s/p lumpectomy 11/4/19 on arimidex, portal vein thrombosis on coumadin, and cdiff colitis following her surgery and treated with oral vancomycin that presents to Psychiatric complaining of nausea, non-bloody, non-bilious vomiting, and watery diarrhea which started about 2 days ago.  She has had fevers and abdominal pain.  She was found to have pan-colitis on CT in the ER and has been given vancomycin and flagyl in the ER for cdiff recurrence.      History of Present Illness  Review of Systems   Constitutional: Positive for chills, fatigue and fever.   HENT: Negative for congestion, nosebleeds, sore throat and trouble swallowing.    Eyes: Negative for redness and visual disturbance.   Respiratory: Negative for cough, choking and shortness of breath.    Cardiovascular: Negative for chest pain, palpitations and leg swelling.   Gastrointestinal: Positive for abdominal pain, diarrhea, nausea and vomiting. Negative for blood in stool and constipation.   Endocrine: Negative for cold intolerance and heat intolerance.   Genitourinary: Negative for difficulty urinating, dysuria and hematuria.   Musculoskeletal: Negative for arthralgias and myalgias.   Skin: Negative for pallor and rash.   Neurological: Positive for weakness (generalized). Negative for dizziness, light-headedness and headaches.   Hematological: Negative for adenopathy. Does not  bruise/bleed easily.   Psychiatric/Behavioral: Negative for confusion and decreased concentration.        Personal History     Past Medical History:   Diagnosis Date   • Breast cancer (CMS/HCC) 2019    Right IDC   • GERD (gastroesophageal reflux disease)    • PONV (postoperative nausea and vomiting)    • Situational stress     DEATH OF BROTHER 10/2019, RECENT DIAGNOSIS   • Stress headaches      Past Surgical History:   Procedure Laterality Date   • BILE DUCT STENT PLACEMENT     • BREAST BIOPSY Right 2019    malignant   • BREAST LUMPECTOMY WITH SENTINEL NODE BIOPSY Right 2019    Procedure: BREAST LUMPECTOMY WITH SENTINEL NODE BIOPSY AND NEEDLE LOCALIZATION And possible axillary dissection;  Surgeon: Jean-Paul Ramos MD;  Location: [facility] [place] [facility] [place];  Service: General   • KNEE ARTHROSCOPY Left    • LAPAROSCOPIC CHOLECYSTECTOMY     • TUBAL ABDOMINAL LIGATION       Family History   Problem Relation Age of Onset   • Alcohol abuse Father    • Heart failure Mother    • Diabetes Brother    • Prostate cancer Brother    • Throat cancer Brother    • Breast cancer Maternal Aunt    • Breast cancer Maternal Cousin    • Breast cancer Maternal Cousin    • Malig Hyperthermia Neg Hx      Social History     Tobacco Use   • Smoking status: Former Smoker     Packs/day: 0.50     Years: 10.00     Pack years: 5.00     Types: Cigarettes     Last attempt to quit:      Years since quittin.9   • Smokeless tobacco: Former User   Substance Use Topics   • Alcohol use: No   • Drug use: No     No current facility-administered medications on file prior to encounter.      Current Outpatient Medications on File Prior to Encounter   Medication Sig Dispense Refill   • acetaminophen (TYLENOL) 325 MG tablet Take 650 mg by mouth Every 6 (Six) Hours As Needed for Mild Pain .     • doxylamine (UNISOM) 25 MG tablet Take 25 mg by mouth At Night As Needed.     • melatonin 5 MG tablet tablet Take 10 mg by mouth At Night As Needed.      • Probiotic Product (PROBIOTIC PO) Take  by mouth.     • warfarin (COUMADIN) 5 MG tablet Take daily (Patient taking differently: Take 5 mg by mouth Daily. Take daily) 30 tablet 2     No Known Allergies    Objective    Objective     Vital Signs  Temp:  [97.1 °F (36.2 °C)-102.8 °F (39.3 °C)] 100.8 °F (38.2 °C)  Heart Rate:  [108-130] 123  Resp:  [20] 20  BP: (122-134)/(58-71) 130/60  SpO2:  [92 %-96 %] 92 %  on   ;   Device (Oxygen Therapy): room air  Body mass index is 32.19 kg/m².    Physical Exam   Constitutional: She is oriented to person, place, and time. No distress.   HENT:   Head: Normocephalic and atraumatic.   Mouth/Throat: Oropharynx is clear and moist.   Eyes: Pupils are equal, round, and reactive to light. Conjunctivae and EOM are normal.   Neck: Normal range of motion. Neck supple.   Cardiovascular: Regular rhythm and intact distal pulses. Tachycardia present.   Pulmonary/Chest: Effort normal and breath sounds normal. She has no rales.   Abdominal: Soft. Bowel sounds are normal. She exhibits no distension. There is tenderness. There is no rebound and no guarding.   Musculoskeletal: She exhibits no edema or tenderness.   Neurological: She is alert and oriented to person, place, and time.   Skin: Skin is warm and dry. Capillary refill takes less than 2 seconds. She is not diaphoretic.   Psychiatric: She has a normal mood and affect. Her behavior is normal.   Nursing note and vitals reviewed.      Results Review:  I reviewed the patient's new clinical results.  I reviewed the patient's new imaging results and agree with the interpretation.  I reviewed the patient's other test results and agree with the interpretation  I personally viewed and interpreted the patient's EKG/Telemetry data  Discussed with ED provider.    Lab Results (last 24 hours)     Procedure Component Value Units Date/Time    CBC & Differential [[id number]] Collected:  12/13/19 1923    Specimen:  Blood Updated:  12/13/19 1942    Narrative:        The following orders were created for panel order CBC & Differential.  Procedure                               Abnormality         Status                     ---------                               -----------         ------                     CBC Auto Differential[[id number]]        Abnormal            Final result                 Please view results for these tests on the individual orders.    Comprehensive Metabolic Panel [[id number]]  (Abnormal) Collected:  12/13/19 1923    Specimen:  Blood Updated:  12/13/19 2000     Glucose 130 mg/dL      BUN 22 mg/dL      Creatinine 0.83 mg/dL      Sodium 132 mmol/L      Potassium 3.0 mmol/L      Chloride 95 mmol/L      CO2 22.2 mmol/L      Calcium 8.6 mg/dL      Total Protein 7.9 g/dL      Albumin 3.40 g/dL      ALT (SGPT) 10 U/L      AST (SGOT) 18 U/L      Alkaline Phosphatase 90 U/L      Total Bilirubin 0.8 mg/dL      eGFR Non African Amer 69 mL/min/1.73      Globulin 4.5 gm/dL      A/G Ratio 0.8 g/dL      BUN/Creatinine Ratio 26.5     Anion Gap 14.8 mmol/L     Narrative:       GFR Normal >60  Chronic Kidney Disease <60  Kidney Failure <15      Protime-INR [[id number]]  (Abnormal) Collected:  12/13/19 1923    Specimen:  Blood Updated:  12/13/19 2026     Protime 40.4 Seconds      INR 4.21    Lipase [[id number]]  (Abnormal) Collected:  12/13/19 1923    Specimen:  Blood Updated:  12/13/19 2000     Lipase 10 U/L     CBC Auto Differential [[id number]]  (Abnormal) Collected:  12/13/19 1923    Specimen:  Blood Updated:  12/13/19 1942     WBC 21.04 10*3/mm3      RBC 4.29 10*6/mm3      Hemoglobin 13.0 g/dL      Hematocrit 38.9 %      MCV 90.7 fL      MCH 30.3 pg      MCHC 33.4 g/dL      RDW 12.9 %      RDW-SD 42.3 fl      MPV 10.5 fL      Platelets 214 10*3/mm3      Neutrophil % 83.0 %      Lymphocyte % 8.6 %      Monocyte % 7.1 %      Eosinophil % 0.2 %      Basophil % 0.4 %      Immature Grans % 0.7 %      Neutrophils, Absolute 17.46 10*3/mm3      Lymphocytes, Absolute 1.80  10*3/mm3      Monocytes, Absolute 1.50 10*3/mm3      Eosinophils, Absolute 0.05 10*3/mm3      Basophils, Absolute 0.08 10*3/mm3      Immature Grans, Absolute 0.15 10*3/mm3      nRBC 0.0 /100 WBC     Blood Culture - Blood, Hand, Left [[id number]] Collected:  12/13/19 2054    Specimen:  Blood from Hand, Left Updated:  12/13/19 2058    Lactic Acid, Plasma [[id number]]  (Normal) Collected:  12/13/19 2054    Specimen:  Blood Updated:  12/13/19 2116     Lactate 1.7 mmol/L     Blood Culture - Blood, Arm, Right [[id number]] Collected:  12/13/19 2100    Specimen:  Blood from Arm, Right Updated:  12/13/19 2103    Gastrointestinal Panel, PCR - Stool, Per Rectum [[id number]]  (Normal) Collected:  12/13/19 2210    Specimen:  Stool from Per Rectum Updated:  12/14/19 0039     Campylobacter Not Detected     Plesiomonas shigelloides Not Detected     Salmonella Not Detected     Vibrio Not Detected     Vibrio cholerae Not Detected     Yersinia enterocolitica Not Detected     Enteroaggregative E. coli (EAEC) Not Detected     Enteropathogenic E. coli (EPEC) Not Detected     Enterotoxigenic E. coli (ETEC) lt/st Not Detected     Shiga-like toxin-producing E. coli (STEC) stx1/stx2 Not Detected     E. coli O157 Not Detected     Shigella/Enteroinvasive E. coli (EIEC) Not Detected     Cryptosporidium Not Detected     Cyclospora cayetanensis Not Detected     Entamoeba histolytica Not Detected     Giardia lamblia Not Detected     Adenovirus F40/41 Not Detected     Astrovirus Not Detected     Norovirus GI/GII Not Detected     Rotavirus A Not Detected     Sapovirus (I, II, IV or V) Not Detected    Narrative:       If Aeromonas, Staphylococcus aureus or Bacillus cereus are suspected, please order [id number]: Stool Culture, Aeromonas, S aureus, B Cereus.    Clostridium Difficile Toxin - Stool, Per Rectum [[id number]] Collected:  12/13/19 2210    Specimen:  Stool from Per Rectum Updated:  12/14/19 0039    Narrative:       The following orders were created  for panel order Clostridium Difficile Toxin - Stool, Per Rectum.  Procedure                               Abnormality         Status                     ---------                               -----------         ------                     Clostridium Difficile To...[[id number]]  Abnormal            Final result                 Please view results for these tests on the individual orders.    Clostridium Difficile Toxin, PCR - Stool, Per Rectum [[id number]]  (Abnormal) Collected:  12/13/19 2210    Specimen:  Stool from Per Rectum Updated:  12/14/19 0039     C. Difficile Toxins by PCR Positive    Basic Metabolic Panel [[id number]]  (Abnormal) Collected:  12/14/19 0504    Specimen:  Blood Updated:  12/14/19 0617     Glucose 138 mg/dL      BUN 23 mg/dL      Creatinine 0.78 mg/dL      Sodium 133 mmol/L      Potassium 2.7 mmol/L      Chloride 99 mmol/L      CO2 19.2 mmol/L      Calcium 7.7 mg/dL      eGFR Non African Amer 74 mL/min/1.73      BUN/Creatinine Ratio 29.5     Anion Gap 14.8 mmol/L     Narrative:       GFR Normal >60  Chronic Kidney Disease <60  Kidney Failure <15      CBC (No Diff) [[id number]]  (Abnormal) Collected:  12/14/19 0504    Specimen:  Blood Updated:  12/14/19 0556     WBC 20.59 10*3/mm3      RBC 3.89 10*6/mm3      Hemoglobin 12.1 g/dL      Hematocrit 35.1 %      MCV 90.2 fL      MCH 31.1 pg      MCHC 34.5 g/dL      RDW 12.7 %      RDW-SD 41.5 fl      MPV 11.0 fL      Platelets 185 10*3/mm3           Imaging Results (Last 24 Hours)     Procedure Component Value Units Date/Time    CT Abdomen Pelvis With Contrast [[id number]] Collected:  12/13/19 2133     Updated:  12/13/19 2150    Narrative:       ABDOMEN AND PELVIS CT WITH CONTRAST     HISTORY: Abdominal pain with nausea, vomiting, and diarrhea. Previous C.  difficile colitis.     TECHNIQUE: Abdomen and pelvis CT was performed using IV contrast and is  correlated with a CT scan from 11/10/2019 and a liver MRI from  11/11/2019.     Radiation dose reduction  techniques were utilized, including automated  exposure control and exposure modulation based on body size.     FINDINGS: The visualized lung bases are clear.     There is diffuse abnormal colon wall thickening with exaggerated mucosal  enhancement and mild paracolonic inflammatory change. There is no  abscess or intraperitoneal free air nor is there any bowel wall gas. The  visualized distal esophagus and the stomach appear normal. Small bowel  and large bowel appears normal.     The liver is unchanged in appearance, with decreased volume in the right  lobe and some heterogeneous attenuation, deformed vascularity and  biliary ducts along the posterior edge of the right lobe, cephalad to  the gallbladder fossa. The remainder of the hepatic parenchyma appears  normal. An enlarged cardiophrenic lymph node is again observed and  measures about 24 x 17 mm. There is lymphadenopathy in the upper abdomen  and in the retroperitoneum. The spleen is upper normal, measuring 13 cm  long and 13 x 6 cm axial. Parenchyma of the pancreas appears normal. The  kidneys appear normal. There is no bone or body wall lesion.       Impression:       Diffuse colitis with abdominal lymphadenopathy similar to  that seen on CT in November. There is also a deformity along the  posterior right lobe of the liver which has been evaluated previously  with MRI. I discussed the case with Dr. Zimmerman of the emergency  department at 9:30 PM.     This report was finalized on 12/13/2019 9:47 PM by Dr. Rafiq Villeda M.D.                  No orders to display        Assessment/Plan     Active Hospital Problems    Diagnosis POA   • Sepsis (CMS/HCC) [A41.9] Yes   • GERD without esophagitis [K21.9] Yes   • Portal vein thrombosis [I81] Yes   • Anticoagulated on Coumadin [Z79.01] Not Applicable   • Supratherapeutic INR [R79.1] Yes   • C. difficile colitis [A04.72] Yes   • Hypokalemia [E87.6] Yes   • Malignant neoplasm of upper-inner quadrant of right breast  "in female, estrogen receptor positive (CMS/[facility]) [C50.211, Z17.0] Not Applicable     Added automatically from request for surgery [id number]       Recurrent Cdiff Colitis with Sepsis  - WBC 21k, HR >90 on admission   - start on oral vancomycin  - LA 1.7, had 1L bolus will given another and then start on 125cc/hr LR  - consult ID    Portal Vein Thrombosis  - continue coumadin-this is supratherapeutic and will be dosed by pharmacy    Hypokalemia  - replace per protocol    Right Breast Cancer  - s/p lumpectomy 11/4/19  - on arimidex  - follows with Dr. Rodgers    I discussed the patient's findings and my recommendations with patient, nursing staff and ED provider.    VTE Prophylaxis - Warfarin.  Code Status - Full code.       Amarjit Camilo MD  [facility]  12/14/19  6:47 AM    Electronically signed by Amarjit Camilo MD at 12/14/19 0649          Emergency Department Notes      Yvonne Caraballo, RN at 12/13/19 1849        Pt c/o abd pain, nausea, diarrhea. Went to [facility]. Temp 100.2 in office - 1000mg tylenol given. Flu neg.    Pt treated for c diff last month.    Electronically signed by Yvonne Caraballo RN at 12/13/19 1850     Brian Zimmerman MD at 12/13/19 1928           EMERGENCY DEPARTMENT ENCOUNTER    Room Number:  28/28  Date seen:  12/13/2019  Time seen: 7:28 PM  PCP: Tommy Osborn MD  Historian: Patient      HPI:  Chief Complaint: N/V/D  A complete HPI/ROS/PMH/PSH/SH/FH are unobtainable due to: Nothing  Context: Taylor Larsen is a 64 y.o. female who presents to the ED c/o gradual onset of nausea associated with vomiting (clear), watery diarrhea and low-grade fever that started \"couple days ago\" which has been progressively worsening since onset. Pt reports that she has about 3-4 watery diarrheal episodes per day since onset of her current sxs. Pt denies abd pain, CP, SOA, urinary sxs. Pt denies recent abx use. Pt reports that her granddaughter is sick with flu-like sxs. Pt " "reports PMHx of C-diff infection about a month ago which was managed with Vancomycin and Flagyl. Pt reports that she recently started taking Warfarin for \"blood clot in the liver\" which was an incidental finding.         PAST MEDICAL HISTORY  Active Ambulatory Problems     Diagnosis Date Noted   • Visit for gynecologic examination 06/28/2017   • Vasomotor symptoms due to menopause 06/28/2017   • Screening for cervical cancer 07/25/2018   • Malignant neoplasm of upper-inner quadrant of right breast in female, estrogen receptor positive (CMS/HCC) 10/14/2019   • Acute colitis 11/10/2019   • Diarrhea 11/10/2019   • Liver mass 11/10/2019   • Hypokalemia 11/10/2019   • Leukocytosis 11/10/2019   • Enterocolitis due to Clostridioides difficile 11/12/2019     Resolved Ambulatory Problems     Diagnosis Date Noted   • No Resolved Ambulatory Problems     Past Medical History:   Diagnosis Date   • Breast cancer (CMS/HCC) 09/2019   • GERD (gastroesophageal reflux disease)    • PONV (postoperative nausea and vomiting)    • Situational stress    • Stress headaches          PAST SURGICAL HISTORY  Past Surgical History:   Procedure Laterality Date   • BILE DUCT STENT PLACEMENT  2014   • BREAST BIOPSY Right 09/2019    malignant   • BREAST LUMPECTOMY WITH SENTINEL NODE BIOPSY Right 11/4/2019    Procedure: BREAST LUMPECTOMY WITH SENTINEL NODE BIOPSY AND NEEDLE LOCALIZATION And possible axillary dissection;  Surgeon: Jean-Paul Ramos MD;  Location: [facility] OR [facility] [place];  Service: General   • KNEE ARTHROSCOPY Left    • LAPAROSCOPIC CHOLECYSTECTOMY  2014   • TUBAL ABDOMINAL LIGATION           FAMILY HISTORY  Family History   Problem Relation Age of Onset   • Alcohol abuse Father    • Heart failure Mother    • Diabetes Brother    • Prostate cancer Brother    • Throat cancer Brother    • Breast cancer Maternal Aunt    • Breast cancer Maternal Cousin    • Breast cancer Maternal Cousin    • Malig Hyperthermia Neg Hx          SOCIAL " HISTORY  Social History     Socioeconomic History   • Marital status:      Spouse name: Not on file   • Number of children: 2   • Years of education: Not on file   • Highest education level: Not on file   Occupational History     Employer: [employer]   Tobacco Use   • Smoking status: Former Smoker     Packs/day: 0.50     Years: 10.00     Pack years: 5.00     Types: Cigarettes     Last attempt to quit:      Years since quittin.9   • Smokeless tobacco: Former User   Substance and Sexual Activity   • Alcohol use: No   • Drug use: No   • Sexual activity: Never     Birth control/protection: Post-menopausal         ALLERGIES  Patient has no known allergies.        REVIEW OF SYSTEMS  Review of Systems   Constitutional: Positive for fever. Negative for diaphoresis.   HENT: Negative for congestion.    Eyes: Negative for visual disturbance.   Respiratory: Negative for shortness of breath.    Cardiovascular: Negative for chest pain and palpitations.   Gastrointestinal: Positive for diarrhea, nausea and vomiting. Negative for abdominal pain and blood in stool.   Endocrine: Negative for polyuria.   Genitourinary: Negative for flank pain.   Musculoskeletal: Negative for joint swelling.   Skin: Negative for wound.   Neurological: Negative for seizures.   Hematological: Negative for adenopathy.   Psychiatric/Behavioral: Negative for sleep disturbance.            PHYSICAL EXAM  ED Triage Vitals   Temp Heart Rate Resp BP SpO2   19   (!) 100.6 °F (38.1 °C) (!) 130 20 134/64 95 %      Temp src Heart Rate Source Patient Position BP Location FiO2 (%)   19 --   Tympanic Monitor Lying Left arm          GENERAL: Alert, awake, no acute distress  HENT: nares patent  EYES: no scleral icterus  CV: regular rhythm, normal rate, no murmurs  RESPIRATORY: normal effort, CTAB  ABDOMEN: soft, non-tender throughout,  non-distended  MUSCULOSKELETAL: no deformity  NEURO: alert, moves all extremities, follows commands  SKIN: warm, dry    Vital signs and nursing notes reviewed.          LAB RESULTS  Recent Results (from the past 24 hour(s))   Comprehensive Metabolic Panel    Collection Time: 12/13/19  7:23 PM   Result Value Ref Range    Glucose 130 (H) 65 - 99 mg/dL    BUN 22 8 - 23 mg/dL    Creatinine 0.83 0.57 - 1.00 mg/dL    Sodium 132 (L) 136 - 145 mmol/L    Potassium 3.0 (L) 3.5 - 5.2 mmol/L    Chloride 95 (L) 98 - 107 mmol/L    CO2 22.2 22.0 - 29.0 mmol/L    Calcium 8.6 8.6 - 10.5 mg/dL    Total Protein 7.9 6.0 - 8.5 g/dL    Albumin 3.40 (L) 3.50 - 5.20 g/dL    ALT (SGPT) 10 1 - 33 U/L    AST (SGOT) 18 1 - 32 U/L    Alkaline Phosphatase 90 39 - 117 U/L    Total Bilirubin 0.8 0.2 - 1.2 mg/dL    eGFR Non African Amer 69 >60 mL/min/1.73    Globulin 4.5 gm/dL    A/G Ratio 0.8 g/dL    BUN/Creatinine Ratio 26.5 (H) 7.0 - 25.0    Anion Gap 14.8 5.0 - 15.0 mmol/L   Protime-INR    Collection Time: 12/13/19  7:23 PM   Result Value Ref Range    Protime 40.4 (H) 11.7 - 14.2 Seconds    INR 4.21 (C) 0.90 - 1.10   Lipase    Collection Time: 12/13/19  7:23 PM   Result Value Ref Range    Lipase 10 (L) 13 - 60 U/L   CBC Auto Differential    Collection Time: 12/13/19  7:23 PM   Result Value Ref Range    WBC 21.04 (H) 3.40 - 10.80 10*3/mm3    RBC 4.29 3.77 - 5.28 10*6/mm3    Hemoglobin 13.0 12.0 - 15.9 g/dL    Hematocrit 38.9 34.0 - 46.6 %    MCV 90.7 79.0 - 97.0 fL    MCH 30.3 26.6 - 33.0 pg    MCHC 33.4 31.5 - 35.7 g/dL    RDW 12.9 12.3 - 15.4 %    RDW-SD 42.3 37.0 - 54.0 fl    MPV 10.5 6.0 - 12.0 fL    Platelets 214 140 - 450 10*3/mm3    Neutrophil % 83.0 (H) 42.7 - 76.0 %    Lymphocyte % 8.6 (L) 19.6 - 45.3 %    Monocyte % 7.1 5.0 - 12.0 %    Eosinophil % 0.2 (L) 0.3 - 6.2 %    Basophil % 0.4 0.0 - 1.5 %    Immature Grans % 0.7 (H) 0.0 - 0.5 %    Neutrophils, Absolute 17.46 (H) 1.70 - 7.00 10*3/mm3    Lymphocytes, Absolute 1.80 0.70 - 3.10  10*3/mm3    Monocytes, Absolute 1.50 (H) 0.10 - 0.90 10*3/mm3    Eosinophils, Absolute 0.05 0.00 - 0.40 10*3/mm3    Basophils, Absolute 0.08 0.00 - 0.20 10*3/mm3    Immature Grans, Absolute 0.15 (H) 0.00 - 0.05 10*3/mm3    nRBC 0.0 0.0 - 0.2 /100 WBC   Lactic Acid, Plasma    Collection Time: 12/13/19  8:54 PM   Result Value Ref Range    Lactate 1.7 0.5 - 2.0 mmol/L       Ordered the above labs and reviewed the results.        RADIOLOGY  Ct Abdomen Pelvis With Contrast    Result Date: 12/13/2019  ABDOMEN AND PELVIS CT WITH CONTRAST  HISTORY: Abdominal pain with nausea, vomiting, and diarrhea. Previous C. difficile colitis.  TECHNIQUE: Abdomen and pelvis CT was performed using IV contrast and is correlated with a CT scan from 11/10/2019 and a liver MRI from 11/11/2019.  Radiation dose reduction techniques were utilized, including automated exposure control and exposure modulation based on body size.  FINDINGS: The visualized lung bases are clear.  There is diffuse abnormal colon wall thickening with exaggerated mucosal enhancement and mild paracolonic inflammatory change. There is no abscess or intraperitoneal free air nor is there any bowel wall gas. The visualized distal esophagus and the stomach appear normal. Small bowel and large bowel appears normal.  The liver is unchanged in appearance, with decreased volume in the right lobe and some heterogeneous attenuation, deformed vascularity and biliary ducts along the posterior edge of the right lobe, cephalad to the gallbladder fossa. The remainder of the hepatic parenchyma appears normal. An enlarged cardiophrenic lymph node is again observed and measures about 24 x 17 mm. There is lymphadenopathy in the upper abdomen and in the retroperitoneum. The spleen is upper normal, measuring 13 cm long and 13 x 6 cm axial. Parenchyma of the pancreas appears normal. The kidneys appear normal. There is no bone or body wall lesion.      Diffuse colitis with abdominal  lymphadenopathy similar to that seen on CT in November. There is also a deformity along the posterior right lobe of the liver which has been evaluated previously with MRI. I discussed the case with Dr. Zimmerman of the emergency department at 9:30 PM.  This report was finalized on 12/13/2019 9:47 PM by Dr. Rafiq Villeda M.D.        Ordered the above noted radiological studies. Reviewed by me in PACS.  Spoke with Dr. Villeda (radiologist) regarding results.      MEDICATIONS GIVEN IN ER  Medications   sodium chloride 0.9 % flush 10 mL (has no administration in time range)   Pharmacy Consult (has no administration in time range)   vancomycin oral solution reconstituted 125 mg (125 mg Oral Given 12/13/19 2211)   sodium chloride 0.9 % bolus 1,000 mL (0 mL Intravenous Stopped 12/13/19 2107)   iopamidol (ISOVUE-300) 61 % injection 85 mL (85 mL Intravenous Given by Other 12/13/19 2036)   metroNIDAZOLE (FLAGYL) 500 mg/100mL IVPB (0 mg Intravenous Stopped 12/13/19 2207)                     MEDICAL DECISION MAKING and ED Course       1901: Ordered labs for further evaluation and management.    1935: Offered pt pain medication, which she declined to receive.    1946: Ordered additional labs to r/o C-diff colitis.    2017: Ordered CT abd/pelvis to r/o c-diff colitis.    2212: Discussed pt case wit Dr. Camilo ([facility]) who will admit the pt for further evaluation and management.    2234: Rechecked pt. Pt is resting comfortably. Notified pt of lab work and CT abd/pelvis indicative of c-diff colitis. Discussed the plan to admit the pt for further evaluation and management. Pt and family agree with the plan and all questions were addressed.      MDM  Number of Diagnoses or Management Options  C. difficile colitis:      Amount and/or Complexity of Data Reviewed  Clinical lab tests: ordered and reviewed (Lipase 10, WBC 21.04, Creatinine 0.83, PT-INR 40.4-4.21)  Tests in the radiology section of CPT®:  ordered and reviewed (CT abd/pelvis  show pancolitis)  Decide to obtain previous medical records or to obtain history from someone other than the patient: yes  Discuss the patient with other providers: yes (Dr. Camilo (A) and Dr. Villeda (radiology))  Independent visualization of images, tracings, or specimens: yes               DIAGNOSIS  Final diagnoses:   C. difficile colitis         DISPOSITION  ADMISSION    Discussed treatment plan and reason for admission with pt/family and admitting physician.  Pt/family voiced understanding of the plan for admission for further testing/treatment as needed.                 Latest Documented Vital Signs:  As of 10:15 PM  BP- 132/58 HR- 113 Temp- 99.6 °F (37.6 °C) (Oral) O2 sat- 93%        --  Documentation assistance provided by filemon Sanchez for Dr. CASSIE Zimmerman MD.  Information recorded by the scribe was done at my direction and has been verified and validated by me.      Please note that portions of this were completed with a voice recognition program.           Maryam Sanchez  12/13/19 2314       Brian Zimmerman MD  12/14/19 0201      Electronically signed by Brian Zimmerman MD at 12/14/19 0201     Kristian Mo, RN at 12/13/19 2212        Pt was given PO Vanc but was unable to kep it down due to vomitting. Dr. Zimmerman notified and Zofran ordered      Kristian Mo, RN  12/13/19 2257      Electronically signed by Kristian Mo RN at 12/13/19 2257       Vital Signs (last 3 days)     Date/Time   Temp   Temp src   Pulse   Resp   BP   Patient Position   SpO2    12/16/19 0748   98 (36.7)   Oral   90   16   124/71   Lying   96    12/16/19 0441   96.7 (35.9)   Oral   101   18   122/66   Lying   99    12/15/19 1848   97.3 (36.3)   Oral   91   16   128/68   Lying   100    12/15/19 1205   98.4 (36.9)   Oral   --   --   --   --   --    12/15/19 0848   97.5 (36.4)   Oral   105   16   122/64   Lying   96    12/15/19 0529   100 (37.8)   Oral   118   16   130/60   Lying   96    12/14/19 2028    99.8 (37.7)   Oral   118   18   140/70   Lying   98    12/14/19 1500   (!) 101.2 (38.4) rn notified   Oral   (!) 125 rn notified   22   134/76   Lying   95    Temp: rn notified at 12/14/19 1500    Pulse: rn notified at 12/14/19 1500    12/14/19 1100   (!) 101 (38.3)   Oral   --   --   --   --   --    12/14/19 1049   (!) 102.2 (39)   Oral   --   --   --   --   --    12/14/19 0735   99.1 (37.3)   Oral   (!) 126   20   135/70   Lying   96    12/14/19 0530   (!) 100.8 (38.2)   Oral   --   --   --   --   --    12/14/19 0327   (!) 102.8 (39.3)   Oral   (!) 123   20   130/60   Lying   92    12/13/19 2350   97.1 (36.2)   Oral   116   20   122/71   Lying   96    12/13/19 22:14:10   99.4 (37.4)   Oral   116   20   125/66   Lying   --    12/13/19 21:08:16   --   --   113   20   132/58   Sitting   93    12/13/19 2000   99.6 (37.6)   Oral   --   --   --   --   --    12/13/19 19:24:57   99.5 (37.5)   Oral   108   20   134/64   Lying   93    12/13/19 1915   --   --   --   20   --   --   --    12/13/19 1850   (!) 100.6 (38.1)   Tympanic   (!) 130   --   --   --   95            Lines, Drains & Airways    Active LDAs     Name:   Placement date:   Placement time:   Site:   Days:    Peripheral IV 12/13/19 1924 Left Antecubital   12/13/19 1924    Antecubital   2         Inactive LDAs     Name:   Placement date:   Placement time:   Removal date:   Removal time:   Site:   Days:    [REMOVED] Peripheral IV 12/13/19 2054 Left Hand   12/13/19    2054    12/15/19    1233    Hand   1              Hospital Medications (all)       Dose Frequency Start End    acetaminophen (TYLENOL) 160 MG/5ML solution 650 mg 650 mg Every 4 Hours PRN 12/13/2019     Admin Instructions: Do not exceed 4 grams of acetaminophen in a 24 hr period.<BR><BR>If given for pain, use the following pain scale: <BR>Mild Pain = Pain Score of 1-3, CPOT 1-2<BR>Moderate Pain = Pain Score of 4-6, CPOT 3-4<BR>Severe Pain = Pain Score of 7-10, CPOT 5-8    Route: Oral    Linked  "Group 1:  \"Or\" Linked Group Details        acetaminophen (TYLENOL) suppository 650 mg 650 mg Every 4 Hours PRN 12/13/2019     Admin Instructions: Do not exceed 4 grams of acetaminophen in a 24 hr period.<BR><BR>If given for pain, use the following pain scale: <BR>Mild Pain = Pain Score of 1-3, CPOT 1-2<BR>Moderate Pain = Pain Score of 4-6, CPOT 3-4<BR>Severe Pain = Pain Score of 7-10, CPOT 5-8    Route: Rectal    Linked Group 1:  \"Or\" Linked Group Details        acetaminophen (TYLENOL) tablet 1,000 mg 1,000 mg Nightly PRN 12/15/2019     Admin Instructions: Do not exceed 4 grams of acetaminophen in a 24 hr period.<BR><BR>If given for pain, use the following pain scale: <BR>Mild Pain = Pain Score of 1-3, CPOT 1-2<BR>Moderate Pain = Pain Score of 4-6, CPOT 3-4<BR>Severe Pain = Pain Score of 7-10, CPOT 5-8    Route: Oral    Linked Group 2:  \"And\" Linked Group Details        acetaminophen (TYLENOL) tablet 650 mg 650 mg Every 4 Hours PRN 12/13/2019     Admin Instructions: Do not exceed 4 grams of acetaminophen in a 24 hr period.<BR><BR>If given for pain, use the following pain scale: <BR>Mild Pain = Pain Score of 1-3, CPOT 1-2<BR>Moderate Pain = Pain Score of 4-6, CPOT 3-4<BR>Severe Pain = Pain Score of 7-10, CPOT 5-8    Route: Oral    Linked Group 1:  \"Or\" Linked Group Details        diphenhydrAMINE (BENADRYL) capsule 50 mg 50 mg Nightly PRN 12/15/2019     Admin Instructions: Caution: Look alike/sound alike drug alert. This med may be ordered in other forms and routes. Before giving verify the last time the drug was given by any route/form.<BR>    Route: Oral    Linked Group 2:  \"And\" Linked Group Details        influenza vac split quad (FLUZONE,FLUARIX,AFLURIA) injection 0.5 mL (Completed) 0.5 mL Once 12/15/2019 12/15/2019    Admin Instructions: **Do Not Administer if Temperature Greater Than 102F & Notify Pharmacy** Pneumococcal & Influenza Vaccines May Be Given at the Same Time in SEPARATE Injections.    Route: " "Intramuscular    iopamidol (ISOVUE-300) 61 % injection 85 mL (Completed) 85 mL Once in Imaging 12/13/2019 12/13/2019    Route: Intravenous    lactated ringers bolus 1,000 mL (Completed) 1,000 mL Once 12/14/2019 12/14/2019    Route: Intravenous    lactated ringers infusion 75 mL/hr Continuous 12/15/2019     Route: Intravenous    melatonin tablet 10 mg 10 mg Nightly PRN 12/14/2019     Route: Oral    metroNIDAZOLE (FLAGYL) 500 mg/100mL IVPB (Completed) 500 mg Once 12/13/2019 12/13/2019    Admin Instructions: Caution: Look alike/sound alike drug alert.  Do not refrigerate.    Route: Intravenous    ondansetron (ZOFRAN) injection 4 mg (Completed) 4 mg Once 12/13/2019 12/13/2019    Route: Intravenous    ondansetron (ZOFRAN) injection 4 mg 4 mg Every 6 Hours PRN 12/13/2019     Admin Instructions: If BOTH ondansetron (ZOFRAN) and promethazine (PHENERGAN) are ordered use ondansetron first and THEN promethazine IF ondansetron is ineffective.    Route: Intravenous    Pharmacy Consult  Continuous PRN 12/13/2019     Route: Does not apply    Pharmacy to dose warfarin  Continuous PRN 12/14/2019     Admin Instructions: If INR Greater Than Target, Contact Pharmacy Prior to Giving Dose.  Acutely Hazardous. Waste BOTH Residual Medication and/or Empty Package. .    Route: Does not apply    sodium chloride 0.9 % bolus 1,000 mL (Completed) 1,000 mL Once 12/13/2019 12/13/2019    Route: Intravenous    sodium chloride 0.9 % flush 10 mL 10 mL As Needed 12/13/2019     Route: Intravenous    Linked Group 3:  \"And\" Linked Group Details        sodium chloride 0.9 % flush 10 mL 10 mL Every 12 Hours Scheduled 12/13/2019     Route: Intravenous    sodium chloride 0.9 % flush 10 mL 10 mL As Needed 12/13/2019     Route: Intravenous    vancomycin oral solution reconstituted 125 mg 125 mg Every 6 Hours Scheduled 12/14/2019 12/24/2019    Admin Instructions: Medication Stored in Refrigerator    Route: Oral    vancomycin oral solution reconstituted 125 mg " "125 mg Every 12 Hours 12/24/2019 12/30/2019    Admin Instructions: Medication Stored in Refrigerator    Route: Oral    vancomycin oral solution reconstituted 125 mg 125 mg Every 24 Hours 12/31/2019 1/6/2020    Admin Instructions: Medication Stored in Refrigerator    Route: Oral    vancomycin oral solution reconstituted 125 mg 125 mg Every Other Day 1/7/2020 1/21/2020    Admin Instructions: Select a duration of 2 to 8 weeks    Route: Oral    diphenhydrAMINE-acetaminophen (TYLENOL PM)  MG per tablet 2 tablet (Discontinued) 2 tablet Nightly PRN 12/14/2019 12/15/2019    Admin Instructions: Caution: Look alike/sound alike drug alert. Do not exceed 4g of acetaminophen in a 24 hour period.    Route: Oral    lactated ringers infusion (Discontinued) 125 mL/hr Continuous 12/14/2019 12/14/2019    Route: Intravenous    potassium chloride (KLOR-CON) packet 40 mEq (Discontinued) 40 mEq As Needed 12/14/2019 12/15/2019    Admin Instructions: Potassium 3.1 or Less Give KCl 40 mEq q4h x3 Doses <BR>Potassium 3.2 - 3.6 Give KCl 40 mEq q4h x2 Doses <BR><BR>Check Potassium 4 Hours After Last Dose Given <BR>Check Magnesium if Potassium Level Remains Low After Replacement <BR>DO NOT GIVE if CrCl is Less Than 30 mL/minute or Urine Output Less Than 30 mL/hr    Route: Oral    Linked Group 4:  \"Or\" Linked Group Details        potassium chloride (MICRO-K) CR capsule 40 mEq (Discontinued) 40 mEq As Needed 12/14/2019 12/15/2019    Admin Instructions: Potassium 3.1 or Less Give KCl 40 mEq q4h x3 Doses <BR>Potassium 3.2 - 3.6 Give KCl 40 mEq q4h x2 Doses <BR><BR>Check Potassium 4 Hours After Last Dose Given <BR>Check Magnesium if Potassium Level Remains Low After Replacement <BR>DO NOT GIVE if CrCl is Less Than 30 mL/minute or Urine Output Less Than 30 mL/hr    Route: Oral    Linked Group 4:  \"Or\" Linked Group Details        potassium chloride 10 mEq in 100 mL IVPB (Discontinued) 10 mEq Every 1 Hour PRN 12/14/2019 12/15/2019    Admin " "Instructions: Peripheral or Central IV<BR>Potassium 3.1 or Less Give KCl 10 mEq/100 mL NS IV q1h x6 Doses<BR>Potassium 3.2 - 3.6 Give KCl 10 mEq/100 mL NS q1h x4 Doses<BR><BR>Check Potassium 4 Hours After Last Dose Given<BR>Check Magnesium if Potassium Remains Low After Replacement<BR>DO NOT GIVE if CrCl is Less Than 30 mL/minute or Urine Output Less Than 30 mL/hr. <BR><BR>Rates Greater Than 10 mEq/hr Require ECG Monitoring.<BR>OUTPATIENT/NON-MONITORED UNITS: Potassium Chloride standard bolus infusion rate is a maximum of 10 mEq/hr on unmonitored patients<BR><BR>MONITORED UNITS: Potassium Chloride standard bolus infusion rate is a maximum of 20 mEq/hr on ECG monitored patients ONLY<BR>    Route: Intravenous    Linked Group 4:  \"Or\" Linked Group Details        sodium chloride 0.9 % infusion (Discontinued) 100 mL/hr Continuous 12/13/2019 12/14/2019    Route: Intravenous    sodium chloride 0.9 % with KCl 20 mEq/L infusion (Discontinued) 125 mL/hr Continuous 12/14/2019 12/15/2019    Route: Intravenous    vancomycin oral solution reconstituted 125 mg (Discontinued) 125 mg Every 6 Hours Scheduled 12/13/2019 12/14/2019    Admin Instructions: Medication Stored in Refrigerator    Route: Oral        Orders (last 72 hrs)      Start     Ordered    01/07/20 0900  vancomycin oral solution reconstituted 125 mg  Every Other Day      12/14/19 0851    12/31/19 0000  vancomycin oral solution reconstituted 125 mg  Every 24 Hours      12/14/19 0851    12/24/19 0000  vancomycin oral solution reconstituted 125 mg  Every 12 Hours      12/14/19 0851    12/16/19 0600  CBC (No Diff)  Morning Draw      12/15/19 0830    12/16/19 0600  Basic Metabolic Panel  Morning Draw      12/15/19 0830    12/15/19 1947  Auto Discontinue GI Panel in 48 Hours if not Collected  ONCE GI PANEL      12/13/19 1946    12/15/19 1947  Auto Discontinue in 48 Hours  ONCE CDIFF      12/13/19 1946    12/15/19 1413  Inpatient Nutrition Consult  Once     Comments:  Pt " "states \"nothing tastes good\".  Some food items cause her to gag.  Have offered boost.   Provider:  (Not yet assigned)    12/15/19 1413    12/15/19 1330  lactated ringers infusion  Continuous      12/15/19 1232    12/15/19 1200  influenza vac split quad (FLUZONE,FLUARIX,AFLURIA) injection 0.5 mL  Once      12/14/19 0007    12/15/19 1054  acetaminophen (TYLENOL) tablet 1,000 mg  Nightly PRN      12/15/19 1056    12/15/19 1054  diphenhydrAMINE (BENADRYL) capsule 50 mg  Nightly PRN      12/15/19 1056    12/15/19 0600  CBC (No Diff)  Morning Draw      12/14/19 0852    12/15/19 0600  Comprehensive Metabolic Panel  Morning Draw,   Status:  Canceled      12/14/19 0852    12/15/19 0600  Magnesium  Morning Draw      12/14/19 1317    12/15/19 0600  Basic Metabolic Panel  Morning Draw      12/14/19 1317    12/15/19 0600  CBC (No Diff)  Morning Draw,   Status:  Canceled      12/14/19 1317    12/14/19 1756  Diet Regular; Cardiac  Diet Effective Now     Comments:  Pt requested no greens on her tray.    12/14/19 1757    12/14/19 1415  sodium chloride 0.9 % with KCl 20 mEq/L infusion  Continuous,   Status:  Discontinued      12/14/19 1317    12/14/19 1200  vancomycin oral solution reconstituted 125 mg  Every 6 Hours Scheduled      12/14/19 0851    12/14/19 0730  lactated ringers bolus 1,000 mL  Once      12/14/19 0644    12/14/19 0730  lactated ringers infusion  Continuous,   Status:  Discontinued      12/14/19 0644    12/14/19 0652  Inpatient Infectious Diseases Consult  Once     Specialty:  Infectious Diseases  Provider:  Abdullahi Cano MD    12/14/19 0652    12/14/19 0644  Patient Currently On Electrolyte Replacement Protocol - Please Refer to MAR for Protocol Details  Misc Nursing Order (Specify)  Daily     Comments:  Patient Currently On Electrolyte Replacement Protocol - Please Refer to MAR for Protocol Details    12/14/19 0643    12/14/19 0643  potassium chloride (MICRO-K) CR capsule 40 mEq  As Needed,   Status:  " Discontinued      12/14/19 0643 12/14/19 0643  potassium chloride (KLOR-CON) packet 40 mEq  As Needed,   Status:  Discontinued      12/14/19 0643 12/14/19 0643  potassium chloride 10 mEq in 100 mL IVPB  Every 1 Hour PRN,   Status:  Discontinued      12/14/19 0643 12/14/19 0642  Protime-INR  Daily      12/14/19 0641    12/14/19 0641  melatonin tablet 10 mg  Nightly PRN      12/14/19 0642    12/14/19 0641  diphenhydrAMINE-acetaminophen (TYLENOL PM)  MG per tablet 2 tablet  Nightly PRN,   Status:  Discontinued      12/14/19 0642    12/14/19 0641  Pharmacy to dose warfarin  Continuous PRN      12/14/19 0641 12/14/19 0600  Basic Metabolic Panel  Morning Draw      12/13/19 2256    12/14/19 0600  CBC (No Diff)  Morning Draw      12/13/19 2256    12/14/19 0000  Vital Signs  Every 4 Hours      12/13/19 2256 12/13/19 2258  sodium chloride 0.9 % flush 10 mL  Every 12 Hours Scheduled      12/13/19 2256 12/13/19 2258  sodium chloride 0.9 % infusion  Continuous,   Status:  Discontinued      12/13/19 2256 12/13/19 2256  Diet Regular; Cardiac  Diet Effective Now,   Status:  Canceled      12/13/19 2256 12/13/19 2255  ondansetron (ZOFRAN) injection 4 mg  Every 6 Hours PRN      12/13/19 2256 12/13/19 2255  Intake & Output  Every Shift      12/13/19 2256 12/13/19 2255  Weigh Patient  Once      12/13/19 2256 12/13/19 2255  Oxygen Therapy- Nasal Cannula; Titrate for SPO2: 90%  Continuous      12/13/19 2256 12/13/19 2255  Insert Peripheral IV  Once      12/13/19 2256 12/13/19 2255  Saline Lock & Maintain IV Access  Continuous      12/13/19 2256 12/13/19 2255  Code Status and Medical Interventions:  Continuous      12/13/19 2256 12/13/19 2255  Place Sequential Compression Device  Once      12/13/19 2256 12/13/19 2255  Maintain Sequential Compression Device  Continuous      12/13/19 2256 12/13/19 2253  sodium chloride 0.9 % flush 10 mL  As Needed      12/13/19 2254    12/13/19 0641   acetaminophen (TYLENOL) tablet 650 mg  Every 4 Hours PRN      12/13/19 2256    12/13/19 2254  acetaminophen (TYLENOL) 160 MG/5ML solution 650 mg  Every 4 Hours PRN      12/13/19 2256    12/13/19 2254  acetaminophen (TYLENOL) suppository 650 mg  Every 4 Hours PRN      12/13/19 2256    12/13/19 2228  ondansetron (ZOFRAN) injection 4 mg  Once      12/13/19 2226    12/13/19 2214  Inpatient Admission  Once      12/13/19 2213    12/13/19 2133  [facility] (on-call MD unless specified) Details  Once     Specialty:  Hospitalist  Provider:  (Not yet assigned)    12/13/19 2132 12/13/19 2044  metroNIDAZOLE (FLAGYL) 500 mg/100mL IVPB  Once      12/13/19 2042 12/13/19 2043  Blood Culture - Blood,  Once      12/13/19 2042 12/13/19 2043  Blood Culture - Blood,  Once      12/13/19 2042 12/13/19 2043  Lactic Acid, Plasma  Once      12/13/19 2042 12/13/19 2036  iopamidol (ISOVUE-300) 61 % injection 85 mL  Once in Imaging      12/13/19 2034 12/13/19 2020  vancomycin oral solution reconstituted 125 mg  Every 6 Hours Scheduled,   Status:  Discontinued      12/13/19 2018 12/13/19 2018  Patient Isolation Contact Spore  Continuous     Comments:  Isolation Precautions Per Clostridium Difficile (CDI) Infection Control Policy / Process    12/13/19 2018 12/13/19 2017  Pharmacy Consult  Continuous PRN      12/13/19 2018 12/13/19 2017  CT Abdomen Pelvis With Contrast  1 Time Imaging      12/13/19 2017 12/13/19 1947  Gastrointestinal Panel, PCR - Stool, Per Rectum  STAT      12/13/19 1946 12/13/19 1947  Clostridium Difficile Toxin - Stool, Per Rectum  Once      12/13/19 1946 12/13/19 1947  Patient Isolation Contact Spore  Continuous,   Status:  Canceled      12/13/19 1946 12/13/19 1947  Clostridium Difficile Toxin, PCR - Stool, Per Rectum  PROCEDURE ONCE      12/13/19 1946    12/13/19 1903  sodium chloride 0.9 % bolus 1,000 mL  Once      12/13/19 1901    12/13/19 1902  CBC & Differential  Once      12/13/19 1901     12/13/19 1902  Comprehensive Metabolic Panel  Once      12/13/19 1901    12/13/19 1902  Protime-INR  Once      12/13/19 1901    12/13/19 1902  Insert peripheral IV  Once      12/13/19 1901    12/13/19 1902  Pulse Oximetry, Continuous  Continuous      12/13/19 1901    12/13/19 1902  Cardiac Monitoring  Once      12/13/19 1901    12/13/19 1902  Monitor Blood Pressure  Per Hospital Policy      12/13/19 1901    12/13/19 1902  Lipase  Once      12/13/19 1901    12/13/19 1902  CBC Auto Differential  PROCEDURE ONCE      12/13/19 1901    12/13/19 1901  sodium chloride 0.9 % flush 10 mL  As Needed      12/13/19 1901    Unscheduled  Up With Assistance  As Needed      12/13/19 2256    Unscheduled  Magnesium  As Needed      12/14/19 0643    Unscheduled  Potassium  As Needed      12/14/19 0643    --  Probiotic Product (PROBIOTIC PO)      12/13/19 2109                   Physician Progress Notes (last 72 hours) (Notes from 12/13/19 1120 through 12/16/19 1120)      Abdullahi Cano MD at 12/16/19 0856           LOS: 3 days     Chief Complaint:  Follow-up C diff    Interval History:  Fever resolved. WBC slightly up to 15k but still down since admission. Had BMs overnight and has some distension today. Appetite is fair. Tolerating vanco w/o nausea or vomiting.    ROS: no CP or SOA    Vital Signs  Temp:  [96.7 °F (35.9 °C)-98.4 °F (36.9 °C)] 98 °F (36.7 °C)  Heart Rate:  [] 90  Resp:  [16-18] 16  BP: (122-128)/(66-71) 124/71    Physical Exam:  General: awake,  alert,   Eyes: no scleral icterus  ENT: MMM  Cardiovascular: NR, RR, no murmurs, no LE edema  Respiratory:  normal work of breathing on ambient air  GI: Abdomen is mildly distended and mildly TTP; no rebound or guarding  : no Martínez catheter present  Skin: No rashes, lesions, or embolic phenomenon  Neurological: Alert and oriented x 3, motor strength 5/5 in all four extremities  Psychiatric: Normal mood and affect     Antibiotics:  •  vancomycin oral solution  reconstituted 125 mg, 125 mg, Oral, Q6H, Abdullahi Cano MD, 125 mg at 12/15/19 0553  •  [START ON 12/24/2019] vancomycin oral solution reconstituted 125 mg, 125 mg, Oral, Q12H, Abdullahi Cano MD  •  [START ON 12/31/2019] vancomycin oral solution reconstituted 125 mg, 125 mg, Oral, Q24H, Abdullahi Cano MD  •  [START ON 1/7/2020] vancomycin oral solution reconstituted 125 mg, 125 mg, Oral, Every Other Day, Abdullahi Cano MD    LABS:  CBC, BMP, INR, micro reviewed today  Lab Results   Component Value Date    WBC 15.40 (H) 12/16/2019    HGB 12.0 12/16/2019    HCT 35.0 12/16/2019    MCV 90.7 12/16/2019     12/16/2019     Lab Results   Component Value Date    GLUCOSE 85 12/16/2019    BUN 27 (H) 12/16/2019    CREATININE 0.61 12/16/2019    EGFRIFNONA 99 12/16/2019    BCR 44.3 (H) 12/16/2019    CO2 19.4 (L) 12/16/2019    CALCIUM 7.7 (L) 12/16/2019    ALBUMIN 3.40 (L) 12/13/2019    AST 18 12/13/2019    ALT 10 12/13/2019     Lab Results   Component Value Date    INR 5.24 (C) 12/16/2019    INR 6.57 (C) 12/15/2019    INR 6.95 (C) 12/14/2019    PROTIME 48.0 (C) 12/16/2019    PROTIME 57.5 (C) 12/15/2019    PROTIME 60.1 (C) 12/14/2019     Microbiology:  12/13 BCx: NGTD  12/13 C diff PCR +  12/13 GI PCR: negative     Radiology (prior):  CT A/P with diffuse colitis    Assessment/Plan   1. Recurrent C difficile colitis  2. Portal vein thrombosis on coumadin   3. Elevated INR  4. Breast cancer (pre-Arimidex and XRT; no chemo planned)     Fever resolved. WBC down from admission. BM frequency improved from admission. Certainly still has room for improvement but trending in the right direction. I think with more time + antibiotics she'll start feeling back to her usual self. Continue treatment dose vancomycin 125 mg PO q6h x 10 days followed by prolonged vancomycin pulse/tapered regimen. Orders have been placed in Epic if they primary team could please transition this to an  "outpatient RX at the time of discharge.     Thank you for allowing me to be involved in the care of this patient. Infectious diseases will sign off at this time with antibiotics plan in place but please call me at [phone] if any further questions.              Electronically signed by Abdullahi Cano MD at 19 0889     Sean Mullen MD at 12/15/19 1205              DAILY PROGRESS NOTE  [facility]    Patient Identification:  Name: Taylor Larsen  Age: 64 y.o.  Sex: female  :  1955  MRN: [id number]         Primary Care Physician: Tommy Osborn MD    Subjective:  Interval History: still w/ watery loose stool and 4-5 BMs this am - nausea but no emesis or abd pain - denies cp/sob/ams      Objective:spouse at bs     Scheduled Meds:    sodium chloride 10 mL Intravenous Q12H   vancomycin 125 mg Oral Q6H   [START ON 2019] vancomycin 125 mg Oral Q12H   [START ON 2019] vancomycin 125 mg Oral Q24H   [START ON 2020] vancomycin 125 mg Oral Every Other Day     Continuous Infusions:    Pharmacy Consult    Pharmacy to dose warfarin        Vital signs in last 24 hours:  Temp:  [97.5 °F (36.4 °C)-101.2 °F (38.4 °C)] 97.5 °F (36.4 °C)  Heart Rate:  [105-125] 105  Resp:  [16-22] 16  BP: (122-140)/(60-76) 122/64    Intake/Output:    Intake/Output Summary (Last 24 hours) at 12/15/2019 1208  Last data filed at 12/15/2019 0642  Gross per 24 hour   Intake 2383.33 ml   Output --   Net 2383.33 ml       Exam:  /64 (BP Location: Left arm, Patient Position: Lying)   Pulse 105   Temp 97.5 °F (36.4 °C) (Oral)   Resp 16   Ht 160 cm (63\")   Wt 82.4 kg (181 lb 11.2 oz)   SpO2 96%   BMI 32.19 kg/m²      General Appearance:    Alert, cooperative, no distress though sickly, AAOx3, conversational                           Head:    Normocephalic, without obvious abnormality, atraumatic                         Throat:   Oral mucosa pink and still quite dry                     "        Neck:   Supple, symmetrical, trachea midline, no JVD                         Lungs:    Clear to auscultation bilaterally, respirations unlabored                          Heart:    Regular rate and rhythm, S1 and S2 normal                  Abdomen:     Soft, non-tender, bowel sounds active                 Extremities:   Globally weak though moving all, no cyanosis or edema                        Pulses:   Pulses palpable in lower extremities                           Data Review:  Labs in chart were reviewed.    Assessment:  Active Hospital Problems    Diagnosis  POA   • **C. difficile colitis [A04.72]  Yes   • Sepsis (CMS/HCC) [A41.9]  Yes   • GERD without esophagitis [K21.9]  Yes   • Portal vein thrombosis [I81]  Yes   • Anticoagulated on Coumadin [Z79.01]  Not Applicable   • Supratherapeutic INR [R79.1]  Yes   • Hypokalemia [E87.6]  Yes   • Malignant neoplasm of upper-inner quadrant of right breast in female, estrogen receptor positive (CMS/HCC) [C50.211, Z17.0]  Not Applicable      Resolved Hospital Problems   No resolved problems to display.       Plan:    C. difficile colitis -recurrent -ID suggesting treatment with vancomycin 125 mg 4 times daily for 10 days followed by prolonged pulse/taper   -Fever still persist though white blood cell count/sepsis improving    Portal vein thrombosis on Coumadin are supratherapeutic and is trending down at this juncture no active bleeding   -I did give 2.5 mg p.o. vitamin K x1 yesterday -continue to monitor INR    Hypokalemia corrected.  Magnesium 1.9 -saline lock IV fluids      Dispo - likely needs 2-3 more inpatient days     Sean Mullen MD  12/15/2019  12:08 PM      Electronically signed by Sean Mullen MD at 12/15/19 5606     Abdullahi Cano MD at 12/15/19 2218           LOS: 2 days     Chief Complaint:  Follow-up C diff    History from pt, her , and RN.    Interval History:  BM frequency and abdominal discomfort are improved. She  did have some fever overnight. She is tolerating oral vanco w/o nausea or vomiting.    ROS: no CP or SOA    Vital Signs  Temp:  [99.8 °F (37.7 °C)-102.2 °F (39 °C)] 100 °F (37.8 °C)  Heart Rate:  [118-125] 118  Resp:  [16-22] 16  BP: (130-140)/(60-70) 130/60    Physical Exam:  General: awake,  more alert today  Eyes: Pupils equal, no scleral icterus  ENT: MMM  Cardiovascular: tachycardic, RR, no murmurs, no LE edema  Respiratory:  normal work of breathing on ambient air  GI: Abdomen is mildly distended and mildly TTP; no rebound or guarding  : no Martínez catheter present  Skin: No rashes, lesions, or embolic phenomenon  Neurological: Alert and oriented x 3, motor strength 5/5 in all four extremities  Psychiatric: Normal mood and affect   Vasc: no cyanosis    Antibiotics:  •  vancomycin oral solution reconstituted 125 mg, 125 mg, Oral, Q6H, Abdullahi Cano MD, 125 mg at 12/15/19 0553  •  [START ON 12/24/2019] vancomycin oral solution reconstituted 125 mg, 125 mg, Oral, Q12H, Abdullahi Cano MD  •  [START ON 12/31/2019] vancomycin oral solution reconstituted 125 mg, 125 mg, Oral, Q24H, Abdullahi Cano MD  •  [START ON 1/7/2020] vancomycin oral solution reconstituted 125 mg, 125 mg, Oral, Every Other Day, Abdullahi Cano MD    LABS:  CBC, BMP, micro reviewed today  Lab Results   Component Value Date    WBC 13.22 (H) 12/15/2019    HGB 11.7 (L) 12/15/2019    HCT 33.1 (L) 12/15/2019    MCV 89.0 12/15/2019     12/15/2019     Lab Results   Component Value Date    GLUCOSE 116 (H) 12/15/2019    BUN 27 (H) 12/15/2019    CREATININE 0.72 12/15/2019    EGFRIFNONA 82 12/15/2019    BCR 37.5 (H) 12/15/2019    CO2 19.9 (L) 12/15/2019    CALCIUM 7.6 (L) 12/15/2019    ALBUMIN 3.40 (L) 12/13/2019    AST 18 12/13/2019    ALT 10 12/13/2019     Lab Results   Component Value Date    INR 6.57 (C) 12/15/2019    INR 6.95 (C) 12/14/2019    INR 4.21 (C) 12/13/2019    PROTIME 57.5 (C)  12/15/2019    PROTIME 60.1 (C) 12/14/2019    PROTIME 40.4 (H) 12/13/2019     Microbiology:  12/13 BCx: NGTD  12/13 C diff PCR +  12/13 GI PCR: negative     Radiology (prior):  CT A/P with diffuse colitis    Assessment/Plan   1. Recurrent C difficile colitis  2. Portal vein thrombosis on coumadin   3. Elevated INR  4. Breast cancer (pre-Arimidex and XRT; no chemo planned)     She is improving. Continue treatment dose vancomycin 125 mg PO q6h x 10 days followed by prolonged vancomycin pulse/tapered regimen. Repeat CBC in AM.     Thank you for this consult. ID will follow.             Electronically signed by Abdullahi Cano MD at 12/15/19 9469     Sean Mullen MD at 12/14/19 1311        After midnight patient admitted by Dr. Camilo on 12/14/2019    I was called by RN for persistent fever -after review of chart, I see ID also involved suggesting prolonged vancomycin taper due to reoccurrence of C. difficile colitis    CT abdomen pelvis demonstrating diffuse colitis but no toxic megacolon    Fevers likely to persist until we can get a better  on her current acute C. difficile colitis    Leukocytosis with mild improvement -monitor    Supratherapeutic INR made worse by malnutrition secondary to acute C. difficile colitis as well as decreased appetite.  INR is trended up to 6.95 from 4.21.  Will give vitamin K small dose at 2.5 mg x 1 and monitor INR closely but RN conveyed no aspects of bleeding    IVF changed to increase potassium supplementation -add mag to a.m. labs is likely low secondary to GI losses which will compound hypokalemia    Electronically signed by Sean Mullen MD at 12/14/19 131          Consult Notes (last 72 hours) (Notes from 12/13/19 1120 through 12/16/19 1120)      Abdullahi Cano MD at 12/14/19 0851      Consult Orders    1. Inpatient Infectious Diseases Consult [[id number]] ordered by Amarjit Camilo MD at 12/14/19 0610                Referring  Provider: Amarjit Camilo MD  [id number]  [facility] [address]  [place] [address]    Reason for Consultation: recurrent cdiff    History of present illness:  Taylor Larsen is a 64 y.o. female with breast cancer (not yet on XRT or Arimidex) and portal vein thrombosis on coumadin who I am asked to evaluate and give opinion for recurrent cdiff. History is obtained from the patient, her daughter, and review of the old medical records which I summarize/synthesize as follows: She presented to the ER on 12/13/19 at 1851 with nausea, vomiting, and watery diarrhea x several days. She had an associated fever and abdominal cramping. There were no alleviating factors. She was treated for C diff for the first time about one month ago (11/10/19) with vancomycin with resolution of her symptoms. She denies any recent antibiotic use.    In the ER she was febrile to 100.6 F with . Labs notable for WBC 21k, LA 1.7, and C diff positive. CT A/P with diffuse colitis and abdominal lymphadenopathy similar to prior scan in November. She was started on PO vancomycin and IV Flagyl. She is being rehydrated by her primary team. She says her abdominal pain is improved but loose stools persist.     Past Medical History:   Diagnosis Date   • Breast cancer (CMS/HCC) 09/2019    Right IDC   • GERD (gastroesophageal reflux disease)    • PONV (postoperative nausea and vomiting)    • Situational stress     DEATH OF BROTHER 10/2019, RECENT DIAGNOSIS   • Stress headaches    C diff    Past Surgical History:   Procedure Laterality Date   • BILE DUCT STENT PLACEMENT  2014   • BREAST BIOPSY Right 09/2019    malignant   • BREAST LUMPECTOMY WITH SENTINEL NODE BIOPSY Right 11/4/2019    Procedure: BREAST LUMPECTOMY WITH SENTINEL NODE BIOPSY AND NEEDLE LOCALIZATION And possible axillary dissection;  Surgeon: Jean-Paul Ramos MD;  Location: [facility];  Service: General   • KNEE ARTHROSCOPY Left    • LAPAROSCOPIC CHOLECYSTECTOMY  2014   • TUBAL  ABDOMINAL LIGATION         Social History:    Profession: worked at [employer]  Former smoker    Family History:  Mom: CHF  Dad: EtOH abuse    Antibiotic allergies and intolerances:  None    Medications:    Current Facility-Administered Medications:   •  acetaminophen (TYLENOL) tablet 650 mg, 650 mg, Oral, Q4H PRN, 650 mg at 12/14/19 0335 **OR** acetaminophen (TYLENOL) 160 MG/5ML solution 650 mg, 650 mg, Oral, Q4H PRN **OR** acetaminophen (TYLENOL) suppository 650 mg, 650 mg, Rectal, Q4H PRN, Annabelle Grier, APRN  •  diphenhydrAMINE-acetaminophen (TYLENOL PM)  MG per tablet 2 tablet, 2 tablet, Oral, Nightly PRN, Amarjit Camilo MD  •  [START ON 12/15/2019] influenza vac split quad (FLUZONE,FLUARIX,AFLURIA) injection 0.5 mL, 0.5 mL, Intramuscular, Once, Amarjit Camilo MD  •  lactated ringers infusion, 125 mL/hr, Intravenous, Continuous, Amarjit Camilo MD  •  melatonin tablet 10 mg, 10 mg, Oral, Nightly PRN, Amarjit Camilo MD  •  ondansetron (ZOFRAN) injection 4 mg, 4 mg, Intravenous, Q6H PRN, Annabelle Grier APRENRICO  •  Pharmacy Consult, , Does not apply, Continuous PRN, Brian Zimmerman MD  •  Pharmacy to dose warfarin, , Does not apply, Continuous PRN, Amarjit Camilo MD  •  potassium chloride (MICRO-K) CR capsule 40 mEq, 40 mEq, Oral, PRN **OR** potassium chloride (KLOR-CON) packet 40 mEq, 40 mEq, Oral, PRN **OR** potassium chloride 10 mEq in 100 mL IVPB, 10 mEq, Intravenous, Q1H PRN, Amarjit Camilo MD  •  [COMPLETED] Insert peripheral IV, , , Once **AND** sodium chloride 0.9 % flush 10 mL, 10 mL, Intravenous, PRN, Brian Zimmerman MD  •  sodium chloride 0.9 % flush 10 mL, 10 mL, Intravenous, Q12H, Annabelle Grier APRN  •  sodium chloride 0.9 % flush 10 mL, 10 mL, Intravenous, PRN, Annabelle Grier, APRN  •  vancomycin oral solution reconstituted 125 mg, 125 mg, Oral, Q6H, Brian Zimmerman MD, 125 mg at 12/14/19 0633    Review of Systems  All systems were  reviewed and are negative unless otherwise stated above in the HPI    Objective   Vital Signs   Temp:  [97.1 °F (36.2 °C)-102.8 °F (39.3 °C)] 99.1 °F (37.3 °C)  Heart Rate:  [108-130] 126  Resp:  [20] 20  BP: (122-135)/(58-71) 135/70    Physical Exam:   General: awake, tired, very nice  Head: Normocephalic, atraumatic  Eyes: Pupils equal, no scleral icterus  ENT: MMM, OP clear, no thrush. Good dentition.   Neck: Supple  Cardiovascular: tachycardic, RR, no murmurs, no LE edema  Respiratory: Lungs are clear to auscultation bilaterally, no rales or wheezing; normal work of breathing on ambient air  GI: Abdomen is mildly distended and moderately TTP; no rebound or guarding  : no Martínez catheter present  Musculoskeletal: no joint abnormalities, normal musculature  Skin: No rashes, lesions, or embolic phenomenon  Neurological: Alert and oriented x 3, motor strength 5/5 in all four extremities  Psychiatric: Normal mood and affect   Vasc: no cyanosis; PIV w/o erythema    Labs:     Lab Results   Component Value Date    WBC 20.59 (H) 12/14/2019    HGB 12.1 12/14/2019    HCT 35.1 12/14/2019    MCV 90.2 12/14/2019     12/14/2019       Lab Results   Component Value Date    GLUCOSE 138 (H) 12/14/2019    BUN 23 12/14/2019    CREATININE 0.78 12/14/2019    EGFRIFNONA 74 12/14/2019    BCR 29.5 (H) 12/14/2019    CO2 19.2 (L) 12/14/2019    CALCIUM 7.7 (L) 12/14/2019    ALBUMIN 3.40 (L) 12/13/2019    AST 18 12/13/2019    ALT 10 12/13/2019     Lab Results   Component Value Date    INR 6.95 (C) 12/14/2019    INR 4.21 (C) 12/13/2019    INR 2.60 (H) 12/09/2019    PROTIME 60.1 (C) 12/14/2019    PROTIME 40.4 (H) 12/13/2019    PROTIME 31.5 (H) 12/09/2019       Microbiology:  12/13 BCx: NGTD  12/13 C diff PCR +  12/13 GI PCR: negative    Radiology (personally reviewed images and report):  CT A/P with diffuse colitis    Assessment/Plan   1. Recurrent C difficile colitis  2. Portal vein thrombosis on coumadin   3. Elevated INR  4. Breast  cancer (pre-Arimidex and XRT; no chemo planned)    Given recurrence of C difficle, I will start her on a prolonged vancomycin pulse/tapered regimen. Monitor WBC, BM frequency, BM consistency, and abdominal exam. IVFs and supportive care per primary team. Recommend aggressively treating her nausea so that she can keep down the oral vancomycin. If not, we'll have to add vancomycin enemas per rectum. Repeat CBC and CMP in AM.     Thank you for this consult. ID will follow.       Electronically signed by Abdullahi Cano MD at 12/14/19 1130       Toro Velazquez RN   Registered Nurse   Oncology   Plan of Care   Signed   Date of Service:  12/16/19 0559   Creation Time:  12/16/19 0559            Signed             Show:Clear all  []Manual[x]Template[]Copied    Added by:  [x]Toro Velazquez RN    []Madhuri for details     Problem: Patient Care Overview  Goal: Plan of Care Review  Outcome: Ongoing (interventions implemented as appropriate)  Flowsheets  Taken 12/16/2019 0541  Progress: no change  Outcome Summary: Frequent, loose, dark brown bowel movments through the night. PO Vancomycin administered. INR being monitored by phyisicans. Afebrile. Awaiting bowel movements to form up more. IVF infusing. Hx right breast cancer, to start radiation soon, post-lumpectomy. Ambulating safely to bedside commode independently.  Taken 12/15/2019 1957  Plan of Care Reviewed With: patient;spouse

## 2019-12-16 NOTE — PROGRESS NOTES
Pharmacy Consult: Warfarin Dosing/ Monitoring    Taylor Larsen is a 64 y.o. female, estimated creatinine clearance is 80.3 mL/min (by C-G formula based on SCr of 0.72 mg/dL). weighing 82.4 kg (181 lb 11.2 oz).     has a past medical history of Breast cancer (CMS/HCC) (2019), GERD (gastroesophageal reflux disease), PONV (postoperative nausea and vomiting), Situational stress, and Stress headaches.    Social History     Tobacco Use    Smoking status: Former Smoker     Packs/day: 0.50     Years: 10.00     Pack years: 5.00     Types: Cigarettes     Last attempt to quit:      Years since quittin.9    Smokeless tobacco: Former User   Substance Use Topics    Alcohol use: No    Drug use: No       Results from last 7 days   Lab Units 12/15/19  0706 19  0809 19  0504 19  1923 19  0811   INR  6.57* 6.95*  --  4.21* 2.60*   HEMOGLOBIN g/dL 11.7*  --  12.1 13.0 13.3   HEMATOCRIT % 33.1*  --  35.1 38.9 41.0   PLATELETS 10*3/mm3 172  --  185 214 223     Results from last 7 days   Lab Units 12/15/19  0706 19  0504 19  1923 19  0811   SODIUM mmol/L 134* 133* 132* 140   POTASSIUM mmol/L 4.2 2.7* 3.0* 3.8   CHLORIDE mmol/L 104 99 95* 103   CO2 mmol/L 19.9* 19.2* 22.2 25.3   BUN mg/dL 27* 23 22 15   CREATININE mg/dL 0.72 0.78 0.83 0.81   CALCIUM mg/dL 7.6* 7.7* 8.6 9.0   BILIRUBIN mg/dL  --   --  0.8 0.6   ALK PHOS U/L  --   --  90 120*   ALT (SGPT) U/L  --   --  10 19   AST (SGOT) U/L  --   --  18 32   GLUCOSE mg/dL 116* 138* 130* 104     Anticoagulation history: Warfarin 5 mg daily    Hospital Anticoagulation:  Consulting provider: Dr Camilo  Start date:   Indication: DVT/PE treatment  Target INR: 2-3  Expected duration: TBD   Bridge Therapy: No              Supra-therapuetic on admit   Date 12/13 12/14 12/15 12/16         INR 4.21 6.95 6.57 5.24         Warfarin dose 5 mg prior to admission hold hold hold           Potential drug interactions: none noted at this  time    Relevant nutrition status: Pt on regular cardiac diet; 75% dinner reported as eaten on 12/15    Other: Active C Diff infection    Education complete?/ Date: home med    Assessment/Plan:  INR remains supratherapeutic, however is trending down. No s/sx of bleeding noted, will continue to hold warfarin and monitor INR trend    Pharmacy will continue to follow until discharge or discontinuation of warfarin.     Claudia Lawrence, Pharm.D., [facility]  Oncology Pharmacy Specialist  [phone]

## 2019-12-16 NOTE — PROGRESS NOTES
"    DAILY PROGRESS NOTE  [facility]    Patient Identification:  Name: Taylor Larsen  Age: 64 y.o.  Sex: female  :  1955  MRN: [id number]         Primary Care Physician: Tommy Osborn MD    Subjective:  Interval History:  Feeling better.  Not complaining of emesis or abdominal pain.  She still having the urge to 1 a stool but she stating that her frequency of BMs is decreasing and output is definitely decreasing.  Denies chest pain shortness of breath or altered mentation or any bleeding issues    Objective: No family at bedside.  Clinically much improved    Scheduled Meds:  sodium chloride 10 mL Intravenous Q12H   vancomycin 125 mg Oral Q6H   [START ON 2019] vancomycin 125 mg Oral Q12H   [START ON 2019] vancomycin 125 mg Oral Q24H   [START ON 2020] vancomycin 125 mg Oral Every Other Day     Continuous Infusions:  lactated ringers 75 mL/hr Last Rate: 75 mL/hr (19 0223)   Pharmacy Consult     Pharmacy to dose warfarin         Vital signs in last 24 hours:  Temp:  [96.7 °F (35.9 °C)-98.2 °F (36.8 °C)] 98.2 °F (36.8 °C)  Heart Rate:  [] 92  Resp:  [16-18] 16  BP: (122-128)/(66-71) 124/71    Intake/Output:    Intake/Output Summary (Last 24 hours) at 2019 1322  Last data filed at 2019 0914  Gross per 24 hour   Intake 1842 ml   Output --   Net 1842 ml       Exam:  /71 (BP Location: Left arm, Patient Position: Lying)   Pulse 92   Temp 98.2 °F (36.8 °C) (Oral)   Resp 16   Ht 160 cm (63\")   Wt 82.4 kg (181 lb 11.2 oz)   SpO2 95%   BMI 32.19 kg/m²     General Appearance:    Alert, cooperative, no distress, AAOx3                         Throat:   Oral mucosa pink and moist                           Neck:   No JVD                         Lungs:    Clear to auscultation bilaterally, respirations unlabored                         Heart:    Regular rate and rhythm, S1 and S2 normal                 Abdomen:     Soft, non-tender, bowel sounds active    "              Extremities:   No cyanosis or edema                             Data Review:  Labs in chart were reviewed.    Assessment:  Active Hospital Problems    Diagnosis  POA   • **C. difficile colitis [A04.72]  Yes   • Sepsis (CMS/HCC) [A41.9]  Yes   • GERD without esophagitis [K21.9]  Yes   • Portal vein thrombosis [I81]  Yes   • Anticoagulated on Coumadin [Z79.01]  Not Applicable   • Supratherapeutic INR [R79.1]  Yes   • Hypokalemia [E87.6]  Yes   • Malignant neoplasm of upper-inner quadrant of right breast in female, estrogen receptor positive (CMS/HCC) [C50.211, Z17.0]  Not Applicable      Resolved Hospital Problems   No resolved problems to display.       Plan:    C. difficile colitis -recurrent -ID suggesting treatment with vancomycin 125 mg 4 times daily for 10 days followed by prolonged pulse/taper              -Fevers finally resolving - WBC labile      Portal vein thrombosis on Coumadin are supratherapeutic and is trending down at this juncture no active bleeding              -d/w Pharm and Vit K was not given and INR continues to trend down while holding AC w/out bleeding issues     -continue to monitor INR - 6.95-6.57-5.24     Hypokalemia corrected.  Magnesium 1.9         Dispo - likely needs 1-2 more inpatient days     Sean Mullen MD  12/16/2019  1:22 PM

## 2019-12-16 NOTE — PROGRESS NOTES
LOS: 3 days     Chief Complaint:  Follow-up C diff    Interval History:  Fever resolved. WBC slightly up to 15k but still down since admission. Had BMs overnight and has some distension today. Appetite is fair. Tolerating vanco w/o nausea or vomiting.    ROS: no CP or SOA    Vital Signs  Temp:  [96.7 °F (35.9 °C)-98.4 °F (36.9 °C)] 98 °F (36.7 °C)  Heart Rate:  [] 90  Resp:  [16-18] 16  BP: (122-128)/(66-71) 124/71    Physical Exam:  General: awake, alert,   Eyes: no scleral icterus  ENT: MMM  Cardiovascular: NR, RR, no murmurs, no LE edema  Respiratory:  normal work of breathing on ambient air  GI: Abdomen is mildly distended and mildly TTP; no rebound or guarding  : no Martínez catheter present  Skin: No rashes, lesions, or embolic phenomenon  Neurological: Alert and oriented x 3, motor strength 5/5 in all four extremities  Psychiatric: Normal mood and affect     Antibiotics:  •  vancomycin oral solution reconstituted 125 mg, 125 mg, Oral, Q6H, Abdullahi Cano MD, 125 mg at 12/15/19 0553  •  [START ON 12/24/2019] vancomycin oral solution reconstituted 125 mg, 125 mg, Oral, Q12H, Abdullahi Cano MD  •  [START ON 12/31/2019] vancomycin oral solution reconstituted 125 mg, 125 mg, Oral, Q24H, Abdullahi Cano MD  •  [START ON 1/7/2020] vancomycin oral solution reconstituted 125 mg, 125 mg, Oral, Every Other Day, Abdullahi Cano MD    LABS:  CBC, BMP, INR, micro reviewed today  Lab Results   Component Value Date    WBC 15.40 (H) 12/16/2019    HGB 12.0 12/16/2019    HCT 35.0 12/16/2019    MCV 90.7 12/16/2019     12/16/2019     Lab Results   Component Value Date    GLUCOSE 85 12/16/2019    BUN 27 (H) 12/16/2019    CREATININE 0.61 12/16/2019    EGFRIFNONA 99 12/16/2019    BCR 44.3 (H) 12/16/2019    CO2 19.4 (L) 12/16/2019    CALCIUM 7.7 (L) 12/16/2019    ALBUMIN 3.40 (L) 12/13/2019    AST 18 12/13/2019    ALT 10 12/13/2019     Lab Results   Component Value Date  Was the patient seen in the last year in this department? Yes    Does patient have an active prescription for medications requested? No     Received Request Via: Pharmacy      Pt met protocol?: Yes    LAST OV 11/27/2018         INR 5.24 (C) 12/16/2019    INR 6.57 (C) 12/15/2019    INR 6.95 (C) 12/14/2019    PROTIME 48.0 (C) 12/16/2019    PROTIME 57.5 (C) 12/15/2019    PROTIME 60.1 (C) 12/14/2019     Microbiology:  12/13 BCx: NGTD  12/13 C diff PCR +  12/13 GI PCR: negative     Radiology (prior):  CT A/P with diffuse colitis    Assessment/Plan   1. Recurrent C difficile colitis  2. Portal vein thrombosis on coumadin   3. Elevated INR  4. Breast cancer (pre-Arimidex and XRT; no chemo planned)     Fever resolved. WBC down from admission. BM frequency improved from admission. Certainly still has room for improvement but trending in the right direction. I think with more time + antibiotics she'll start feeling back to her usual self. Continue treatment dose vancomycin 125 mg PO q6h x 10 days followed by prolonged vancomycin pulse/tapered regimen. Orders have been placed in Epic if they primary team could please transition this to an outpatient RX at the time of discharge.     Thank you for allowing me to be involved in the care of this patient. Infectious diseases will sign off at this time with antibiotics plan in place but please call me at [phone] if any further questions.

## 2019-12-16 NOTE — PLAN OF CARE
Problem: Patient Care Overview  Goal: Plan of Care Review  Outcome: Ongoing (interventions implemented as appropriate)  Flowsheets  Taken 12/16/2019 0557  Progress: no change  Outcome Summary: Frequent, loose, dark brown bowel movments through the night. PO Vancomycin administered. INR being monitored by phyisicans. Afebrile. Awaiting bowel movements to form up more. IVF infusing. Hx right breast cancer, to start radiation soon, post-lumpectomy. Ambulating safely to bedside commode independently.  Taken 12/15/2019 1957  Plan of Care Reviewed With: patient;spouse

## 2019-12-16 NOTE — PLAN OF CARE
Problem: Patient Care Overview  Goal: Plan of Care Review  Outcome: Ongoing (interventions implemented as appropriate)  Flowsheets  Taken 12/16/2019 6398  Progress: improving  Outcome Summary: Pt still having frequent loose stools. PO vanc continued. INR 5.24, MD's monitoring. No c/o pain, VSS, will continue to monitor.  Taken 12/16/2019 9748  Plan of Care Reviewed With: patient

## 2019-12-16 NOTE — CONSULTS
"Adult Nutrition  Assessment/PES    Patient Name:  Taylor Larsen  YOB: 1955  MRN: [id number]  Admit Date:  12/13/2019    Assessment Date:  12/16/2019  Nutrition assessment triggered by RN consult-poor po intake. Due to being on oral vancomycin-will interfere with taste of food.  Provided nutrition therapy. Went over foods she may be able to tolerate/taste. She agreed to nutritional supplement-boost and orange sherbet with every meal.   Reason for Assessment     Row Name 12/16/19 1120          Reason for Assessment    Reason For Assessment  nurse/nurse practitioner consult     Diagnosis   C. difficile colitis; breast ca         Nutrition/Diet History     Row Name 12/16/19 1121          Nutrition/Diet History    Typical Food/Fluid Intake  poor po intake \"nothing taste good\"; on oral vanc         Anthropometrics     Row Name 12/16/19 1121          Body Mass Index (BMI)    BMI Assessment  BMI 30-34.9: obesity grade I         Labs/Tests/Procedures/Meds     Row Name 12/16/19 1121          Labs/Procedures/Meds    Lab Results Reviewed  reviewed, pertinent        Diagnostic Tests/Procedures    Diagnostic Test/Procedure Reviewed  reviewed, pertinent        Medications    Pertinent Medications Reviewed  reviewed, pertinent     Pertinent Medications Comments  NaCl         Physical Findings     Row Name 12/16/19 1121          Physical Findings    Overall Physical Appearance  -- B=21         Estimated/Assessed Needs     Row Name 12/16/19 1121          Calculation Measurements    Weight Used For Calculations  82.4 kg (181 lb 10.5 oz)        Estimated/Assessed Needs    Additional Documentation  KCAL/KG (Group);Protein Requirements (Group);Fluid Requirements (Group)        KCAL/KG    KCAL/KG  20 Kcal/Kg (kcal);25 Kcal/Kg (kcal)     20 Kcal/Kg (kcal)  1648     25 Kcal/Kg (kcal)  2060        Protein Requirements    Weight Used For Protein Calculations  82.4 kg (181 lb 10.5 oz)     Est Protein Requirement Amount " (gms/kg)  1.0 gm protein     Estimated Protein Requirements (gms/day)  82.4        Fluid Requirements    Estimated Fluid Requirements (mL/day)  2050     RDA Method (mL)  2050     Keven-Rhonda Method (over 20 kg)  3148         Nutrition Prescription Ordered     Row Name 12/16/19 1122          Nutrition Prescription PO    Common Modifiers  Cardiac         Evaluation of Received Nutrient/Fluid Intake     Row Name 12/16/19 1122          PO Evaluation    Number of Meals  3     % PO Intake  50               Problem/Interventions:  Problem 1     Row Name 12/16/19 1122          Nutrition Diagnoses Problem 1    Problem 1  Inadequate Nutrient Intake     Etiology (related to)  MNT for Treatment/Condition     Signs/Symptoms (evidenced by)  Report of Mnimal PO Intake               Intervention Goal     Row Name 12/16/19 1122          Intervention Goal    General  Maintain nutrition;Meet nutritional needs for age/condition     PO  Tolerate PO;Increase intake     Weight  Maintain weight         Nutrition Intervention     Row Name 12/16/19 1123          Nutrition Intervention    RD/Tech Action  Follow Tx progress;Care plan reviewd         Nutrition Prescription     Row Name 12/16/19 1123          Nutrition Prescription PO    PO Prescription  Begin/change supplement     Supplement  Boost     Supplement Frequency  3 times a day     New PO Prescription Ordered?  Yes         Education/Evaluation     Row Name 12/16/19 1123          Education    Education  Will Instruct as appropriate        Monitor/Evaluation    Monitor  Per protocol           Electronically signed by:  Maggie Calvin RD  12/16/19 11:23 AM

## 2019-12-17 LAB
INR PPP: 5.36 (ref 0.9–1.1)
PROTHROMBIN TIME: 48.9 SECONDS (ref 11.7–14.2)

## 2019-12-17 PROCEDURE — 85610 PROTHROMBIN TIME: CPT | Performed by: INTERNAL MEDICINE

## 2019-12-17 RX ADMIN — VANCOMYCIN 125 MG: KIT at 18:30

## 2019-12-17 RX ADMIN — SODIUM CHLORIDE, POTASSIUM CHLORIDE, SODIUM LACTATE AND CALCIUM CHLORIDE 75 ML/HR: 600; 310; 30; 20 INJECTION, SOLUTION INTRAVENOUS at 05:02

## 2019-12-17 RX ADMIN — SODIUM CHLORIDE, PRESERVATIVE FREE 10 ML: 5 INJECTION INTRAVENOUS at 09:33

## 2019-12-17 RX ADMIN — VANCOMYCIN 125 MG: KIT at 05:32

## 2019-12-17 RX ADMIN — SODIUM CHLORIDE, PRESERVATIVE FREE 10 ML: 5 INJECTION INTRAVENOUS at 21:17

## 2019-12-17 RX ADMIN — VANCOMYCIN 125 MG: KIT at 12:57

## 2019-12-17 NOTE — PROGRESS NOTES
Discharge Planning Assessment  [facility]     Patient Name: Taylor Larsen  MRN: [id number]  Today's Date: 12/17/2019    Admit Date: 12/13/2019    Discharge Needs Assessment     Row Name 12/17/19 1056       Living Environment    Lives With  spouse    Current Living Arrangements  home/apartment/condo    Primary Care Provided by  self    Provides Primary Care For  no one    Family Caregiver if Needed  spouse    Quality of Family Relationships  helpful;involved    Able to Return to Prior Arrangements  no       Resource/Environmental Concerns    Resource/Environmental Concerns  none       Transition Planning    Patient/Family Anticipates Transition to  home with family    Patient/Family Anticipated Services at Transition  none    Transportation Anticipated  family or friend will provide       Discharge Needs Assessment    Readmission Within the Last 30 Days  no previous admission in last 30 days    Concerns to be Addressed  denies needs/concerns at this time;no discharge needs identified    Equipment Currently Used at Home  none    Equipment Needed After Discharge  none    Provided post acute provider list?  Yes    Post Acute Provider Lists  Home Health    Post Acuite Provider List  Delivered    Delivered To  Patient    Method of Delivery  In person    Discharge Coordination/Progress  home with spouse        Discharge Plan     Row Name 12/17/19 105       Plan    Plan  home with spouse; follow for needs    Plan Comments  Checked IMM. Spoke with pt bedside. Confirmed facesheet correct. Explained role of CCP. Pt lives with her spouse in a home with a few steps to enter. Pt is IADLs no DME used. Pt reports she has never used HH or SNF in past. Pt reports her PCP is Dr. Tommy Osborn and she uses [facility] on [address] for prescriptions with no issues. Pt reports he plans home, denies current needs. CCP to follow…PEG HuangW        Destination      Coordination has not been started for this encounter.      Durable Medical  Equipment      Coordination has not been started for this encounter.      Dialysis/Infusion      Coordination has not been started for this encounter.      Home Medical Care      Coordination has not been started for this encounter.      Therapy      Coordination has not been started for this encounter.      Community Resources      Coordination has not been started for this encounter.          Demographic Summary     Row Name 12/17/19 1056       General Information    Admission Type  inpatient    Arrived From  home    Required Notices Provided  Important Message from Medicare    Reason for Consult  discharge planning    Preferred Language  English     Used During This Interaction  no       Contact Information    Permission Granted to Share Info With  facility ;family/designee        Functional Status     Row Name 12/17/19 1056       Functional Status    Usual Activity Tolerance  good    Current Activity Tolerance  moderate       Functional Status, IADL    Medications  independent    Meal Preparation  independent    Housekeeping  independent    Laundry  independent    Shopping  independent       Mental Status    General Appearance WDL  WDL       Mental Status Summary    Recent Changes in Mental Status/Cognitive Functioning  no changes        Psychosocial    No documentation.       Abuse/Neglect    No documentation.       Legal    No documentation.       Substance Abuse    No documentation.       Patient Forms    No documentation.           JL Salgado

## 2019-12-17 NOTE — PROGRESS NOTES
Name: Taylor Larsen ADMIT: 2019   : 1955  PCP: Tommy Osborn MD    MRN: [id number] LOS: 4 days   AGE/SEX: 64 y.o. female  ROOM: [facility]     Subjective   Subjective   CC: diarrhea  No acute events.  Patient has no new complaints.  Her diarrhea is much improved.  Taking PO. No CP/dyspnea/f/c/n/v/d.    Objective   Objective   Vital Signs  Temp:  [96.8 °F (36 °C)-99.1 °F (37.3 °C)] 96.8 °F (36 °C)  Heart Rate:  [] 88  Resp:  [16] 16  BP: (126-130)/(60-72) 126/72  SpO2:  [96 %-97 %] 96 %  on   ;   Device (Oxygen Therapy): room air  Body mass index is 32.19 kg/m².  Physical Exam   Constitutional: She is oriented to person, place, and time. No distress.   HENT:   Head: Normocephalic and atraumatic.   Mouth/Throat: Oropharynx is clear and moist.   Eyes: Pupils are equal, round, and reactive to light. Conjunctivae and EOM are normal.   Neck: Normal range of motion. Neck supple.   Cardiovascular: Normal rate, regular rhythm and intact distal pulses.   Pulmonary/Chest: Effort normal and breath sounds normal.   Abdominal: Soft. Bowel sounds are normal. There is no tenderness.   Musculoskeletal: She exhibits no edema or tenderness.   Neurological: She is alert and oriented to person, place, and time.   Skin: Skin is warm and dry. Capillary refill takes less than 2 seconds. She is not diaphoretic.   Psychiatric: She has a normal mood and affect. Her behavior is normal.   Nursing note and vitals reviewed.      Results Review:       I reviewed the patient's new clinical results.  Results from last 7 days   Lab Units 19  0527 12/15/19  0706 19  0504 19  1923   WBC 10*3/mm3 15.40* 13.22* 20.59* 21.04*   HEMOGLOBIN g/dL 12.0 11.7* 12.1 13.0   PLATELETS 10*3/mm3 193 172 185 214     Results from last 7 days   Lab Units 19  0527 12/15/19  0706 19  0504 19  1923   SODIUM mmol/L 138 134* 133* 132*   POTASSIUM mmol/L 3.8 4.2 2.7* 3.0*   CHLORIDE mmol/L 106 104 99 95*   CO2  mmol/L 19.4* 19.9* 19.2* 22.2   BUN mg/dL 27* 27* 23 22   CREATININE mg/dL 0.61 0.72 0.78 0.83   GLUCOSE mg/dL 85 116* 138* 130*   Estimated Creatinine Clearance: 94.7 mL/min (by C-G formula based on SCr of 0.61 mg/dL).  Results from last 7 days   Lab Units 12/13/19  1923   ALBUMIN g/dL 3.40*   BILIRUBIN mg/dL 0.8   ALK PHOS U/L 90   AST (SGOT) U/L 18   ALT (SGPT) U/L 10     Results from last 7 days   Lab Units 12/16/19  0527 12/15/19  0706 12/14/19  0504 12/13/19  1923   CALCIUM mg/dL 7.7* 7.6* 7.7* 8.6   ALBUMIN g/dL  --   --   --  3.40*   MAGNESIUM mg/dL  --  1.9  --   --      Results from last 7 days   Lab Units 12/13/19  2054   LACTATE mmol/L 1.7     No results found for: HGBA1C, POCGLU      sodium chloride 10 mL Intravenous Q12H   vancomycin 125 mg Oral Q6H   [START ON 12/24/2019] vancomycin 125 mg Oral Q12H   [START ON 12/31/2019] vancomycin 125 mg Oral Q24H   [START ON 1/7/2020] vancomycin 125 mg Oral Every Other Day       Pharmacy Consult    Pharmacy to dose warfarin    Diet Regular; Cardiac       Assessment/Plan     Active Hospital Problems    Diagnosis  POA   • **C. difficile colitis [A04.72]  Yes   • Sepsis (CMS/HCC) [A41.9]  Yes   • GERD without esophagitis [K21.9]  Yes   • Portal vein thrombosis [I81]  Yes   • Anticoagulated on Coumadin [Z79.01]  Not Applicable   • Supratherapeutic INR [R79.1]  Yes   • Hypokalemia [E87.6]  Yes   • Malignant neoplasm of upper-inner quadrant of right breast in female, estrogen receptor positive (CMS/HCC) [C50.211, Z17.0]  Not Applicable      Resolved Hospital Problems   No resolved problems to display.   Cdiff Colitis  - recurrent  - doing much better on oral vancomycin-will finish 10d course and proceed with long taper/pulse dose  - appreciate ID recs    Portal Vein Thrombosis  - supratherapeutic INR on admission remains so, probably due to Cdiff and oral abx  - follow INR, holdign coumadin  - no e/o bleeding      Warfarin for DVT prophylaxis.  Full code.  Discussed  with patient and nursing staff.  Anticipate discharge home tomorrow.      Amarjit Camilo MD  [facility]  12/17/19  6:13 PM

## 2019-12-17 NOTE — PLAN OF CARE
Problem: Patient Care Overview  Goal: Plan of Care Review  Outcome: Ongoing (interventions implemented as appropriate)  Flowsheets (Taken 12/17/2019 0630)  Progress: no change  Plan of Care Reviewed With: patient; spouse  Outcome Summary: Loose stools continue. PO Vanc continued. Up with standby assistance. Patient is sore from getting in and out of the bed frequently.

## 2019-12-17 NOTE — PROGRESS NOTES
Pharmacy Consult: Warfarin Dosing/ Monitoring    Taylor Larsen is a 64 y.o. female, estimated creatinine clearance is 80.3 mL/min (by C-G formula based on SCr of 0.72 mg/dL). weighing 82.4 kg (181 lb 11.2 oz).     has a past medical history of Breast cancer (CMS/HCC) (2019), GERD (gastroesophageal reflux disease), PONV (postoperative nausea and vomiting), Situational stress, and Stress headaches.    Social History     Tobacco Use    Smoking status: Former Smoker     Packs/day: 0.50     Years: 10.00     Pack years: 5.00     Types: Cigarettes     Last attempt to quit:      Years since quittin.9    Smokeless tobacco: Former User   Substance Use Topics    Alcohol use: No    Drug use: No       Results from last 7 days   Lab Units 12/15/19  0706 19  0809 19  0504 19  1923 19  0811   INR  6.57* 6.95*  --  4.21* 2.60*   HEMOGLOBIN g/dL 11.7*  --  12.1 13.0 13.3   HEMATOCRIT % 33.1*  --  35.1 38.9 41.0   PLATELETS 10*3/mm3 172  --  185 214 223     Results from last 7 days   Lab Units 12/15/19  0706 19  0504 19  1923 19  0811   SODIUM mmol/L 134* 133* 132* 140   POTASSIUM mmol/L 4.2 2.7* 3.0* 3.8   CHLORIDE mmol/L 104 99 95* 103   CO2 mmol/L 19.9* 19.2* 22.2 25.3   BUN mg/dL 27* 23 22 15   CREATININE mg/dL 0.72 0.78 0.83 0.81   CALCIUM mg/dL 7.6* 7.7* 8.6 9.0   BILIRUBIN mg/dL  --   --  0.8 0.6   ALK PHOS U/L  --   --  90 120*   ALT (SGPT) U/L  --   --  10 19   AST (SGOT) U/L  --   --  18 32   GLUCOSE mg/dL 116* 138* 130* 104     Anticoagulation history: Warfarin 5 mg daily    Hospital Anticoagulation:  Consulting provider: Dr Camilo  Start date:   Indication: DVT/PE treatment  Target INR: 2-3  Expected duration: TBD   Bridge Therapy: No              Supra-therapuetic on admit   Date 12/13 12/14 12/15 12/16 12/27        INR 4.21 6.95 6.57 5.24 5.36        Warfarin dose 5 mg prior to admission hold hold hold hold          Potential drug interactions: none noted at this  time    Relevant nutrition status: Pt on regular cardiac diet; 75% dinner reported as eaten on 12/15    Other: Active C Diff infection    Education complete?/ Date: home med    Assessment/Plan:  INR remains supratherapeutic, slight increase. No s/sx of bleeding noted, will continue to hold warfarin and monitor INR trend    Pharmacy will continue to follow until discharge or discontinuation of warfarin.     Claudia Lawrence, Pharm.D., [facility]  Oncology Pharmacy Specialist  [phone]

## 2019-12-17 NOTE — PLAN OF CARE
VSS. Up with standby assist. Pt sore d/t frequently getting in and out of bed. Loose stools continue. Will continue to monitor.

## 2019-12-18 VITALS
DIASTOLIC BLOOD PRESSURE: 61 MMHG | RESPIRATION RATE: 16 BRPM | SYSTOLIC BLOOD PRESSURE: 130 MMHG | WEIGHT: 181.7 LBS | BODY MASS INDEX: 32.2 KG/M2 | HEIGHT: 63 IN | HEART RATE: 84 BPM | OXYGEN SATURATION: 96 % | TEMPERATURE: 98.1 F

## 2019-12-18 PROBLEM — E87.6 HYPOKALEMIA: Status: RESOLVED | Noted: 2019-11-10 | Resolved: 2019-12-18

## 2019-12-18 LAB
BACTERIA SPEC AEROBE CULT: NORMAL
BACTERIA SPEC AEROBE CULT: NORMAL
INR PPP: 3.73 (ref 0.9–1.1)
PROTHROMBIN TIME: 36.7 SECONDS (ref 11.7–14.2)

## 2019-12-18 PROCEDURE — 85610 PROTHROMBIN TIME: CPT | Performed by: INTERNAL MEDICINE

## 2019-12-18 RX ORDER — WARFARIN SODIUM 5 MG/1
2.5 TABLET ORAL NIGHTLY
Start: 2019-12-19 | End: 2020-02-04 | Stop reason: ALTCHOICE

## 2019-12-18 RX ORDER — VANCOMYCIN HYDROCHLORIDE 125 MG/1
CAPSULE ORAL
Qty: 54 CAPSULE | Refills: 0 | Status: SHIPPED | OUTPATIENT
Start: 2019-12-18 | End: 2020-02-04

## 2019-12-18 RX ORDER — WARFARIN SODIUM 5 MG/1
2.5 TABLET ORAL NIGHTLY
Start: 2019-12-19 | End: 2019-12-18 | Stop reason: SDUPTHER

## 2019-12-18 RX ADMIN — SODIUM CHLORIDE, PRESERVATIVE FREE 10 ML: 5 INJECTION INTRAVENOUS at 09:51

## 2019-12-18 RX ADMIN — VANCOMYCIN 125 MG: KIT at 00:14

## 2019-12-18 RX ADMIN — VANCOMYCIN 125 MG: KIT at 11:59

## 2019-12-18 RX ADMIN — Medication 10 MG: at 00:13

## 2019-12-18 RX ADMIN — VANCOMYCIN 125 MG: KIT at 07:13

## 2019-12-18 NOTE — DISCHARGE SUMMARY
Date of Admission: 12/13/2019  Date of Discharge:  12/18/2019  Primary Care Physician: Tommy Osborn MD     Discharge Diagnosis:  Active Hospital Problems    Diagnosis  POA   • **C. difficile colitis [A04.72]  Yes   • Sepsis (CMS/HCC) [A41.9]  Yes   • GERD without esophagitis [K21.9]  Yes   • Portal vein thrombosis [I81]  Yes   • Anticoagulated on Coumadin [Z79.01]  Not Applicable   • Supratherapeutic INR [R79.1]  Yes   • Malignant neoplasm of upper-inner quadrant of right breast in female, estrogen receptor positive (CMS/HCC) [C50.211, Z17.0]  Not Applicable      Resolved Hospital Problems    Diagnosis Date Resolved POA   • Hypokalemia [E87.6] 12/18/2019 Yes       Presenting Problem/History of Present Illness:  C. difficile colitis [A04.72]     Hospital Course:  The patient is a 64 y.o. female  with a history of right breast cancer s/p lumpectomy 11/4/19 on arimidex, portal vein thrombosis on coumadin, and cdiff colitis following her surgery and treated with oral vancomycin that presented to [facility] complaining of nausea, non-bloody, non-bilious vomiting, and watery diarrhea for about 2 days.  Please see admission H&P from 12/14/19 for further details.  She was found to have Cdiff colitis. She met criteria for sepsis with WBC of 21k and HR>90 on admission. She was started on IV fluids and oral vancomycin.  Her condition steadily improved and she had no evidence of any complication.  Infectious diseases followed the patient given recurrent cdiff and recommended a 10 day course of oral vancomycin followed by a long pulse/tapered regimen as outlined below.    The patient also had a supratherapeutic INR likely due to her GI infection and antibiotic treatment.  Her coumadin was held and her INR improved to 3.53 at the time of discharge.  She at no point had any evidence of bleeding.  She is to resume her coumadin at 2.5mg instead of 5mg tomorrow.  She should keep close follow up with her PCP to  "follow INR.    Exam Today:  Blood pressure 130/61, pulse 84, temperature 98.1 °F (36.7 °C), temperature source Oral, resp. rate 16, height 160 cm (63\"), weight 82.4 kg (181 lb 11.2 oz), SpO2 96 %.  Constitutional: She is oriented to person, place, and time. No distress.   Head: Normocephalic and atraumatic.   Mouth/Throat: Oropharynx is clear and moist.   Eyes: Pupils are equal, round, and reactive to light. Conjunctivae and EOM are normal.   Neck: Normal range of motion. Neck supple.   Cardiovascular: Normal rate, regular rhythm and intact distal pulses.   Pulmonary/Chest: Effort normal and breath sounds normal.   Abdominal: Soft. Bowel sounds are normal. There is no tenderness.   Musculoskeletal: She exhibits no edema or tenderness.   Neurological: She is alert and oriented to person, place, and time.   Skin: Skin is warm and dry. Capillary refill takes less than 2 seconds. She is not diaphoretic.   Psychiatric: She has a normal mood and affect. Her behavior is normal.   Nursing note and vitals reviewed.      Consults:   Consults     Date and Time Order Name Status Description    12/14/2019 0652 Inpatient Infectious Diseases Consult Completed     12/13/2019 2132 [facility] (on-call MD unless specified) Details Completed     11/11/2019 1608 Hematology & Oncology Inpatient Consult Completed     11/11/2019 0030 Inpatient Gastroenterology Consult Completed     11/10/2019 1649 [facility] (on-call MD unless specified) Details Completed            Discharge Disposition:  Home or Self Care    Discharge Medications:     Discharge Medications      New Medications      Instructions Start Date   vancomycin 50 MG/ML reconstituted solution oral solution reconstituted   125 mg, Oral, Every 6 Hours Scheduled      vancomycin 50 MG/ML reconstituted solution oral solution reconstituted   125 mg, Oral, Every 12 Hours   Start Date:  December 24, 2019     vancomycin 50 MG/ML reconstituted solution oral solution reconstituted   125 mg, Oral, Every 24 " Hours   Start Date:  December 31, 2019     vancomycin 50 MG/ML reconstituted solution oral solution reconstituted   125 mg, Oral, Every Other Day   Start Date:  January 7, 2020        Changes to Medications      Instructions Start Date   warfarin 5 MG tablet  Commonly known as:  COUMADIN  What changed:    · how much to take  · how to take this  · when to take this   2.5 mg, Oral, Nightly, Take daily   Start Date:  December 19, 2019        Continue These Medications      Instructions Start Date   acetaminophen 325 MG tablet  Commonly known as:  TYLENOL   650 mg, Oral, Every 6 Hours PRN      doxylamine 25 MG tablet  Commonly known as:  UNISOM   25 mg, Oral, Nightly PRN      melatonin 5 MG tablet tablet   10 mg, Oral, Nightly PRN      PROBIOTIC PO   Oral             Discharge Diet:   Diet Instructions     Diet: Regular, Cardiac; Thin      Discharge Diet:   Regular  Cardiac       Fluid Consistency:  Thin          Activity at Discharge:   Activity Instructions     Activity as Tolerated            Follow-up Appointments:  Future Appointments   Date Time Provider Department Center   12/26/2019  2:20 PM  [facility]7  SHAE RO SHAE   12/27/2019  3:20 PM  [facility]  SHAE RO [facility]AE   12/30/2019  3:20 PM  [facility]  SHAE RO SHAE   12/31/2019  3:20 PM  [facility]7  SHAE RO SHAE   1/2/2020  3:20 PM  [facility]7  SHAE RO SHAE   1/3/2020  3:20 PM  [facility]7  SHAE RO SHAE   1/6/2020  7:30 AM LAB CHAIR 3 [facility] JACKI  LAB KRES LAG   1/6/2020  8:00 AM RN REV 2 [facility] KRE  INFUS KRE LAG   1/6/2020  3:20 PM  [facility] 3557  SHAE RO SHAE   1/7/2020  3:20 PM  [facility]7  SHAE RO SHAE   1/8/2020  3:20 PM  [facility]7  SHAE RO SHAE   1/9/2020  3:20 PM  [facility] 3557  SHAE RO SHAE   1/10/2020  3:20 PM  [facility] 3557  SHAE RO SHAE   1/13/2020  3:20 PM  [facility] 3557  SHAE RO SHAE   1/14/2020  3:20 PM  [facility] 3557  SHAE RO SHAE   1/15/2020  3:20 PM  [facility] 3557  SHAE RO SHAE    1/16/2020  3:20 PM BH ROSA LINAC 3557 BH ROSA RO ROSA   1/17/2020  3:20 PM BH ROSA LINAC 3557 BH ROSA RO ROSA   1/20/2020  3:20 PM BH ROSA LINAC 3557 BH ROSA RO ROSA   1/21/2020  3:20 PM BH ROSA LINAC 3557 BH ROSA RO ROSA   1/22/2020  3:20 PM BH KAUFMAN LINAC 3557 BH ROSA RO ROSA   2/3/2020  7:30 AM LAB CHAIR 4 [facility] KRESGE BH LAB KRES LAG   2/3/2020  8:00 AM RN REV 2 CBC KRE BH INFUS KRE LAG   2/19/2020  9:15 AM ROSA PET CT BH ROSA PET ROSA   2/19/2020 10:00 AM LAB CHAIR 5 [facility] KRESGE BH LAB KRES LAG   2/19/2020 10:30 AM RN REV 1 CBC KRE BH INFUS KRE LAG   2/26/2020  8:20 AM VITALS ONLY CBC KRE BH LAB KRES LAG   2/26/2020  8:40 AM Aguilar Rodgers II, MD MGK CBC KRES BH CBC Rosa     Additional Instructions for the Follow-ups that You Need to Schedule     Discharge Follow-up with PCP   As directed       Currently Documented PCP:    Tommy Osborn MD    PCP Phone Number:    [phone]     Follow Up Details:  5-7d               Test Results Pending at Discharge:   Order Current Status    Blood Culture - Blood, Arm, Right Preliminary result    Blood Culture - Blood, Hand, Left Preliminary result           Amarjit Camilo MD  12/18/19  10:05 AM    Time Spent on Discharge Activities: Greater than 30 minutes.

## 2019-12-18 NOTE — PROGRESS NOTES
Case Management Discharge Note      Final Note: Home--no needs    Provided post acute provider list?: Yes  Post Acute Provider Lists: Home Health  Post Acuite Provider List: Delivered  Delivered To: Patient  Method of Delivery: In person    Destination      No service has been selected for the patient.      Durable Medical Equipment      No service has been selected for the patient.      Dialysis/Infusion      No service has been selected for the patient.      Home Medical Care      No service has been selected for the patient.      Therapy      No service has been selected for the patient.      Community Resources      No service has been selected for the patient.             Final Discharge Disposition Code: 01 - home or self-care

## 2019-12-18 NOTE — PLAN OF CARE
Problem: Patient Care Overview  Goal: Plan of Care Review  Outcome: Ongoing (interventions implemented as appropriate)  Flowsheets (Taken 12/18/2019 0522)  Progress: improving  Plan of Care Reviewed With: patient  Outcome Summary: patient states that diarrhea continues but seems to be slowing; up with assist to BSC r/t general weakness; PO vanc continues for CDiff; skin intact with no s/s of breakdown; probable discharge to home 12/18

## 2019-12-19 ENCOUNTER — TELEPHONE (OUTPATIENT)
Dept: GENERAL RADIOLOGY | Facility: HOSPITAL | Age: 64
End: 2019-12-19

## 2019-12-19 ENCOUNTER — APPOINTMENT (OUTPATIENT)
Dept: RADIATION ONCOLOGY | Facility: HOSPITAL | Age: 64
End: 2019-12-19

## 2019-12-19 ENCOUNTER — READMISSION MANAGEMENT (OUTPATIENT)
Dept: CALL CENTER | Facility: HOSPITAL | Age: 64
End: 2019-12-19

## 2019-12-19 ENCOUNTER — DOCUMENTATION (OUTPATIENT)
Dept: ONCOLOGY | Facility: CLINIC | Age: 64
End: 2019-12-19

## 2019-12-19 DIAGNOSIS — Z79.01 ANTICOAGULATED ON COUMADIN: Primary | ICD-10-CM

## 2019-12-19 NOTE — TELEPHONE ENCOUNTER
----- Message from Aguilar Rodgers II, MD sent at 12/19/2019  9:24 AM EST -----  Please add an INR with nurse review on 12/23/2019 and 12/30/2019.  Keep the other appointments as well.

## 2019-12-19 NOTE — PROGRESS NOTES
"I am adding an INR on 12/23/2019 and 12/30/2019 after reviewing the following message sent to me from Claudia Lawrence, oncology pharmacist    \"this patient follows you for her breast cancer and warfarin management. She came into the hospital with Cdiff and an INR that peaked at 6.95 on 12/14 (most likely due to her inability to eat with the diarrhea); we held her dosing of 5 mg warfarin the entire time she was here (12/13-12/18); INR is back to 3.73 today and Dr. Camilo is restarting her on 2.5 mg daily. She probably needs a RN check for INR next week given her supratherapeutic levels\"  "

## 2019-12-19 NOTE — PAYOR COMM NOTE
"ATTN: Armando Nurse Reviewer   REF: [id number]  RE: Discharge Notification     Sally Ortiz  Utilization Review/Room Reservations  Phone: Tessa Sharma: [phone], Sally: [phone]  Fax: [phone]  Email: [email or web address]  Please call, fax back, or email with authorization or any questions! Thanks!        Taylor Monaco (64 y.o. Female)     Date of Birth Social Security Number Address Home Phone MRN    1955  [id number] [address] [phone] [id number]    Adventism Marital Status          Adventism        Admission Date Admission Type Admitting Provider Attending Provider Department, Room/Bed    12/13/19 Emergency Amarjit Camilo MD  [address], P386/1    Discharge Date Discharge Disposition Discharge Destination        12/18/2019 Home or Self Care              Attending Provider:  (none)   Allergies:  No Known Allergies    Isolation:  Spore   Infection:  C.difficile (11/11/19)   Code Status:  Prior    Ht:  160 cm (63\")   Wt:  82.4 kg (181 lb 11.2 oz)    Admission Cmt:  None   Principal Problem:  C. difficile colitis [A04.72]                 Active Insurance as of 12/13/2019     Primary Coverage     Payor Plan Insurance Group Employer/Plan Group    ARMANDO BLUE [facility] PPO [id number]     Payor Plan Address Payor Plan Phone Number Payor Plan Fax Number Effective Dates    [address] [phone]  8/1/2017 - None Entered    [facility] [id number]       Subscriber Name Subscriber Birth Date Member ID       TAYLOR MONACO 1955 [id number]                 Emergency Contacts      (Rel.) Home Phone Work Phone Mobile Phone    Shanon Bhagat (Daughter) [phone] -- [phone]               Discharge Summary      Amarjit Camilo MD at 12/18/19 1005          Date of Admission: 12/13/2019  Date of Discharge:  12/18/2019  Primary Care Physician: Tommy Osborn MD     Discharge " Diagnosis:  Active Hospital Problems    Diagnosis  POA   • **C. difficile colitis [A04.72]  Yes   • Sepsis (CMS/HCC) [A41.9]  Yes   • GERD without esophagitis [K21.9]  Yes   • Portal vein thrombosis [I81]  Yes   • Anticoagulated on Coumadin [Z79.01]  Not Applicable   • Supratherapeutic INR [R79.1]  Yes   • Malignant neoplasm of upper-inner quadrant of right breast in female, estrogen receptor positive (CMS/HCC) [C50.211, Z17.0]  Not Applicable      Resolved Hospital Problems    Diagnosis Date Resolved POA   • Hypokalemia [E87.6] 12/18/2019 Yes       Presenting Problem/History of Present Illness:  C. difficile colitis [A04.72]     Hospital Course:  The patient is a 64 y.o. female  with a history of right breast cancer s/p lumpectomy 11/4/19 on arimidex, portal vein thrombosis on coumadin, and cdiff colitis following her surgery and treated with oral vancomycin that presented to [facility] complaining of nausea, non-bloody, non-bilious vomiting, and watery diarrhea for about 2 days.  Please see admission H&P from 12/14/19 for further details.  She was found to have Cdiff colitis. She met criteria for sepsis with WBC of 21k and HR>90 on admission. She was started on IV fluids and oral vancomycin.  Her condition steadily improved and she had no evidence of any complication.  Infectious diseases followed the patient given recurrent cdiff and recommended a 10 day course of oral vancomycin followed by a long pulse/tapered regimen as outlined below.    The patient also had a supratherapeutic INR likely due to her GI infection and antibiotic treatment.  Her coumadin was held and her INR improved to 3.53 at the time of discharge.  She at no point had any evidence of bleeding.  She is to resume her coumadin at 2.5mg instead of 5mg tomorrow.  She should keep close follow up with her PCP to follow INR.    Exam Today:  Blood pressure 130/61, pulse 84, temperature 98.1 °F (36.7 °C), temperature source Oral, resp.  "rate 16, height 160 cm (63\"), weight 82.4 kg (181 lb 11.2 oz), SpO2 96 %.  Constitutional: She is oriented to person, place, and time. No distress.   Head: Normocephalic and atraumatic.   Mouth/Throat: Oropharynx is clear and moist.   Eyes: Pupils are equal, round, and reactive to light. Conjunctivae and EOM are normal.   Neck: Normal range of motion. Neck supple.   Cardiovascular: Normal rate, regular rhythm and intact distal pulses.   Pulmonary/Chest: Effort normal and breath sounds normal.   Abdominal: Soft. Bowel sounds are normal. There is no tenderness.   Musculoskeletal: She exhibits no edema or tenderness.   Neurological: She is alert and oriented to person, place, and time.   Skin: Skin is warm and dry. Capillary refill takes less than 2 seconds. She is not diaphoretic.   Psychiatric: She has a normal mood and affect. Her behavior is normal.   Nursing note and vitals reviewed.      Consults:   Consults     Date and Time Order Name Status Description    12/14/2019 0652 Inpatient Infectious Diseases Consult Completed     12/13/2019 2132 [facility] (on-call MD unless specified) Details Completed     11/11/2019 1608 Hematology & Oncology Inpatient Consult Completed     11/11/2019 0030 Inpatient Gastroenterology Consult Completed     11/10/2019 1649 [facility] (on-call MD unless specified) Details Completed            Discharge Disposition:  Home or Self Care    Discharge Medications:     Discharge Medications      New Medications      Instructions Start Date   vancomycin 50 MG/ML reconstituted solution oral solution reconstituted   125 mg, Oral, Every 6 Hours Scheduled      vancomycin 50 MG/ML reconstituted solution oral solution reconstituted   125 mg, Oral, Every 12 Hours   Start Date:  December 24, 2019     vancomycin 50 MG/ML reconstituted solution oral solution reconstituted   125 mg, Oral, Every 24 Hours   Start Date:  December 31, 2019     vancomycin 50 MG/ML reconstituted solution oral solution reconstituted   125 " mg, Oral, Every Other Day   Start Date:  January 7, 2020        Changes to Medications      Instructions Start Date   warfarin 5 MG tablet  Commonly known as:  COUMADIN  What changed:    · how much to take  · how to take this  · when to take this   2.5 mg, Oral, Nightly, Take daily   Start Date:  December 19, 2019        Continue These Medications      Instructions Start Date   acetaminophen 325 MG tablet  Commonly known as:  TYLENOL   650 mg, Oral, Every 6 Hours PRN      doxylamine 25 MG tablet  Commonly known as:  UNISOM   25 mg, Oral, Nightly PRN      melatonin 5 MG tablet tablet   10 mg, Oral, Nightly PRN      PROBIOTIC PO   Oral             Discharge Diet:   Diet Instructions     Diet: Regular, Cardiac; Thin      Discharge Diet:   Regular  Cardiac       Fluid Consistency:  Thin          Activity at Discharge:   Activity Instructions     Activity as Tolerated            Follow-up Appointments:  Future Appointments   Date Time Provider Department Center   12/26/2019  2:20 PM  [facility]7  SHAE RO SHAE   12/27/2019  3:20 PM  [facility]7  SHAE RO SHAE   12/30/2019  3:20 PM  [facility] 3557  SHAE RO SHAE   12/31/2019  3:20 PM  [facility] 3557  SHAE RO SHAE   1/2/2020  3:20 PM  [facility] 3557  SHAE RO SHAE   1/3/2020  3:20 PM  [facility] 3557  SHAE RO SHAE   1/6/2020  7:30 AM LAB CHAIR 3 [facility] JACKI  LAB KRES LAG   1/6/2020  8:00 AM RN REV 2 [facility] KRE BH INFUS KRE LAG   1/6/2020  3:20 PM  [facility] 3557  SHAE RO SHAE   1/7/2020  3:20 PM  [facility] 3557  SHAE RO SHAE   1/8/2020  3:20 PM  [facility] 3557  SHAE RO SHAE   1/9/2020  3:20 PM  [facility] 3557  SHAE RO SHAE   1/10/2020  3:20 PM  [facility] 3557  SHAE RO SHAE   1/13/2020  3:20 PM  [facility] 3557  SHAE RO SHAE   1/14/2020  3:20 PM  [facility] 3557  SHAE RO SHAE   1/15/2020  3:20 PM  [facility] 3557  SHAE RO SHAE   1/16/2020  3:20 PM  [facility] 3557  SHAE RO SHAE   1/17/2020  3:20 PM  [facility] 3557  SHAE RO SHAE   1/20/2020  3:20  PM BH ROSA LINAC 3557 BH ROSA RO ROSA   1/21/2020  3:20 PM BH ROSA LINAC 3557 BH ROSA RO ROSA   1/22/2020  3:20 PM BH ROSA LINAC 3557 BH ROSA RO ROSA   2/3/2020  7:30 AM LAB CHAIR 4 [facility] KRESGE BH LAB KRES LAG   2/3/2020  8:00 AM RN REV 2 CBC KRE BH INFUS KRE LAG   2/19/2020  9:15 AM ROSA PET CT BH ROSA PET ROSA   2/19/2020 10:00 AM LAB CHAIR 5 [facility] KRESGE BH LAB KRES LAG   2/19/2020 10:30 AM RN REV 1 CBC KRE BH INFUS KRE LAG   2/26/2020  8:20 AM VITALS ONLY CBC KRE BH LAB KRES LAG   2/26/2020  8:40 AM Aguilar Rodgers II, MD MGK CBC KRES BH CBC Rosa     Additional Instructions for the Follow-ups that You Need to Schedule     Discharge Follow-up with PCP   As directed       Currently Documented PCP:    Tommy Osborn MD    PCP Phone Number:    [phone]     Follow Up Details:  5-7d               Test Results Pending at Discharge:   Order Current Status    Blood Culture - Blood, Arm, Right Preliminary result    Blood Culture - Blood, Hand, Left Preliminary result           Amarjit Camilo MD  12/18/19  10:05 AM    Time Spent on Discharge Activities: Greater than 30 minutes.          Electronically signed by Amarjit Camilo MD at 12/18/19 5069

## 2019-12-19 NOTE — OUTREACH NOTE
Prep Survey      Responses   Facility patient discharged from?  [place]   Is patient eligible?  Yes   Discharge diagnosis  **C. difficile colitis    Does the patient have one of the following disease processes/diagnoses(primary or secondary)?  Sepsis   Does the patient have Home health ordered?  No   Is there a DME ordered?  No   Prep survey completed?  Yes          Shayna Obregon RN

## 2019-12-20 ENCOUNTER — READMISSION MANAGEMENT (OUTPATIENT)
Dept: CALL CENTER | Facility: HOSPITAL | Age: 64
End: 2019-12-20

## 2019-12-20 NOTE — OUTREACH NOTE
Sepsis Week 1 Survey      Responses   Facility patient discharged from?  [place]   Does the patient have one of the following disease processes/diagnoses(primary or secondary)?  Sepsis   Is there a successful TCM telephone encounter documented?  No   Week 1 attempt successful?  No   Unsuccessful attempts  Attempt 1          Mimi Galan RN

## 2019-12-23 ENCOUNTER — READMISSION MANAGEMENT (OUTPATIENT)
Dept: CALL CENTER | Facility: HOSPITAL | Age: 64
End: 2019-12-23

## 2019-12-23 ENCOUNTER — LAB (OUTPATIENT)
Dept: LAB | Facility: HOSPITAL | Age: 64
End: 2019-12-23

## 2019-12-23 ENCOUNTER — CLINICAL SUPPORT (OUTPATIENT)
Dept: ONCOLOGY | Facility: HOSPITAL | Age: 64
End: 2019-12-23

## 2019-12-23 DIAGNOSIS — Z79.01 ANTICOAGULATED ON COUMADIN: ICD-10-CM

## 2019-12-23 LAB
INR PPP: 5.21 (ref 0.9–1.1)
PROTHROMBIN TIME: 47.8 SECONDS (ref 11.7–14.2)

## 2019-12-23 PROCEDURE — 85610 PROTHROMBIN TIME: CPT | Performed by: INTERNAL MEDICINE

## 2019-12-23 PROCEDURE — 36415 COLL VENOUS BLD VENIPUNCTURE: CPT

## 2019-12-23 NOTE — OUTREACH NOTE
Sepsis Week 1 Survey      Responses   [facility]   Does the patient have one of the following disease processes/diagnoses(primary or secondary)?  Sepsis   Is there a successful TCM telephone encounter documented?  No   Week 1 attempt successful?  Yes   Call start time  1009   Call end time  1016   General alerts for this patient   recently diagnosed with breast cancer    Discharge diagnosis  **C. difficile colitis    Meds reviewed with patient/caregiver?  Yes   Is the patient having any side effects they believe may be caused by any medication additions or changes?  No   Does the patient have all medications related to this admission filled (includes all antibiotics, inhalers, nebulizers,steroids,etc.)  Yes   Is the patient taking all medications as directed (includes completed medication regime)?  Yes   Comments regarding appointments  Pt had PT/INR drawn today. Pt reported her levels are a little high.   Does the patient have a primary care provider?   Yes   Comments regarding PCP  Appointment with PCP not made at this time   Does the patient have an appointment with their PCP within 7 days of discharge?  N/A   Has the patient kept scheduled appointments due by today?  N/A   Psychosocial issues?  No   Did the patient receive a copy of their discharge instructions?  Yes   Nursing interventions  Reviewed instructions with patient   What is the patient's perception of their health status since discharge?  Improving   Nursing interventions  Nurse provided patient education   Is the patient/caregiver able to teach back Sepsis?  S - Shivering,fever or very cold, S - Short of breath, S - Sleepy, difficult to arouse,confused, I -   I feel like I might die-a feeling of hopelessness   Nursing interventions  Nurse provided patient education   Is patient/caregiver able to teach back steps to recovery at home?  Make a list of questions for PCP appoinment, Set small, achievable goals for  return to baseline health, Rest and regain strength   Is the patient/caregiver able to teach back signs and symptoms of worsening condition:  Fever, Hyperthermia, Altered mental status(confusion/coma), Shortness of breath/rapid respiratory rate   Is the patient/caregiver able to teach back the hierarchy of who to call/visit for symptoms/problems? PCP, Specialist, Home health nurse, Urgent Care, ED, 911  Yes   Week 1 call completed?  Yes          Niecy Rainey RN

## 2019-12-23 NOTE — PROGRESS NOTES
INR elevated on fingerstick in the office. INR redrawn and sent to the hospital for verification. Pt confirms she has been taking 2.5mg since Thursday (4 days). She is also on vancomycin until tomorrow. She denies bleeding. D/W Dr. Rodgers. No need to give Vitamin K. OK to send pt home and we will call her with results. INR resulted at 5.2. D/W Dr. Rodgers. Per Dr. Rodgers, have pt hold coumadin until Thursday when we will recheck her in the office. Informed pt and she v/u. Message sent to scheduling.

## 2019-12-26 ENCOUNTER — LAB (OUTPATIENT)
Dept: LAB | Facility: HOSPITAL | Age: 64
End: 2019-12-26

## 2019-12-26 ENCOUNTER — CLINICAL SUPPORT (OUTPATIENT)
Dept: ONCOLOGY | Facility: HOSPITAL | Age: 64
End: 2019-12-26

## 2019-12-26 DIAGNOSIS — Z79.01 ANTICOAGULATED ON COUMADIN: ICD-10-CM

## 2019-12-26 LAB
INR PPP: 1.7 (ref 0.9–1.1)
PROTHROMBIN TIME: 20.1 SECONDS (ref 11–13.5)

## 2019-12-26 PROCEDURE — 85610 PROTHROMBIN TIME: CPT

## 2019-12-26 NOTE — PROGRESS NOTES
INR 1.7 today. Pt has been holding coumadin since last Monday. Prior to that she took 2.5mg on thur/fri/sat/sun. Pt continues on vancomycin for relapsed C.diff. Recently discharged from the hospital. Reviewed with Lisa MCCLAIN. Pt to take 2.5mg every other day and return on Monday dec 30th for recheck.     Pt states she has a mild non productive cough, asking what she can take. Pt denies fever or any other symptom. OK for OTC cough syrup. Pt v/u.

## 2019-12-30 ENCOUNTER — CLINICAL SUPPORT (OUTPATIENT)
Dept: ONCOLOGY | Facility: HOSPITAL | Age: 64
End: 2019-12-30

## 2019-12-30 ENCOUNTER — LAB (OUTPATIENT)
Dept: LAB | Facility: HOSPITAL | Age: 64
End: 2019-12-30

## 2019-12-30 DIAGNOSIS — Z79.01 ANTICOAGULATED ON COUMADIN: Primary | ICD-10-CM

## 2019-12-30 LAB
INR PPP: 1.3 (ref 0.9–1.1)
PROTHROMBIN TIME: 15.2 SECONDS (ref 11–13.5)

## 2019-12-30 PROCEDURE — 85610 PROTHROMBIN TIME: CPT

## 2019-12-30 NOTE — PROGRESS NOTES
Patient her for PT/INR review. Patient denies any missed does and is currently still taking Vancomycin. No complaints at this time. Discussed with SARAHI Le. Patient currently taking 2.5mg every other day. Order for patient to take 2.5mg every day but Wednesday. Wednesday patient to take 1.25 mg. Patient to return Friday 1/3/20 for lab/rn review. Patient VU. No further questions at this time.  Placed in standing stone and copy provided to patient.

## 2020-01-01 ENCOUNTER — APPOINTMENT (OUTPATIENT)
Dept: RADIATION ONCOLOGY | Facility: HOSPITAL | Age: 65
End: 2020-01-01

## 2020-01-02 ENCOUNTER — READMISSION MANAGEMENT (OUTPATIENT)
Dept: CALL CENTER | Facility: HOSPITAL | Age: 65
End: 2020-01-02

## 2020-01-02 NOTE — OUTREACH NOTE
Sepsis Week 2 Survey      Responses   Facility patient discharged from?  [facility]   Does the patient have one of the following disease processes/diagnoses(primary or secondary)?  Sepsis   Week 2 attempt successful?  Yes   Call start time  1556   Call end time  1558   General alerts for this patient   recently diagnosed with breast cancer    Discharge diagnosis  **C. difficile colitis    Meds reviewed with patient/caregiver?  Yes   Is the patient having any side effects they believe may be caused by any medication additions or changes?  No   Does the patient have all medications related to this admission filled (includes all antibiotics, inhalers, nebulizers,steroids,etc.)  Yes   Is the patient taking all medications as directed (includes completed medication regime)?  Yes   Does the patient have a primary care provider?   Yes   Does the patient have an appointment with their PCP within 7 days of discharge?  Yes   Has the patient kept scheduled appointments due by today?  N/A   Psychosocial issues?  No   Did the patient receive a copy of their discharge instructions?  Yes   Nursing interventions  Reviewed instructions with patient   What is the patient's perception of their health status since discharge?  Same   Nursing interventions  Nurse provided patient education   Is the patient/caregiver able to teach back Sepsis?  S - Shivering,fever or very cold, E - Extreme pain or generalized discomfort (worst ever,especially abdomen), S - Short of breath   Nursing interventions  Nurse provided patient education   Is patient/caregiver able to teach back steps to recovery at home?  Make a list of questions for PCP appoinment, Set small, achievable goals for return to baseline health, Rest and regain strength   Is the patient/caregiver able to teach back signs and symptoms of worsening condition:  Fever, Rapid heart rate (>90), Altered mental status(confusion/coma), Edema, Shortness of breath/rapid respiratory rate,  Hyperthermia   Is the patient/caregiver able to teach back the hierarchy of who to call/visit for symptoms/problems? PCP, Specialist, Home health nurse, Urgent Care, ED, 911  Yes   Week 2 call completed?  Yes   Wrap up additional comments  doing ok, denies any questions today          Esha Bang, RN

## 2020-01-03 ENCOUNTER — TELEPHONE (OUTPATIENT)
Dept: ONCOLOGY | Facility: HOSPITAL | Age: 65
End: 2020-01-03

## 2020-01-03 ENCOUNTER — LAB (OUTPATIENT)
Dept: LAB | Facility: HOSPITAL | Age: 65
End: 2020-01-03

## 2020-01-03 ENCOUNTER — APPOINTMENT (OUTPATIENT)
Dept: ONCOLOGY | Facility: HOSPITAL | Age: 65
End: 2020-01-03

## 2020-01-03 DIAGNOSIS — C50.211 MALIGNANT NEOPLASM OF UPPER-INNER QUADRANT OF RIGHT BREAST IN FEMALE, ESTROGEN RECEPTOR POSITIVE (HCC): ICD-10-CM

## 2020-01-03 DIAGNOSIS — Z17.0 MALIGNANT NEOPLASM OF UPPER-INNER QUADRANT OF RIGHT BREAST IN FEMALE, ESTROGEN RECEPTOR POSITIVE (HCC): ICD-10-CM

## 2020-01-03 LAB
INR PPP: 1.1 (ref 0.9–1.1)
PROTHROMBIN TIME: 13.7 SECONDS (ref 11–13.5)

## 2020-01-03 PROCEDURE — 85610 PROTHROMBIN TIME: CPT

## 2020-01-03 NOTE — TELEPHONE ENCOUNTER
PT'S INR TODAY IS 1.1. PT HAS BEEN FOLLOWING OUR RECOMMENDATION. NO MISSED DOSES. PT CONTINUES ON PO VANC TILL MID JAN. PER DR. WILLS PT TO TAKE 5MG DAILY AND RTN HERE TUES AND FRI OF NEXT WK. CANCEL MON APPT. THEN PT TO F/U Q FRI TILL HER MD APPT 2/3. PT MADE AWARE AND V/U. WILL ROUTE CALL TO APPT DESK TO SET THIS UP.

## 2020-01-06 ENCOUNTER — APPOINTMENT (OUTPATIENT)
Dept: LAB | Facility: HOSPITAL | Age: 65
End: 2020-01-06

## 2020-01-06 ENCOUNTER — APPOINTMENT (OUTPATIENT)
Dept: ONCOLOGY | Facility: HOSPITAL | Age: 65
End: 2020-01-06

## 2020-01-07 ENCOUNTER — CLINICAL SUPPORT (OUTPATIENT)
Dept: ONCOLOGY | Facility: HOSPITAL | Age: 65
End: 2020-01-07

## 2020-01-07 ENCOUNTER — LAB (OUTPATIENT)
Dept: LAB | Facility: HOSPITAL | Age: 65
End: 2020-01-07

## 2020-01-07 DIAGNOSIS — Z79.01 ANTICOAGULATED ON COUMADIN: Primary | ICD-10-CM

## 2020-01-07 LAB
INR PPP: 1.6 (ref 0.9–1.1)
PROTHROMBIN TIME: 18.9 SECONDS (ref 11–13.5)

## 2020-01-07 PROCEDURE — 85610 PROTHROMBIN TIME: CPT

## 2020-01-07 NOTE — PROGRESS NOTES
PT'S INR TODAY UP TO 1.6. PT HAS TAKEN THE FOLLOWING... 5MG ON 1/3, 1/4, 1/5 & 1/6; 1.25MG ON 1/1 AND 2.5MG ON 12/31 & 1/2 = 26.25MG. PT WILL BE OFF PO VANC STARTING NEXT WK. DR. WILLS HAD ALREADY ORDERED FOR PT TO RTN HERE THIS FRI AND THEN Q FRI TILL HE SEES HER IN FEB. PT'S INFO PLACED IN SS AND PER [facility] PT TO TAKE 5MG TODAY, TOMORROW AND THURS. PT WILL RTN FRI. PT GIVEN COPY OF COUMADIN DOSING AND UPDATED APPTS. PT V/U TO ALL.

## 2020-01-10 ENCOUNTER — READMISSION MANAGEMENT (OUTPATIENT)
Dept: CALL CENTER | Facility: HOSPITAL | Age: 65
End: 2020-01-10

## 2020-01-10 ENCOUNTER — LAB (OUTPATIENT)
Dept: LAB | Facility: HOSPITAL | Age: 65
End: 2020-01-10

## 2020-01-10 ENCOUNTER — CLINICAL SUPPORT (OUTPATIENT)
Dept: ONCOLOGY | Facility: HOSPITAL | Age: 65
End: 2020-01-10

## 2020-01-10 DIAGNOSIS — R79.1 SUPRATHERAPEUTIC INR: Primary | ICD-10-CM

## 2020-01-10 LAB
INR PPP: 2.2 (ref 0.9–1.1)
PROTHROMBIN TIME: 26.2 SECONDS (ref 11–13.5)

## 2020-01-10 PROCEDURE — 85610 PROTHROMBIN TIME: CPT

## 2020-01-10 NOTE — OUTREACH NOTE
Sepsis Week 3 Survey      Responses   Facility patient discharged from?  [facility]   Does the patient have one of the following disease processes/diagnoses(primary or secondary)?  Sepsis   Week 3 attempt successful?  Yes   Call start time  1431   Call end time  1433   General alerts for this patient   recently diagnosed with breast cancer    Discharge diagnosis  **C. difficile colitis    Meds reviewed with patient/caregiver?  Yes   Is the patient taking all medications as directed (includes completed medication regime)?  Yes   Has the patient kept scheduled appointments due by today?  Yes   Psychosocial issues?  No   Comments  Pt doing better per her report   What is the patient's perception of their health status since discharge?  Improving   Is the patient/caregiver able to teach back Sepsis?  E - Extreme pain or generalized discomfort (worst ever,especially abdomen), S - Shivering,fever or very cold   Is patient/caregiver able to teach back steps to recovery at home?  Set small, achievable goals for return to baseline health, Rest and regain strength   Week 3 call completed?  Yes   Revoked  No further contact(revokes)-requires comment          Dara Reyes RN

## 2020-01-10 NOTE — PROGRESS NOTES
PT'S INR IMPROVED TODAY AT 2.2. PAST 7 D PT HAS BEEN TAKING 5MG DAILY. PT TO STAY ON THAT DOSE AND RTN HERE NEXT FRI AS SCHEDULED.     PT ASKING IF OK TO START RADIATION ON Monday. PER DR. WILLS PT CAN START ON Monday AS SCHEDULED. PT V/U.

## 2020-01-13 PROCEDURE — 77280 THER RAD SIMULAJ FIELD SMPL: CPT | Performed by: RADIOLOGY

## 2020-01-13 PROCEDURE — 77412 RADIATION TX DELIVERY LVL 3: CPT | Performed by: RADIOLOGY

## 2020-01-13 PROCEDURE — 77427 RADIATION TX MANAGEMENT X5: CPT | Performed by: RADIOLOGY

## 2020-01-14 PROCEDURE — 77387 GUIDANCE FOR RADJ TX DLVR: CPT | Performed by: RADIOLOGY

## 2020-01-14 PROCEDURE — 77412 RADIATION TX DELIVERY LVL 3: CPT | Performed by: RADIOLOGY

## 2020-01-15 PROCEDURE — 77387 GUIDANCE FOR RADJ TX DLVR: CPT | Performed by: RADIOLOGY

## 2020-01-15 PROCEDURE — 77412 RADIATION TX DELIVERY LVL 3: CPT | Performed by: RADIOLOGY

## 2020-01-16 ENCOUNTER — RADIATION ONCOLOGY WEEKLY ASSESSMENT (OUTPATIENT)
Dept: RADIATION ONCOLOGY | Facility: HOSPITAL | Age: 65
End: 2020-01-16

## 2020-01-16 VITALS — HEART RATE: 86 BPM | SYSTOLIC BLOOD PRESSURE: 130 MMHG | OXYGEN SATURATION: 98 % | DIASTOLIC BLOOD PRESSURE: 78 MMHG

## 2020-01-16 DIAGNOSIS — Z17.0 MALIGNANT NEOPLASM OF UPPER-INNER QUADRANT OF RIGHT BREAST IN FEMALE, ESTROGEN RECEPTOR POSITIVE (HCC): Primary | ICD-10-CM

## 2020-01-16 DIAGNOSIS — C50.211 MALIGNANT NEOPLASM OF UPPER-INNER QUADRANT OF RIGHT BREAST IN FEMALE, ESTROGEN RECEPTOR POSITIVE (HCC): Primary | ICD-10-CM

## 2020-01-16 PROCEDURE — 77387 GUIDANCE FOR RADJ TX DLVR: CPT | Performed by: RADIOLOGY

## 2020-01-16 PROCEDURE — 77412 RADIATION TX DELIVERY LVL 3: CPT | Performed by: RADIOLOGY

## 2020-01-17 ENCOUNTER — LAB (OUTPATIENT)
Dept: LAB | Facility: HOSPITAL | Age: 65
End: 2020-01-17

## 2020-01-17 ENCOUNTER — APPOINTMENT (OUTPATIENT)
Dept: ONCOLOGY | Facility: HOSPITAL | Age: 65
End: 2020-01-17

## 2020-01-17 ENCOUNTER — APPOINTMENT (OUTPATIENT)
Dept: LAB | Facility: HOSPITAL | Age: 65
End: 2020-01-17

## 2020-01-17 ENCOUNTER — CLINICAL SUPPORT (OUTPATIENT)
Dept: ONCOLOGY | Facility: HOSPITAL | Age: 65
End: 2020-01-17

## 2020-01-17 DIAGNOSIS — R79.1 SUPRATHERAPEUTIC INR: Primary | ICD-10-CM

## 2020-01-17 LAB
INR PPP: 4.2 (ref 0.9–1.1)
PROTHROMBIN TIME: 51 SECONDS (ref 11–13.5)

## 2020-01-17 PROCEDURE — 77412 RADIATION TX DELIVERY LVL 3: CPT | Performed by: RADIOLOGY

## 2020-01-17 PROCEDURE — 85610 PROTHROMBIN TIME: CPT

## 2020-01-17 PROCEDURE — 77387 GUIDANCE FOR RADJ TX DLVR: CPT | Performed by: RADIOLOGY

## 2020-01-17 NOTE — PROGRESS NOTES
Physician Weekly Management Note    Diagnosis:     1. Malignant neoplasm of upper-inner quadrant of right breast in female, estrogen receptor positive (CMS/HCC)     Cancer Staging    Reason for Visit: Radiation (4/21)    Concurrent Chemo:   No    Notes on Treatment course, Films, Medical progress and Plan:  Doing well. Breast exam without change. No problems or questions, cont on.    Performance Status: (1) Restricted in physically strenuous activity, ambulatory and able to do work of light nature    ROS - Other than as listed above, as follow:  Constitutional - Normal - Denies lack of appetite, fatigue, fever, night sweats and change in weight.  Neck - Normal - Denies neck masses, muscle weakness, neck pain, decreased range of motion and swelling of the neck.  Breasts - Normal - Denies breast masses, nipple discharge, nipple inversion and pain.  Respiratory - Normal - Denies cough, dyspnea, hemoptysis, hiccoughs, pleuritic chest pain and wheezing.  Gastrointestinal - Normal - Denies abdominal pain, constipation, diarrhea, heartburn / dyspepsia, hematemesis, hemorrhoids, melena / GI bleeding, nausea, pain / cramping, satiety and vomiting.  Musculoskeletal - Normal - Denies arthritis, bone pain, joint pain, muscle weakness and decreased range of motion.   Hematologic/Lymphatic - Normal - Denies easy bruising and tender or enlarged lymph nodes.    PYHSICAL EXAM - Other than listed above, as follows:  Vitals:    01/16/20 1548   BP: 130/78   Pulse: 86   SpO2: 98%   PainSc: 0-No pain       Constitutional - Normal - No evidence of impaired alertness, inadequate appearance, premature or advanced chronologic age, uncooperativeness, altered mood and affect and disorientation.  Neck - Normal - No evidence of tender or enlarged lymph nodes, neck abnormalities, restricted range of motion and enlarged thyroid gland.  Breasts - Normal - No change in nipple or incision site.  Chest - Normal - No evidence of chest abnormalities and  "tender or enlarged lymph nodes.  Hematologic/Lymphatic -  Normal - No evidence of tender or enlarged axillae lymph nodes and tender or enlarged neck lymph nodes.    Problem added:  No problems updated.  Medications added: No orders of the defined types were placed in this encounter.    Ancillary referrals made: No orders of the defined types were placed in this encounter.      Technical aspects reviewed:  Weekly OBI approved if applicable? Yes  Weekly port films approved?   Yes  Change requests noted if applicable? Yes  Patient setup and plan reviewed?  Yes    Chart Reviewed:  Continue current treatment plan?   Yes  Treatment plan change requested?  No    I have reviewed and marked as \"reviewed\" the current medications, allergies and problem list in the patients EMR.    I have reviewed the patient's medical, surgical  history in detail, reviewed any pertinent lab work  and updated the computerized patient record if needed.    Patient's Care Team:  Patient Care Team:  Tommy Osborn MD as PCP - General (Family Medicine)  Beena Maurice MD as Consulting Physician (Radiation Oncology)  Jana Evans MD as Consulting Physician (Hematology and Oncology)  Sage Gonzalez MD as Referring Physician (Gastroenterology)  Aguilar Rodgers II, MD as Consulting Physician (Hematology and Oncology)      "

## 2020-01-17 NOTE — PROGRESS NOTES
INR 4.2. Prior dose confirmed. Pt Denies any new mes, diet changes missed does or bleeding. She took her last Vancomycin on 1/13/2020. Per Mimi Serrano NP Pt will take 2.5 mg of Coumadin on M,F and 5 mg all other days. Pt will call if she has any bleeding.  Pt has no complaints.  Copy of labs given to pt and f/u appt reviewed. Pt is instructed to call the office with any concerns or new symptoms prior to next visit. Pt ida.

## 2020-01-20 PROCEDURE — 77336 RADIATION PHYSICS CONSULT: CPT | Performed by: RADIOLOGY

## 2020-01-21 ENCOUNTER — HOSPITAL ENCOUNTER (EMERGENCY)
Facility: HOSPITAL | Age: 65
Discharge: HOME | End: 2020-01-21
Attending: EMERGENCY MEDICINE | Admitting: EMERGENCY MEDICINE

## 2020-01-21 ENCOUNTER — APPOINTMENT (OUTPATIENT)
Dept: GENERAL RADIOLOGY | Facility: HOSPITAL | Age: 65
End: 2020-01-21

## 2020-01-21 ENCOUNTER — APPOINTMENT (OUTPATIENT)
Dept: CT IMAGING | Facility: HOSPITAL | Age: 65
End: 2020-01-21

## 2020-01-21 VITALS
BODY MASS INDEX: 30.12 KG/M2 | HEART RATE: 100 BPM | HEIGHT: 63 IN | SYSTOLIC BLOOD PRESSURE: 112 MMHG | WEIGHT: 170 LBS | OXYGEN SATURATION: 97 % | DIASTOLIC BLOOD PRESSURE: 58 MMHG | TEMPERATURE: 99.8 F | RESPIRATION RATE: 16 BRPM

## 2020-01-21 DIAGNOSIS — K52.9 GASTROENTERITIS: Primary | ICD-10-CM

## 2020-01-21 DIAGNOSIS — Z87.19 HISTORY OF COLITIS: ICD-10-CM

## 2020-01-21 LAB
ALBUMIN SERPL-MCNC: 3.5 G/DL (ref 3.5–5.2)
ALBUMIN/GLOB SERPL: 0.8 G/DL
ALP SERPL-CCNC: 105 U/L (ref 39–117)
ALT SERPL W P-5'-P-CCNC: 14 U/L (ref 1–33)
ANION GAP SERPL CALCULATED.3IONS-SCNC: 17.4 MMOL/L (ref 5–15)
AST SERPL-CCNC: 28 U/L (ref 1–32)
BASOPHILS # BLD MANUAL: 0.16 10*3/MM3 (ref 0–0.2)
BASOPHILS NFR BLD AUTO: 1 % (ref 0–1.5)
BILIRUB SERPL-MCNC: 0.6 MG/DL (ref 0.2–1.2)
BUN BLD-MCNC: 24 MG/DL (ref 8–23)
BUN/CREAT SERPL: 31.6 (ref 7–25)
CALCIUM SPEC-SCNC: 8.7 MG/DL (ref 8.6–10.5)
CHLORIDE SERPL-SCNC: 90 MMOL/L (ref 98–107)
CO2 SERPL-SCNC: 22.6 MMOL/L (ref 22–29)
CREAT BLD-MCNC: 0.76 MG/DL (ref 0.57–1)
DEPRECATED RDW RBC AUTO: 44.9 FL (ref 37–54)
EOSINOPHIL # BLD MANUAL: 0.31 10*3/MM3 (ref 0–0.4)
EOSINOPHIL NFR BLD MANUAL: 2 % (ref 0.3–6.2)
ERYTHROCYTE [DISTWIDTH] IN BLOOD BY AUTOMATED COUNT: 14.1 % (ref 12.3–15.4)
GFR SERPL CREATININE-BSD FRML MDRD: 77 ML/MIN/1.73
GLOBULIN UR ELPH-MCNC: 4.4 GM/DL
GLUCOSE BLD-MCNC: 94 MG/DL (ref 65–99)
HCT VFR BLD AUTO: 37.6 % (ref 34–46.6)
HGB BLD-MCNC: 12.9 G/DL (ref 12–15.9)
HOLD SPECIMEN: NORMAL
HOLD SPECIMEN: NORMAL
INR PPP: 2.93 (ref 0.9–1.1)
LIPASE SERPL-CCNC: 17 U/L (ref 13–60)
LYMPHOCYTES # BLD MANUAL: 2.62 10*3/MM3 (ref 0.7–3.1)
LYMPHOCYTES NFR BLD MANUAL: 11.9 % (ref 5–12)
LYMPHOCYTES NFR BLD MANUAL: 16.8 % (ref 19.6–45.3)
MCH RBC QN AUTO: 30.4 PG (ref 26.6–33)
MCHC RBC AUTO-ENTMCNC: 34.3 G/DL (ref 31.5–35.7)
MCV RBC AUTO: 88.5 FL (ref 79–97)
MONOCYTES # BLD AUTO: 1.86 10*3/MM3 (ref 0.1–0.9)
NEUTROPHILS # BLD AUTO: 10.66 10*3/MM3 (ref 1.7–7)
NEUTROPHILS NFR BLD MANUAL: 68.3 % (ref 42.7–76)
PLAT MORPH BLD: NORMAL
PLATELET # BLD AUTO: 224 10*3/MM3 (ref 140–450)
PMV BLD AUTO: 10.4 FL (ref 6–12)
POTASSIUM BLD-SCNC: 3.1 MMOL/L (ref 3.5–5.2)
PROT SERPL-MCNC: 7.9 G/DL (ref 6–8.5)
PROTHROMBIN TIME: 30.3 SECONDS (ref 11.7–14.2)
RBC # BLD AUTO: 4.25 10*6/MM3 (ref 3.77–5.28)
RBC MORPH BLD: NORMAL
SMUDGE CELLS BLD QL SMEAR: ABNORMAL
SODIUM BLD-SCNC: 130 MMOL/L (ref 136–145)
TROPONIN T SERPL-MCNC: <0.01 NG/ML (ref 0–0.03)
WBC NRBC COR # BLD: 15.61 10*3/MM3 (ref 3.4–10.8)
WHOLE BLOOD HOLD SPECIMEN: NORMAL
WHOLE BLOOD HOLD SPECIMEN: NORMAL

## 2020-01-21 PROCEDURE — 85007 BL SMEAR W/DIFF WBC COUNT: CPT | Performed by: NURSE PRACTITIONER

## 2020-01-21 PROCEDURE — 85610 PROTHROMBIN TIME: CPT | Performed by: NURSE PRACTITIONER

## 2020-01-21 PROCEDURE — 93010 ELECTROCARDIOGRAM REPORT: CPT | Performed by: INTERNAL MEDICINE

## 2020-01-21 PROCEDURE — 84484 ASSAY OF TROPONIN QUANT: CPT | Performed by: NURSE PRACTITIONER

## 2020-01-21 PROCEDURE — 93005 ELECTROCARDIOGRAM TRACING: CPT | Performed by: EMERGENCY MEDICINE

## 2020-01-21 PROCEDURE — 71046 X-RAY EXAM CHEST 2 VIEWS: CPT

## 2020-01-21 PROCEDURE — 85025 COMPLETE CBC W/AUTO DIFF WBC: CPT | Performed by: NURSE PRACTITIONER

## 2020-01-21 PROCEDURE — 99284 EMERGENCY DEPT VISIT MOD MDM: CPT

## 2020-01-21 PROCEDURE — 83690 ASSAY OF LIPASE: CPT | Performed by: NURSE PRACTITIONER

## 2020-01-21 PROCEDURE — 71250 CT THORAX DX C-: CPT

## 2020-01-21 PROCEDURE — 80053 COMPREHEN METABOLIC PANEL: CPT | Performed by: NURSE PRACTITIONER

## 2020-01-21 PROCEDURE — 93005 ELECTROCARDIOGRAM TRACING: CPT

## 2020-01-21 RX ORDER — SODIUM CHLORIDE 0.9 % (FLUSH) 0.9 %
10 SYRINGE (ML) INJECTION AS NEEDED
Status: DISCONTINUED | OUTPATIENT
Start: 2020-01-21 | End: 2020-01-22 | Stop reason: HOSPADM

## 2020-01-21 RX ORDER — ONDANSETRON 4 MG/1
4 TABLET, FILM COATED ORAL EVERY 8 HOURS PRN
Qty: 10 TABLET | Refills: 0 | Status: SHIPPED | OUTPATIENT
Start: 2020-01-21 | End: 2020-10-13

## 2020-01-21 RX ORDER — POTASSIUM CHLORIDE 750 MG/1
40 CAPSULE, EXTENDED RELEASE ORAL ONCE
Status: COMPLETED | OUTPATIENT
Start: 2020-01-21 | End: 2020-01-21

## 2020-01-21 RX ADMIN — POTASSIUM CHLORIDE 40 MEQ: 750 CAPSULE, EXTENDED RELEASE ORAL at 20:31

## 2020-01-21 NOTE — ED NOTES
Pt comes to ER for RUQ abdominal pain with n/v/d x3 days. Pt states the vomiting has lessened but is still having diarrhea in which she was recently admitted for c.diff. Pt also states she also started developing chest pain with symptoms.      Gabriela Dowd RN  01/21/20 0091

## 2020-01-21 NOTE — ED PROVIDER NOTES
EMERGENCY DEPARTMENT ENCOUNTER    Room Number:  17/17  Date of encounter:  1/21/2020  PCP: Bam Umaña MD  Historian: patient, , prior records    HPI:  Chief Complaint: epigastric pain, diarrhea  Context: Taylor Larsen is a 64 y.o. female who presents to the ED c/o moderate nausea/vomiting/diarrhea since Saturday night.  The n/v stopped on Monday but the diarrhea has persisted.  It has been made slightly better with several doses of Immodium AD and some pepto bismol.  She also mentions associated HA and some epigastric pain.  Denies any fever or chills or ill contacts. She is currently undergoing radiation treatment for right sided breast cancer.  She is on Warfarin for portal vein thrombosis.  Has has C.diff twice and was treated last time with oral Vancomycin.       MEDICAL HISTORY REVIEW   From Discharge Summary 12/18/2019  Hospital Course:  The patient is a 64 y.o. female  with a history of right breast cancer s/p lumpectomy 11/4/19 on arimidex, portal vein thrombosis on coumadin, and cdiff colitis following her surgery and treated with oral vancomycin that presented to [facility] complaining of nausea, non-bloody, non-bilious vomiting, and watery diarrhea for about 2 days.  Please see admission H&P from 12/14/19 for further details.  She was found to have Cdiff colitis. She met criteria for sepsis with WBC of 21k and HR>90 on admission. She was started on IV fluids and oral vancomycin.  Her condition steadily improved and she had no evidence of any complication.  Infectious diseases followed the patient given recurrent cdiff and recommended a 10 day course of oral vancomycin followed by a long pulse/tapered regimen as outlined below.    The patient also had a supratherapeutic INR likely due to her GI infection and antibiotic treatment.  Her coumadin was held and her INR improved to 3.53 at the time of discharge.  She at no point had any evidence of bleeding.  She is to  resume her coumadin at 2.5mg instead of 5mg tomorrow.  She should keep close follow up with her PCP to follow INR.         PAST MEDICAL HISTORY  Active Ambulatory Problems     Diagnosis Date Noted   • Visit for gynecologic examination 06/28/2017   • Vasomotor symptoms due to menopause 06/28/2017   • Screening for cervical cancer 07/25/2018   • Malignant neoplasm of upper-inner quadrant of right breast in female, estrogen receptor positive (CMS/HCC) 10/14/2019   • Acute colitis 11/10/2019   • Diarrhea 11/10/2019   • Liver mass 11/10/2019   • Leukocytosis 11/10/2019   • Enterocolitis due to Clostridioides difficile 11/12/2019   • C. difficile colitis 12/13/2019   • Sepsis (CMS/HCC) 12/14/2019   • GERD without esophagitis 12/14/2019   • Portal vein thrombosis 12/14/2019   • Anticoagulated on Coumadin 12/14/2019   • Supratherapeutic INR 12/14/2019     Resolved Ambulatory Problems     Diagnosis Date Noted   • Hypokalemia 11/10/2019     Past Medical History:   Diagnosis Date   • Breast cancer (CMS/HCC) 09/2019   • GERD (gastroesophageal reflux disease)    • PONV (postoperative nausea and vomiting)    • Situational stress    • Stress headaches          PAST SURGICAL HISTORY  Past Surgical History:   Procedure Laterality Date   • BILE DUCT STENT PLACEMENT  2014   • BREAST BIOPSY Right 09/2019    malignant   • BREAST LUMPECTOMY WITH SENTINEL NODE BIOPSY Right 11/4/2019    Procedure: BREAST LUMPECTOMY WITH SENTINEL NODE BIOPSY AND NEEDLE LOCALIZATION And possible axillary dissection;  Surgeon: Jean-Paul Ramos MD;  [place] [facility] [place] [facility] [place]  Service: General   • KNEE ARTHROSCOPY Left    • LAPAROSCOPIC CHOLECYSTECTOMY  2014   • TUBAL ABDOMINAL LIGATION           FAMILY HISTORY  Family History   Problem Relation Age of Onset   • Alcohol abuse Father    • Heart failure Mother    • Diabetes Brother    • Prostate cancer Brother    • Throat cancer Brother    • Breast cancer Maternal Aunt    • Breast cancer Maternal Cousin    •  Breast cancer Maternal Cousin    • Malig Hyperthermia Neg Hx          SOCIAL HISTORY  Social History     Socioeconomic History   • Marital status:      Spouse name: Not on file   • Number of children: 2   • Years of education: Not on file   • Highest education level: Not on file   Occupational History     Employer: [employer]   Tobacco Use   • Smoking status: Former Smoker     Packs/day: 0.50     Years: 10.00     Pack years: 5.00     Types: Cigarettes     Last attempt to quit: 2014     Years since quittin.0   • Smokeless tobacco: Former User   Substance and Sexual Activity   • Alcohol use: No   • Drug use: No   • Sexual activity: Never     Birth control/protection: Post-menopausal         ALLERGIES  Patient has no known allergies.        REVIEW OF SYSTEMS  Review of Systems     All systems reviewed and negative except for those discussed in HPI.       PHYSICAL EXAM    I have reviewed the triage vital signs and nursing notes.    ED Triage Vitals   Temp Heart Rate Resp BP SpO2   20 1748 20 1748 20 1748 20 1806 20 1748   99.7 °F (37.6 °C) 109 18 100/56 96 %      Temp src Heart Rate Source Patient Position BP Location FiO2 (%)   -- -- 20 1806 20 180 --     Lying Right arm        GENERAL: not distressed  HENT: nares patent, mm moist  EYES: no scleral icterus  CV: regular rhythm, regular rate, no MRG  RESPIRATORY: normal effort, CTAB  ABDOMEN: soft, epigastric discomfort and RUQ discomfort  MUSCULOSKELETAL: no deformity  NEURO: alert, moves all extremities, follows commands  SKIN: warm, dry, NO pallor        LAB RESULTS  Recent Results (from the past 24 hour(s))   Light Blue Top    Collection Time: 20  6:13 PM   Result Value Ref Range    Extra Tube hold for add-on    Green Top (Gel)    Collection Time: 20  6:13 PM   Result Value Ref Range    Extra Tube Hold for add-ons.    Lavender Top    Collection Time: 20  6:13 PM   Result Value Ref Range     Extra Tube hold for add-on    Gold Top - SST    Collection Time: 01/21/20  6:13 PM   Result Value Ref Range    Extra Tube Hold for add-ons.    Protime-INR    Collection Time: 01/21/20  6:13 PM   Result Value Ref Range    Protime 30.3 (H) 11.7 - 14.2 Seconds    INR 2.93 (H) 0.90 - 1.10   Comprehensive Metabolic Panel    Collection Time: 01/21/20  6:13 PM   Result Value Ref Range    Glucose 94 65 - 99 mg/dL    BUN 24 (H) 8 - 23 mg/dL    Creatinine 0.76 0.57 - 1.00 mg/dL    Sodium 130 (L) 136 - 145 mmol/L    Potassium 3.1 (L) 3.5 - 5.2 mmol/L    Chloride 90 (L) 98 - 107 mmol/L    CO2 22.6 22.0 - 29.0 mmol/L    Calcium 8.7 8.6 - 10.5 mg/dL    Total Protein 7.9 6.0 - 8.5 g/dL    Albumin 3.50 3.50 - 5.20 g/dL    ALT (SGPT) 14 1 - 33 U/L    AST (SGOT) 28 1 - 32 U/L    Alkaline Phosphatase 105 39 - 117 U/L    Total Bilirubin 0.6 0.2 - 1.2 mg/dL    eGFR Non African Amer 77 >60 mL/min/1.73    Globulin 4.4 gm/dL    A/G Ratio 0.8 g/dL    BUN/Creatinine Ratio 31.6 (H) 7.0 - 25.0    Anion Gap 17.4 (H) 5.0 - 15.0 mmol/L   Lipase    Collection Time: 01/21/20  6:13 PM   Result Value Ref Range    Lipase 17 13 - 60 U/L   Troponin    Collection Time: 01/21/20  6:13 PM   Result Value Ref Range    Troponin T <0.010 0.000 - 0.030 ng/mL   CBC Auto Differential    Collection Time: 01/21/20  6:13 PM   Result Value Ref Range    WBC 15.61 (H) 3.40 - 10.80 10*3/mm3    RBC 4.25 3.77 - 5.28 10*6/mm3    Hemoglobin 12.9 12.0 - 15.9 g/dL    Hematocrit 37.6 34.0 - 46.6 %    MCV 88.5 79.0 - 97.0 fL    MCH 30.4 26.6 - 33.0 pg    MCHC 34.3 31.5 - 35.7 g/dL    RDW 14.1 12.3 - 15.4 %    RDW-SD 44.9 37.0 - 54.0 fl    MPV 10.4 6.0 - 12.0 fL    Platelets 224 140 - 450 10*3/mm3   Manual Differential    Collection Time: 01/21/20  6:13 PM   Result Value Ref Range    Neutrophil % 68.3 42.7 - 76.0 %    Lymphocyte % 16.8 (L) 19.6 - 45.3 %    Monocyte % 11.9 5.0 - 12.0 %    Eosinophil % 2.0 0.3 - 6.2 %    Basophil % 1.0 0.0 - 1.5 %    Neutrophils Absolute 10.66 (H)  1.70 - 7.00 10*3/mm3    Lymphocytes Absolute 2.62 0.70 - 3.10 10*3/mm3    Monocytes Absolute 1.86 (H) 0.10 - 0.90 10*3/mm3    Eosinophils Absolute 0.31 0.00 - 0.40 10*3/mm3    Basophils Absolute 0.16 0.00 - 0.20 10*3/mm3    RBC Morphology Normal Normal    Smudge Cells Slight/1+ None Seen    Platelet Morphology Normal Normal       Ordered the above labs and independently reviewed the results.        RADIOLOGY  Xr Chest 2 View    Result Date: 1/21/2020  EMERGENCY PA AND LATERAL CHEST X-RAY 01/21/2020  CLINICAL HISTORY: Upper chest pain.  This is correlated to a prior PA and lateral chest x-ray from [facility] 11/10/2019.  FINDINGS: The cardiomediastinal silhouette and pulmonary vasculature are within normal limits. There is a new oblique platelike opacity projecting over the heart on the lateral view, difficult to appreciate on the PA view, but has the appearance of a platelike area of atelectasis that is either in the right middle lobe or the lingular segment of the left upper lobe.  It is new when compared to 11/10/2019. The remainder of the lungs are clear.  The costophrenic angles are sharp.      Since prior chest x-ray 11/10/2019 there has been development of a vertical oblique 6 cm long 4 mm in diameter platelike opacity over the heart seen on the lateral view only that is felt to be a platelike area of atelectasis in either the lingular segment of left upper lobe or in the right middle lobe.  Otherwise, no additional active disease is seen in the chest. The results were communicated by telephone to FAWAD Vaz, from the emergency room 01/21/2020 at 7 PM.  This report was finalized on 1/21/2020 7:28 PM by Dr. Harry Soto M.D.      Ct Chest Without Contrast    Result Date: 1/21/2020  CT OF THE CHEST WITHOUT CONTRAST  HISTORY: Chest pain and shortness of breath  COMPARISON: None available.  TECHNIQUE: Axial CT imaging was obtained from the thoracic inlet. No IV contrast was administered.   FINDINGS: The thyroid gland, trachea, and esophagus appear unremarkable. The thoracic aorta measures within normal size limits. Mediastinal lymph nodes do not appear pathologically enlarged, although the patient has enlarged cardiophrenic nodes. For example, one measures up to 2.3 x 1.8 cm. This was also present on a prior CT from 12/13/2019. There is an area of scarring seen within the lingula. Does appear more pronounced than on prior CT from 12/13/2019. Images through the upper abdomen again demonstrate atrophy of the right hepatic lobe. This was also present on the prior study from 12/13/2019. Spleen is enlarged, although this is incompletely assessed on these images. The pancreas appears atrophic. The patient is noted to have persistent wall thickening involving the splenic flexure, as well as some stranding anterior to the right kidney. Persistent colitis is suspected. Gallbladder is surgically absent. No acute osseous abnormalities are seen. There are some enlarged upper abdominal nodes. For example, a gastrohepatic ligament node measures 2.1 x 1.4 cm. This is probably not significantly changed when compared to the prior study.       1. Area of linear scarring identified within the lingula. No other acute findings within the pulmonary parenchyma. 2. Enlarged cardiophrenic nodes of uncertain clinical significance. Patient is also noted to have enlarged nodes within the juma hepatis and gastrohepatic ligament. These were also present on the prior exams dating back to 11/10/2019. Short-term CT follow-up is recommended. 3. The patient does appear to have persistent colitis involving at least the splenic flexure of the colon. The remainder of the colon is not visualized. Persistent inflammatory stranding is seen anterior to the right kidney. Correlation with history is recommended. Given its persistence, endoscopy could be considered. 4. Patient is again noted to have atrophy of the right lobe of the liver.   Radiation dose reduction techniques were utilized, including automated exposure control and exposure modulation based on body size.  This report was finalized on 1/21/2020 9:25 PM by Dr. Sarah Colmenares M.D.        I ordered the above noted radiological studies. Reviewed by me and discussed with radiologist.  See dictation for official radiology interpretation.      PROCEDURES    Procedures      MEDICATIONS GIVEN IN ER    Medications   sodium chloride 0.9 % flush 10 mL (has no administration in time range)   potassium chloride (MICRO-K) CR capsule 40 mEq (40 mEq Oral Given 1/21/20 2031)         PROGRESS, DATA ANALYSIS, CONSULTS, AND MEDICAL DECISION MAKING    2030: Reviewed pt's history and workup with Dr. Quintanilla.  After a bedside evaluation, Dr. Quintanilla agrees with the plan of care.      All labs have been independently reviewed by me.  All radiology studies have been reviewed by me and discussed with radiologist dictating the report.   EKG's independently viewed and interpreted by me.  Discussion below represents my analysis of pertinent findings related to patient's condition, differential diagnosis, treatment plan and final disposition.      ED Course as of Jan 21 2214   Tue Jan 21, 2020   2011 Will give oral dose,  the loss is probably related to the several days of n/v/d.    Potassium(!): 3.1 [EP]   2015 Updated patient and her  on results of chest x-ray.  I will further clarify this chest x-ray with a CT scan of the chest given her symptoms she may very well could have a pneumonia but I want to verify this before starting any antibiotics given her recent bouts of C. difficile.    [EP]   2212 Updated patient and her  on CT chest results.  There is no pneumonia there is still some colitis I am going to let this ride out and she can follow-up with her primary care.  The diarrhea is getting better especially on over-the-counter Imodium right ear and the nausea and vomiting has stopped.    [EP]       ED Course User Index  [EP] Sherice Blackwood, FAWAD       AS OF 10:14 PM VITALS:    BP - 122/77  HR - 90  TEMP - 99.8 °F (37.7 °C) (Tympanic)  O2 SATS - 98%        DIAGNOSIS  Final diagnoses:   Gastroenteritis   History of colitis         DISPOSITION  Home               Sherice Blackwood, FAWAD  01/21/20 3452

## 2020-01-22 PROCEDURE — 77427 RADIATION TX MANAGEMENT X5: CPT | Performed by: RADIOLOGY

## 2020-01-22 PROCEDURE — 77412 RADIATION TX DELIVERY LVL 3: CPT | Performed by: RADIOLOGY

## 2020-01-22 PROCEDURE — 77387 GUIDANCE FOR RADJ TX DLVR: CPT | Performed by: RADIOLOGY

## 2020-01-22 NOTE — ED PROVIDER NOTES
Pt presents to the ED c/o nausea, vomiting, and diarrhea since Saturday night. Pt states she has not vomited today. Pt denies abd pain, cough, SOA, fever, or chills. Pt is currently undergoing radiation for R-sided breast cancer.  Hx of C-diff. There are no other complaints at this time.     On exam:  General: no distress  CV: regular rhythm, regular rate  Respiratory: effort and breath sounds are normal, no respiratory distress  ABD: soft, nontender, normal active bowel sounds  MSK: moves all extremities, no BLE edema/tenderness      EKG          EKG time: 1751  Rhythm/Rate: NSR, 99  P waves and NJ: normal   QRS, axis: normal   ST and T waves: nonspecific T wave changes inferiorly      Interpreted Contemporaneously by me, independently viewed  Changed compared to prior 10/29/2019.        Plan: Review labs and imaging (CXR and CT chest).      MD ATTESTATION NOTE      The NANCY and I have discussed this patient's history, physical exam, and treatment plan.  I have reviewed the documentation and personally had a face to face interaction with the patient. I affirm the documentation and agree with the treatment and plan.  The attached note describes my personal findings.      Documentation assistance provided by filemon Neal for Dr. Quintanilla.  Information recorded by the scribe was done at my direction and has been verified and validated by me.             Kay Neal  01/21/20 2043       Jean-Paul Quintanilla MD  01/22/20 0101

## 2020-01-22 NOTE — DISCHARGE INSTRUCTIONS
Avoid foods that are greasy and spicy    Return Precautions    Although you are being discharged from the ED today, I encourage you to return for worsening symptoms.  Things can, and do, change such that treatment at home with medication may not be adequate.      Specifically, return for any of the following:    Chest pain, shortness of breath, pain or nausea and vomiting not controlled by medications provided.    Please make a follow up with your Primary Care Provider for a blood pressure recheck.

## 2020-01-23 PROCEDURE — 77412 RADIATION TX DELIVERY LVL 3: CPT | Performed by: RADIOLOGY

## 2020-01-23 PROCEDURE — 77387 GUIDANCE FOR RADJ TX DLVR: CPT | Performed by: RADIOLOGY

## 2020-01-24 ENCOUNTER — APPOINTMENT (OUTPATIENT)
Dept: LAB | Facility: HOSPITAL | Age: 65
End: 2020-01-24

## 2020-01-24 ENCOUNTER — RADIATION ONCOLOGY WEEKLY ASSESSMENT (OUTPATIENT)
Dept: RADIATION ONCOLOGY | Facility: HOSPITAL | Age: 65
End: 2020-01-24

## 2020-01-24 ENCOUNTER — LAB (OUTPATIENT)
Dept: LAB | Facility: HOSPITAL | Age: 65
End: 2020-01-24

## 2020-01-24 ENCOUNTER — APPOINTMENT (OUTPATIENT)
Dept: ONCOLOGY | Facility: HOSPITAL | Age: 65
End: 2020-01-24

## 2020-01-24 ENCOUNTER — INFUSION (OUTPATIENT)
Dept: ONCOLOGY | Facility: HOSPITAL | Age: 65
End: 2020-01-24

## 2020-01-24 VITALS
DIASTOLIC BLOOD PRESSURE: 79 MMHG | HEART RATE: 98 BPM | BODY MASS INDEX: 29.94 KG/M2 | SYSTOLIC BLOOD PRESSURE: 120 MMHG | TEMPERATURE: 98.2 F | OXYGEN SATURATION: 98 % | WEIGHT: 169 LBS

## 2020-01-24 DIAGNOSIS — Z17.0 MALIGNANT NEOPLASM OF UPPER-INNER QUADRANT OF RIGHT BREAST IN FEMALE, ESTROGEN RECEPTOR POSITIVE (HCC): Primary | ICD-10-CM

## 2020-01-24 DIAGNOSIS — Z79.01 ANTICOAGULATED ON COUMADIN: Primary | ICD-10-CM

## 2020-01-24 DIAGNOSIS — C50.211 MALIGNANT NEOPLASM OF UPPER-INNER QUADRANT OF RIGHT BREAST IN FEMALE, ESTROGEN RECEPTOR POSITIVE (HCC): Primary | ICD-10-CM

## 2020-01-24 LAB
INR PPP: 4.98 (ref 0.9–1.1)
PROTHROMBIN TIME: 46.1 SECONDS (ref 11.7–14.2)

## 2020-01-24 PROCEDURE — 85610 PROTHROMBIN TIME: CPT | Performed by: INTERNAL MEDICINE

## 2020-01-24 PROCEDURE — 77412 RADIATION TX DELIVERY LVL 3: CPT | Performed by: RADIOLOGY

## 2020-01-24 PROCEDURE — 36415 COLL VENOUS BLD VENIPUNCTURE: CPT

## 2020-01-24 PROCEDURE — 77387 GUIDANCE FOR RADJ TX DLVR: CPT | Performed by: RADIOLOGY

## 2020-01-24 NOTE — NURSING NOTE
Pt here for an INR. Initial result per fingerstick was 6.5. Specimen was sent to the hospital and resulted at 4.98. Pt has missed no doses. Per her last schedule she took Coumadin 2.5 Monday and Friday and 5 mg all other days. She was at the hospital for nausea and vomiting recently and her INR on 1/21 was 2.93. Pt denies vomiting after taking Coumadin. Per [facility], pt's Coumadin would be decreased to 2.5 Mon, Wed, Fri, and Sat and 5 mg all other days. Reviewed with SARAHI Bowen, ok to follow schedule from [facility]. New schedule given to pt and she v/u. Pt to return in one week for an INR, pt aware and v/u.

## 2020-01-24 NOTE — PROGRESS NOTES
Physician Weekly Management Note    Diagnosis:     1. Malignant neoplasm of upper-inner quadrant of right breast in female, estrogen receptor positive (CMS/HCC)       Reason for Visit: Radiation (8/21)    Concurrent Chemo:   No    Notes on Treatment course, Films, Medical progress and Plan:  Doing well. Skin fine. No problems or questions, cont on.    Performance Status: (1) Restricted in physically strenuous activity, ambulatory and able to do work of light nature    ROS - Other than as listed above, as follow:  Constitutional - Normal - Denies lack of appetite, fatigue, fever, night sweats and change in weight.  Neck - Normal - Denies neck masses, muscle weakness, neck pain, decreased range of motion and swelling of the neck.  Breasts - Normal - Denies breast masses, nipple discharge, nipple inversion and pain.  Respiratory - Normal - Denies cough, dyspnea, hemoptysis, hiccoughs, pleuritic chest pain and wheezing.  Gastrointestinal - Normal - Denies abdominal pain, constipation, diarrhea, heartburn / dyspepsia, hematemesis, hemorrhoids, melena / GI bleeding, nausea, pain / cramping, satiety and vomiting.  Musculoskeletal - Normal - Denies arthritis, bone pain, joint pain, muscle weakness and decreased range of motion.   Hematologic/Lymphatic - Normal - Denies easy bruising and tender or enlarged lymph nodes.    PYHSICAL EXAM - Other than listed above, as follows:  Vitals:    01/24/20 1543   PainSc: 0-No pain       Constitutional - Normal - No evidence of impaired alertness, inadequate appearance, premature or advanced chronologic age, uncooperativeness, altered mood and affect and disorientation.  Neck - Normal - No evidence of tender or enlarged lymph nodes, neck abnormalities, restricted range of motion and enlarged thyroid gland.  Breasts - Normal - No change in nipple or incision site.  Chest - Normal - No evidence of chest abnormalities and tender or enlarged lymph nodes.  Hematologic/Lymphatic -  Normal - No  "evidence of tender or enlarged axillae lymph nodes and tender or enlarged neck lymph nodes.    Problem added:  No problems updated.  Medications added: No orders of the defined types were placed in this encounter.    Ancillary referrals made: No orders of the defined types were placed in this encounter.      Technical aspects reviewed:  Weekly OBI approved if applicable? Yes  Weekly port films approved?   Yes  Change requests noted if applicable? Yes  Patient setup and plan reviewed?  Yes    Chart Reviewed:  Continue current treatment plan?   Yes  Treatment plan change requested?  No    I have reviewed and marked as \"reviewed\" the current medications, allergies and problem list in the patients EMR.    I have reviewed the patient's medical, surgical  history in detail, reviewed any pertinent lab work  and updated the computerized patient record if needed.    Patient's Care Team:  Patient Care Team:  Bam Umaña MD as PCP - General (Family Medicine)  Beena Maurice MD as Consulting Physician (Radiation Oncology)  Jana Evans MD as Consulting Physician (Hematology and Oncology)  Sage Gonzalez MD as Referring Physician (Gastroenterology)  Aguilar Rodgers II, MD as Consulting Physician (Hematology and Oncology)      "

## 2020-01-27 PROCEDURE — 77412 RADIATION TX DELIVERY LVL 3: CPT | Performed by: RADIOLOGY

## 2020-01-27 PROCEDURE — 77336 RADIATION PHYSICS CONSULT: CPT | Performed by: RADIOLOGY

## 2020-01-27 PROCEDURE — 77387 GUIDANCE FOR RADJ TX DLVR: CPT | Performed by: RADIOLOGY

## 2020-01-28 PROCEDURE — 77412 RADIATION TX DELIVERY LVL 3: CPT | Performed by: RADIOLOGY

## 2020-01-28 PROCEDURE — 77387 GUIDANCE FOR RADJ TX DLVR: CPT | Performed by: RADIOLOGY

## 2020-01-29 PROCEDURE — 77427 RADIATION TX MANAGEMENT X5: CPT | Performed by: RADIOLOGY

## 2020-01-29 PROCEDURE — 77412 RADIATION TX DELIVERY LVL 3: CPT | Performed by: RADIOLOGY

## 2020-01-29 PROCEDURE — 77387 GUIDANCE FOR RADJ TX DLVR: CPT | Performed by: RADIOLOGY

## 2020-01-30 PROCEDURE — 77412 RADIATION TX DELIVERY LVL 3: CPT | Performed by: RADIOLOGY

## 2020-01-30 PROCEDURE — 77387 GUIDANCE FOR RADJ TX DLVR: CPT | Performed by: RADIOLOGY

## 2020-01-31 ENCOUNTER — INFUSION (OUTPATIENT)
Dept: ONCOLOGY | Facility: HOSPITAL | Age: 65
End: 2020-01-31

## 2020-01-31 ENCOUNTER — LAB (OUTPATIENT)
Dept: LAB | Facility: HOSPITAL | Age: 65
End: 2020-01-31

## 2020-01-31 ENCOUNTER — RADIATION ONCOLOGY WEEKLY ASSESSMENT (OUTPATIENT)
Dept: RADIATION ONCOLOGY | Facility: HOSPITAL | Age: 65
End: 2020-01-31

## 2020-01-31 ENCOUNTER — APPOINTMENT (OUTPATIENT)
Dept: ONCOLOGY | Facility: HOSPITAL | Age: 65
End: 2020-01-31

## 2020-01-31 ENCOUNTER — APPOINTMENT (OUTPATIENT)
Dept: LAB | Facility: HOSPITAL | Age: 65
End: 2020-01-31

## 2020-01-31 ENCOUNTER — TELEPHONE (OUTPATIENT)
Dept: ONCOLOGY | Facility: CLINIC | Age: 65
End: 2020-01-31

## 2020-01-31 DIAGNOSIS — C50.211 MALIGNANT NEOPLASM OF UPPER-INNER QUADRANT OF RIGHT BREAST IN FEMALE, ESTROGEN RECEPTOR POSITIVE (HCC): Primary | ICD-10-CM

## 2020-01-31 DIAGNOSIS — Z17.0 MALIGNANT NEOPLASM OF UPPER-INNER QUADRANT OF RIGHT BREAST IN FEMALE, ESTROGEN RECEPTOR POSITIVE (HCC): Primary | ICD-10-CM

## 2020-01-31 DIAGNOSIS — Z17.0 MALIGNANT NEOPLASM OF UPPER-INNER QUADRANT OF RIGHT BREAST IN FEMALE, ESTROGEN RECEPTOR POSITIVE (HCC): ICD-10-CM

## 2020-01-31 DIAGNOSIS — C50.211 MALIGNANT NEOPLASM OF UPPER-INNER QUADRANT OF RIGHT BREAST IN FEMALE, ESTROGEN RECEPTOR POSITIVE (HCC): ICD-10-CM

## 2020-01-31 LAB
INR PPP: >10 (ref 0.9–1.1)
PROTHROMBIN TIME: 81.9 SECONDS (ref 11.7–14.2)

## 2020-01-31 PROCEDURE — 25010000002 VITAMIN K1 PER 1 MG: Performed by: INTERNAL MEDICINE

## 2020-01-31 PROCEDURE — 36415 COLL VENOUS BLD VENIPUNCTURE: CPT

## 2020-01-31 PROCEDURE — 77412 RADIATION TX DELIVERY LVL 3: CPT | Performed by: RADIOLOGY

## 2020-01-31 PROCEDURE — 77387 GUIDANCE FOR RADJ TX DLVR: CPT | Performed by: RADIOLOGY

## 2020-01-31 PROCEDURE — 85610 PROTHROMBIN TIME: CPT | Performed by: INTERNAL MEDICINE

## 2020-01-31 RX ORDER — PHYTONADIONE 2 MG/ML
2.5 INJECTION, EMULSION INTRAMUSCULAR; INTRAVENOUS; SUBCUTANEOUS ONCE
Status: COMPLETED | OUTPATIENT
Start: 2020-01-31 | End: 2020-01-31

## 2020-01-31 RX ADMIN — PHYTONADIONE 2.5 MG: 10 INJECTION, EMULSION INTRAMUSCULAR; INTRAVENOUS; SUBCUTANEOUS at 17:35

## 2020-01-31 NOTE — PROGRESS NOTES
Physician Weekly Management Note    Diagnosis:     1. Malignant neoplasm of upper-inner quadrant of right breast in female, estrogen receptor positive (CMS/HCC)        Reason for Visit: Radiation (13/21)    Concurrent Chemo:   No    Notes on Treatment course, Films, Medical progress and Plan:  Doing ok iwithus. Breast slightly dry but looks good. Skin care reviewed again. Daughter with many concerns about her general recovery - fatigued, not eating well, lack of motivation, dry mouth. All addressed. Patient not concerned. Feels mood is ok, she is still recovering and feels she is doing all she can or should. To return to work next week. Will see if she is able and talk further. Asked her to focus on what is mood and what is physical and she was agreeable. Cont on.    Performance Status: (1) Restricted in physically strenuous activity, ambulatory and able to do work of light nature    ROS - Other than as listed above, as follow:  Constitutional - Normal - Denies lack of appetite, fatigue, fever, night sweats and change in weight.  Neck - Normal - Denies neck masses, muscle weakness, neck pain, decreased range of motion and swelling of the neck.  Breasts - Normal - Denies breast masses, nipple discharge, nipple inversion and pain.  Respiratory - Normal - Denies cough, dyspnea, hemoptysis, hiccoughs, pleuritic chest pain and wheezing.  Gastrointestinal - Normal - Denies abdominal pain, constipation, diarrhea, heartburn / dyspepsia, hematemesis, hemorrhoids, melena / GI bleeding, nausea, pain / cramping, satiety and vomiting.  Musculoskeletal - Normal - Denies arthritis, bone pain, joint pain, muscle weakness and decreased range of motion.   Hematologic/Lymphatic - Normal - Denies easy bruising and tender or enlarged lymph nodes.    PYHSICAL EXAM - Other than listed above, as follows:  There were no vitals filed for this visit.    Constitutional - Normal - No evidence of impaired alertness, inadequate appearance,  "premature or advanced chronologic age, uncooperativeness, altered mood and affect and disorientation.  Neck - Normal - No evidence of tender or enlarged lymph nodes, neck abnormalities, restricted range of motion and enlarged thyroid gland.  Breasts - Normal - No change in nipple or incision site.  Chest - Normal - No evidence of chest abnormalities and tender or enlarged lymph nodes.  Hematologic/Lymphatic -  Normal - No evidence of tender or enlarged axillae lymph nodes and tender or enlarged neck lymph nodes.    Problem added:  No problems updated.  Medications added: No orders of the defined types were placed in this encounter.    Ancillary referrals made: No orders of the defined types were placed in this encounter.      Technical aspects reviewed:  Weekly OBI approved if applicable? Yes  Weekly port films approved?   Yes  Change requests noted if applicable? Yes  Patient setup and plan reviewed?  Yes    Chart Reviewed:  Continue current treatment plan?   Yes  Treatment plan change requested?  No    I have reviewed and marked as \"reviewed\" the current medications, allergies and problem list in the patients EMR.    I have reviewed the patient's medical, surgical  history in detail, reviewed any pertinent lab work  and updated the computerized patient record if needed.    Patient's Care Team:  Patient Care Team:  Bam Umaña MD as PCP - General (Family Medicine)  Beena Maurice MD as Consulting Physician (Radiation Oncology)  Jana Evans MD as Consulting Physician (Hematology and Oncology)  Sage Gonzalez MD as Referring Physician (Gastroenterology)  Aguilar Rodgers II, MD as Consulting Physician (Hematology and Oncology)      "

## 2020-01-31 NOTE — NURSING NOTE
Pt given po Vitamin K as ordered.  Pt to be instructed by Dr. Rodgers once INR results are back.  Pt instructed not to take any Coumadin until instructed to do so.  Pt and daughter v/u.  Pt discharged ambulating home.

## 2020-02-01 ENCOUNTER — APPOINTMENT (OUTPATIENT)
Dept: RADIATION ONCOLOGY | Facility: HOSPITAL | Age: 65
End: 2020-02-01

## 2020-02-03 ENCOUNTER — INFUSION (OUTPATIENT)
Dept: ONCOLOGY | Facility: HOSPITAL | Age: 65
End: 2020-02-03

## 2020-02-03 ENCOUNTER — LAB (OUTPATIENT)
Dept: LAB | Facility: HOSPITAL | Age: 65
End: 2020-02-03

## 2020-02-03 ENCOUNTER — DOCUMENTATION (OUTPATIENT)
Dept: ONCOLOGY | Facility: CLINIC | Age: 65
End: 2020-02-03

## 2020-02-03 DIAGNOSIS — Z79.01 ANTICOAGULATED ON COUMADIN: Primary | ICD-10-CM

## 2020-02-03 LAB
INR PPP: 2 (ref 0.9–1.1)
PROTHROMBIN TIME: 24 SECONDS (ref 11–13.5)

## 2020-02-03 PROCEDURE — 77412 RADIATION TX DELIVERY LVL 3: CPT | Performed by: RADIOLOGY

## 2020-02-03 PROCEDURE — 77336 RADIATION PHYSICS CONSULT: CPT | Performed by: RADIOLOGY

## 2020-02-03 PROCEDURE — 85610 PROTHROMBIN TIME: CPT

## 2020-02-03 PROCEDURE — 77387 GUIDANCE FOR RADJ TX DLVR: CPT | Performed by: RADIOLOGY

## 2020-02-03 NOTE — PROGRESS NOTES
INR was >10 on Friday.  She received 2.5 mg vitamin K on Friday and no Coumadin on Friday, Saturday, or Sunday.  INR is 2 today.    Although INR previously reasonable, with this dramatic increase in INR, I am concerned she may develop future wide fluctuations in INR.  Therefore, although Coumadin is generally preferred over newer oral anticoagulants for portal vein thrombosis, I think a new oral anticoagulant such as Xarelto or Eliquis would be a safer choice for her.  She has already had 3 months of anticoagulation with Coumadin.    She will return tomorrow to meet with a nurse practitioner to discuss.  In the meantime, she will hold Coumadin overnight.  Nursing (Tiffanie) is going to check with the niece to see if out-of-pocket expense is affordable.  If not, perhaps samples can be given.  Xarelto 20 mg daily would be the most convenient option for her.

## 2020-02-03 NOTE — PROGRESS NOTES
INR 2.0. Has not had any coumadin since last Thursday. Discussed with Dr Rodgers. He wants her to see NP tomorrow and switch to eliquis or xarelto. No coumadin today. Patient verbalized understanding. In basket to scheduling and Jacklyn.  Patient went to scheduling.

## 2020-02-04 ENCOUNTER — LAB (OUTPATIENT)
Dept: LAB | Facility: HOSPITAL | Age: 65
End: 2020-02-04

## 2020-02-04 ENCOUNTER — OFFICE VISIT (OUTPATIENT)
Dept: ONCOLOGY | Facility: CLINIC | Age: 65
End: 2020-02-04

## 2020-02-04 ENCOUNTER — DOCUMENTATION (OUTPATIENT)
Dept: ONCOLOGY | Facility: CLINIC | Age: 65
End: 2020-02-04

## 2020-02-04 VITALS
HEIGHT: 63 IN | RESPIRATION RATE: 16 BRPM | DIASTOLIC BLOOD PRESSURE: 61 MMHG | WEIGHT: 164 LBS | TEMPERATURE: 98.5 F | OXYGEN SATURATION: 98 % | SYSTOLIC BLOOD PRESSURE: 95 MMHG | BODY MASS INDEX: 29.06 KG/M2 | HEART RATE: 100 BPM

## 2020-02-04 DIAGNOSIS — Z79.01 ALTERATION IN ANTICOAGULATION: Primary | ICD-10-CM

## 2020-02-04 DIAGNOSIS — Z51.81 ALTERATION IN ANTICOAGULATION: Primary | ICD-10-CM

## 2020-02-04 DIAGNOSIS — Z79.01 ANTICOAGULATED ON COUMADIN: Primary | ICD-10-CM

## 2020-02-04 PROBLEM — R79.1 SUPRATHERAPEUTIC INR: Status: RESOLVED | Noted: 2019-12-14 | Resolved: 2020-02-04

## 2020-02-04 LAB
BASOPHILS # BLD AUTO: 0.11 10*3/MM3 (ref 0–0.2)
BASOPHILS NFR BLD AUTO: 0.6 % (ref 0–1.5)
DEPRECATED RDW RBC AUTO: 44.5 FL (ref 37–54)
EOSINOPHIL # BLD AUTO: 0.94 10*3/MM3 (ref 0–0.4)
EOSINOPHIL NFR BLD AUTO: 5.3 % (ref 0.3–6.2)
ERYTHROCYTE [DISTWIDTH] IN BLOOD BY AUTOMATED COUNT: 13.8 % (ref 12.3–15.4)
HCT VFR BLD AUTO: 38.4 % (ref 34–46.6)
HGB BLD-MCNC: 13.2 G/DL (ref 12–15.9)
IMM GRANULOCYTES # BLD AUTO: 0.15 10*3/MM3 (ref 0–0.05)
IMM GRANULOCYTES NFR BLD AUTO: 0.9 % (ref 0–0.5)
INR PPP: 1.9 (ref 0.9–1.1)
LYMPHOCYTES # BLD AUTO: 4.11 10*3/MM3 (ref 0.7–3.1)
LYMPHOCYTES NFR BLD AUTO: 23.3 % (ref 19.6–45.3)
MCH RBC QN AUTO: 30.7 PG (ref 26.6–33)
MCHC RBC AUTO-ENTMCNC: 34.4 G/DL (ref 31.5–35.7)
MCV RBC AUTO: 89.3 FL (ref 79–97)
MONOCYTES # BLD AUTO: 1.9 10*3/MM3 (ref 0.1–0.9)
MONOCYTES NFR BLD AUTO: 10.8 % (ref 5–12)
NEUTROPHILS # BLD AUTO: 10.41 10*3/MM3 (ref 1.7–7)
NEUTROPHILS NFR BLD AUTO: 59.1 % (ref 42.7–76)
NRBC BLD AUTO-RTO: 0 /100 WBC (ref 0–0.2)
PLATELET # BLD AUTO: 306 10*3/MM3 (ref 140–450)
PMV BLD AUTO: 9.3 FL (ref 6–12)
PROTHROMBIN TIME: 23 SECONDS (ref 11–13.5)
RBC # BLD AUTO: 4.3 10*6/MM3 (ref 3.77–5.28)
WBC NRBC COR # BLD: 17.62 10*3/MM3 (ref 3.4–10.8)

## 2020-02-04 PROCEDURE — 77412 RADIATION TX DELIVERY LVL 3: CPT | Performed by: RADIOLOGY

## 2020-02-04 PROCEDURE — 77387 GUIDANCE FOR RADJ TX DLVR: CPT | Performed by: RADIOLOGY

## 2020-02-04 PROCEDURE — 36415 COLL VENOUS BLD VENIPUNCTURE: CPT

## 2020-02-04 PROCEDURE — 85610 PROTHROMBIN TIME: CPT

## 2020-02-04 PROCEDURE — 99212 OFFICE O/P EST SF 10 MIN: CPT | Performed by: NURSE PRACTITIONER

## 2020-02-04 PROCEDURE — 85025 COMPLETE CBC W/AUTO DIFF WBC: CPT

## 2020-02-04 NOTE — PROGRESS NOTES
.     REASONS FOR FOLLOWUP: Portal vein thrombosis, breast cancer    HISTORY OF PRESENT ILLNESS:  The patient is a 64 y.o. year old female who is here today specifically to discuss transitioning from Coumadin to Xarelto.  Last Friday, 1/31/2020 her INR was greater than 10.  She received 2.5 mg of vitamin K and took no further Coumadin.  Yesterday repeat INR was 2.  Dr. Rodgers discussed briefly with the patient switching to a newer agent such as Xarelto or Eliquis.  Since that time Jacklyn Bhagat, pharmacy benefit advocate, has looked into coverage and the patient can get Xarelto at a maximum cost of $30/month.  Therefore this prescription was sent in this afternoon.    I spent time with the patient today discussing the differences between Xarelto and previously taken Coumadin.  We specifically addressed that she no longer needs to worry about certain foods or medications that interacted with Coumadin as this is not an issue with Xarelto.  We also discussed she can just take this at the same time each day.  We discussed potential bleeding risk with Xarelto but that overall it is much more stable drug with less risk for bleeding comparatively to Coumadin.    Patient's repeat INR today is 1.9.  We discussed to go ahead and get started on the Xarelto as soon as she picks up the prescription.    She denies other questions or concerns.    Past Medical History:   Diagnosis Date   • Breast cancer (CMS/HCC) 09/2019    Right IDC   • GERD (gastroesophageal reflux disease)    • PONV (postoperative nausea and vomiting)    • Situational stress     DEATH OF BROTHER 10/2019, RECENT DIAGNOSIS   • Stress headaches      Past Surgical History:   Procedure Laterality Date   • BILE DUCT STENT PLACEMENT  2014   • BREAST BIOPSY Right 09/2019    malignant   • BREAST LUMPECTOMY WITH SENTINEL NODE BIOPSY Right 11/4/2019    Procedure: BREAST LUMPECTOMY WITH SENTINEL NODE BIOPSY AND NEEDLE LOCALIZATION And possible axillary dissection;   Surgeon: Jean-Paul Ramos MD;  [place] [facility] [place] [facility]  Service: General   • KNEE ARTHROSCOPY Left    • LAPAROSCOPIC CHOLECYSTECTOMY     • TUBAL ABDOMINAL LIGATION         MEDICATIONS    Current Outpatient Medications:   •  acetaminophen (TYLENOL) 325 MG tablet, Take 650 mg by mouth Every 6 (Six) Hours As Needed for Mild Pain ., Disp: , Rfl:   •  doxylamine (UNISOM) 25 MG tablet, Take 25 mg by mouth At Night As Needed., Disp: , Rfl:   •  melatonin 5 MG tablet tablet, Take 10 mg by mouth At Night As Needed., Disp: , Rfl:   •  ondansetron (ZOFRAN) 4 MG tablet, Take 1 tablet by mouth Every 8 (Eight) Hours As Needed for Nausea or Vomiting., Disp: 10 tablet, Rfl: 0  •  Probiotic Product (PROBIOTIC PO), Take  by mouth., Disp: , Rfl:   •  rivaroxaban (XARELTO) 20 MG tablet, Take 1 tablet by mouth Daily., Disp: 30 tablet, Rfl: 6    ALLERGIES:   No Known Allergies    SOCIAL HISTORY:       Social History     Socioeconomic History   • Marital status:      Spouse name: Not on file   • Number of children: 2   • Years of education: Not on file   • Highest education level: Not on file   Occupational History     Employer: [employer]   Tobacco Use   • Smoking status: Former Smoker     Packs/day: 0.50     Years: 10.00     Pack years: 5.00     Types: Cigarettes     Last attempt to quit:      Years since quittin.0   • Smokeless tobacco: Former User   Substance and Sexual Activity   • Alcohol use: No   • Drug use: No   • Sexual activity: Never     Birth control/protection: Post-menopausal         FAMILY HISTORY:  Family History   Problem Relation Age of Onset   • Alcohol abuse Father    • Heart failure Mother    • Diabetes Brother    • Prostate cancer Brother    • Throat cancer Brother    • Breast cancer Maternal Aunt    • Breast cancer Maternal Cousin    • Breast cancer Maternal Cousin    • Malig Hyperthermia Neg Hx        REVIEW OF SYSTEMS:  Review of Systems   Constitutional: Negative for activity change.  "  HENT: Negative for nosebleeds and trouble swallowing.    Respiratory: Negative for shortness of breath and wheezing.    Cardiovascular: Negative for chest pain and palpitations.   Gastrointestinal: Negative for constipation, diarrhea and nausea.   Genitourinary: Negative for dysuria and hematuria.   Musculoskeletal: Negative for arthralgias and myalgias.   Skin: Negative for rash and wound.   Neurological: Negative for seizures and syncope.   Hematological: Negative for adenopathy. Does not bruise/bleed easily.   Psychiatric/Behavioral: Negative for confusion.            Vitals:    02/04/20 1555   BP: 95/61   Pulse: 100   Resp: 16   Temp: 98.5 °F (36.9 °C)   SpO2: 98%   Weight: 74.4 kg (164 lb)   Height: 161.2 cm (63.47\")   PainSc: 0-No pain     Current Status 2/4/2020   ECOG score 0       PHYSICAL EXAM:    CONSTITUTIONAL:  Vital signs reviewed.  No distress, looks comfortable.  EYES:  Conjunctiva and lids unremarkable.  PERRLA  EARS,NOSE,MOUTH,THROAT:  Ears and nose appear unremarkable.  Lips, teeth, gums appear unremarkable.  RESPIRATORY:  Normal respiratory effort.  Lungs clear to auscultation bilaterally.  CARDIOVASCULAR:  Normal S1, S2.  No murmurs rubs or gallops.  No significant lower extremity edema.  GASTROINTESTINAL: Abdomen appears unremarkable.  Nontender.  No hepatomegaly.  No splenomegaly.  LYMPHATIC:  No cervical, supraclavicular, axillary lymphadenopathy.  SKIN:  Warm.  No rashes.  PSYCHIATRIC:  Normal judgment and insight.  Normal mood and affect.    RECENT LABS:        WBC   Date/Time Value Ref Range Status   02/04/2020 04:17 PM 17.62 (H) 3.40 - 10.80 10*3/mm3 Final     Hemoglobin   Date/Time Value Ref Range Status   02/04/2020 04:17 PM 13.2 12.0 - 15.9 g/dL Final     Platelets   Date/Time Value Ref Range Status   02/04/2020 04:17  140 - 450 10*3/mm3 Final       Assessment/Plan   There are no diagnoses linked to this encounter.  *Right breast cancer.  Invasive ductal adenocarcinoma.  Upper " inner quadrant  · Right lumpectomy by Dr. Ramos 11/4/2019.  1:00.  Overall grade 1.  Greatest dimension 5 mm.  Single focus of invasive carcinoma.  No lymphovascular invasion.  Margins negative.  0 out of 2 nodes involved.  · [id number], stage 1  · ER 91%.  DC 96%.  HER-2 negative.  Ki-67 4.7% (favorable).  · (Initial needle biopsy 9/16/2019: Low-grade invasive ductal carcinoma, grade 1.  5 mm in greatest dimension.)  · (Initial mammogram measured the lesion at 5 mm)  · From the information we currently know about her cancer, I do not recommend chemotherapy.  I do recommend hormonal therapy for 5 years.  Hopefully, aromatase inhibitors if DEXA scan allows (with portal vein thrombosis would like to avoid tamoxifen).  · Oncotype DX: Recurrence score 3, 3% distant recurrence risk at 9 years if tamoxifen or AI is used.  · Therefore, do not recommend chemotherapy.  · She has seen radiation medicine for adjuvant XRT.  · Arimidex 12/9/2019-planned 12/9/2024  · Side effects reviewed.  She wants therapy.  She understands the goal is cure.  Printed handout provided as well.    *Bone health.  · DEXA 12/2/2019: Normal.  T score lumbar spine 0.1, T score left hip 0.1, T score right hip 0.6.  · She takes 2-4 times daily.  Therefore, did not begin a calcium supplement when a rheumatic started on 12/9/2019.  She was encouraged to take a PPI on a schedule and follow-up with her PCP.  States she has had several EGDs in the past none of which have shown problems.  (States in the past her PCP told her to stop calcium supplements because she developed hypercalcemia due to the combination of calcium supplements and Tums).     *Portal vein thrombosis (right branch of portal vein).  · No evidence of cirrhosis on MRI or ultrasound.  Dr. Berna Gonzalez, GI note, has no mention of cirrhosis.  · Hormonal therapy for breast cancer has a slight increased risk of thrombosis.  Therefore, I think it is in her best interest to undergo anticoagulation  until she completes hormonal therapy.  · Generally, Lovenox or Coumadin are the preferred agents.  · INR supratherapeutic greater than 10 on Friday, 1/31/2020.  Per Dr. Rodgers decision made to transition to newer agent going forward is a safer choice.   · I met with the patient today, 2/4/2020, and discussed transitioning to Xarelto which we have been able to get approved through her insurance around $30 a month.  Patient is agreeable to this.  As outlined in HPI we reviewed this medication in detail today.  Her INR today is 1.9.  She will go ahead and start on Xarelto when she is able to  the prescription in the next 24 hours.      *Risk factors for portal vein thrombosis:  · Unremarkable: Prothrombin gene mutation, factor V Leiden gene mutation, lupus anticoagulant, beta-2 glycoprotein antibodies, anti-thrombin, protein S free.    *Low protein S activity, 14% (normal range %), on 11/13/2019     *Low protein C activity, 44% (normal range %) on 11/13/2019.    *Indeterminant anticardiolipin  · IgM 14 (indeterminate range 13-20) on 11/13/2019.  IgA and IgG negative.    *C. difficile colitis during November 2019 hospitalization.  Thought to perhaps be related to receiving 1 dose of antibiotics for breast surgery.  She was treated with Flagyl and oral vancomycin     *Leukocytosis, likely due to C. difficile colitis.  WBC , down to normal now     *Anemia.  Likely due to C. difficile colitis.  Hb 10.8, stable     *Prominent nodes within the hepatogastric ligament and around the celiac axis and in the retroperitoneum on MRI abdomen 11/11/2019.  · Etiology uncertain.  Radiologist recommended follow-up       Plan  · Patient to permanently discontinue Coumadin.  · Patient will begin Xarelto 20 mg daily.  · Patient will follow-up with Dr. Rodgers in 3 weeks. 1 week prior: CT abdomen, CBC, CMP to follow-up prominent upper abdominal nodes seen on MRI abdomen 11/11/2019  · If neck CT looks okay hopefully we can move  her to an every 6-month MD visit schedule.

## 2020-02-04 NOTE — PROGRESS NOTES
I spoke with Lisa SEGOVIA NP who is seeing pt. I notified her that the Xarelto 20 mg has been approved through her insurance. Her cost should be no more than $30. I have escribed the rx to [facility].

## 2020-02-04 NOTE — PROGRESS NOTES
Staff message rec from Tiffanie BOUCHER RN askng me to get either Xarelto or Eliquis approved. See below.    Lisa Carrero RN sent to Jacklyn Bhagat; Mackenzie Nieto RN Dr Code wants to see if her insurance will approve eliquis or Xarelto.      Per Dr Rodgers's note-Xarelto 20 mg would be most convenient for pt.     I have submitted a PA to [employer] [employer] for the Xarelto. The request has been approved.    I have notified Tiffanie BOUCHER RN as well as Dr Rodgers by staff message.

## 2020-02-05 PROCEDURE — 77412 RADIATION TX DELIVERY LVL 3: CPT | Performed by: RADIOLOGY

## 2020-02-05 PROCEDURE — 77387 GUIDANCE FOR RADJ TX DLVR: CPT | Performed by: RADIOLOGY

## 2020-02-05 PROCEDURE — 77427 RADIATION TX MANAGEMENT X5: CPT | Performed by: RADIOLOGY

## 2020-02-06 ENCOUNTER — RADIATION ONCOLOGY WEEKLY ASSESSMENT (OUTPATIENT)
Dept: RADIATION ONCOLOGY | Facility: HOSPITAL | Age: 65
End: 2020-02-06

## 2020-02-06 DIAGNOSIS — C50.211 MALIGNANT NEOPLASM OF UPPER-INNER QUADRANT OF RIGHT BREAST IN FEMALE, ESTROGEN RECEPTOR POSITIVE (HCC): Primary | ICD-10-CM

## 2020-02-06 DIAGNOSIS — Z17.0 MALIGNANT NEOPLASM OF UPPER-INNER QUADRANT OF RIGHT BREAST IN FEMALE, ESTROGEN RECEPTOR POSITIVE (HCC): Primary | ICD-10-CM

## 2020-02-06 PROCEDURE — 77280 THER RAD SIMULAJ FIELD SMPL: CPT | Performed by: RADIOLOGY

## 2020-02-06 PROCEDURE — 77412 RADIATION TX DELIVERY LVL 3: CPT | Performed by: RADIOLOGY

## 2020-02-06 NOTE — PROGRESS NOTES
Physician Weekly Management Note    Diagnosis:     1. Malignant neoplasm of upper-inner quadrant of right breast in female, estrogen receptor positive (CMS/HCC)        Reason for Visit: Radiation (17/21)    Concurrent Chemo:   No    Notes on Treatment course, Films, Medical progress and Plan:  Doing ok with us. Breast slightly dry but looks good. Skin care reviewed again.  Back to work and pleased. Energy and mood both better. Cont on.    Performance Status: (1) Restricted in physically strenuous activity, ambulatory and able to do work of light nature    ROS - Other than as listed above, as follow:  Constitutional - Normal - Denies lack of appetite, fatigue, fever, night sweats and change in weight.  Neck - Normal - Denies neck masses, muscle weakness, neck pain, decreased range of motion and swelling of the neck.  Breasts - Normal - Denies breast masses, nipple discharge, nipple inversion and pain.  Respiratory - Normal - Denies cough, dyspnea, hemoptysis, hiccoughs, pleuritic chest pain and wheezing.  Gastrointestinal - Normal - Denies abdominal pain, constipation, diarrhea, heartburn / dyspepsia, hematemesis, hemorrhoids, melena / GI bleeding, nausea, pain / cramping, satiety and vomiting.  Musculoskeletal - Normal - Denies arthritis, bone pain, joint pain, muscle weakness and decreased range of motion.   Hematologic/Lymphatic - Normal - Denies easy bruising and tender or enlarged lymph nodes.    PYHSICAL EXAM - Other than listed above, as follows:  There were no vitals filed for this visit.    Constitutional - Normal - No evidence of impaired alertness, inadequate appearance, premature or advanced chronologic age, uncooperativeness, altered mood and affect and disorientation.  Neck - Normal - No evidence of tender or enlarged lymph nodes, neck abnormalities, restricted range of motion and enlarged thyroid gland.  Breasts - Normal - No change in nipple or incision site.  Chest - Normal - No evidence of chest  "abnormalities and tender or enlarged lymph nodes.  Hematologic/Lymphatic -  Normal - No evidence of tender or enlarged axillae lymph nodes and tender or enlarged neck lymph nodes.    Problem added:  No problems updated.  Medications added: No orders of the defined types were placed in this encounter.    Ancillary referrals made: No orders of the defined types were placed in this encounter.      Technical aspects reviewed:  Weekly OBI approved if applicable? Yes  Weekly port films approved?   Yes  Change requests noted if applicable? Yes  Patient setup and plan reviewed?  Yes    Chart Reviewed:  Continue current treatment plan?   Yes  Treatment plan change requested?  No    I have reviewed and marked as \"reviewed\" the current medications, allergies and problem list in the patients EMR.    I have reviewed the patient's medical, surgical  history in detail, reviewed any pertinent lab work  and updated the computerized patient record if needed.    Patient's Care Team:  Patient Care Team:  Bam Umaña MD as PCP - General (Family Medicine)  Beena Maurice MD as Consulting Physician (Radiation Oncology)  Jana Evans MD as Consulting Physician (Hematology and Oncology)  Sage Gonzalez MD as Referring Physician (Gastroenterology)  Aguilar Rodgers II, MD as Consulting Physician (Hematology and Oncology)      "

## 2020-02-07 ENCOUNTER — APPOINTMENT (OUTPATIENT)
Dept: LAB | Facility: HOSPITAL | Age: 65
End: 2020-02-07

## 2020-02-07 ENCOUNTER — APPOINTMENT (OUTPATIENT)
Dept: ONCOLOGY | Facility: HOSPITAL | Age: 65
End: 2020-02-07

## 2020-02-07 PROCEDURE — 77387 GUIDANCE FOR RADJ TX DLVR: CPT | Performed by: RADIOLOGY

## 2020-02-07 PROCEDURE — 77412 RADIATION TX DELIVERY LVL 3: CPT | Performed by: RADIOLOGY

## 2020-02-10 PROCEDURE — 77412 RADIATION TX DELIVERY LVL 3: CPT | Performed by: RADIOLOGY

## 2020-02-10 PROCEDURE — 77387 GUIDANCE FOR RADJ TX DLVR: CPT | Performed by: RADIOLOGY

## 2020-02-10 PROCEDURE — 77336 RADIATION PHYSICS CONSULT: CPT | Performed by: RADIOLOGY

## 2020-02-11 ENCOUNTER — TELEPHONE (OUTPATIENT)
Dept: ONCOLOGY | Facility: CLINIC | Age: 65
End: 2020-02-11

## 2020-02-11 PROCEDURE — 77412 RADIATION TX DELIVERY LVL 3: CPT | Performed by: RADIOLOGY

## 2020-02-11 PROCEDURE — 77387 GUIDANCE FOR RADJ TX DLVR: CPT | Performed by: RADIOLOGY

## 2020-02-11 NOTE — TELEPHONE ENCOUNTER
Called pt back. She said she had a return to work form that someone from our office was going to have Dr. Rodgers sign. She could not remember who this was. Called Mele to see if she had the form. No answer and LVM asking her to reach out to pt if it was signed. Informed pt someone would get in touch with her. She v/u

## 2020-02-11 NOTE — TELEPHONE ENCOUNTER
Patient left paperwork to be filled out and was wondering if they was done and could be picked up                Call patient at [phone]

## 2020-02-12 ENCOUNTER — DOCUMENTATION (OUTPATIENT)
Dept: RADIATION ONCOLOGY | Facility: HOSPITAL | Age: 65
End: 2020-02-12

## 2020-02-12 DIAGNOSIS — C50.211 MALIGNANT NEOPLASM OF UPPER-INNER QUADRANT OF RIGHT BREAST IN FEMALE, ESTROGEN RECEPTOR POSITIVE (HCC): Primary | ICD-10-CM

## 2020-02-12 DIAGNOSIS — Z17.0 MALIGNANT NEOPLASM OF UPPER-INNER QUADRANT OF RIGHT BREAST IN FEMALE, ESTROGEN RECEPTOR POSITIVE (HCC): Primary | ICD-10-CM

## 2020-02-12 PROCEDURE — 77387 GUIDANCE FOR RADJ TX DLVR: CPT | Performed by: RADIOLOGY

## 2020-02-12 PROCEDURE — 77412 RADIATION TX DELIVERY LVL 3: CPT | Performed by: RADIOLOGY

## 2020-02-13 NOTE — PROGRESS NOTES
Patient: Taylor Larsen  YOB: 1955    RADIATION THERAPY TREATMENT SUMMARY  Diagnosis:     Malignant neoplasm of upper-inner quadrant of right breast in female, estrogen receptor positive (CMS/HCC)     Patient Care Team:  Beena Maurice MD as Consulting Physician (Radiation Oncology)  Jana Evans MD as Consulting Physician (Hematology and Oncology)  Sage Gonzalez MD as Referring Physician (Gastroenterology)  Aguilar Rodgers II, MD as Consulting Physician (Hematology and Oncology)     Taylor Larsen has recently completed the course of radiation therapy prescribed for the above-mentioned diagnosis.  Below, please find the specifics of the course of treatment delivered:    Radiation Details:    Dates of treatment:  1/13/2020 - 2/12/2020.  Treatment Site - RIGHT BREAST  Treatment Intent - Curative, Postoperative  Total Dose in cGy - 4256  Number of Treatments - 16  Dose per fraction - 266 cGy per fraction  Fractions per day - 1 fx/day  Fractions per week - 5 fx/week  Treatment Type - 3D  Energy - 6 MVP, 18 MVP  Normalization - Calc point, Prescribed at 100%  Imaging/Field Verification - Simulation before first treatment to verify field, blocking, placement and positioning, Simulation for all ports of change, Weekly port films of all ports, Other, see below  Additional comments: - IGRT USING OSMS DAILY    Treatment Site - RIGHT TUMOR BED BOOST  Treatment Intent - Curative, Postoperative  Total Dose in cGy - 1000  Number of Treatments - 5  Dose per fraction - 200 cGy per fraction  Fractions per day - 1 fx/day  Fractions per week - 5 fx/week  Treatment Type - 3 fields , 3D  Energy - 6 MVP, 18 MVP  Normalization - Isocenter, Prescribed at 95%  Imaging/Field Verification - Simulation before first treatment to verify field, blocking, placement and positioning, Simulation for all ports of change, Weekly port films of all ports, Other, see below  Additional comments: - IGRT USING OSMS  DAILY    Tolerance and Toxicities: she tolerated the treatments well , requiring 1 treatment break from 1/20 to 1/21 due to being sick. she completed the treatments in 30 elapsed days.    Follow-up Plans:  I have asked the patient to return to see me in approximately 4 weeks .  I have also made a referral to our Survivorship Clinic.

## 2020-02-18 ENCOUNTER — DOCUMENTATION (OUTPATIENT)
Dept: OTHER | Facility: HOSPITAL | Age: 65
End: 2020-02-18

## 2020-02-18 NOTE — PROGRESS NOTES
Call placed to patient RE: open referral to survivorship clinic from Dr Maurice. Our office spoke to the patient and reviewed purpose and goals of survivorship visit as well as what to expect. Patient declines at present due to appointment burden and anticipating more testing. Patient encouraged to call anytime should they reconsider.

## 2020-02-19 ENCOUNTER — TELEPHONE (OUTPATIENT)
Dept: ONCOLOGY | Facility: CLINIC | Age: 65
End: 2020-02-19

## 2020-02-19 ENCOUNTER — HOSPITAL ENCOUNTER (OUTPATIENT)
Dept: PET IMAGING | Facility: HOSPITAL | Age: 65
Discharge: HOME | End: 2020-02-19
Admitting: INTERNAL MEDICINE

## 2020-02-19 ENCOUNTER — CLINICAL SUPPORT (OUTPATIENT)
Dept: ONCOLOGY | Facility: HOSPITAL | Age: 65
End: 2020-02-19

## 2020-02-19 ENCOUNTER — HOSPITAL ENCOUNTER (OUTPATIENT)
Dept: CT IMAGING | Facility: HOSPITAL | Age: 65
Discharge: HOME | End: 2020-02-19

## 2020-02-19 ENCOUNTER — LAB (OUTPATIENT)
Dept: LAB | Facility: HOSPITAL | Age: 65
End: 2020-02-19

## 2020-02-19 DIAGNOSIS — Z17.0 MALIGNANT NEOPLASM OF UPPER-INNER QUADRANT OF RIGHT BREAST IN FEMALE, ESTROGEN RECEPTOR POSITIVE (HCC): Primary | ICD-10-CM

## 2020-02-19 DIAGNOSIS — C50.211 MALIGNANT NEOPLASM OF UPPER-INNER QUADRANT OF RIGHT BREAST IN FEMALE, ESTROGEN RECEPTOR POSITIVE (HCC): Primary | ICD-10-CM

## 2020-02-19 DIAGNOSIS — Z17.0 MALIGNANT NEOPLASM OF UPPER-INNER QUADRANT OF RIGHT BREAST IN FEMALE, ESTROGEN RECEPTOR POSITIVE (HCC): ICD-10-CM

## 2020-02-19 DIAGNOSIS — C50.211 MALIGNANT NEOPLASM OF UPPER-INNER QUADRANT OF RIGHT BREAST IN FEMALE, ESTROGEN RECEPTOR POSITIVE (HCC): ICD-10-CM

## 2020-02-19 LAB
ALBUMIN SERPL-MCNC: 2.9 G/DL (ref 3.5–5.2)
ALBUMIN/GLOB SERPL: 0.6 G/DL (ref 1.1–2.4)
ALP SERPL-CCNC: 74 U/L (ref 38–116)
ALT SERPL W P-5'-P-CCNC: 9 U/L (ref 0–33)
ANION GAP SERPL CALCULATED.3IONS-SCNC: 12 MMOL/L (ref 5–15)
AST SERPL-CCNC: 24 U/L (ref 0–32)
BASOPHILS # BLD AUTO: 0.03 10*3/MM3 (ref 0–0.2)
BASOPHILS NFR BLD AUTO: 0.4 % (ref 0–1.5)
BILIRUB SERPL-MCNC: 0.5 MG/DL (ref 0.2–1.2)
BUN BLD-MCNC: 12 MG/DL (ref 6–20)
BUN/CREAT SERPL: 15.4 (ref 7.3–30)
CALCIUM SPEC-SCNC: 8.3 MG/DL (ref 8.5–10.2)
CHLORIDE SERPL-SCNC: 94 MMOL/L (ref 98–107)
CO2 SERPL-SCNC: 27 MMOL/L (ref 22–29)
CREAT BLD-MCNC: 0.78 MG/DL (ref 0.6–1.1)
CREAT BLDA-MCNC: 0.8 MG/DL (ref 0.6–1.3)
DEPRECATED RDW RBC AUTO: 49.3 FL (ref 37–54)
EOSINOPHIL # BLD AUTO: 0.43 10*3/MM3 (ref 0–0.4)
EOSINOPHIL NFR BLD AUTO: 5.6 % (ref 0.3–6.2)
ERYTHROCYTE [DISTWIDTH] IN BLOOD BY AUTOMATED COUNT: 14.6 % (ref 12.3–15.4)
GFR SERPL CREATININE-BSD FRML MDRD: 74 ML/MIN/1.73
GLOBULIN UR ELPH-MCNC: 4.5 GM/DL (ref 1.8–3.5)
GLUCOSE BLD-MCNC: 94 MG/DL (ref 74–124)
HCT VFR BLD AUTO: 34.6 % (ref 34–46.6)
HGB BLD-MCNC: 11.3 G/DL (ref 12–15.9)
IMM GRANULOCYTES # BLD AUTO: 0.02 10*3/MM3 (ref 0–0.05)
IMM GRANULOCYTES NFR BLD AUTO: 0.3 % (ref 0–0.5)
INR PPP: 2.3 (ref 0.9–1.1)
LYMPHOCYTES # BLD AUTO: 2.44 10*3/MM3 (ref 0.7–3.1)
LYMPHOCYTES NFR BLD AUTO: 31.6 % (ref 19.6–45.3)
MCH RBC QN AUTO: 30.2 PG (ref 26.6–33)
MCHC RBC AUTO-ENTMCNC: 32.7 G/DL (ref 31.5–35.7)
MCV RBC AUTO: 92.5 FL (ref 79–97)
MONOCYTES # BLD AUTO: 0.79 10*3/MM3 (ref 0.1–0.9)
MONOCYTES NFR BLD AUTO: 10.2 % (ref 5–12)
NEUTROPHILS # BLD AUTO: 4.02 10*3/MM3 (ref 1.7–7)
NEUTROPHILS NFR BLD AUTO: 51.9 % (ref 42.7–76)
NRBC BLD AUTO-RTO: 0 /100 WBC (ref 0–0.2)
PLATELET # BLD AUTO: 212 10*3/MM3 (ref 140–450)
PMV BLD AUTO: 9.5 FL (ref 6–12)
POTASSIUM BLD-SCNC: 3.1 MMOL/L (ref 3.5–4.7)
PROT SERPL-MCNC: 7.4 G/DL (ref 6.3–8)
PROTHROMBIN TIME: 27.2 SECONDS (ref 11–13.5)
RBC # BLD AUTO: 3.74 10*6/MM3 (ref 3.77–5.28)
SODIUM BLD-SCNC: 133 MMOL/L (ref 134–145)
WBC NRBC COR # BLD: 7.73 10*3/MM3 (ref 3.4–10.8)

## 2020-02-19 PROCEDURE — 25010000002 IOPAMIDOL 61 % SOLUTION: Performed by: INTERNAL MEDICINE

## 2020-02-19 PROCEDURE — G0463 HOSPITAL OUTPT CLINIC VISIT: HCPCS

## 2020-02-19 PROCEDURE — 36415 COLL VENOUS BLD VENIPUNCTURE: CPT

## 2020-02-19 PROCEDURE — 82565 ASSAY OF CREATININE: CPT

## 2020-02-19 PROCEDURE — 85610 PROTHROMBIN TIME: CPT

## 2020-02-19 PROCEDURE — 85025 COMPLETE CBC W/AUTO DIFF WBC: CPT

## 2020-02-19 PROCEDURE — 80053 COMPREHEN METABOLIC PANEL: CPT

## 2020-02-19 PROCEDURE — 74160 CT ABDOMEN W/CONTRAST: CPT

## 2020-02-19 RX ORDER — POTASSIUM CHLORIDE 20 MEQ/1
20 TABLET, EXTENDED RELEASE ORAL DAILY
Qty: 30 TABLET | Refills: 0 | Status: SHIPPED | OUTPATIENT
Start: 2020-02-19 | End: 2020-06-25

## 2020-02-19 RX ADMIN — IOPAMIDOL 85 ML: 612 INJECTION, SOLUTION INTRAVENOUS at 09:49

## 2020-02-19 NOTE — TELEPHONE ENCOUNTER
ADDED LAB ON FOR THE DAY PT COMES TO SEE DR WILLS REMOVED THE VITALS , CALLED PT WITH HER UPDATE TIME THAT SHE NEEDS TO COME IN. PT CONFIRMED

## 2020-02-19 NOTE — PROGRESS NOTES
Pt is here for lab with RN review.  Pt has blood blood on tissue after her BM. INR was taken by mistake. Its 2.3. CBC, INR and symptoms reviewed Mimi Serrano NP. Per Mimi pt is to have a CBC  In one week and speak to Dr. Hayden regarding INR. Per Dr. Hayden Xarelto can cause the INR to increase. Per Dr. Hayden pt will eat green leafy veggies and drink cranberry juice.  Pt has no complaints.  Copy of labs given to pt and f/u appt reviewed. Pt is instructed to call the office with any concerns or new symptoms prior to next visit. Pt vu.   Lab Results   Component Value Date    WBC 7.73 02/19/2020    HGB 11.3 (L) 02/19/2020    HCT 34.6 02/19/2020    MCV 92.5 02/19/2020     02/19/2020       CMP reviewed with Lisa Vargas NP. Per Lisa pt is to take 20 meq of Potassium if she isn't already. CBC and BMP added to pt's appt next week. Pt V/U. Script e-scribed to pt's pharmacy.

## 2020-02-19 NOTE — TELEPHONE ENCOUNTER
----- Message from Jennifer Cee RN sent at 2/19/2020  2:13 PM EST -----  Please add lab appt prior to appt with Dr. Rodgers on 2/26. Thanks

## 2020-02-24 ENCOUNTER — TELEPHONE (OUTPATIENT)
Dept: ONCOLOGY | Facility: HOSPITAL | Age: 65
End: 2020-02-24

## 2020-02-24 ENCOUNTER — DOCUMENTATION (OUTPATIENT)
Dept: ONCOLOGY | Facility: CLINIC | Age: 65
End: 2020-02-24

## 2020-02-24 DIAGNOSIS — K52.9 ACUTE COLITIS: Primary | ICD-10-CM

## 2020-02-24 NOTE — TELEPHONE ENCOUNTER
I spoke with patient and explained to her that her CT from 2/19/20 still shows inflammation throughout the colon.  I also explained to her that her prior CT from November showed similar findings when she was known to have C. Difficile.  She states that she had been having diarrhea but it has gotten better now and is more solid.  She states that it is not similar to what she had back in November when she had C. Diff.    I instructed patient to contact her PCP if her diarrhea persists to get tested for C. Diff or to call our office back if she is unable to get through to PCP.      Ambulatory referral for Dr. Cano and Dr. Gonzalez placed. I will send message to scheduling to refer patient to Dr. Cano from Infectious Disease and Dr. Berna Gonzalez from GI ( she saw both of these MD's in the hospital)

## 2020-02-24 NOTE — TELEPHONE ENCOUNTER
----- Message from Aguilar Rodgers II, MD sent at 2/24/2020  9:27 AM EST -----  Please call her and tell her to keep the follow-up appointment with me as already scheduled.    However, tell her the recent CT from 2/19/2020 still shows inflammation throughout the colon.  Remind her prior CT scan from November showed similar findings when she was known to have C. difficile.  Ask her if she is having any diarrhea and if so is this new?  If she is having new onset diarrhea, then she might need treatment for C. difficile colitis.  If this is the case, please asked her to call her PCP for assistance.  If for some reason she cannot get through to her PCP, she should call us back.    Please arrange an appointment with Dr. Cano from infectious disease (she saw him in the hospital) and an appointment with Dr. Berna Gonzalez from GI (she saw him in the hospital previously).

## 2020-02-24 NOTE — PROGRESS NOTES
Please call her and tell her to keep the follow-up appointment with me as already scheduled.    However, tell her the recent CT from 2/19/2020 still shows inflammation throughout the colon.  Remind her prior CT scan from November showed similar findings when she was known to have C. difficile.  Ask her if she is having any diarrhea and if so is this new?  If she is having new onset diarrhea, then she might need treatment for C. difficile colitis.  If this is the case, please asked her to call her PCP for assistance.  If for some reason she cannot get through to her PCP, she should call us back.    Please arrange an appointment with Dr. Cano from infectious disease (she saw him in the hospital) and an appointment with Dr. Berna Gonzalez from GI (she saw him in the hospital previously).

## 2020-02-25 NOTE — PROGRESS NOTES
"    .     REASONS FOR FOLLOWUP: Portal vein thrombosis, breast cancer    HISTORY OF PRESENT ILLNESS:  The patient is a 64 y.o. year old female  who is here for follow-up with the above-mentioned history.    She did not start Arimidex as planned.  She states she did not receive a prescription and did not call her office to ask about this because she thought maybe she was supposed to start the Rheumatrex after radiation completes.  Radiation recently completed a couple of weeks ago.  I told her from now on call our office anytime she has a question or does not receive a prescription.  However, I also told her it is considered acceptable to wait until radiation completes to begin a REM attacks.    Recent CT shows extensive colitis unchanged from November 2019.  She states she has had issues with diarrhea for several months, never becoming normal.  Actually, she is currently doing better than she has for several months regarding diarrhea.  Currently she is having 2-3 bowel movements per day, usually after eating.  Stools are \"liquid with pellets\".    Denies nausea.  Denies pain.    Denies bleeding other than a small amount of blood on the toilet paper with wiping recently.  States she has hemorrhoids.  No blood in bowel movements or in the toilet.    Past Medical History:   Diagnosis Date   • Breast cancer (CMS/HCC) 09/2019    Right IDC   • GERD (gastroesophageal reflux disease)    • PONV (postoperative nausea and vomiting)    • Situational stress     DEATH OF BROTHER 10/2019, RECENT DIAGNOSIS   • Stress headaches      Past Surgical History:   Procedure Laterality Date   • BILE DUCT STENT PLACEMENT  2014   • BREAST BIOPSY Right 09/2019    malignant   • BREAST LUMPECTOMY WITH SENTINEL NODE BIOPSY Right 11/4/2019    Procedure: BREAST LUMPECTOMY WITH SENTINEL NODE BIOPSY AND NEEDLE LOCALIZATION And possible axillary dissection;  Surgeon: Jean-Paul Ramos MD;  Location: [facility] OR [facility];  Service: General   • KNEE " ARTHROSCOPY Left    • LAPAROSCOPIC CHOLECYSTECTOMY     • TUBAL ABDOMINAL LIGATION         MEDICATIONS    Current Outpatient Medications:   •  acetaminophen (TYLENOL) 325 MG tablet, Take 650 mg by mouth Every 6 (Six) Hours As Needed for Mild Pain ., Disp: , Rfl:   •  doxylamine (UNISOM) 25 MG tablet, Take 25 mg by mouth At Night As Needed., Disp: , Rfl:   •  melatonin 5 MG tablet tablet, Take 10 mg by mouth At Night As Needed., Disp: , Rfl:   •  ondansetron (ZOFRAN) 4 MG tablet, Take 1 tablet by mouth Every 8 (Eight) Hours As Needed for Nausea or Vomiting., Disp: 10 tablet, Rfl: 0  •  potassium chloride (K-DUR,KLOR-CON) 20 MEQ CR tablet, Take 1 tablet by mouth Daily., Disp: 30 tablet, Rfl: 0  •  Probiotic Product (PROBIOTIC PO), Take  by mouth., Disp: , Rfl:   •  rivaroxaban (XARELTO) 20 MG tablet, Take 1 tablet by mouth Daily., Disp: 30 tablet, Rfl: 6  •  anastrozole (ARIMIDEX) 1 MG tablet, Take 1 tablet by mouth Daily for 90 days., Disp: 90 tablet, Rfl: 3    ALLERGIES:   No Known Allergies    SOCIAL HISTORY:       Social History     Socioeconomic History   • Marital status:      Spouse name: Not on file   • Number of children: 2   • Years of education: Not on file   • Highest education level: Not on file   Occupational History     Employer: [employer]   Tobacco Use   • Smoking status: Former Smoker     Packs/day: 0.50     Years: 10.00     Pack years: 5.00     Types: Cigarettes     Last attempt to quit:      Years since quittin.1   • Smokeless tobacco: Former User   Substance and Sexual Activity   • Alcohol use: No   • Drug use: No   • Sexual activity: Never     Birth control/protection: Post-menopausal         FAMILY HISTORY:  Family History   Problem Relation Age of Onset   • Alcohol abuse Father    • Heart failure Mother    • Diabetes Brother    • Prostate cancer Brother    • Throat cancer Brother    • Breast cancer Maternal Aunt    • Breast cancer Maternal Cousin    • Breast cancer  "Maternal Cousin    • Malig Hyperthermia Neg Hx        REVIEW OF SYSTEMS:  Review of Systems   Constitutional: Negative for activity change.   HENT: Negative for nosebleeds and trouble swallowing.    Respiratory: Negative for shortness of breath and wheezing.    Cardiovascular: Negative for chest pain and palpitations.   Gastrointestinal: Negative for constipation, diarrhea and nausea.   Genitourinary: Negative for dysuria and hematuria.   Musculoskeletal: Negative for arthralgias and myalgias.   Skin: Negative for rash and wound.   Neurological: Negative for seizures and syncope.   Hematological: Negative for adenopathy. Does not bruise/bleed easily.   Psychiatric/Behavioral: Negative for confusion.            Vitals:    02/26/20 0824   BP: 114/75   Pulse: 95   Resp: 14   Temp: 98.6 °F (37 °C)   TempSrc: Oral   SpO2: 97%   Weight: 74.3 kg (163 lb 11.2 oz)   Height: 160 cm (62.99\")   PainSc: 0-No pain     Current Status 2/26/2020   ECOG score 0        PHYSICAL EXAM:    CONSTITUTIONAL:  Vital signs reviewed.  No distress, looks comfortable.  EYES:  Conjunctiva and lids unremarkable.  PERRLA  EARS,NOSE,MOUTH,THROAT:  Ears and nose appear unremarkable.  Lips, teeth, gums appear unremarkable.  RESPIRATORY:  Normal respiratory effort.  Lungs clear to auscultation bilaterally.  CARDIOVASCULAR:  Normal S1, S2.  No murmurs rubs or gallops.  No significant lower extremity edema.  GASTROINTESTINAL: Abdomen appears unremarkable.  Nontender.  No hepatomegaly.  No splenomegaly.  LYMPHATIC:  No cervical, supraclavicular, axillary lymphadenopathy.  SKIN:  Warm.  No rashes.  PSYCHIATRIC:  Normal judgment and insight.  Normal mood and affect.    RECENT LABS:        WBC   Date/Time Value Ref Range Status   02/26/2020 08:21 AM 7.71 3.40 - 10.80 10*3/mm3 Final     Hemoglobin   Date/Time Value Ref Range Status   02/26/2020 08:21 AM 11.7 (L) 12.0 - 15.9 g/dL Final     Platelets   Date/Time Value Ref Range Status   02/26/2020 08:21  " 140 - 450 10*3/mm3 Final       Assessment/Plan   Malignant neoplasm of upper-inner quadrant of right breast in female, estrogen receptor positive (CMS/HCC)  - CBC & Differential  - Comprehensive Metabolic Panel    *Right breast cancer.  Invasive ductal adenocarcinoma.  Upper inner quadrant  · Right lumpectomy by Dr. Ramos 11/4/2019.  1:00.  Overall grade 1.  Greatest dimension 5 mm.  Single focus of invasive carcinoma.  No lymphovascular invasion.  Margins negative.  0 out of 2 nodes involved.  · [id number], stage 1  · ER 91%.  DC 96%.  HER-2 negative.  Ki-67 4.7% (favorable).  · (Initial needle biopsy 9/16/2019: Low-grade invasive ductal carcinoma, grade 1.  5 mm in greatest dimension.)  · (Initial mammogram measured the lesion at 5 mm)  · From the information we currently know about her cancer, I do not recommend chemotherapy.  I do recommend hormonal therapy for 5 years.  Hopefully, aromatase inhibitors if DEXA scan allows (with portal vein thrombosis would like to avoid tamoxifen).  · Oncotype DX: Recurrence score 3, 3% distant recurrence risk at 9 years if tamoxifen or AI is used.  · Therefore, do not recommend chemotherapy.  · Adjuvant XRT completed 2/12/2020  · Arimidex 2/26/2020- 2/26/2025  · Per her request we reviewed side effects of Arimidex today    *Bone health.  · DEXA 12/2/2019: Normal.  T score lumbar spine 0.1, T score left hip 0.1, T score right hip 0.6.  · She takes 2-4 times daily.  Therefore, did not begin a calcium supplement when a rheumatic started on 12/9/2019.  She was encouraged to take a PPI on a schedule and follow-up with her PCP.  States she has had several EGDs in the past none of which have shown problems.  (States in the past her PCP told her to stop calcium supplements because she developed hypercalcemia due to the combination of calcium supplements and Tums).     *Portal vein thrombosis (right branch of portal vein).  · No evidence of cirrhosis on MRI or ultrasound.  Dr. Coronel  Carlos, GI note, has no mention of cirrhosis.  · Hormonal therapy for breast cancer has a slight increased risk of thrombosis.  Therefore, I think it is in her best interest to undergo anticoagulation until she completes hormonal therapy.  · Generally, Lovenox or Coumadin are the preferred agents.  However, because of significant elevations in INR, and the desire for an oral agent, changed to Xarelto on 2/19/2020.       *Length of anticoagulation  Plan Xarelto throughout her anastrozole therapy due to slight increased risk of clotting on anastrozole    *Risk factors for portal vein thrombosis:  · Unremarkable: Prothrombin gene mutation, factor V Leiden gene mutation, lupus anticoagulant, beta-2 glycoprotein antibodies, anti-thrombin, protein S free.    *Low protein S activity, 14% (normal range %), on 11/13/2019     *Low protein C activity, 44% (normal range %) on 11/13/2019.    *Indeterminant anticardiolipin  · IgM 14 (indeterminate range 13-20) on 11/13/2019.  IgA and IgG negative.    *Prior leukocytosis, likely due to C. difficile colitis.  WBC currently normal at 7.7     *Anemia.  Previously felt to likely due to C. difficile colitis.  Hb currently almost normal at 11.7     *Prominent nodes within the hepatogastric ligament and around the celiac axis and in the retroperitoneum on MRI abdomen 11/11/2019.  · Etiology uncertain.  Radiologist recommended follow-up  · CT 2/19/2020, from 12/13/2019: Abdominal and pelvic and cardiophrenic LAD unchanged.  · CT images personally viewed by me    *C. difficile colitis during November 2019 hospitalization.  Thought to perhaps be related to receiving 1 dose of antibiotics for breast surgery.  She was treated with Flagyl and oral vancomycin     *Extensive colitis again seen on CT 2/19/2020, unchanged from 11/10/2019.  Patient reports she had some recent diarrhea but it became better and is more solid and it does not seem similar to the diarrhea she had back in  November during the diagnosis of C. difficile.  On 2/24/2020 she was told to contact her PCP if the diarrhea worsens.  In the meantime, we referred her back to Dr. Cano of ID and Dr. Berna Gonzalez of GI (she saw both during the C. difficile hospitalization November 2019).  · Dr. Cano appointment on 3/4/2020    Plan  · Begin anastrozole  · MD roughly 1 month for toxicity check on anastrozole  · Plan neck CT abdomen with contrast roughly 6 months from last which will be around 8/19/2020  · Await Dr. Prescott appointment on 3/4/2020  · Appointment with Dr. Berna Gonzalez requested  · In the meantime, if diarrhea worsens, she should contact PCP        Send a copy this to Dr. Berna Gonzalez

## 2020-02-26 ENCOUNTER — OFFICE VISIT (OUTPATIENT)
Dept: ONCOLOGY | Facility: CLINIC | Age: 65
End: 2020-02-26

## 2020-02-26 ENCOUNTER — LAB (OUTPATIENT)
Dept: LAB | Facility: HOSPITAL | Age: 65
End: 2020-02-26

## 2020-02-26 VITALS
TEMPERATURE: 98.6 F | SYSTOLIC BLOOD PRESSURE: 114 MMHG | RESPIRATION RATE: 14 BRPM | HEART RATE: 95 BPM | WEIGHT: 163.7 LBS | OXYGEN SATURATION: 97 % | HEIGHT: 63 IN | DIASTOLIC BLOOD PRESSURE: 75 MMHG | BODY MASS INDEX: 29 KG/M2

## 2020-02-26 DIAGNOSIS — C50.211 MALIGNANT NEOPLASM OF UPPER-INNER QUADRANT OF RIGHT BREAST IN FEMALE, ESTROGEN RECEPTOR POSITIVE (HCC): ICD-10-CM

## 2020-02-26 DIAGNOSIS — Z17.0 MALIGNANT NEOPLASM OF UPPER-INNER QUADRANT OF RIGHT BREAST IN FEMALE, ESTROGEN RECEPTOR POSITIVE (HCC): ICD-10-CM

## 2020-02-26 DIAGNOSIS — C50.211 MALIGNANT NEOPLASM OF UPPER-INNER QUADRANT OF RIGHT BREAST IN FEMALE, ESTROGEN RECEPTOR POSITIVE (HCC): Primary | ICD-10-CM

## 2020-02-26 DIAGNOSIS — Z17.0 MALIGNANT NEOPLASM OF UPPER-INNER QUADRANT OF RIGHT BREAST IN FEMALE, ESTROGEN RECEPTOR POSITIVE (HCC): Primary | ICD-10-CM

## 2020-02-26 LAB
ANION GAP SERPL CALCULATED.3IONS-SCNC: 10.3 MMOL/L (ref 5–15)
BASOPHILS # BLD AUTO: 0.04 10*3/MM3 (ref 0–0.2)
BASOPHILS NFR BLD AUTO: 0.5 % (ref 0–1.5)
BUN BLD-MCNC: 9 MG/DL (ref 6–20)
BUN/CREAT SERPL: 11.8 (ref 7.3–30)
CALCIUM SPEC-SCNC: 8.6 MG/DL (ref 8.5–10.2)
CHLORIDE SERPL-SCNC: 101 MMOL/L (ref 98–107)
CO2 SERPL-SCNC: 26.7 MMOL/L (ref 22–29)
CREAT BLD-MCNC: 0.76 MG/DL (ref 0.6–1.1)
DEPRECATED RDW RBC AUTO: 51.5 FL (ref 37–54)
EOSINOPHIL # BLD AUTO: 0.24 10*3/MM3 (ref 0–0.4)
EOSINOPHIL NFR BLD AUTO: 3.1 % (ref 0.3–6.2)
ERYTHROCYTE [DISTWIDTH] IN BLOOD BY AUTOMATED COUNT: 14.8 % (ref 12.3–15.4)
GFR SERPL CREATININE-BSD FRML MDRD: 77 ML/MIN/1.73
GLUCOSE BLD-MCNC: 100 MG/DL (ref 74–124)
HCT VFR BLD AUTO: 36.5 % (ref 34–46.6)
HGB BLD-MCNC: 11.7 G/DL (ref 12–15.9)
IMM GRANULOCYTES # BLD AUTO: 0.05 10*3/MM3 (ref 0–0.05)
IMM GRANULOCYTES NFR BLD AUTO: 0.6 % (ref 0–0.5)
LYMPHOCYTES # BLD AUTO: 2.74 10*3/MM3 (ref 0.7–3.1)
LYMPHOCYTES NFR BLD AUTO: 35.5 % (ref 19.6–45.3)
MCH RBC QN AUTO: 30.2 PG (ref 26.6–33)
MCHC RBC AUTO-ENTMCNC: 32.1 G/DL (ref 31.5–35.7)
MCV RBC AUTO: 94.1 FL (ref 79–97)
MONOCYTES # BLD AUTO: 0.75 10*3/MM3 (ref 0.1–0.9)
MONOCYTES NFR BLD AUTO: 9.7 % (ref 5–12)
NEUTROPHILS # BLD AUTO: 3.89 10*3/MM3 (ref 1.7–7)
NEUTROPHILS NFR BLD AUTO: 50.6 % (ref 42.7–76)
NRBC BLD AUTO-RTO: 0 /100 WBC (ref 0–0.2)
PLATELET # BLD AUTO: 249 10*3/MM3 (ref 140–450)
PMV BLD AUTO: 9 FL (ref 6–12)
POTASSIUM BLD-SCNC: 3.9 MMOL/L (ref 3.5–4.7)
RBC # BLD AUTO: 3.88 10*6/MM3 (ref 3.77–5.28)
SODIUM BLD-SCNC: 138 MMOL/L (ref 134–145)
WBC NRBC COR # BLD: 7.71 10*3/MM3 (ref 3.4–10.8)

## 2020-02-26 PROCEDURE — 80048 BASIC METABOLIC PNL TOTAL CA: CPT

## 2020-02-26 PROCEDURE — 85025 COMPLETE CBC W/AUTO DIFF WBC: CPT

## 2020-02-26 PROCEDURE — 36415 COLL VENOUS BLD VENIPUNCTURE: CPT

## 2020-02-26 PROCEDURE — 99215 OFFICE O/P EST HI 40 MIN: CPT | Performed by: INTERNAL MEDICINE

## 2020-02-26 RX ORDER — ANASTROZOLE 1 MG/1
1 TABLET ORAL DAILY
Qty: 90 TABLET | Refills: 3 | Status: SHIPPED | OUTPATIENT
Start: 2020-02-26 | End: 2020-05-26

## 2020-03-04 ENCOUNTER — OFFICE VISIT (OUTPATIENT)
Dept: INFECTIOUS DISEASES | Facility: CLINIC | Age: 65
End: 2020-03-04

## 2020-03-04 ENCOUNTER — OFFICE (OUTPATIENT)
Dept: URBAN - METROPOLITAN AREA CLINIC 66 | Facility: CLINIC | Age: 65
End: 2020-03-04

## 2020-03-04 VITALS
SYSTOLIC BLOOD PRESSURE: 124 MMHG | HEIGHT: 63 IN | DIASTOLIC BLOOD PRESSURE: 77 MMHG | HEART RATE: 94 BPM | TEMPERATURE: 97.7 F | WEIGHT: 163.4 LBS | BODY MASS INDEX: 28.95 KG/M2

## 2020-03-04 VITALS
SYSTOLIC BLOOD PRESSURE: 108 MMHG | HEIGHT: 63 IN | WEIGHT: 163 LBS | DIASTOLIC BLOOD PRESSURE: 68 MMHG | HEART RATE: 89 BPM | OXYGEN SATURATION: 98 %

## 2020-03-04 DIAGNOSIS — R93.3 ABNORMAL FINDINGS ON DIAGNOSTIC IMAGING OF OTHER PARTS OF DI: ICD-10-CM

## 2020-03-04 DIAGNOSIS — A04.72 ENTEROCOLITIS DUE TO CLOSTRIDIUM DIFFICILE, NOT SPECIFIED AS: ICD-10-CM

## 2020-03-04 DIAGNOSIS — K52.9 COLITIS: Primary | ICD-10-CM

## 2020-03-04 DIAGNOSIS — I81 PORTAL VEIN THROMBOSIS: ICD-10-CM

## 2020-03-04 DIAGNOSIS — Z86.19 HISTORY OF CLOSTRIDIOIDES DIFFICILE COLITIS: ICD-10-CM

## 2020-03-04 DIAGNOSIS — Z79.01 LONG TERM (CURRENT) USE OF ANTICOAGULANTS: ICD-10-CM

## 2020-03-04 PROCEDURE — 99213 OFFICE O/P EST LOW 20 MIN: CPT | Performed by: INTERNAL MEDICINE

## 2020-03-04 PROCEDURE — 99203 OFFICE O/P NEW LOW 30 MIN: CPT | Performed by: NURSE PRACTITIONER

## 2020-03-04 NOTE — PROGRESS NOTES
Follow-up    CC: C difficile    HPI: Taylor Larsen is a 64 y.o. female here for C difficile. I saw her back in December 2019 for C diff infection which was treated with a 10-day course of oral vancomycin followed by a prolonged vancomycin taper. She says her stool are now forming. She reports small pellets anywhere from 0-3 times per day. She denies watery diarrhea and abdominal cramping. No fevers.     She had a CT done on 2/19 that showed colitis so was referred back to me. She also follows w/ GI. They are planning a colonoscopy 4/3/20.      Review of Systems: no n/v; no rash    Past medical history:  Breast cancer  Recurrent C diff  GERD  On Xarelto for portal vein thrombosis    Past Surgical History:   Procedure Laterality Date   • BILE DUCT STENT PLACEMENT  2014   • BREAST BIOPSY Right 09/2019    malignant   • BREAST LUMPECTOMY WITH SENTINEL NODE BIOPSY Right 11/4/2019    Procedure: BREAST LUMPECTOMY WITH SENTINEL NODE BIOPSY AND NEEDLE LOCALIZATION And possible axillary dissection;  Surgeon: Jean-Paul Ramos MD;  [place] [facility] [place] [facility] [place]  Service: General   • KNEE ARTHROSCOPY Left    • LAPAROSCOPIC CHOLECYSTECTOMY  2014   • TUBAL ABDOMINAL LIGATION       Social History:    Profession: worked at [employer]  Former smoker     Family History:  Mom: CHF  Dad: EtOH abuse     Antibiotic allergies and intolerances:  None      Medications:   Current Outpatient Medications:   •  acetaminophen (TYLENOL) 325 MG tablet, Take 650 mg by mouth Every 6 (Six) Hours As Needed for Mild Pain ., Disp: , Rfl:   •  anastrozole (ARIMIDEX) 1 MG tablet, Take 1 tablet by mouth Daily for 90 days., Disp: 90 tablet, Rfl: 3  •  doxylamine (UNISOM) 25 MG tablet, Take 25 mg by mouth At Night As Needed., Disp: , Rfl:   •  melatonin 5 MG tablet tablet, Take 10 mg by mouth At Night As Needed., Disp: , Rfl:   •  potassium chloride (K-DUR,KLOR-CON) 20 MEQ CR tablet, Take 1 tablet by mouth Daily., Disp: 30 tablet, Rfl: 0  •   "Probiotic Product (PROBIOTIC PO), Take  by mouth., Disp: , Rfl:   •  rivaroxaban (XARELTO) 20 MG tablet, Take 1 tablet by mouth Daily., Disp: 30 tablet, Rfl: 6  •  ondansetron (ZOFRAN) 4 MG tablet, Take 1 tablet by mouth Every 8 (Eight) Hours As Needed for Nausea or Vomiting., Disp: 10 tablet, Rfl: 0      OBJECTIVE:  /77   Pulse 94   Temp 97.7 °F (36.5 °C)   Ht 160 cm (62.99\")   Wt 74.1 kg (163 lb 6.4 oz)   BMI 28.95 kg/m²     General: awake, alert, NAD  Eyes: no scleral icterus  Cardiovascular: NR  Respiratory:  normal work of breathing on ambient air  GI: Abdomen is non-distended  : no Martínez catheter present  Neurological: Alert and oriented x 3  Psychiatric: Normal mood and affect     DIAGNOSTICS:  CBC, CMP  reviewed today  Lab Results   Component Value Date    WBC 7.71 02/26/2020    HGB 11.7 (L) 02/26/2020    HCT 36.5 02/26/2020     02/26/2020     Lab Results   Component Value Date    GLUCOSE 100 02/26/2020    BUN 9 02/26/2020    CREATININE 0.76 02/26/2020    EGFRIFNONA 77 02/26/2020    BCR 11.8 02/26/2020    CO2 26.7 02/26/2020    CALCIUM 8.6 02/26/2020    ALBUMIN 2.90 (L) 02/19/2020    AST 24 02/19/2020    ALT 9 02/19/2020     Lab Results   Component Value Date    INR 2.30 (H) 02/19/2020    INR 1.90 (H) 02/04/2020    INR 2.00 (H) 02/03/2020    PROTIME 27.2 (H) 02/19/2020    PROTIME 23.0 (H) 02/04/2020    PROTIME 24.0 (H) 02/03/2020       ASSESSMENT/PLAN:  1. Colitis on CT scan  2. History of C difficile colitis  3. Breast cancer (on Arimidex after finishing XRT)  4. On Xarelto for portal vein thrombosis    Her history, exam, and labs are not consistent w/ active C diff. I'm not sure why her CT on 2/19 showed colitis. She is having formed stool, no abdominal pain, no fever, and a normal WBC all of which would argue against active C difficile. We discussed signs and symptoms of recurrent C difficile and she will call me if those occur. She is planning to have a colonoscopy w/ GI around the " first of April and she will call to let me know the results of that test. We also discussed continuing her probiotic and avoiding unnecessary antibiotics.

## 2020-03-13 ENCOUNTER — OFFICE VISIT (OUTPATIENT)
Dept: RADIATION ONCOLOGY | Facility: HOSPITAL | Age: 65
End: 2020-03-13

## 2020-03-13 VITALS
SYSTOLIC BLOOD PRESSURE: 113 MMHG | WEIGHT: 162.2 LBS | DIASTOLIC BLOOD PRESSURE: 71 MMHG | TEMPERATURE: 98.7 F | OXYGEN SATURATION: 98 % | BODY MASS INDEX: 28.74 KG/M2 | HEART RATE: 88 BPM

## 2020-03-13 DIAGNOSIS — C50.211 MALIGNANT NEOPLASM OF UPPER-INNER QUADRANT OF RIGHT BREAST IN FEMALE, ESTROGEN RECEPTOR POSITIVE (HCC): Primary | ICD-10-CM

## 2020-03-13 DIAGNOSIS — Z17.0 MALIGNANT NEOPLASM OF UPPER-INNER QUADRANT OF RIGHT BREAST IN FEMALE, ESTROGEN RECEPTOR POSITIVE (HCC): Primary | ICD-10-CM

## 2020-03-13 PROCEDURE — 99024 POSTOP FOLLOW-UP VISIT: CPT | Performed by: RADIOLOGY

## 2020-03-13 NOTE — PROGRESS NOTES
DIAGNOSIS and REASON FOR FOLLOW UP: Malignant neoplasm of upper-inner quadrant of right breast in female, estrogen receptor positive (CMS/HCC)  CHIEF COMPLAINT:  4 week follow up  I had the pleasure of seeing Taylor Larsen  back in the department today, now approximately 4 weeks out from the radiation therapy portion of her treatment for the above mentioned diagnosis. Clinically she is doing wonderfully well.  Her energy has improved and she states her performance status is nearly back to baseline.  She has no new complaints regarding the breast other than intermittent tenderness in the breast. She has started her Arimidex and does have some expected bone and joint pains but states it is bearable.  Subjective   Past Medical History: she  has a past medical history of Breast cancer (CMS/HCC) (09/2019), GERD (gastroesophageal reflux disease), PONV (postoperative nausea and vomiting), Situational stress, and Stress headaches.    Past Surgical History:  she has a past surgical history that includes Tubal ligation; Laparoscopic cholecystectomy (2014); Bile duct stent placement (2014); Knee arthroscopy (Left); Breast biopsy (Right, 09/2019); and breast lumpectomy with sentinel node biopsy (Right, 11/4/2019).    Meds:    Current Outpatient Medications:   •  acetaminophen (TYLENOL) 325 MG tablet, Take 650 mg by mouth Every 6 (Six) Hours As Needed for Mild Pain ., Disp: , Rfl:   •  anastrozole (ARIMIDEX) 1 MG tablet, Take 1 tablet by mouth Daily for 90 days., Disp: 90 tablet, Rfl: 3  •  doxylamine (UNISOM) 25 MG tablet, Take 25 mg by mouth At Night As Needed., Disp: , Rfl:   •  melatonin 5 MG tablet tablet, Take 10 mg by mouth At Night As Needed., Disp: , Rfl:   •  ondansetron (ZOFRAN) 4 MG tablet, Take 1 tablet by mouth Every 8 (Eight) Hours As Needed for Nausea or Vomiting., Disp: 10 tablet, Rfl: 0  •  potassium chloride (K-DUR,KLOR-CON) 20 MEQ CR tablet, Take 1 tablet by mouth Daily., Disp: 30 tablet, Rfl: 0  •   Probiotic Product (PROBIOTIC PO), Take  by mouth., Disp: , Rfl:   •  rivaroxaban (XARELTO) 20 MG tablet, Take 1 tablet by mouth Daily., Disp: 30 tablet, Rfl: 6    Allergies:  No Known Allergies    Family History:  her family history includes Alcohol abuse in her father; Breast cancer in her maternal aunt, maternal cousin, and maternal cousin; Diabetes in her brother; Heart failure in her mother; Prostate cancer in her brother; Throat cancer in her brother.    Social History:  she  reports that she quit smoking about 6 years ago. Her smoking use included cigarettes. She has a 5.00 pack-year smoking history. She has quit using smokeless tobacco. She reports that she does not drink alcohol or use drugs.    Pertinent Findings on Review of Systems:  Fourteen systems have been reviewed with the patient and are negative other than as mentioned above.     Objective   Pertinent Findings on Physical Exam:  Vitals:    03/13/20 1541   BP: 113/71   Pulse: 88   Temp: 98.7 °F (37.1 °C)   TempSrc: Oral   SpO2: 98%   Weight: 73.6 kg (162 lb 3.2 oz)   PainSc:   2   PainLoc: Breast     Performance Status:  (1) Restricted in physically strenuous activity, ambulatory and able to do work of light nature    Physical examination of the left breast reveals no abnormality and the left axilla is benign, without lymphadenopathy.   The right breast shows the well-healed lumpectomy incision and only slight and mild hyperpigmentation from the radiation therapy. The breast is quite soft and easy to examine. There are no worrisome nodules appreciated. The nipple is without change. The axilla is benign on the right and there is no cervical or supraclavicular lymphadenopathy.   There is no lymphedema noted in either upper extremity.       Assessment:  Malignant neoplasm of upper-inner quadrant of right breast in female, estrogen receptor positive (CMS/HCC)  Doing wonderfully well, 4 weeks out from radiation therapy    Plan:   We reviewed today the  current NCCN guidelines for her surveillance and follow up, specifically the need for physician visit every 4 to 6 months for 5 years, annual mammogram, annual gynecologic assessment, continued monitoring of bone health and achieving and maintaining an ideal BMI.      She does continue on the Arimidex without difficulty.  She returns to see the [facility] physicians on April 6 and understands she will continue follow-up through that office while on the medication. Regarding her mammographic follow-up, she knows to continue on with her annual bilateral mammogram in late August, 2020.  Given the above, I have not given her an appointment to return here, but encouraged her to call if I can be of any further help in her care and thank you again for allowing us to participate in her care.

## 2020-03-25 ENCOUNTER — TELEPHONE (OUTPATIENT)
Dept: ONCOLOGY | Facility: CLINIC | Age: 65
End: 2020-03-25

## 2020-03-25 ENCOUNTER — DOCUMENTATION (OUTPATIENT)
Dept: ONCOLOGY | Facility: CLINIC | Age: 65
End: 2020-03-25

## 2020-03-25 DIAGNOSIS — C50.211 MALIGNANT NEOPLASM OF UPPER-INNER QUADRANT OF RIGHT BREAST IN FEMALE, ESTROGEN RECEPTOR POSITIVE (HCC): Primary | ICD-10-CM

## 2020-03-25 DIAGNOSIS — Z17.0 MALIGNANT NEOPLASM OF UPPER-INNER QUADRANT OF RIGHT BREAST IN FEMALE, ESTROGEN RECEPTOR POSITIVE (HCC): Primary | ICD-10-CM

## 2020-03-25 DIAGNOSIS — K52.9 ACUTE COLITIS: ICD-10-CM

## 2020-03-25 NOTE — PROGRESS NOTES
Aguilar Rodgers II, MD Finney, Deborah, RegSched Rep   Cc: P On[facility] Nurs[facility]             Ryan Ortiz,       Please call this patient and tell her in view of the coronavirus pandemic, I think it is best we cancel the appointment with me which is coming up.  Instead, reschedule to late August 2020.  1 week prior needs CT abdomen with contrast.  Reason is follow-up of prominent nodes in the hepatogastric ligament and around the celiac axis and in the retroperitoneum on MRI abdomen from 11/11/2019 and CT scans from 12/13/2019 and CT 2/19/2020.  Please provide a recommendation for how long to follow this up with imaging.       Thanks!

## 2020-03-25 NOTE — TELEPHONE ENCOUNTER
----- Message from Faith Curiel RN sent at 3/25/2020  8:42 AM EDT -----  CT order placed. Thanks!  ----- Message -----  From: Aguilar Rodgers II, MD  Sent: 3/25/2020   8:21 AM EDT  To: [facility], Oncology Nurses    Ryan Ortiz,     Please call this patient and tell her in view of the coronavirus pandemic, I think it is best we cancel the appointment with me which is coming up.  Instead, reschedule to late August 2020.  1 week prior needs CT abdomen with contrast.  Reason is follow-up of prominent nodes in the hepatogastric ligament and around the celiac axis and in the retroperitoneum on MRI abdomen from 11/11/2019 and CT scans from 12/13/2019 and CT 2/19/2020.  Please provide a recommendation for how long to follow this up with imaging.      Thanks!

## 2020-03-30 ENCOUNTER — TELEPHONE (OUTPATIENT)
Dept: ONCOLOGY | Facility: CLINIC | Age: 65
End: 2020-03-30

## 2020-03-30 NOTE — TELEPHONE ENCOUNTER
Pt states she was exposed this past weekend.  She is NOT actively on treatment.  She understands she should self quarantine for 14 days and either go to [facility] or ER if she starts to exhibit symptoms.        Patient was around her grandkids on 04/22/20 and dads wife was test positive with the coronavirus on the 03/26/20. Patient wants to know what she needs to do.      Call patient back at [phone]

## 2020-03-30 NOTE — TELEPHONE ENCOUNTER
Patient was around her grandkids on 04/22/20 and dads wife was test positive with the coronavirus on the 04/26/20. Patient wants to know what she needs to do.      Call patient back at [phone]

## 2020-06-22 ENCOUNTER — TRANSCRIBE ORDERS (OUTPATIENT)
Dept: SLEEP MEDICINE | Facility: HOSPITAL | Age: 65
End: 2020-06-22

## 2020-06-22 DIAGNOSIS — Z01.818 OTHER SPECIFIED PRE-OPERATIVE EXAMINATION: Primary | ICD-10-CM

## 2020-06-24 ENCOUNTER — LAB (OUTPATIENT)
Dept: LAB | Facility: HOSPITAL | Age: 65
End: 2020-06-24

## 2020-06-24 DIAGNOSIS — Z01.818 OTHER SPECIFIED PRE-OPERATIVE EXAMINATION: ICD-10-CM

## 2020-06-24 PROCEDURE — U0004 COV-19 TEST NON-CDC HGH THRU: HCPCS

## 2020-06-25 LAB
REF LAB TEST METHOD: NORMAL
SARS-COV-2 RNA RESP QL NAA+PROBE: NOT DETECTED

## 2020-06-25 RX ORDER — ANASTROZOLE 1 MG/1
1 TABLET ORAL DAILY
COMMUNITY
End: 2020-09-08 | Stop reason: SDUPTHER

## 2020-06-26 ENCOUNTER — ON CAMPUS - OUTPATIENT (OUTPATIENT)
Dept: URBAN - METROPOLITAN AREA HOSPITAL 114 | Facility: HOSPITAL | Age: 65
End: 2020-06-26
Payer: MEDICAID

## 2020-06-26 ENCOUNTER — HOSPITAL ENCOUNTER (OUTPATIENT)
Facility: HOSPITAL | Age: 65
Setting detail: HOSPITAL OUTPATIENT SURGERY
Discharge: HOME | End: 2020-06-26
Attending: INTERNAL MEDICINE | Admitting: INTERNAL MEDICINE

## 2020-06-26 ENCOUNTER — ANESTHESIA (OUTPATIENT)
Dept: GASTROENTEROLOGY | Facility: HOSPITAL | Age: 65
End: 2020-06-26

## 2020-06-26 ENCOUNTER — ANESTHESIA EVENT (OUTPATIENT)
Dept: GASTROENTEROLOGY | Facility: HOSPITAL | Age: 65
End: 2020-06-26

## 2020-06-26 VITALS
RESPIRATION RATE: 16 BRPM | SYSTOLIC BLOOD PRESSURE: 116 MMHG | OXYGEN SATURATION: 99 % | HEIGHT: 63 IN | WEIGHT: 159 LBS | DIASTOLIC BLOOD PRESSURE: 61 MMHG | HEART RATE: 79 BPM | BODY MASS INDEX: 28.17 KG/M2

## 2020-06-26 DIAGNOSIS — Z12.11 ENCOUNTER FOR SCREENING FOR MALIGNANT NEOPLASM OF COLON: ICD-10-CM

## 2020-06-26 DIAGNOSIS — Z12.11 ENCOUNTER FOR SCREENING COLONOSCOPY: ICD-10-CM

## 2020-06-26 DIAGNOSIS — S36.599A: ICD-10-CM

## 2020-06-26 PROCEDURE — 88305 TISSUE EXAM BY PATHOLOGIST: CPT | Performed by: INTERNAL MEDICINE

## 2020-06-26 PROCEDURE — 45380 COLONOSCOPY AND BIOPSY: CPT | Performed by: INTERNAL MEDICINE

## 2020-06-26 PROCEDURE — 25010000002 PROPOFOL 10 MG/ML EMULSION: Performed by: ANESTHESIOLOGY

## 2020-06-26 RX ORDER — PROPOFOL 10 MG/ML
VIAL (ML) INTRAVENOUS AS NEEDED
Status: DISCONTINUED | OUTPATIENT
Start: 2020-06-26 | End: 2020-06-26 | Stop reason: SURG

## 2020-06-26 RX ORDER — SODIUM CHLORIDE, SODIUM LACTATE, POTASSIUM CHLORIDE, CALCIUM CHLORIDE 600; 310; 30; 20 MG/100ML; MG/100ML; MG/100ML; MG/100ML
1000 INJECTION, SOLUTION INTRAVENOUS CONTINUOUS
Status: DISCONTINUED | OUTPATIENT
Start: 2020-06-26 | End: 2020-06-26 | Stop reason: HOSPADM

## 2020-06-26 RX ORDER — PROPOFOL 10 MG/ML
VIAL (ML) INTRAVENOUS CONTINUOUS PRN
Status: DISCONTINUED | OUTPATIENT
Start: 2020-06-26 | End: 2020-06-26 | Stop reason: SURG

## 2020-06-26 RX ORDER — LIDOCAINE HYDROCHLORIDE 20 MG/ML
INJECTION, SOLUTION INFILTRATION; PERINEURAL AS NEEDED
Status: DISCONTINUED | OUTPATIENT
Start: 2020-06-26 | End: 2020-06-26 | Stop reason: SURG

## 2020-06-26 RX ADMIN — LIDOCAINE HYDROCHLORIDE 60 MG: 20 INJECTION, SOLUTION INFILTRATION; PERINEURAL at 08:45

## 2020-06-26 RX ADMIN — PROPOFOL 100 MG: 10 INJECTION, EMULSION INTRAVENOUS at 08:45

## 2020-06-26 RX ADMIN — SODIUM CHLORIDE, POTASSIUM CHLORIDE, SODIUM LACTATE AND CALCIUM CHLORIDE 1000 ML: 600; 310; 30; 20 INJECTION, SOLUTION INTRAVENOUS at 08:01

## 2020-06-26 RX ADMIN — PROPOFOL 100 MCG/KG/MIN: 10 INJECTION, EMULSION INTRAVENOUS at 08:45

## 2020-06-26 NOTE — ANESTHESIA PREPROCEDURE EVALUATION
Anesthesia Evaluation     Patient summary reviewed and Nursing notes reviewed   history of anesthetic complications:               Airway   Mallampati: I  TM distance: >3 FB  Neck ROM: full  No difficulty expected  Dental - normal exam     Pulmonary - negative pulmonary ROS and normal exam   Cardiovascular - negative cardio ROS and normal exam        Neuro/Psych- negative ROS  GI/Hepatic/Renal/Endo    (+)  GERD,  liver disease,     Musculoskeletal (-) negative ROS    Abdominal  - normal exam    Bowel sounds: normal.   Substance History - negative use     OB/GYN negative ob/gyn ROS         Other      history of cancer                    Anesthesia Plan    ASA 3     MAC       Anesthetic plan, all risks, benefits, and alternatives have been provided, discussed and informed consent has been obtained with: patient.

## 2020-06-26 NOTE — ANESTHESIA POSTPROCEDURE EVALUATION
"Patient: Taylor Larsen    Procedure Summary     Date:  06/26/20 Room / Location:   [facility]    Anesthesia Start:  0842 Anesthesia Stop:  0908    Procedure:  COLONOSCOPY TO TERMINAL ILEUM WITH BX (N/A ) Diagnosis:      Surgeon:  Sage Gonzalez MD Provider:  Maldonado Malhotra MD    Anesthesia Type:  MAC ASA Status:  3          Anesthesia Type: MAC    Vitals  Vitals Value Taken Time   /61 6/26/2020  9:25 AM   Temp     Pulse 79 6/26/2020  9:25 AM   Resp 16 6/26/2020  9:25 AM   SpO2 99 % 6/26/2020  9:25 AM           Post Anesthesia Care and Evaluation    Patient location during evaluation: PACU  Patient participation: complete - patient participated  Level of consciousness: awake  Pain score: 0  Pain management: adequate  Airway patency: patent  Anesthetic complications: No anesthetic complications  PONV Status: none  Cardiovascular status: acceptable  Respiratory status: acceptable  Hydration status: acceptable    Comments: /61 (BP Location: Left arm, Patient Position: Lying)   Pulse 79   Resp 16   Ht 160 cm (63\")   Wt 72.1 kg (159 lb)   SpO2 99%   BMI 28.17 kg/m²       "

## 2020-06-26 NOTE — H&P
Patient Care Team:  Bam Umaña MD as PCP - General (Family Medicine)  Beena Maurice MD as Consulting Physician (Radiation Oncology)  Jana Evans MD as Consulting Physician (Hematology and Oncology)  Sage Gonzalez MD as Referring Physician (Gastroenterology)  Aguilar Rodgers II, MD as Consulting Physician (Hematology and Oncology)    Chief complaint  Screening     Subjective     History of Present Illness  Had recent clostridia   Review of Systems   All other systems reviewed and are negative.       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)    • Breast cancer (CMS/HCC) 09/2019    Right IDC   • Clostridium difficile infection 12/2019   • GERD (gastroesophageal reflux disease)    • History of Clostridium difficile colitis    • IBS (irritable bowel syndrome)    • PONV (postoperative nausea and vomiting)    • Situational stress     DEATH OF BROTHER 10/2019, RECENT DIAGNOSIS   • Stress headaches      Past Surgical History:   Procedure Laterality Date   • BILE DUCT STENT PLACEMENT  2014   • BREAST BIOPSY Right 09/2019    malignant   • BREAST LUMPECTOMY WITH SENTINEL NODE BIOPSY Right 11/4/2019    Procedure: BREAST LUMPECTOMY WITH SENTINEL NODE BIOPSY AND NEEDLE LOCALIZATION And possible axillary dissection;  Surgeon: Jean-Paul Ramos MD;  Location: [facility];  Service: General   • COLONOSCOPY     • KNEE ARTHROSCOPY Left    • LAPAROSCOPIC CHOLECYSTECTOMY  2014   • TUBAL ABDOMINAL LIGATION       Family History   Problem Relation Age of Onset   • Alcohol abuse Father    • Heart failure Mother    • Diabetes Brother    • Prostate cancer Brother    • Throat cancer Brother    • Breast cancer Maternal Aunt    • Breast cancer Maternal Cousin    • Breast cancer Maternal Cousin    • Malig Hyperthermia Neg Hx      Social History     Tobacco Use   • Smoking status: Former Smoker     Packs/day: 0.50     Years: 10.00     Pack years:  5.00     Types: Cigarettes     Last attempt to quit: 2014     Years since quittin.4   • Smokeless tobacco: Former User   Substance Use Topics   • Alcohol use: No   • Drug use: No     Medications Prior to Admission   Medication Sig Dispense Refill Last Dose   • acetaminophen (TYLENOL) 325 MG tablet Take 650 mg by mouth Every 6 (Six) Hours As Needed for Mild Pain .   2020   • anastrozole (ARIMIDEX) 1 MG tablet Take 1 mg by mouth Daily.   2020   • doxylamine (UNISOM) 25 MG tablet Take 25 mg by mouth At Night As Needed.   2020   • melatonin 5 MG tablet tablet Take 10 mg by mouth At Night As Needed.   2020   • ondansetron (ZOFRAN) 4 MG tablet Take 1 tablet by mouth Every 8 (Eight) Hours As Needed for Nausea or Vomiting. 10 tablet 0 2020   • Probiotic Product (PROBIOTIC PO) Take 1 tablet by mouth Daily.   2020   • rivaroxaban (XARELTO) 20 MG tablet Take 1 tablet by mouth Daily. 30 tablet 6 2020     Allergies:  Patient has no known allergies.    Objective      Vital Signs  Heart Rate:  [72] 72  Resp:  [19] 19  BP: (124)/(61) 124/61    Physical Exam   Constitutional: She is oriented to person, place, and time. She appears well-developed and well-nourished.   HENT:   Mouth/Throat: Oropharynx is clear and moist.   Eyes: Conjunctivae are normal.   Neck: Neck supple.   Cardiovascular: Normal rate and regular rhythm.   Pulmonary/Chest: Effort normal and breath sounds normal.   Abdominal: Soft. Bowel sounds are normal.   Neurological: She is alert and oriented to person, place, and time.   Skin: Skin is warm and dry.   Psychiatric: She has a normal mood and affect.       Results Review:   I reviewed the patient's new clinical results.      Assessment/Plan       * No active hospital problems. *      Assessment:  (Age related colon cancer screening).     Plan:   (Colonoscopy risks, alternatives and benefits discussed with patient and the patient is agreeable to having procedure done.).       I  discussed the patients findings and my recommendations with patient and nursing staff    Sage Gonzalez MD  06/26/20  08:48

## 2020-06-29 LAB
CYTO UR: NORMAL
LAB AP CASE REPORT: NORMAL
PATH REPORT.FINAL DX SPEC: NORMAL
PATH REPORT.GROSS SPEC: NORMAL

## 2020-08-14 ENCOUNTER — HOSPITAL ENCOUNTER (OUTPATIENT)
Dept: CT IMAGING | Facility: HOSPITAL | Age: 65
Discharge: HOME | End: 2020-08-14
Admitting: INTERNAL MEDICINE

## 2020-08-14 DIAGNOSIS — K52.9 ACUTE COLITIS: ICD-10-CM

## 2020-08-14 DIAGNOSIS — C50.211 MALIGNANT NEOPLASM OF UPPER-INNER QUADRANT OF RIGHT BREAST IN FEMALE, ESTROGEN RECEPTOR POSITIVE (HCC): ICD-10-CM

## 2020-08-14 DIAGNOSIS — Z17.0 MALIGNANT NEOPLASM OF UPPER-INNER QUADRANT OF RIGHT BREAST IN FEMALE, ESTROGEN RECEPTOR POSITIVE (HCC): ICD-10-CM

## 2020-08-14 LAB — CREAT BLDA-MCNC: 0.9 MG/DL (ref 0.6–1.3)

## 2020-08-14 PROCEDURE — 0 DIATRIZOATE MEGLUMINE & SODIUM PER 1 ML: Performed by: INTERNAL MEDICINE

## 2020-08-14 PROCEDURE — 25010000002 IOPAMIDOL 61 % SOLUTION: Performed by: INTERNAL MEDICINE

## 2020-08-14 PROCEDURE — 82565 ASSAY OF CREATININE: CPT

## 2020-08-14 PROCEDURE — 74160 CT ABDOMEN W/CONTRAST: CPT

## 2020-08-14 RX ADMIN — IOPAMIDOL 100 ML: 612 INJECTION, SOLUTION INTRAVENOUS at 09:49

## 2020-08-14 RX ADMIN — DIATRIZOATE MEGLUMINE AND DIATRIZOATE SODIUM 30 ML: 600; 100 SOLUTION ORAL; RECTAL at 08:30

## 2020-08-24 ENCOUNTER — LAB (OUTPATIENT)
Dept: LAB | Facility: HOSPITAL | Age: 65
End: 2020-08-24

## 2020-08-24 ENCOUNTER — OFFICE VISIT (OUTPATIENT)
Dept: ONCOLOGY | Facility: CLINIC | Age: 65
End: 2020-08-24

## 2020-08-24 VITALS
BODY MASS INDEX: 28.9 KG/M2 | HEART RATE: 88 BPM | WEIGHT: 163.1 LBS | TEMPERATURE: 97.8 F | DIASTOLIC BLOOD PRESSURE: 65 MMHG | RESPIRATION RATE: 16 BRPM | OXYGEN SATURATION: 98 % | SYSTOLIC BLOOD PRESSURE: 111 MMHG | HEIGHT: 63 IN

## 2020-08-24 DIAGNOSIS — C50.211 MALIGNANT NEOPLASM OF UPPER-INNER QUADRANT OF RIGHT BREAST IN FEMALE, ESTROGEN RECEPTOR POSITIVE (HCC): ICD-10-CM

## 2020-08-24 DIAGNOSIS — Z17.0 MALIGNANT NEOPLASM OF UPPER-INNER QUADRANT OF RIGHT BREAST IN FEMALE, ESTROGEN RECEPTOR POSITIVE (HCC): Primary | ICD-10-CM

## 2020-08-24 DIAGNOSIS — Z17.0 MALIGNANT NEOPLASM OF UPPER-INNER QUADRANT OF RIGHT BREAST IN FEMALE, ESTROGEN RECEPTOR POSITIVE (HCC): ICD-10-CM

## 2020-08-24 DIAGNOSIS — C50.211 MALIGNANT NEOPLASM OF UPPER-INNER QUADRANT OF RIGHT BREAST IN FEMALE, ESTROGEN RECEPTOR POSITIVE (HCC): Primary | ICD-10-CM

## 2020-08-24 LAB
ALBUMIN SERPL-MCNC: 3.3 G/DL (ref 3.5–5.2)
ALBUMIN/GLOB SERPL: 0.7 G/DL (ref 1.1–2.4)
ALP SERPL-CCNC: 151 U/L (ref 38–116)
ALT SERPL W P-5'-P-CCNC: 37 U/L (ref 0–33)
ANION GAP SERPL CALCULATED.3IONS-SCNC: 7.9 MMOL/L (ref 5–15)
AST SERPL-CCNC: 73 U/L (ref 0–32)
BASOPHILS # BLD AUTO: 0.05 10*3/MM3 (ref 0–0.2)
BASOPHILS NFR BLD AUTO: 0.8 % (ref 0–1.5)
BILIRUB SERPL-MCNC: 0.7 MG/DL (ref 0.2–1.2)
BUN SERPL-MCNC: 16 MG/DL (ref 6–20)
BUN/CREAT SERPL: 17 (ref 7.3–30)
CALCIUM SPEC-SCNC: 9.2 MG/DL (ref 8.5–10.2)
CHLORIDE SERPL-SCNC: 103 MMOL/L (ref 98–107)
CO2 SERPL-SCNC: 27.1 MMOL/L (ref 22–29)
CREAT SERPL-MCNC: 0.94 MG/DL (ref 0.6–1.1)
DEPRECATED RDW RBC AUTO: 45.2 FL (ref 37–54)
EOSINOPHIL # BLD AUTO: 0.18 10*3/MM3 (ref 0–0.4)
EOSINOPHIL NFR BLD AUTO: 3 % (ref 0.3–6.2)
ERYTHROCYTE [DISTWIDTH] IN BLOOD BY AUTOMATED COUNT: 13.2 % (ref 12.3–15.4)
GFR SERPL CREATININE-BSD FRML MDRD: 60 ML/MIN/1.73
GLOBULIN UR ELPH-MCNC: 5 GM/DL (ref 1.8–3.5)
GLUCOSE SERPL-MCNC: 104 MG/DL (ref 74–124)
HCT VFR BLD AUTO: 39.8 % (ref 34–46.6)
HGB BLD-MCNC: 13.1 G/DL (ref 12–15.9)
IMM GRANULOCYTES # BLD AUTO: 0.01 10*3/MM3 (ref 0–0.05)
IMM GRANULOCYTES NFR BLD AUTO: 0.2 % (ref 0–0.5)
LYMPHOCYTES # BLD AUTO: 2.19 10*3/MM3 (ref 0.7–3.1)
LYMPHOCYTES NFR BLD AUTO: 37.1 % (ref 19.6–45.3)
MCH RBC QN AUTO: 31 PG (ref 26.6–33)
MCHC RBC AUTO-ENTMCNC: 32.9 G/DL (ref 31.5–35.7)
MCV RBC AUTO: 94.1 FL (ref 79–97)
MONOCYTES # BLD AUTO: 0.61 10*3/MM3 (ref 0.1–0.9)
MONOCYTES NFR BLD AUTO: 10.3 % (ref 5–12)
NEUTROPHILS NFR BLD AUTO: 2.87 10*3/MM3 (ref 1.7–7)
NEUTROPHILS NFR BLD AUTO: 48.6 % (ref 42.7–76)
NRBC BLD AUTO-RTO: 0 /100 WBC (ref 0–0.2)
PLATELET # BLD AUTO: 152 10*3/MM3 (ref 140–450)
PMV BLD AUTO: 9.9 FL (ref 6–12)
POTASSIUM SERPL-SCNC: 3.4 MMOL/L (ref 3.5–4.7)
PROT SERPL-MCNC: 8.3 G/DL (ref 6.3–8)
RBC # BLD AUTO: 4.23 10*6/MM3 (ref 3.77–5.28)
SODIUM SERPL-SCNC: 138 MMOL/L (ref 134–145)
WBC # BLD AUTO: 5.91 10*3/MM3 (ref 3.4–10.8)

## 2020-08-24 PROCEDURE — 85025 COMPLETE CBC W/AUTO DIFF WBC: CPT

## 2020-08-24 PROCEDURE — 36415 COLL VENOUS BLD VENIPUNCTURE: CPT

## 2020-08-24 PROCEDURE — 99215 OFFICE O/P EST HI 40 MIN: CPT | Performed by: INTERNAL MEDICINE

## 2020-08-24 PROCEDURE — 80053 COMPREHEN METABOLIC PANEL: CPT

## 2020-08-24 NOTE — PROGRESS NOTES
.     REASONS FOR FOLLOWUP: Portal vein thrombosis, breast cancer    HISTORY OF PRESENT ILLNESS:  The patient is a 64 y.o. year old female  who is here for follow-up with the above-mentioned history.    No new problems.  Denies neurological symptoms, nausea.  Denies pain other than some joint pain in her hands since beginning Arimidex.  This is tolerable.    No hot flashes.    Denies bleeding.    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)    • Breast cancer (CMS/HCC) 09/2019    Right IDC   • Clostridium difficile infection 12/2019   • GERD (gastroesophageal reflux disease)    • History of Clostridium difficile colitis    • IBS (irritable bowel syndrome)    • PONV (postoperative nausea and vomiting)    • Situational stress     DEATH OF BROTHER 10/2019, RECENT DIAGNOSIS   • Stress headaches      Past Surgical History:   Procedure Laterality Date   • BILE DUCT STENT PLACEMENT  2014   • BREAST BIOPSY Right 09/2019    malignant   • BREAST LUMPECTOMY WITH SENTINEL NODE BIOPSY Right 11/4/2019    Procedure: BREAST LUMPECTOMY WITH SENTINEL NODE BIOPSY AND NEEDLE LOCALIZATION And possible axillary dissection;  Surgeon: Jean-Paul Ramos MD;  Location: [facility] OR [facility];  Service: General   • COLONOSCOPY     • COLONOSCOPY N/A 6/26/2020    Procedure: COLONOSCOPY TO TERMINAL ILEUM WITH BX;  Surgeon: Sage Gonzalez MD;  Location: [facility] ENDOSCOPY;  Service: Gastroenterology;  Laterality: N/A;  PREOP/ SCREENING  POSTOP/ VASCULAR PATTERN TO COLON   • KNEE ARTHROSCOPY Left    • LAPAROSCOPIC CHOLECYSTECTOMY  2014   • TUBAL ABDOMINAL LIGATION         MEDICATIONS    Current Outpatient Medications:   •  acetaminophen (TYLENOL) 325 MG tablet, Take 650 mg by mouth Every 6 (Six) Hours As Needed for Mild Pain ., Disp: , Rfl:   •  anastrozole (ARIMIDEX) 1 MG tablet, Take 1 mg by mouth Daily., Disp: , Rfl:   •  doxylamine (UNISOM) 25 MG tablet, Take 25 mg by mouth At Night  As Needed., Disp: , Rfl:   •  melatonin 5 MG tablet tablet, Take 10 mg by mouth At Night As Needed., Disp: , Rfl:   •  Probiotic Product (PROBIOTIC PO), Take 1 tablet by mouth Daily., Disp: , Rfl:   •  rivaroxaban (XARELTO) 20 MG tablet, Take 1 tablet by mouth Daily., Disp: 30 tablet, Rfl: 6  •  ondansetron (ZOFRAN) 4 MG tablet, Take 1 tablet by mouth Every 8 (Eight) Hours As Needed for Nausea or Vomiting., Disp: 10 tablet, Rfl: 0    ALLERGIES:   No Known Allergies    SOCIAL HISTORY:       Social History     Socioeconomic History   • Marital status:      Spouse name: Not on file   • Number of children: 2   • Years of education: Not on file   • Highest education level: Not on file   Occupational History     Employer: [employer]   Tobacco Use   • Smoking status: Former Smoker     Packs/day: 0.50     Years: 10.00     Pack years: 5.00     Types: Cigarettes     Last attempt to quit:      Years since quittin.6   • Smokeless tobacco: Former User   Substance and Sexual Activity   • Alcohol use: No   • Drug use: No   • Sexual activity: Never     Birth control/protection: Post-menopausal         FAMILY HISTORY:  Family History   Problem Relation Age of Onset   • Alcohol abuse Father    • Heart failure Mother    • Diabetes Brother    • Prostate cancer Brother    • Throat cancer Brother    • Breast cancer Maternal Aunt    • Breast cancer Maternal Cousin    • Breast cancer Maternal Cousin    • Malig Hyperthermia Neg Hx        REVIEW OF SYSTEMS:  Review of Systems   Constitutional: Negative for activity change.   HENT: Negative for nosebleeds and trouble swallowing.    Respiratory: Negative for shortness of breath and wheezing.    Cardiovascular: Negative for chest pain and palpitations.   Gastrointestinal: Negative for constipation, diarrhea and nausea.   Genitourinary: Negative for dysuria and hematuria.   Musculoskeletal: Positive for arthralgias. Negative for myalgias.   Skin: Negative for rash and wound.  "  Neurological: Negative for seizures and syncope.   Hematological: Negative for adenopathy. Does not bruise/bleed easily.   Psychiatric/Behavioral: Negative for confusion.            Vitals:    08/24/20 1145   BP: 111/65   Pulse: 88   Resp: 16   Temp: 97.8 °F (36.6 °C)   TempSrc: Skin   SpO2: 98%   Weight: 74 kg (163 lb 1.6 oz)   Height: 160 cm (62.99\")   PainSc: 0-No pain     Current Status 8/24/2020   ECOG score 0        PHYSICAL EXAM:        CONSTITUTIONAL:  Vital signs reviewed.  No distress, looks comfortable.  EYES:  Conjunctiva and lids unremarkable.  PERRLA  EARS,NOSE,MOUTH,THROAT:  Ears and nose appear unremarkable.  Lips, teeth, gums appear unremarkable.  RESPIRATORY:  Normal respiratory effort.  Lungs clear to auscultation bilaterally.  CARDIOVASCULAR:  Normal S1, S2.  No murmurs rubs or gallops.  No significant lower extremity edema.  BREAST: She declines breast exam as she will have this with her gynecologist in October.  GASTROINTESTINAL: Abdomen appears unremarkable.  Nontender.  No hepatomegaly.  No splenomegaly.  LYMPHATIC:  No cervical, supraclavicular, axillary lymphadenopathy.  SKIN:  Warm.  No rashes.  PSYCHIATRIC:  Normal judgment and insight.  Normal mood and affect.        RECENT LABS:        WBC   Date/Time Value Ref Range Status   08/24/2020 11:36 AM 5.91 3.40 - 10.80 10*3/mm3 Final     Hemoglobin   Date/Time Value Ref Range Status   08/24/2020 11:36 AM 13.1 12.0 - 15.9 g/dL Final     Platelets   Date/Time Value Ref Range Status   08/24/2020 11:36  140 - 450 10*3/mm3 Final       Assessment/Plan   Malignant neoplasm of upper-inner quadrant of right breast in female, estrogen receptor positive (CMS/HCC)  - CBC & Differential  - Comprehensive Metabolic Panel    *Right breast cancer.  Invasive ductal adenocarcinoma.  Upper inner quadrant  · Right lumpectomy by Dr. Ramos 11/4/2019.  1:00.  Overall grade 1.  Greatest dimension 5 mm.  Single focus of invasive carcinoma.  No " lymphovascular invasion.  Margins negative.  0 out of 2 nodes involved.  · [id number], stage 1  · ER 91%.  AR 96%.  HER-2 negative.  Ki-67 4.7% (favorable).  · (Initial needle biopsy 9/16/2019: Low-grade invasive ductal carcinoma, grade 1.  5 mm in greatest dimension.)  · (Initial mammogram measured the lesion at 5 mm)  · From the information we currently know about her cancer, I do not recommend chemotherapy.  I do recommend hormonal therapy for 5 years.  Hopefully, aromatase inhibitors if DEXA scan allows (with portal vein thrombosis would like to avoid tamoxifen).  · Oncotype DX: Recurrence score 3, 3% distant recurrence risk at 9 years if tamoxifen or AI is used.  · Therefore, do not recommend chemotherapy.  · Adjuvant XRT completed 2/12/2020  · Arimidex 2/26/2020- 2/26/2025  · Overall doing well and Arimidex.  No signs of recurrence.  Continue Arimidex.    *Mild hand joint achiness since beginning Arimidex.  Tolerable.  Continue Arimidex.    *Bone health.  · DEXA 12/2/2019: Normal.  T score lumbar spine 0.1, T score left hip 0.1, T score right hip 0.6.  · She takes 2-4 times daily.  Therefore, did not begin a calcium supplement when a rheumatic started on 12/9/2019.  She was encouraged to take a PPI on a schedule and follow-up with her PCP.  States she has had several EGDs in the past none of which have shown problems.  (States in the past her PCP told her to stop calcium supplements because she developed hypercalcemia due to the combination of calcium supplements and Tums).     *Portal vein thrombosis (right branch of portal vein).  · No evidence of cirrhosis on MRI or ultrasound.  Dr. Berna Gonzalez, GI note, has no mention of cirrhosis.  · Hormonal therapy for breast cancer has a slight increased risk of thrombosis.  Therefore, I think it is in her best interest to undergo anticoagulation until she completes hormonal therapy.  · Generally, Lovenox or Coumadin are the preferred agents.  However, because of  significant elevations in INR, and the desire for an oral agent, changed to Xarelto on 2/19/2020.   No signs of recurrent clotting.  Continue Xarelto.    *Length of anticoagulation  Plan Xarelto throughout her anastrozole therapy due to slight increased risk of clotting on anastrozole    *Risk factors for portal vein thrombosis:  · Unremarkable: Prothrombin gene mutation, factor V Leiden gene mutation, lupus anticoagulant, beta-2 glycoprotein antibodies, anti-thrombin, protein S free.    *Low protein S activity, 14% (normal range %), on 11/13/2019     *Low protein C activity, 44% (normal range %) on 11/13/2019.    *Indeterminant anticardiolipin  · IgM 14 (indeterminate range 13-20) on 11/13/2019.  IgA and IgG negative.    *Prior leukocytosis, likely due to C. difficile colitis.  WBC currently normal at 5.9     *Anemia.  Previously felt to likely due to C. difficile colitis.  Hb 13.1     *Prominent nodes within the hepatogastric ligament and around the celiac axis and in the retroperitoneum on MRI abdomen 11/11/2019.  · Etiology uncertain.  Radiologist recommended follow-up  · CT 2/19/2020, from 12/13/2019: Abdominal and pelvic and cardiophrenic LAD unchanged.  · CT 8/14/2020: Overall improvement with interval decrease in right cardiophrenic node, size of spleen, and degree of wall thickening and surrounding fat stranding in the colon.  Persistent abdominal pelvic and lower thoracic LAD, indeterminant etiology.    · CT images personally viewed by me  · Radiologist recommended continued follow-up.  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  I explained if this were the case it would not be expected to be curable and we would undergo observation instead of treatment regardless.  We would consider treatment if she develops a problem.  Therefore, I think further observation is very reasonable.  (The LAD might be due to her previous problems with  extensive colitis and the mild splenomegaly might be due to the right hepatic lobe atrophy).    *C. difficile colitis during November 2019 hospitalization.  Thought to perhaps be related to receiving 1 dose of antibiotics for breast surgery.  She was treated with Flagyl and oral vancomycin     *Extensive colitis again seen on CT 2/19/2020, unchanged from 11/10/2019.  Patient reports she had some recent diarrhea but it became better and is more solid and it does not seem similar to the diarrhea she had back in November during the diagnosis of C. difficile.  On 2/24/2020 she was told to contact her PCP if the diarrhea worsens.  In the meantime, we referred her back to Dr. Cano of ID and Dr. Berna Gonzalez of GI (she saw both during the C. difficile hospitalization November 2019).  · Dr. Cano appointment on 3/4/2020  · Dr. Berna Gonzalez colonoscopy 6/26/2020: Decreased mucosa vascular pattern in the entire examined colon, biopsied.  Biopsy showed no evidence of malignancy.  It did reveal prominent lymphoid aggregates.    Plan  · Continue anastrozole  · MD 6 months.  1 week prior: CBC, CMP, CT abdomen with contrast.    Colonoscopy reviewed as well as pathology.

## 2020-09-08 RX ORDER — ANASTROZOLE 1 MG/1
1 TABLET ORAL DAILY
Qty: 90 TABLET | Refills: 1 | Status: SHIPPED | OUTPATIENT
Start: 2020-09-08 | End: 2022-11-10

## 2020-09-08 RX ORDER — RIVAROXABAN 20 MG/1
TABLET, FILM COATED ORAL
Qty: 30 TABLET | Refills: 5 | OUTPATIENT
Start: 2020-09-08

## 2020-09-08 NOTE — TELEPHONE ENCOUNTER
Patient is out of medication     [facility] pharmacy [phone]    Patient phone # [phone]

## 2020-10-12 ENCOUNTER — TELEPHONE (OUTPATIENT)
Dept: ONCOLOGY | Facility: CLINIC | Age: 65
End: 2020-10-12

## 2020-10-12 NOTE — TELEPHONE ENCOUNTER
Pt informed she will receive samples and will need to come in for a NP visit to get them. Pt V/U. Message sent to scheduling.

## 2020-10-12 NOTE — TELEPHONE ENCOUNTER
PT CALLED TO LET DR. WILLS KNOW SHE DID NOT GET HER XARELTO RX DUE TO THE COST. SHE LOST HER JOB AND LOST HER INSURANCE. SHE WENT TO PAY WITH RX CARD AND THE COST WAS GOING TO BE $400. CAN SOMETHING ELSE BE PRESCRIBED OR IS THERE A WAY SHE CAN GET THESE WITHOUT PAYING $400?    PT HAS BEEN OUT SINCE Friday, 10-9-2020.    PT CAN BE REACHED AT:  [phone]

## 2020-10-12 NOTE — TELEPHONE ENCOUNTER
I was notified by Jennifer Cee RN that pt did not  her Xarelto on Friday due to cost. She lost her job and insurance and her Xarelto will cost her $400.     I have asked Jennifer to get her scheduled for NP visit to  samples.    I have forwarded the message to CORTNEY Friedman to assist with application for FREE medication since pt has NO coverage at all.

## 2020-10-13 ENCOUNTER — APPOINTMENT (OUTPATIENT)
Dept: LAB | Facility: HOSPITAL | Age: 65
End: 2020-10-13

## 2020-10-13 ENCOUNTER — OFFICE VISIT (OUTPATIENT)
Dept: ONCOLOGY | Facility: CLINIC | Age: 65
End: 2020-10-13

## 2020-10-13 ENCOUNTER — DOCUMENTATION (OUTPATIENT)
Dept: PHARMACY | Facility: HOSPITAL | Age: 65
End: 2020-10-13

## 2020-10-13 VITALS
HEIGHT: 63 IN | TEMPERATURE: 97.8 F | HEART RATE: 83 BPM | OXYGEN SATURATION: 99 % | RESPIRATION RATE: 14 BRPM | BODY MASS INDEX: 29.64 KG/M2 | WEIGHT: 167.3 LBS | SYSTOLIC BLOOD PRESSURE: 120 MMHG | DIASTOLIC BLOOD PRESSURE: 79 MMHG

## 2020-10-13 DIAGNOSIS — I81 PORTAL VEIN THROMBOSIS: Primary | ICD-10-CM

## 2020-10-13 DIAGNOSIS — C50.211 MALIGNANT NEOPLASM OF UPPER-INNER QUADRANT OF RIGHT BREAST IN FEMALE, ESTROGEN RECEPTOR POSITIVE (HCC): ICD-10-CM

## 2020-10-13 DIAGNOSIS — Z17.0 MALIGNANT NEOPLASM OF UPPER-INNER QUADRANT OF RIGHT BREAST IN FEMALE, ESTROGEN RECEPTOR POSITIVE (HCC): ICD-10-CM

## 2020-10-13 NOTE — PROGRESS NOTES
Site of Appt[facility]Pick[facility] (n[facility] [place] (n) [facility];    Prescriber (code) requested the following medication samples to be given to the patient during the Nurse Practitioner visit.    NDC:  25833-054-27  Drug name: xarelto  Strength: 20mg  Total Quantity:  28 (Containers- 4 X Units per Containers- 7)  Lot#:  [id number]  Expiration: 9/22    Product to be placed in the [employer] under 'Patient Specialty Medication' entry and is to be removed at time of visit.

## 2020-10-13 NOTE — PROGRESS NOTES
.     REASON FOR FOLLOW UP:   Management of anticoagulation    HISTORY OF PRESENT ILLNESS:  The patient is a 65 y.o. year old female  who is here for follow-up with the above-mentioned history.  She recently lost her insurance and went to pick her Xarelto up and was told that it would cost her $400, which she cannot afford.  Patient has been out of her Xarelto since Friday.  She continues on the Xarelto tolerating it well denying any issues with bleeding or bruising.  She also continues on a REM attacks, but states that that was more affordable.        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)    • Breast cancer (CMS/HCC) 09/2019    Right IDC   • Clostridium difficile infection 12/2019   • GERD (gastroesophageal reflux disease)    • History of Clostridium difficile colitis    • IBS (irritable bowel syndrome)    • PONV (postoperative nausea and vomiting)    • Situational stress     DEATH OF BROTHER 10/2019, RECENT DIAGNOSIS   • Stress headaches        MEDICATIONS    Current Outpatient Medications:   •  acetaminophen (TYLENOL) 325 MG tablet, Take 650 mg by mouth Every 6 (Six) Hours As Needed for Mild Pain ., Disp: , Rfl:   •  anastrozole (ARIMIDEX) 1 MG tablet, Take 1 tablet by mouth Daily., Disp: 90 tablet, Rfl: 1  •  doxylamine (UNISOM) 25 MG tablet, Take 25 mg by mouth At Night As Needed., Disp: , Rfl:   •  melatonin 5 MG tablet tablet, Take 10 mg by mouth At Night As Needed., Disp: , Rfl:   •  Probiotic Product (PROBIOTIC PO), Take 1 tablet by mouth Daily., Disp: , Rfl:   •  rivaroxaban (XARELTO) 20 MG tablet, Take 1 tablet by mouth Daily., Disp: 30 tablet, Rfl: 6    ALLERGIES:   No Known Allergies    REVIEW OF SYSTEMS:  Review of Systems   HENT: Negative for nosebleeds.    Gastrointestinal: Negative for anal bleeding and blood in stool.   Genitourinary: Negative for hematuria.   Hematological: Bruises/bleeds easily.              Vitals:    10/13/20  "1108   BP: 120/79   Pulse: 83   Resp: 14   Temp: 97.8 °F (36.6 °C)   TempSrc: Tympanic   SpO2: 99%   Weight: 75.9 kg (167 lb 4.8 oz)   Height: 160 cm (62.99\")   PainSc: 0-No pain     Current Status 10/13/2020   ECOG score 0        PHYSICAL EXAM:    CONSTITUTIONAL:  Patient alert and oriented x3  EYES:  Conjunctiva and lids unremarkable.  PERRLA  RESPIRATORY:  Breathing unlabored, no acute distress.  MUSCULOSKELETAL:  No lower extremity swelling, erythema or calf tenderness  SKIN:  Warm.  No rashes.  PSYCHIATRIC:  Normal judgment and insight.  Normal mood and affect.     RECENT LABS:      Assessment/Plan     · 1.  History of portal vein thrombosis (right branch of the portal vein).No evidence of cirrhosis on MRI or ultrasound.  Dr. Berna Gonzalez, GI note, has no mention of cirrhosis.  · Hormonal therapy for breast cancer has a slight increased risk of thrombosis.  Therefore, I think it is in her best interest to undergo anticoagulation until she completes hormonal therapy.  · Generally, Lovenox or Coumadin are the preferred agents.  However, because of significant elevations in INR, and the desire for an oral agent, changed to Xarelto on 2/19/2020.   · No signs of recurrent clotting.  Continue Xarelto.    2.  Malignant neoplasm of upper-inner quadrant of right breast in female, estrogen receptor positive (CMS/HCC)   Right breast cancer.  Invasive ductal adenocarcinoma.  Upper inner quadrant  · Right lumpectomy by Dr. Ramos 11/4/2019.  1:00.  Overall grade 1.  Greatest dimension 5 mm.  Single focus of invasive carcinoma.  No lymphovascular invasion.  Margins negative.  0 out of 2 nodes involved.  · [id number], stage 1  · ER 91%.  VA 96%.  HER-2 negative.  Ki-67 4.7% (favorable).  · (Initial needle biopsy 9/16/2019: Low-grade invasive ductal carcinoma, grade 1.  5 mm in greatest dimension.)  · (Initial mammogram measured the lesion at 5 mm)  · From the information we currently know about her cancer, I do not recommend " chemotherapy.  I do recommend hormonal therapy for 5 years.  Hopefully, aromatase inhibitors if DEXA scan allows (with portal vein thrombosis would like to avoid tamoxifen).  · Oncotype DX: Recurrence score 3, 3% distant recurrence risk at 9 years if tamoxifen or AI is used.  · Therefore, do not recommend chemotherapy.  · Adjuvant XRT completed 2/12/2020  · Arimidex 2/26/2020- 2/26/2025  · Overall doing well and Arimidex.  No signs of recurrence.  Continue Arimidex  · Patient will continue on anticoagulation as long as she is on anastrozole therapy due to slight risk of increased clotting.    PLAN:  1. I have provided the patient with 1 month supply of Xarelto. We reviewed the appropriate instructions for medication administration as well as dosing. We have reviewed potential side effects including increased risk of bleeding. The patient understand to call with any questions or concerns.

## 2020-10-26 ENCOUNTER — PROCEDURE VISIT (OUTPATIENT)
Dept: OBSTETRICS AND GYNECOLOGY | Facility: CLINIC | Age: 65
End: 2020-10-26

## 2020-10-26 ENCOUNTER — OFFICE VISIT (OUTPATIENT)
Dept: OBSTETRICS AND GYNECOLOGY | Facility: CLINIC | Age: 65
End: 2020-10-26

## 2020-10-26 ENCOUNTER — APPOINTMENT (OUTPATIENT)
Dept: WOMENS IMAGING | Facility: HOSPITAL | Age: 65
End: 2020-10-26

## 2020-10-26 VITALS
BODY MASS INDEX: 29.77 KG/M2 | HEIGHT: 63 IN | DIASTOLIC BLOOD PRESSURE: 74 MMHG | SYSTOLIC BLOOD PRESSURE: 118 MMHG | WEIGHT: 168 LBS

## 2020-10-26 DIAGNOSIS — Z01.419 VISIT FOR GYNECOLOGIC EXAMINATION: Primary | ICD-10-CM

## 2020-10-26 DIAGNOSIS — Z85.3 PERSONAL HISTORY OF BREAST CANCER: ICD-10-CM

## 2020-10-26 DIAGNOSIS — Z12.31 VISIT FOR SCREENING MAMMOGRAM: Primary | ICD-10-CM

## 2020-10-26 PROCEDURE — 77067 SCR MAMMO BI INCL CAD: CPT | Performed by: RADIOLOGY

## 2020-10-26 PROCEDURE — 77063 BREAST TOMOSYNTHESIS BI: CPT | Performed by: OBSTETRICS & GYNECOLOGY

## 2020-10-26 PROCEDURE — 77063 BREAST TOMOSYNTHESIS BI: CPT | Performed by: RADIOLOGY

## 2020-10-26 PROCEDURE — 77067 SCR MAMMO BI INCL CAD: CPT | Performed by: OBSTETRICS & GYNECOLOGY

## 2020-10-26 PROCEDURE — G0101 CA SCREEN;PELVIC/BREAST EXAM: HCPCS | Performed by: OBSTETRICS & GYNECOLOGY

## 2020-10-26 NOTE — PROGRESS NOTES
[facility]  [address], [address]  [place], [place] [address]  Phone: [phone] / Fax:  [phone]      10/26/2020    [address] [facility] [address]    Bam Umaña MD    Chief Complaint   Patient presents with   • Gynecologic Exam     Annual Exam,last pap 7-25-18 nl hpv (-) , Mammogram today, colonoscopy 8/2020 nl per patient.       Taylor Larsen is here for annual gynecologic exam.  HPI - Patient with Stage 1 right sided intraductal carcinoma of breast, diagnosed last year.  She completed radiation and is currently on Arimidex daily.  She is on Xarelto for portal vein thrombosis.  Patient did mammogram today and colonoscopy was completed and normal 2 months ago.  She did a DEXA last year that was normal.    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)    • Breast cancer (CMS/HCC) 09/2019    Right IDC   • Clostridium difficile infection 12/2019   • GERD (gastroesophageal reflux disease)    • History of Clostridium difficile colitis    • IBS (irritable bowel syndrome)    • PONV (postoperative nausea and vomiting)    • Radiation-induced angiosarcoma of breast (CMS/HCC)    • Situational stress     DEATH OF BROTHER 10/2019, RECENT DIAGNOSIS   • Stress headaches        Past Surgical History:   Procedure Laterality Date   • BILE DUCT STENT PLACEMENT  2014   • BREAST BIOPSY Right 09/2019    malignant   • BREAST LUMPECTOMY WITH SENTINEL NODE BIOPSY Right 11/4/2019    Procedure: BREAST LUMPECTOMY WITH SENTINEL NODE BIOPSY AND NEEDLE LOCALIZATION And possible axillary dissection;  Surgeon: Jean-Paul Ramos MD;  Location: [facility];  Service: General   • COLONOSCOPY     • COLONOSCOPY N/A 6/26/2020    Procedure: COLONOSCOPY TO TERMINAL ILEUM WITH BX;  Surgeon: Sage Gonzalez MD;  Location: [facility] ENDOSCOPY;  Service: Gastroenterology;  Laterality: N/A;  PREOP/ SCREENING  POSTOP/ VASCULAR PATTERN TO COLON   • KNEE  "ARTHROSCOPY Left    • LAPAROSCOPIC CHOLECYSTECTOMY     • TUBAL ABDOMINAL LIGATION         No Known Allergies    Social History     Socioeconomic History   • Marital status:      Spouse name: Not on file   • Number of children: 2   • Years of education: Not on file   • Highest education level: Not on file   Occupational History     Employer: [employer]   Tobacco Use   • Smoking status: Former Smoker     Packs/day: 0.50     Years: 10.00     Pack years: 5.00     Types: Cigarettes     Quit date:      Years since quittin.8   • Smokeless tobacco: Former User   Substance and Sexual Activity   • Alcohol use: No   • Drug use: No   • Sexual activity: Yes     Birth control/protection: Post-menopausal, Surgical       Family History   Problem Relation Age of Onset   • Alcohol abuse Father    • Heart failure Mother    • Diabetes Brother    • Prostate cancer Brother    • Throat cancer Brother    • Breast cancer Maternal Aunt    • Breast cancer Maternal Cousin    • Breast cancer Maternal Cousin    • Malig Hyperthermia Neg Hx        No LMP recorded. Patient is postmenopausal.    OB History        2    Para   0    Term   0       0    AB   0    Living   2       SAB   0    TAB   0    Ectopic   0    Molar        Multiple   0    Live Births                    Vitals:    10/26/20 1406   BP: 118/74   Weight: 76.2 kg (168 lb)   Height: 160 cm (63\")       Physical Exam  Constitutional:       Appearance: She is well-developed.   Genitourinary:      Pelvic exam was performed with patient in the lithotomy position.      Vulva, urethra, bladder, vagina and uterus normal.      No cervical motion tenderness or lesion.      Right adnexa not tender or full.      Left adnexa not tender or full.   HENT:      Right Ear: External ear normal.      Left Ear: External ear normal.      Nose: Nose normal.   Eyes:      Conjunctiva/sclera: Conjunctivae normal.   Neck:      Musculoskeletal: Normal range of motion and neck " supple.      Thyroid: No thyromegaly.   Cardiovascular:      Rate and Rhythm: Normal rate and regular rhythm.      Heart sounds: Normal heart sounds.   Pulmonary:      Effort: Pulmonary effort is normal.      Breath sounds: No stridor. No wheezing.   Chest:      Breasts:         Right: No mass or nipple discharge.         Left: No mass or nipple discharge.   Abdominal:      Palpations: Abdomen is soft. There is no mass.      Tenderness: There is no guarding or rebound.   Musculoskeletal: Normal range of motion.   Neurological:      Mental Status: She is alert.      Coordination: Coordination normal.   Skin:     General: Skin is warm and dry.   Psychiatric:         Behavior: Behavior normal.         Thought Content: Thought content normal.         Judgment: Judgment normal.   Vitals signs reviewed.         Diagnoses and all orders for this visit:    1. Visit for gynecologic examination (Primary)  -  Discussed regular screening, bone health/osteoporosis prevention.  2. Personal history of breast cancer  -  Mammogram completed today.      Eduardo Lorenzo MD

## 2020-10-28 DIAGNOSIS — R92.8 ABNORMALITY OF LEFT BREAST ON SCREENING MAMMOGRAM: Primary | ICD-10-CM

## 2020-10-28 DIAGNOSIS — N64.89 BREAST ASYMMETRY: ICD-10-CM

## 2020-11-04 ENCOUNTER — TELEPHONE (OUTPATIENT)
Dept: ONCOLOGY | Facility: CLINIC | Age: 65
End: 2020-11-04

## 2020-11-04 NOTE — TELEPHONE ENCOUNTER
Message sent to Scheduling to make NP visit for Xarelto samples. Message left for pt. Advised her to call back with any questions or concerns.

## 2020-11-05 ENCOUNTER — TELEPHONE (OUTPATIENT)
Dept: ONCOLOGY | Facility: CLINIC | Age: 65
End: 2020-11-05

## 2020-11-05 ENCOUNTER — DOCUMENTATION (OUTPATIENT)
Dept: PHARMACY | Facility: HOSPITAL | Age: 65
End: 2020-11-05

## 2020-11-05 NOTE — PROGRESS NOTES
Site of Appt/Pickup[place] (n[place] [facility] ([place];    Prescriber (code) requested the following medication samples to be given to the patient during the Nurse Practitioner visit.    NDC:  5058-579-07  Drug name: xarelto  Strength: 20mg  Total Quantity:  28 (Containers- 4 X Units per Containers- 7)  Lot#:  [id number]  Expiration: 9/22    Product to be placed in the [employer] under 'Patient Specialty Medication' entry and is to be removed at time of visit.

## 2020-11-05 NOTE — TELEPHONE ENCOUNTER
PT CANNOT DO 2:40 PM APPT. SHE GETS OFF WORK AT 1:30 PM AND HAS TO  HER GRANDSON BY SRINIVAS DWYER AT 3 PM.    PLEASE CALL PT AND RESCHEDULE:  [phone]

## 2020-11-05 NOTE — TELEPHONE ENCOUNTER
Pt is needing samples of Eliquis. She has been scheduled for 11/6/2020 at [facility].    I have sent a message to Barb/Jazmyne/Yvonne to place 4 bottles of Xarelto 20 mg into the [facility].    Mackenzie Keyes RegSched Rep sent to Jacklyn Bhagat.             Pt will be at [facility] on Friday for these samples - thank you! Diana    Previous Messages    ----- Message -----   From: Jennifer Cee RN   Sent: 11/4/2020   2:45 PM EST   To: Jacklyn Bhagat, *     Please schedule pt with NP visit for Xarelto samples.

## 2020-11-06 ENCOUNTER — APPOINTMENT (OUTPATIENT)
Dept: LAB | Facility: HOSPITAL | Age: 65
End: 2020-11-06

## 2020-11-06 ENCOUNTER — OFFICE VISIT (OUTPATIENT)
Dept: ONCOLOGY | Facility: CLINIC | Age: 65
End: 2020-11-06

## 2020-11-06 VITALS
SYSTOLIC BLOOD PRESSURE: 118 MMHG | OXYGEN SATURATION: 97 % | BODY MASS INDEX: 29.49 KG/M2 | HEART RATE: 88 BPM | DIASTOLIC BLOOD PRESSURE: 77 MMHG | RESPIRATION RATE: 16 BRPM | WEIGHT: 166.5 LBS | TEMPERATURE: 98 F

## 2020-11-06 DIAGNOSIS — I81 PORTAL VEIN THROMBOSIS: Primary | ICD-10-CM

## 2020-11-06 PROCEDURE — 99212 OFFICE O/P EST SF 10 MIN: CPT | Performed by: NURSE PRACTITIONER

## 2020-11-06 NOTE — PROGRESS NOTES
.     REASON FOR FOLLOW UP:   Management of anticoagulation, portal vein thrombosis    HISTORY OF PRESENT ILLNESS:  The patient is a 65 y.o. year old female who is here for follow-up with the above-mentioned history.  She returns the office today for month supply of her anticoagulation.  She continues on Xarelto 20 mg daily for her previous portal vein thrombosis.  She tolerated Xarelto well without signs or symptoms of bleeding.  She does report today she is scheduled to undergo a diagnostic mammogram and ultrasound of the breast 11/23/2020.  She is presently not scheduled for biopsy, this is pending mammogram results.  We have reviewed if she is to undergo biopsy, she will require a 24-hour holding her anticoagulation.  She will certainly update us on the mammogram.  She otherwise denies new signs or symptoms of thrombosis including lower extremity swelling, chest pain, hemoptysis or cough.    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)    • Breast cancer (CMS/HCC) 09/2019    Right IDC   • Clostridium difficile infection 12/2019   • GERD (gastroesophageal reflux disease)    • History of Clostridium difficile colitis    • IBS (irritable bowel syndrome)    • PONV (postoperative nausea and vomiting)    • Radiation-induced angiosarcoma of breast (CMS/HCC)    • Situational stress     DEATH OF BROTHER 10/2019, RECENT DIAGNOSIS   • Stress headaches        MEDICATIONS    Current Outpatient Medications:   •  acetaminophen (TYLENOL) 325 MG tablet, Take 650 mg by mouth Every 6 (Six) Hours As Needed for Mild Pain ., Disp: , Rfl:   •  anastrozole (ARIMIDEX) 1 MG tablet, Take 1 tablet by mouth Daily., Disp: 90 tablet, Rfl: 1  •  doxylamine (UNISOM) 25 MG tablet, Take 25 mg by mouth At Night As Needed., Disp: , Rfl:   •  melatonin 5 MG tablet tablet, Take 10 mg by mouth At Night As Needed., Disp: , Rfl:   •  Probiotic Product (PROBIOTIC PO), Take 1 tablet by mouth  Daily., Disp: , Rfl:   •  rivaroxaban (XARELTO) 20 MG tablet, Take 1 tablet by mouth Daily., Disp: 28 tablet, Rfl: 0    ALLERGIES:   No Known Allergies    REVIEW OF SYSTEMS:  Review of Systems   Constitutional: Negative for chills, fatigue and fever.   HENT: Negative for mouth sores and sore throat.    Eyes: Negative for visual disturbance.   Respiratory: Negative for cough and shortness of breath.    Gastrointestinal: Negative for abdominal pain, diarrhea, nausea and vomiting.   Genitourinary: Negative for dysuria and frequency.   Neurological: Negative for dizziness and weakness.   Hematological: Bruises/bleeds easily.   Psychiatric/Behavioral: The patient is not nervous/anxious.    All other systems reviewed and are negative.  Review of systems 11/06/2020  unchanged from previous office visit except as updated.           Vitals:    11/06/20 1324   BP: 118/77   Pulse: 88   Resp: 16   Temp: 98 °F (36.7 °C)   TempSrc: Temporal   SpO2: 97%   Weight: 75.5 kg (166 lb 8 oz)   PainSc: 0-No pain     Current Status 10/13/2020   ECOG score 0        PHYSICAL EXAM:    CONSTITUTIONAL:  Patient alert and oriented x3  EYES:  Conjunctiva and lids unremarkable.  PERRLA  RESPIRATORY:  Breathing unlabored, no acute distress.  MUSCULOSKELETAL:  No lower extremity swelling, erythema or calf tenderness  SKIN:  Warm.  No rashes.  PSYCHIATRIC:  Normal judgment and insight.  Normal mood and affect.  Physical exam 11/06/2020  unchanged from previous office visit except as updated.      RECENT LABS:  No labs reviewed    Assessment/Plan   History of portal vein thrombosis.  Continuing on hormone therapy for her breast cancer, therefore she continues on anticoagulation with Xarelto 20 mg daily.   I have provided the patient with 1 month supply of Xarelto 20 mg tablets, with instructions of 1 tablet daily. We reviewed the appropriate instructions for medication administration as well as dosing. We have reviewed potential side effects including  increased risk of bleeding.  Presently, she is not scheduled for an invasive procedure in the next month.  She is going to undergo diagnostic mammogram and ultrasound 11/23/2020.  She understands if a biopsy is scheduled, she will contact our office with instructions for holding her anticoagulation.  The patient understand to call with any questions or concerns.    Mable Marley, FAWAD  11/06/2020

## 2020-11-09 ENCOUNTER — TELEPHONE (OUTPATIENT)
Dept: ONCOLOGY | Facility: CLINIC | Age: 65
End: 2020-11-09

## 2020-11-09 NOTE — TELEPHONE ENCOUNTER
CSW contacted patient regarding applying for free Xarelto; the patient does not exceed the income limit.  CSW emailed patient her portion of the application to complete and she will return it along with her most recent tax return.

## 2020-11-16 ENCOUNTER — TELEPHONE (OUTPATIENT)
Dept: ONCOLOGY | Facility: CLINIC | Age: 65
End: 2020-11-16

## 2020-11-16 NOTE — TELEPHONE ENCOUNTER
CSW contacted patient to follow up on her application for free Xarelto.  The patient stated her tax return from last year will reflect too much income for the program.  However, currently, the patient is employed part time seasonally and her  just started a new job.  They have both been unemployed for several months, and their new income does not exceed the limit for the program.  CSW encouraged the patient to complete the application and provide current income information, instead of last year's tax return; she may be approved based on her current income situation.      The patient stated she will complete the application and return to CSW within the next few days.

## 2020-11-23 ENCOUNTER — HOSPITAL ENCOUNTER (OUTPATIENT)
Dept: ULTRASOUND IMAGING | Facility: HOSPITAL | Age: 65
Discharge: HOME | End: 2020-11-23

## 2020-11-23 ENCOUNTER — HOSPITAL ENCOUNTER (OUTPATIENT)
Dept: MAMMOGRAPHY | Facility: HOSPITAL | Age: 65
Discharge: HOME | End: 2020-11-23

## 2020-11-23 DIAGNOSIS — R92.8 ABNORMALITY OF LEFT BREAST ON SCREENING MAMMOGRAM: ICD-10-CM

## 2020-11-23 DIAGNOSIS — N64.89 BREAST ASYMMETRY: ICD-10-CM

## 2020-11-23 PROCEDURE — 76642 ULTRASOUND BREAST LIMITED: CPT

## 2020-11-23 PROCEDURE — G0279 TOMOSYNTHESIS, MAMMO: HCPCS

## 2020-11-23 PROCEDURE — 77065 DX MAMMO INCL CAD UNI: CPT

## 2020-12-02 ENCOUNTER — TELEPHONE (OUTPATIENT)
Dept: GENERAL RADIOLOGY | Facility: HOSPITAL | Age: 65
End: 2020-12-02

## 2020-12-02 ENCOUNTER — TELEPHONE (OUTPATIENT)
Dept: ONCOLOGY | Facility: CLINIC | Age: 65
End: 2020-12-02

## 2020-12-02 NOTE — TELEPHONE ENCOUNTER
----- Message from Jacklyn Bhagat sent at 12/2/2020  2:52 PM EST -----  Regarding: NP visit for Xarelto samples  Please contact pt to coordinate NP visit for sample . She needs an appt after 1:30 pm

## 2020-12-02 NOTE — TELEPHONE ENCOUNTER
HUB message from pt forwarded to me-she was wanting to speak with me about her Xarelto.    I returned her call and she states she was told to contact me when she opened her last bottle of Xarelto samples. She is needing more.    I informed her I will have scheduling contact her to coordinate a time for NP visit. She states she gets off work at 1:30.     I have sent a message to the scheduling team to contact pt for NP visit after 1:30 pm.    Once schedule-I will notify pharmacy.

## 2020-12-02 NOTE — TELEPHONE ENCOUNTER
Patient called ask for a call back from Jacklyn Bhagat it is about her Lauri     Best call back number [phone]

## 2020-12-04 ENCOUNTER — DOCUMENTATION (OUTPATIENT)
Dept: PHARMACY | Facility: HOSPITAL | Age: 65
End: 2020-12-04

## 2020-12-04 ENCOUNTER — APPOINTMENT (OUTPATIENT)
Dept: LAB | Facility: HOSPITAL | Age: 65
End: 2020-12-04

## 2020-12-04 ENCOUNTER — OFFICE VISIT (OUTPATIENT)
Dept: ONCOLOGY | Facility: CLINIC | Age: 65
End: 2020-12-04

## 2020-12-04 VITALS
SYSTOLIC BLOOD PRESSURE: 138 MMHG | HEART RATE: 86 BPM | DIASTOLIC BLOOD PRESSURE: 79 MMHG | BODY MASS INDEX: 30.05 KG/M2 | OXYGEN SATURATION: 98 % | TEMPERATURE: 97 F | WEIGHT: 169.6 LBS | HEIGHT: 63 IN | RESPIRATION RATE: 16 BRPM

## 2020-12-04 DIAGNOSIS — I81 PORTAL VEIN THROMBOSIS: Primary | ICD-10-CM

## 2020-12-04 PROCEDURE — 99212 OFFICE O/P EST SF 10 MIN: CPT | Performed by: NURSE PRACTITIONER

## 2020-12-04 NOTE — PROGRESS NOTES
Site of Appt/Pickup: (n) [place]; (y) [facility]; (n) [employer];    Prescriber (code) requested the following medication samples to be given to the patient during the Nurse Practitioner visit.    NDC:  02277-923-91  Drug name: xarelto  Strength: 20mg  Total Quantity:  28 (Containers- 4 X Units per Containers- 7)  Lot#:  [id number]  Expiration: 9/22    Product to be placed in the [facility] under 'Patient Specialty Medication' entry and is to be removed at time of visit.

## 2020-12-04 NOTE — PROGRESS NOTES
.     REASON FOR FOLLOW UP:   Management of anticoagulation, portal vein thrombosis    HISTORY OF PRESENT ILLNESS:  The patient is a 65 y.o. year old female who is here for follow-up with the above-mentioned history.  She returns the office today for month supply of her anticoagulation.  She continues on Xarelto 20 mg daily for her previous portal vein thrombosis.  She tolerated Xarelto well without signs or symptoms of bleeding.  She does report bruising more easily.  She also reports recent constipation and frequent straining.  She has had occasional blood with wiping when she has had a hard bowel movement.  We discussed appropriate bowel management today.  She denies lower extremity swelling, shortness of breath or chest pain.    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)    • Breast cancer (CMS/HCC) 09/2019    Right IDC   • Clostridium difficile infection 12/2019   • GERD (gastroesophageal reflux disease)    • History of Clostridium difficile colitis    • IBS (irritable bowel syndrome)    • PONV (postoperative nausea and vomiting)    • Radiation-induced angiosarcoma of breast (CMS/HCC)    • Situational stress     DEATH OF BROTHER 10/2019, RECENT DIAGNOSIS   • Stress headaches        MEDICATIONS    Current Outpatient Medications:   •  acetaminophen (TYLENOL) 325 MG tablet, Take 650 mg by mouth Every 6 (Six) Hours As Needed for Mild Pain ., Disp: , Rfl:   •  anastrozole (ARIMIDEX) 1 MG tablet, Take 1 tablet by mouth Daily., Disp: 90 tablet, Rfl: 1  •  doxylamine (UNISOM) 25 MG tablet, Take 25 mg by mouth At Night As Needed., Disp: , Rfl:   •  melatonin 5 MG tablet tablet, Take 10 mg by mouth At Night As Needed., Disp: , Rfl:   •  Probiotic Product (PROBIOTIC PO), Take 1 tablet by mouth Daily., Disp: , Rfl:   •  rivaroxaban (XARELTO) 20 MG tablet, Take 1 tablet by mouth Daily., Disp: 28 tablet, Rfl: 0    ALLERGIES:   No Known Allergies    REVIEW OF  "SYSTEMS:  Review of Systems   Constitutional: Negative for chills, fatigue and fever.   HENT: Negative for mouth sores and sore throat.    Eyes: Negative for visual disturbance.   Respiratory: Negative for cough and shortness of breath.    Gastrointestinal: Positive for constipation. Negative for abdominal pain, diarrhea, nausea and vomiting.   Genitourinary: Negative for dysuria and frequency.   Neurological: Negative for dizziness and weakness.   Hematological: Bruises/bleeds easily.   Psychiatric/Behavioral: The patient is not nervous/anxious.    All other systems reviewed and are negative.  Review of systems 12/04/2020 unchanged from previous office visit except as updated.           Vitals:    12/04/20 1446   BP: 138/79   Pulse: 86   Resp: 16   Temp: 97 °F (36.1 °C)   TempSrc: Skin   SpO2: 98%   Weight: 76.9 kg (169 lb 9.6 oz)   Height: 160 cm (62.99\")   PainSc: 0-No pain     Current Status 10/13/2020   ECOG score 0        PHYSICAL EXAM:    CONSTITUTIONAL:  Patient alert and oriented x3  EYES:  Conjunctiva and lids unremarkable.  PERRLA  RESPIRATORY:  Breathing unlabored, no acute distress.  MUSCULOSKELETAL:  No lower extremity swelling, erythema or calf tenderness  SKIN:  Warm.  No rashes.  PSYCHIATRIC:  Normal judgment and insight.  Normal mood and affect.  Physical exam 12/04/2020 unchanged from previous office visit except as updated.      RECENT LABS:  No labs reviewed    Assessment/Plan   History of portal vein thrombosis.  Continuing on hormone therapy for her breast cancer, therefore she continues on anticoagulation with Xarelto 20 mg daily.   I have provided the patient with 1 month supply of Xarelto 20 mg tablets, with instructions of 1 tablet daily. We reviewed the appropriate instructions for medication administration as well as dosing. We have reviewed potential side effects including increased risk of bleeding.  Presently, she reports she is experiencing more constipation which is resulted in " straining and occasional blood with wiping.  We discussed appropriate bowel management plan including over-the-counter stool softeners or prunes.  She will monitor closely and call if something change or.  She has no surgical procedures requiring a break in anticoagulation over the next month.  The patient understand to call with any questions or concerns.    Mable Marley, APRN  12/04/2020

## 2020-12-07 ENCOUNTER — TELEPHONE (OUTPATIENT)
Dept: ONCOLOGY | Facility: CLINIC | Age: 65
End: 2020-12-07

## 2020-12-07 NOTE — TELEPHONE ENCOUNTER
CSW left message for patient letting her know I received her portion of the paperwork for free Xarelto.  However, she did not sign the form; CSW left message asking how patient would like the form back to sign.

## 2020-12-08 ENCOUNTER — TELEPHONE (OUTPATIENT)
Dept: OBSTETRICS AND GYNECOLOGY | Facility: CLINIC | Age: 65
End: 2020-12-08

## 2020-12-30 ENCOUNTER — TELEPHONE (OUTPATIENT)
Dept: ONCOLOGY | Facility: CLINIC | Age: 65
End: 2020-12-30

## 2020-12-30 NOTE — TELEPHONE ENCOUNTER
PATIENT CALLING    PATIENT NEEDS MORE SAMPLES OF XARELTO 20 MG, PLEASE ADVISE?    PT CALL BACK # [phone]

## 2020-12-31 ENCOUNTER — DOCUMENTATION (OUTPATIENT)
Dept: PHARMACY | Facility: HOSPITAL | Age: 65
End: 2020-12-31

## 2020-12-31 ENCOUNTER — DOCUMENTATION (OUTPATIENT)
Dept: ONCOLOGY | Facility: CLINIC | Age: 65
End: 2020-12-31

## 2020-12-31 ENCOUNTER — TELEPHONE (OUTPATIENT)
Dept: ONCOLOGY | Facility: CLINIC | Age: 65
End: 2020-12-31

## 2020-12-31 NOTE — TELEPHONE ENCOUNTER
Pt called the HUB yesterday to request Xarelto samples.     I have sent a message to scheduling to arrange NP visit for sample .    Message also sent to Dawna to place 4 bottles of the Xarelto 20 mg into the [facility].

## 2020-12-31 NOTE — PROGRESS NOTES
Site of Appt/Pickup: (n) [place]; (y[facility]; (n) [place];    Prescriber (code) requested the following medication samples to be given to the patient during the Nurse Practitioner visit.    NDC:  5058-579-07  Drug name: xarelto  Strength: 20mg  Total Quantity:  28 (Containers- 4 X Units per Containers- 7)  Lot#:  [id number]  Expiration: 9/22    Product to be placed in the [facility] under 'Patient Specialty Medication' entry and is to be removed at time of visit.

## 2020-12-31 NOTE — TELEPHONE ENCOUNTER
Called pt back. Let pt know scheduling tried to call her regarding appt on Monday for her xarelto samples. Pt states she needs a later time. Told pt I would message scheduling and have them call her back to see if they can set up a later time. Pt v/u.

## 2020-12-31 NOTE — TELEPHONE ENCOUNTER
CALLER: PT    REASON:    PT JUST MISSED A CALL FROM US. NO MESSAGE WAS LEFT. SHE SAID IT MAY BE ABOUT HER XARELTO SAMPLES.     BEST C/B: [phone]

## 2021-01-04 ENCOUNTER — APPOINTMENT (OUTPATIENT)
Dept: LAB | Facility: HOSPITAL | Age: 66
End: 2021-01-04

## 2021-01-04 ENCOUNTER — OFFICE VISIT (OUTPATIENT)
Dept: ONCOLOGY | Facility: CLINIC | Age: 66
End: 2021-01-04

## 2021-01-04 VITALS
HEART RATE: 84 BPM | OXYGEN SATURATION: 97 % | BODY MASS INDEX: 30.21 KG/M2 | WEIGHT: 170.5 LBS | SYSTOLIC BLOOD PRESSURE: 108 MMHG | TEMPERATURE: 97.3 F | RESPIRATION RATE: 16 BRPM | DIASTOLIC BLOOD PRESSURE: 65 MMHG | HEIGHT: 63 IN

## 2021-01-04 DIAGNOSIS — Z51.81 ANTICOAGULATION MANAGEMENT ENCOUNTER: Primary | ICD-10-CM

## 2021-01-04 DIAGNOSIS — Z79.01 ANTICOAGULATION MANAGEMENT ENCOUNTER: Primary | ICD-10-CM

## 2021-01-04 PROCEDURE — 99212 OFFICE O/P EST SF 10 MIN: CPT | Performed by: NURSE PRACTITIONER

## 2021-01-04 NOTE — PROGRESS NOTES
.     REASON FOR FOLLOW UP:   Management of anticoagulation, portal vein thrombosis    HISTORY OF PRESENT ILLNESS:  The patient is a 65 y.o. year old female who is here for follow-up with the above-mentioned history.  Due to financial hardship, she is here today to receive Xarelto samples.  She is currently on 20 mg daily.  She is tolerating this well.  She did note 1 episode of epistaxis that persisted for approximately 5 minutes.  Thankfully, this has not recurred.  She denies any other bleeding including dark, tarry stools.    She denies any new lower extremity swelling, chest pain, shortness of breath.  She has no other issues.    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)    • Breast cancer (CMS/HCC) 09/2019    Right IDC   • Clostridium difficile infection 12/2019   • GERD (gastroesophageal reflux disease)    • History of Clostridium difficile colitis    • IBS (irritable bowel syndrome)    • PONV (postoperative nausea and vomiting)    • Radiation-induced angiosarcoma of breast (CMS/HCC)    • Situational stress     DEATH OF BROTHER 10/2019, RECENT DIAGNOSIS   • Stress headaches        MEDICATIONS    Current Outpatient Medications:   •  acetaminophen (TYLENOL) 325 MG tablet, Take 650 mg by mouth Every 6 (Six) Hours As Needed for Mild Pain ., Disp: , Rfl:   •  anastrozole (ARIMIDEX) 1 MG tablet, Take 1 tablet by mouth Daily., Disp: 90 tablet, Rfl: 1  •  doxylamine (UNISOM) 25 MG tablet, Take 25 mg by mouth At Night As Needed., Disp: , Rfl:   •  melatonin 5 MG tablet tablet, Take 10 mg by mouth At Night As Needed., Disp: , Rfl:   •  Probiotic Product (PROBIOTIC PO), Take 1 tablet by mouth Daily., Disp: , Rfl:   •  rivaroxaban (XARELTO) 20 MG tablet, Take 1 tablet by mouth Daily., Disp: 28 tablet, Rfl: 0    ALLERGIES:   No Known Allergies    REVIEW OF SYSTEMS:  Review of Systems   Constitutional: Negative for chills, fatigue and fever.   HENT: Positive  "for nosebleeds. Negative for mouth sores and sore throat.    Eyes: Negative for visual disturbance.   Respiratory: Negative for cough and shortness of breath.    Gastrointestinal: Negative for abdominal pain, constipation, diarrhea, nausea and vomiting.   Genitourinary: Negative for dysuria and frequency.   Neurological: Negative for dizziness and weakness.   Hematological: Bruises/bleeds easily.   Psychiatric/Behavioral: The patient is not nervous/anxious.    All other systems reviewed and are negative.  ROS January 4, 2021 unchanged from previous except as updated         Vitals:    01/04/21 1532   BP: 108/65   Pulse: 84   Resp: 16   Temp: 97.3 °F (36.3 °C)   TempSrc: Temporal   SpO2: 97%   Weight: 77.3 kg (170 lb 8 oz)   Height: 160 cm (62.99\")   PainSc: 0-No pain     Current Status 1/4/2021   ECOG score 1        PHYSICAL EXAM:    CONSTITUTIONAL:  Patient alert and oriented x3  EYES:  Conjunctiva and lids unremarkable.  PERRLA  RESPIRATORY:  Breathing unlabored, no acute distress.  MUSCULOSKELETAL:  No lower extremity swelling, erythema or calf tenderness  SKIN:  Warm.  No rashes.  PSYCHIATRIC:  Normal judgment and insight.  Normal mood and affect.  I have reexamined the patient and the results are consistent with the previously documented exam. FAWAD Carty       RECENT LABS:  No labs reviewed    Assessment/Plan   History of portal vein thrombosis.  Continuing on hormone therapy for her breast cancer, therefore she continues on anticoagulation with Xarelto 20 mg daily.     I provided the patient with a 1 month supply of Xarelto.  We have reviewed appropriate dosage and instructions.  She knows that she should call our office prior to any invasive surgical procedures.    She is currently scheduled to undergo a repeat CT scan on February 17 and will see Dr. Rodgers for review on February 24, 2021.  She knows to call our office prior to the time that she will run out of her samples to obtain more.    I have " asked her to call with any new issues or concerns prior to her next visit.  She has verbalized understanding  FAWAD Carty

## 2021-02-01 ENCOUNTER — TELEPHONE (OUTPATIENT)
Dept: ONCOLOGY | Facility: CLINIC | Age: 66
End: 2021-02-01

## 2021-02-01 ENCOUNTER — DOCUMENTATION (OUTPATIENT)
Dept: ONCOLOGY | Facility: CLINIC | Age: 66
End: 2021-02-01

## 2021-02-01 ENCOUNTER — DOCUMENTATION (OUTPATIENT)
Dept: PHARMACY | Facility: HOSPITAL | Age: 66
End: 2021-02-01

## 2021-02-01 NOTE — PROGRESS NOTES
Site of Appt/Pickup: (n) [place]; ([facility]; (n) [place];    Prescriber (code) requested the following medication samples to be given to the patient during the Nurse Practitioner visit.    NDC:  5458-579-07  Drug name: xarelto  Strength: 20mg  Total Quantity:  28 (Containers- 4 X Units per Containers- 7)  Lot#:  [id number]  Expiration: 10/22    Product to be placed in the [facility] under 'Patient Specialty Medication' entry and is to be removed at time of visit.

## 2021-02-01 NOTE — PROGRESS NOTES
Pt added to the [facility] schedule tomorrow for Xarelto sample .    I have sent a message to Barb/Jazmyne/Yvonne to place 4 bottles of the Xarelto 20 mg into the [facility].

## 2021-02-01 NOTE — TELEPHONE ENCOUNTER
Caller: Taylor    Relationship to patient: Pt    Best call back number: [phone]    Type of visit: Follow up     Requested date: Asap     Additional notes: Pt has 2 days of Zarelto left so she requested an appt in order to get more samples

## 2021-02-02 ENCOUNTER — APPOINTMENT (OUTPATIENT)
Dept: LAB | Facility: HOSPITAL | Age: 66
End: 2021-02-02

## 2021-02-02 ENCOUNTER — OFFICE VISIT (OUTPATIENT)
Dept: ONCOLOGY | Facility: CLINIC | Age: 66
End: 2021-02-02

## 2021-02-02 VITALS
SYSTOLIC BLOOD PRESSURE: 114 MMHG | BODY MASS INDEX: 30.33 KG/M2 | WEIGHT: 171.2 LBS | TEMPERATURE: 97.3 F | DIASTOLIC BLOOD PRESSURE: 72 MMHG | RESPIRATION RATE: 16 BRPM | OXYGEN SATURATION: 96 % | HEIGHT: 63 IN | HEART RATE: 91 BPM

## 2021-02-02 DIAGNOSIS — I81 PORTAL VEIN THROMBOSIS: Primary | ICD-10-CM

## 2021-02-02 PROCEDURE — 99212 OFFICE O/P EST SF 10 MIN: CPT | Performed by: NURSE PRACTITIONER

## 2021-02-02 NOTE — PROGRESS NOTES
"  .     REASON FOR FOLLOW UP:   Management of anticoagulation, portal vein thrombosis    HISTORY OF PRESENT ILLNESS:  The patient is a 65 y.o. year old female who is here for follow-up with the above-mentioned history.  She returns the office today for month supply of her anticoagulation.  She continues on Xarelto 20 mg daily for her previous portal vein thrombosis.  She continues to tolerate Xarelto very well.  She reports occasional blood tinged drainage when sneezing though denies kamille nosebleeds.  She denies blood in her stool.  She denies abdominal pain.  She continues on Arimidex 1 mg daily with fair tolerance.  She does report arthralgias.  She is scheduled for repeat imaging and MD follow-up with Dr. Rodgers later this month.           Vitals:    02/02/21 1328   BP: 114/72   Pulse: 91   Resp: 16   Temp: 97.3 °F (36.3 °C)   TempSrc: Temporal   SpO2: 96%   Weight: 77.7 kg (171 lb 3.2 oz)   Height: 160 cm (62.99\")   PainSc: 0-No pain     Current Status 1/4/2021   ECOG score 1        PHYSICAL EXAM:    CONSTITUTIONAL:  Patient alert and oriented x3  EYES:  Conjunctiva and lids unremarkable.  PERRLA  RESPIRATORY:  Breathing unlabored, no acute distress.  MUSCULOSKELETAL:  No lower extremity swelling, erythema or calf tenderness  SKIN:  Warm.  No rashes.  PSYCHIATRIC:  Normal judgment and insight.  Normal mood and affect.  Physical exam 02/02/2021 unchanged from previous office visit except as updated.      RECENT LABS:  No labs reviewed    Assessment/Plan   1. Portal vein thrombosis.    · Continuing on hormone therapy for her breast cancer, therefore she continues on anticoagulation with Xarelto 20 mg daily.    · Plans to continue anticoagulation so long as she is on hormone therapy for her breast cancer due to increased risk of thrombosis     PLAN:   I have provided the patient with 1 month supply of Xarelto 20 mg tablets, with instructions of 1 tablet daily. We reviewed the appropriate instructions for medication " administration as well as dosing. We have reviewed potential side effects including increased risk of bleeding.   She will monitor closely and call if something change or.  She has no surgical procedures requiring a break in anticoagulation over the next month.  The patient understand to call with any questions or concerns.  She is scheduled for CT scan and MD follow-up with Dr. Rodgers later this month.    Mable Marley, FAWAD  02/02/2021

## 2021-02-03 ENCOUNTER — TELEPHONE (OUTPATIENT)
Dept: ONCOLOGY | Facility: CLINIC | Age: 66
End: 2021-02-03

## 2021-02-04 ENCOUNTER — DOCUMENTATION (OUTPATIENT)
Dept: ONCOLOGY | Facility: CLINIC | Age: 66
End: 2021-02-04

## 2021-02-04 NOTE — PROGRESS NOTES
CSW met with the patient today to assist her with completing an application for free Xarelto through the [email or web address] patient assistance foundation.      CSW also provided the patient with a  $50 [employer] gift card; the patient was recently laid off from her job at the "[employer] leaving them with only her 's income.      CSW faxed the completed application for medication assistance to [email or web address].

## 2021-02-05 ENCOUNTER — DOCUMENTATION (OUTPATIENT)
Dept: ONCOLOGY | Facility: CLINIC | Age: 66
End: 2021-02-05

## 2021-02-05 NOTE — PROGRESS NOTES
CSW received a fax from [employer] stating the patient's income is too high to qualify for free Xarelto.  I contacted [employer] to let them know this is not the case; I drew their attention to my note stating the patient is unemployed and their income is well under the cap for 2021.  Per Alondra, they will review the case again, and make a determination within 48 hours.      I contacted the patient and provided her with the above update.

## 2021-02-10 ENCOUNTER — DOCUMENTATION (OUTPATIENT)
Dept: ONCOLOGY | Facility: CLINIC | Age: 66
End: 2021-02-10

## 2021-02-10 ENCOUNTER — TELEPHONE (OUTPATIENT)
Dept: ONCOLOGY | Facility: CLINIC | Age: 66
End: 2021-02-10

## 2021-02-10 NOTE — PROGRESS NOTES
I was notified by CORTNEY Friedman that pt does not qualify for Xarelto assistance through [employer]. She has to meet a financial criteria.    We will continue to provide samples to pt. She picked up a 28 day supply on 2/2/21 and knows to call the office for more samples when she has 7 days on hand.

## 2021-02-10 NOTE — PROGRESS NOTES
PEGW contacted [employer] patient assistance to find out the status of the patient's application for free Xarelto.  The patient must first meet an out of pocket expenditure requirement of $1520 on prescription drugs this year; once she meets this out of pocket amount, she will be approved for free drug.      I contacted the patient to provide her the above update, and she stated she has not spent $1520 this year on prescription drugs, and likely won't because she takes only 2 other medications, and she hasn't been spending any money on Xarelto, because she's been receiving samples.  I informed the patient I communicate this to Dr. Rodgers and the pharmacy , and hopefully we can continue to provide samples.

## 2021-02-10 NOTE — TELEPHONE ENCOUNTER
AMANDA notified the patient she must first spend $1520 on prescription medications before she will be approved for free Xarelto through the [employer] patient assistance foundation.  The patient v/u and stated she will most likely not be able to meet this requirement due to few prescriptions and receiving Xarelto samples.

## 2021-02-11 ENCOUNTER — DOCUMENTATION (OUTPATIENT)
Dept: ONCOLOGY | Facility: CLINIC | Age: 66
End: 2021-02-11

## 2021-02-11 NOTE — PROGRESS NOTES
CSW contacted [employer] to find out if we can appeal to out of pocket expense requirement on behalf of the patient.  They stated we can appeal, but the statement of hardship must be written and signed by the patient.      *I contacted the patient to ask her to write a statement regarding her status of being unemployed and therefore not having the ability to contribute to the required out of pocket amount.  The patient v/u and will send a statement within the next couple of days.

## 2021-02-17 ENCOUNTER — HOSPITAL ENCOUNTER (OUTPATIENT)
Dept: PET IMAGING | Facility: HOSPITAL | Age: 66
Discharge: HOME | End: 2021-02-17
Admitting: INTERNAL MEDICINE

## 2021-02-17 ENCOUNTER — LAB (OUTPATIENT)
Dept: LAB | Facility: HOSPITAL | Age: 66
End: 2021-02-17

## 2021-02-17 ENCOUNTER — DOCUMENTATION (OUTPATIENT)
Dept: ONCOLOGY | Facility: CLINIC | Age: 66
End: 2021-02-17

## 2021-02-17 ENCOUNTER — TELEPHONE (OUTPATIENT)
Dept: ONCOLOGY | Facility: CLINIC | Age: 66
End: 2021-02-17

## 2021-02-17 DIAGNOSIS — Z17.0 MALIGNANT NEOPLASM OF UPPER-INNER QUADRANT OF RIGHT BREAST IN FEMALE, ESTROGEN RECEPTOR POSITIVE (HCC): ICD-10-CM

## 2021-02-17 DIAGNOSIS — C50.211 MALIGNANT NEOPLASM OF UPPER-INNER QUADRANT OF RIGHT BREAST IN FEMALE, ESTROGEN RECEPTOR POSITIVE (HCC): ICD-10-CM

## 2021-02-17 LAB
ALBUMIN SERPL-MCNC: 3.1 G/DL (ref 3.5–5.2)
ALBUMIN/GLOB SERPL: 0.6 G/DL (ref 1.1–2.4)
ALP SERPL-CCNC: 132 U/L (ref 38–116)
ALT SERPL W P-5'-P-CCNC: 59 U/L (ref 0–33)
ANION GAP SERPL CALCULATED.3IONS-SCNC: 8.9 MMOL/L (ref 5–15)
AST SERPL-CCNC: 121 U/L (ref 0–32)
BASOPHILS # BLD AUTO: 0.04 10*3/MM3 (ref 0–0.2)
BASOPHILS NFR BLD AUTO: 0.7 % (ref 0–1.5)
BILIRUB SERPL-MCNC: 1 MG/DL (ref 0.2–1.2)
BUN SERPL-MCNC: 18 MG/DL (ref 6–20)
BUN/CREAT SERPL: 21.4 (ref 7.3–30)
CALCIUM SPEC-SCNC: 9.2 MG/DL (ref 8.5–10.2)
CHLORIDE SERPL-SCNC: 103 MMOL/L (ref 98–107)
CO2 SERPL-SCNC: 26.1 MMOL/L (ref 22–29)
CREAT BLDA-MCNC: 0.8 MG/DL (ref 0.6–1.3)
CREAT SERPL-MCNC: 0.84 MG/DL (ref 0.6–1.1)
DEPRECATED RDW RBC AUTO: 48.4 FL (ref 37–54)
EOSINOPHIL # BLD AUTO: 0.11 10*3/MM3 (ref 0–0.4)
EOSINOPHIL NFR BLD AUTO: 1.8 % (ref 0.3–6.2)
ERYTHROCYTE [DISTWIDTH] IN BLOOD BY AUTOMATED COUNT: 13.5 % (ref 12.3–15.4)
GFR SERPL CREATININE-BSD FRML MDRD: 68 ML/MIN/1.73
GLOBULIN UR ELPH-MCNC: 5 GM/DL (ref 1.8–3.5)
GLUCOSE SERPL-MCNC: 99 MG/DL (ref 74–124)
HCT VFR BLD AUTO: 41.5 % (ref 34–46.6)
HGB BLD-MCNC: 14.1 G/DL (ref 12–15.9)
IMM GRANULOCYTES # BLD AUTO: 0.02 10*3/MM3 (ref 0–0.05)
IMM GRANULOCYTES NFR BLD AUTO: 0.3 % (ref 0–0.5)
LYMPHOCYTES # BLD AUTO: 2.16 10*3/MM3 (ref 0.7–3.1)
LYMPHOCYTES NFR BLD AUTO: 36 % (ref 19.6–45.3)
MCH RBC QN AUTO: 32.9 PG (ref 26.6–33)
MCHC RBC AUTO-ENTMCNC: 34 G/DL (ref 31.5–35.7)
MCV RBC AUTO: 96.7 FL (ref 79–97)
MONOCYTES # BLD AUTO: 0.55 10*3/MM3 (ref 0.1–0.9)
MONOCYTES NFR BLD AUTO: 9.2 % (ref 5–12)
NEUTROPHILS NFR BLD AUTO: 3.12 10*3/MM3 (ref 1.7–7)
NEUTROPHILS NFR BLD AUTO: 52 % (ref 42.7–76)
NRBC BLD AUTO-RTO: 0 /100 WBC (ref 0–0.2)
PLATELET # BLD AUTO: 150 10*3/MM3 (ref 140–450)
PMV BLD AUTO: 10.7 FL (ref 6–12)
POTASSIUM SERPL-SCNC: 3.2 MMOL/L (ref 3.5–4.7)
PROT SERPL-MCNC: 8.1 G/DL (ref 6.3–8)
RBC # BLD AUTO: 4.29 10*6/MM3 (ref 3.77–5.28)
SODIUM SERPL-SCNC: 138 MMOL/L (ref 134–145)
WBC # BLD AUTO: 6 10*3/MM3 (ref 3.4–10.8)

## 2021-02-17 PROCEDURE — 80053 COMPREHEN METABOLIC PANEL: CPT

## 2021-02-17 PROCEDURE — 25010000002 IOPAMIDOL 61 % SOLUTION: Performed by: INTERNAL MEDICINE

## 2021-02-17 PROCEDURE — 36415 COLL VENOUS BLD VENIPUNCTURE: CPT

## 2021-02-17 PROCEDURE — 0 DIATRIZOATE MEGLUMINE & SODIUM PER 1 ML: Performed by: INTERNAL MEDICINE

## 2021-02-17 PROCEDURE — 85025 COMPLETE CBC W/AUTO DIFF WBC: CPT

## 2021-02-17 PROCEDURE — 82565 ASSAY OF CREATININE: CPT

## 2021-02-17 PROCEDURE — 74160 CT ABDOMEN W/CONTRAST: CPT

## 2021-02-17 RX ADMIN — DIATRIZOATE MEGLUMINE AND DIATRIZOATE SODIUM 30 ML: 660; 100 LIQUID ORAL; RECTAL at 07:30

## 2021-02-17 RX ADMIN — IOPAMIDOL 85 ML: 612 INJECTION, SOLUTION INTRAVENOUS at 08:22

## 2021-02-17 NOTE — PROGRESS NOTES
Noted elevation in AST and ALT, similar to August 2020.  Previously normal.    Await CT abdomen    I see her in about 1 week.  If no explanation for the increase in LFTs on CT abdomen, plan to refer her back to Dr. Berna Gonzalez, her GI MD.    K is 3.2.  Prescribed 5 days potassium.

## 2021-02-17 NOTE — TELEPHONE ENCOUNTER
Called patient to inform her of low potassium and to restart Potassium 40 Meq once daily x five days patient schedule for office visit next week.      ----- Message from Aguilar Rodgers II, MD sent at 2/17/2021 11:46 AM EST -----  Please call her and tell her her potassium is slightly low.    Please ask the patient to take potassium 40 mEq daily ×5 days due to a low potassium.      If they are already on potassium, ask them to take an additional 40 mEq daily x5 days due to low potassium.    Please provide the prescription.    Thank you

## 2021-02-18 ENCOUNTER — DOCUMENTATION (OUTPATIENT)
Dept: ONCOLOGY | Facility: CLINIC | Age: 66
End: 2021-02-18

## 2021-02-19 ENCOUNTER — DOCUMENTATION (OUTPATIENT)
Dept: ONCOLOGY | Facility: CLINIC | Age: 66
End: 2021-02-19

## 2021-02-19 ENCOUNTER — DOCUMENTATION (OUTPATIENT)
Dept: PHARMACY | Facility: HOSPITAL | Age: 66
End: 2021-02-19

## 2021-02-19 NOTE — PROGRESS NOTES
Pt is on the [facility] NP schedule for 2/24/2021 to  Xarelto samples. I have sent a message to Barb/Jazmyne/Yvonne to place 4 bottles of the Xarelto 20 mg into the [facility].

## 2021-02-19 NOTE — PROGRESS NOTES
Site of Appt/Pickup: (n) [place]; [facility]; (n) [place];    Prescriber (code) requested the following medication samples to be given to the patient during the Nurse Practitioner visit.    NDC:  60507-239-69  Drug name: xarelto  Strength: 20mg  Total Quantity:  28 (Containers- 4 X Units per Containers- 7)  Lot#:  [id number]  Expiration: 12/22    Product to be placed in the [facility] under 'Patient Specialty Medication' entry and is to be removed at time of visit.

## 2021-02-23 NOTE — PROGRESS NOTES
.     REASONS FOR FOLLOWUP: Portal vein thrombosis, breast cancer    HISTORY OF PRESENT ILLNESS:  The patient is a 65 y.o. year old female  who is here for follow-up with the above-mentioned history.    Having some chest discomfort lasting for a few seconds only, after eating.  Relieved with Tums.    Occasional nosebleeds only with blowing her nose.  No spontaneous bleeding.  Both nostrils.  1 month.  Intermittent.  Mild.    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)    • Breast cancer (CMS/HCC) 09/2019    Right IDC   • Clostridium difficile infection 12/2019   • GERD (gastroesophageal reflux disease)    • History of Clostridium difficile colitis    • IBS (irritable bowel syndrome)    • PONV (postoperative nausea and vomiting)    • Radiation-induced angiosarcoma of breast (CMS/HCC)    • Situational stress     DEATH OF BROTHER 10/2019, RECENT DIAGNOSIS   • Stress headaches      Past Surgical History:   Procedure Laterality Date   • BILE DUCT STENT PLACEMENT  2014   • BREAST BIOPSY Right 09/2019    malignant   • BREAST LUMPECTOMY WITH SENTINEL NODE BIOPSY Right 11/4/2019    Procedure: BREAST LUMPECTOMY WITH SENTINEL NODE BIOPSY AND NEEDLE LOCALIZATION And possible axillary dissection;  Surgeon: Jean-Paul Ramos MD;  Location: [facility];  Service: General   • COLONOSCOPY     • COLONOSCOPY N/A 6/26/2020    Procedure: COLONOSCOPY TO TERMINAL ILEUM WITH BX;  Surgeon: Sage Gonzalez MD;  Location: [facility] ENDOSCOPY;  Service: Gastroenterology;  Laterality: N/A;  PREOP/ SCREENING  POSTOP/ VASCULAR PATTERN TO COLON   • KNEE ARTHROSCOPY Left    • LAPAROSCOPIC CHOLECYSTECTOMY  2014   • TUBAL ABDOMINAL LIGATION         MEDICATIONS    Current Outpatient Medications:   •  acetaminophen (TYLENOL) 325 MG tablet, Take 650 mg by mouth Every 6 (Six) Hours As Needed for Mild Pain ., Disp: , Rfl:   •  anastrozole (ARIMIDEX) 1 MG tablet, Take 1 tablet by mouth  Daily., Disp: 90 tablet, Rfl: 1  •  doxylamine (UNISOM) 25 MG tablet, Take 25 mg by mouth At Night As Needed., Disp: , Rfl:   •  melatonin 5 MG tablet tablet, Take 10 mg by mouth At Night As Needed., Disp: , Rfl:   •  Probiotic Product (PROBIOTIC PO), Take 1 tablet by mouth Daily., Disp: , Rfl:   •  rivaroxaban (XARELTO) 20 MG tablet, Take 1 tablet by mouth Daily., Disp: 28 tablet, Rfl: 0    ALLERGIES:   No Known Allergies    SOCIAL HISTORY:       Social History     Socioeconomic History   • Marital status:      Spouse name: Not on file   • Number of children: 2   • Years of education: Not on file   • Highest education level: Not on file   Occupational History     Employer: [employer]   Tobacco Use   • Smoking status: Former Smoker     Packs/day: 0.50     Years: 10.00     Pack years: 5.00     Types: Cigarettes     Quit date:      Years since quittin.1   • Smokeless tobacco: Former User   Substance and Sexual Activity   • Alcohol use: No   • Drug use: No   • Sexual activity: Yes     Birth control/protection: Post-menopausal, Surgical         FAMILY HISTORY:  Family History   Problem Relation Age of Onset   • Alcohol abuse Father    • Heart failure Mother    • Diabetes Brother    • Prostate cancer Brother    • Throat cancer Brother    • Breast cancer Maternal Aunt    • Breast cancer Maternal Cousin    • Breast cancer Maternal Cousin    • Malig Hyperthermia Neg Hx        REVIEW OF SYSTEMS:  Review of Systems   Constitutional: Negative for activity change.   HENT: Negative for nosebleeds and trouble swallowing.    Respiratory: Negative for shortness of breath and wheezing.    Cardiovascular: Negative for chest pain and palpitations.   Gastrointestinal: Negative for constipation, diarrhea and nausea.   Genitourinary: Negative for dysuria and hematuria.   Musculoskeletal: Positive for arthralgias. Negative for myalgias.   Skin: Negative for rash and wound.   Neurological: Negative for seizures and  "syncope.   Hematological: Negative for adenopathy. Does not bruise/bleed easily.   Psychiatric/Behavioral: Negative for confusion.            Vitals:    02/24/21 1112   BP: 119/78   Pulse: 85   Resp: 16   Temp: 97.8 °F (36.6 °C)   TempSrc: Skin   SpO2: 96%   Weight: 78.9 kg (174 lb)   Height: 160 cm (62.99\")   PainSc: 0-No pain     Current Status 1/4/2021   ECOG score 1        PHYSICAL EXAM:        CONSTITUTIONAL:  Vital signs reviewed.  No distress, looks comfortable.  EYES:  Conjunctiva and lids unremarkable.  PERRLA  EARS,NOSE,MOUTH,THROAT:  Ears and nose appear unremarkable.  Lips, teeth, gums appear unremarkable.  RESPIRATORY:  Normal respiratory effort.  Lungs clear to auscultation bilaterally.  CARDIOVASCULAR:  Normal S1, S2.  No murmurs rubs or gallops.  No significant lower extremity edema.  GASTROINTESTINAL: Abdomen appears unremarkable.  Nontender.  No hepatomegaly.  No splenomegaly.  LYMPHATIC:  No cervical, supraclavicular, axillary lymphadenopathy.  SKIN:  Warm.  No rashes.  PSYCHIATRIC:  Normal judgment and insight.  Normal mood and affect.        RECENT LABS:        WBC   Date/Time Value Ref Range Status   02/17/2021 08:10 AM 6.00 3.40 - 10.80 10*3/mm3 Final     Hemoglobin   Date/Time Value Ref Range Status   02/17/2021 08:10 AM 14.1 12.0 - 15.9 g/dL Final     Platelets   Date/Time Value Ref Range Status   02/17/2021 08:10  140 - 450 10*3/mm3 Final       Assessment/Plan   There are no diagnoses linked to this encounter.  *Right breast cancer.  Invasive ductal adenocarcinoma.  Upper inner quadrant  · Right lumpectomy by Dr. Ramos 11/4/2019.  1:00.  Overall grade 1.  Greatest dimension 5 mm.  Single focus of invasive carcinoma.  No lymphovascular invasion.  Margins negative.  0 out of 2 nodes involved.  · [id number], stage 1  · ER 91%.  UT 96%.  HER-2 negative.  Ki-67 4.7% (favorable).  · (Initial needle biopsy 9/16/2019: Low-grade invasive ductal carcinoma, grade 1.  5 mm in greatest " dimension.)  · (Initial mammogram measured the lesion at 5 mm)  · From the information we currently know about her cancer, I do not recommend chemotherapy.  I do recommend hormonal therapy for 5 years.  Hopefully, aromatase inhibitors if DEXA scan allows (with portal vein thrombosis would like to avoid tamoxifen).  · Oncotype DX: Recurrence score 3, 3% distant recurrence risk at 9 years if tamoxifen or AI is used.  · Therefore, do not recommend chemotherapy.  · Adjuvant XRT completed 2/12/2020  · Arimidex 2/26/2020- 2/26/2025  · Overall doing well and Arimidex.  No evidence of recurrence.  Remain on Arimidex.    *Mild hand joint achiness since beginning Arimidex.  Remains tolerable.  Continue Arimidex.    *Bone health.  · DEXA 12/2/2019: Normal.  T score lumbar spine 0.1, T score left hip 0.1, T score right hip 0.6.  · She takes 2-4 times daily.  Therefore, did not begin a calcium supplement when a rheumatic started on 12/9/2019.  She was encouraged to take a PPI on a schedule and follow-up with her PCP.  States she has had several EGDs in the past none of which have shown problems.  (States in the past her PCP told her to stop calcium supplements because she developed hypercalcemia due to the combination of calcium supplements and Tums).     *Portal vein thrombosis (right branch of portal vein).  · No evidence of cirrhosis on MRI or ultrasound.  Dr. Berna Gonzalez, GI note, has no mention of cirrhosis.  · Hormonal therapy for breast cancer has a slight increased risk of thrombosis.  Therefore, I think it is in her best interest to undergo anticoagulation until she completes hormonal therapy.  · Generally, Lovenox or Coumadin are the preferred agents.  However, because of significant elevations in INR, and the desire for an oral agent, changed to Xarelto on 2/19/2020.   No signs of recurrent clotting.  Remain on Xarelto.  Samples given    *Length of anticoagulation  Plan Xarelto throughout her anastrozole therapy due to  slight increased risk of clotting on anastrozole    *Risk factors for portal vein thrombosis:  · Unremarkable: Prothrombin gene mutation, factor V Leiden gene mutation, lupus anticoagulant, beta-2 glycoprotein antibodies, anti-thrombin, protein S free.    *Low protein S activity, 14% (normal range %), on 11/13/2019     *Low protein C activity, 44% (normal range %) on 11/13/2019.    *Indeterminant anticardiolipin  · IgM 14 (indeterminate range 13-20) on 11/13/2019.  IgA and IgG negative.    *Prior leukocytosis, likely due to C. difficile colitis.  WBC 6     *Anemia.  Previously felt to likely due to C. difficile colitis.  Hb 14.1     *Prominent nodes within the hepatogastric ligament and around the celiac axis and in the retroperitoneum on MRI abdomen 11/11/2019.  · Etiology uncertain.  Radiologist recommended follow-up  · CT 2/19/2020, from 12/13/2019: Abdominal and pelvic and cardiophrenic LAD unchanged.  · CT 8/14/2020: Overall improvement with interval decrease in right cardiophrenic node, size of spleen, and degree of wall thickening and surrounding fat stranding in the colon.  Persistent abdominal pelvic and lower thoracic LAD, indeterminant etiology.    · Radiologist recommended continued follow-up.  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  I explained if this were the case it would not be expected to be curable and we would undergo observation instead of treatment regardless.  We would consider treatment if she develops a problem.  Therefore, I think further observation is very reasonable.  (The LAD might be due to her previous problems with extensive colitis and the mild splenomegaly might be due to the right hepatic lobe atrophy).  · CT 2/17/2021: Unchanged enlarged nodes, from 8/14/2020.  · CT images personally viewed by me    *C. difficile colitis during November 2019 hospitalization.  Thought to perhaps be related to receiving 1  dose of antibiotics for breast surgery.  She was treated with Flagyl and oral vancomycin     *Extensive colitis again seen on CT 2/19/2020, unchanged from 11/10/2019.  Patient reports she had some recent diarrhea but it became better and is more solid and it does not seem similar to the diarrhea she had back in November during the diagnosis of C. difficile.  On 2/24/2020 she was told to contact her PCP if the diarrhea worsens.  In the meantime, we referred her back to Dr. Cano of ID and Dr. Berna Gonzalez of GI (she saw both during the C. difficile hospitalization November 2019).  · Dr. Cano appointment on 3/4/2020  · Dr. Berna Gonzalez colonoscopy 6/26/2020: Decreased mucosa vascular pattern in the entire examined colon, biopsied.  Biopsy showed no evidence of malignancy.  It did reveal prominent lymphoid aggregates.    *Elevated LFTs.  Refer to Dr. Berna Gonzalez    Plan  · Continue anastrozole  · MD 9 months.  1 week prior: CBC, CMP, CT abdomen with contrast.  (This will complete 2 years of CT follow-up.  If nodes remain unchanged, patient and I discussed possibly no further CT follow-up after that)  · Last mammogram 11/23/2020: BI-RADS 3.  Radiologist recommended 6-month follow-up mammogram  · Referral back to Dr. Berna Gonzalez, her GI MD due to elevated LFTs    42 minutes.  Total time.  Same day.       Noted elevation in AST and ALT, similar to August 2020.  Previously normal.    Await CT abdomen    I see her in about 1 week.  If no explanation for the increase in LFTs on CT abdomen, plan to refer her back to Dr. Berna Gonzalez, her GI MD.    K is 3.2.  Prescribed 5 days potassium.

## 2021-02-24 ENCOUNTER — OFFICE VISIT (OUTPATIENT)
Dept: ONCOLOGY | Facility: CLINIC | Age: 66
End: 2021-02-24

## 2021-02-24 ENCOUNTER — LAB (OUTPATIENT)
Dept: LAB | Facility: HOSPITAL | Age: 66
End: 2021-02-24

## 2021-02-24 VITALS
BODY MASS INDEX: 30.83 KG/M2 | OXYGEN SATURATION: 96 % | WEIGHT: 174 LBS | TEMPERATURE: 97.8 F | DIASTOLIC BLOOD PRESSURE: 78 MMHG | RESPIRATION RATE: 16 BRPM | HEART RATE: 85 BPM | HEIGHT: 63 IN | SYSTOLIC BLOOD PRESSURE: 119 MMHG

## 2021-02-24 DIAGNOSIS — Z17.0 MALIGNANT NEOPLASM OF UPPER-INNER QUADRANT OF RIGHT BREAST IN FEMALE, ESTROGEN RECEPTOR POSITIVE (HCC): Primary | ICD-10-CM

## 2021-02-24 DIAGNOSIS — R79.89 ELEVATED LFTS: Primary | ICD-10-CM

## 2021-02-24 DIAGNOSIS — C50.211 MALIGNANT NEOPLASM OF UPPER-INNER QUADRANT OF RIGHT BREAST IN FEMALE, ESTROGEN RECEPTOR POSITIVE (HCC): ICD-10-CM

## 2021-02-24 DIAGNOSIS — C50.211 MALIGNANT NEOPLASM OF UPPER-INNER QUADRANT OF RIGHT BREAST IN FEMALE, ESTROGEN RECEPTOR POSITIVE (HCC): Primary | ICD-10-CM

## 2021-02-24 DIAGNOSIS — Z17.0 MALIGNANT NEOPLASM OF UPPER-INNER QUADRANT OF RIGHT BREAST IN FEMALE, ESTROGEN RECEPTOR POSITIVE (HCC): ICD-10-CM

## 2021-02-24 PROCEDURE — 99215 OFFICE O/P EST HI 40 MIN: CPT | Performed by: INTERNAL MEDICINE

## 2021-02-26 ENCOUNTER — TELEPHONE (OUTPATIENT)
Dept: ONCOLOGY | Facility: CLINIC | Age: 66
End: 2021-02-26

## 2021-02-26 NOTE — TELEPHONE ENCOUNTER
PATIENT CALLING    PATIENT WANTS TO SEE IF DR. WILLS RECOMMENDS HER GETTING THE COVID VACCINE, PLEASE ADVISE?    PT CALL BACK # [phone]

## 2021-02-26 NOTE — TELEPHONE ENCOUNTER
Called the patient to let her know that Dr. Rodgers was fine with her getting the COVID vaccine. Patient v/u.

## 2021-03-03 ENCOUNTER — TELEPHONE (OUTPATIENT)
Dept: ONCOLOGY | Facility: CLINIC | Age: 66
End: 2021-03-03

## 2021-03-03 NOTE — TELEPHONE ENCOUNTER
AMANDA contacted the patient to let her know [employer] has denied her appeal for free Xarelto.  Even if there is a financial hardship, they still require people with Part D plans to meet the out of pocket requirement.      We will continue to provide samples to the patient.

## 2021-03-12 ENCOUNTER — OFFICE (OUTPATIENT)
Dept: URBAN - METROPOLITAN AREA CLINIC 64 | Facility: CLINIC | Age: 66
End: 2021-03-12

## 2021-03-12 ENCOUNTER — TRANSCRIBE ORDERS (OUTPATIENT)
Dept: ADMINISTRATIVE | Facility: HOSPITAL | Age: 66
End: 2021-03-12

## 2021-03-12 VITALS
SYSTOLIC BLOOD PRESSURE: 110 MMHG | DIASTOLIC BLOOD PRESSURE: 61 MMHG | WEIGHT: 170 LBS | HEART RATE: 89 BPM | HEIGHT: 63 IN

## 2021-03-12 DIAGNOSIS — K21.9 GASTRO-ESOPHAGEAL REFLUX DISEASE WITHOUT ESOPHAGITIS: ICD-10-CM

## 2021-03-12 DIAGNOSIS — R74.8 ELEVATED LIVER ENZYMES: Primary | ICD-10-CM

## 2021-03-12 DIAGNOSIS — R74.8 ABNORMAL LEVELS OF OTHER SERUM ENZYMES: ICD-10-CM

## 2021-03-12 DIAGNOSIS — I81 PORTAL VEIN THROMBOSIS: ICD-10-CM

## 2021-03-12 PROCEDURE — 99214 OFFICE O/P EST MOD 30 MIN: CPT | Performed by: NURSE PRACTITIONER

## 2021-03-12 RX ORDER — FAMOTIDINE 40 MG/1
40 TABLET, FILM COATED ORAL
Qty: 30 | Refills: 11 | Status: ACTIVE
Start: 2021-03-12

## 2021-03-18 ENCOUNTER — APPOINTMENT (OUTPATIENT)
Dept: ULTRASOUND IMAGING | Facility: HOSPITAL | Age: 66
End: 2021-03-18

## 2021-03-22 ENCOUNTER — TRANSCRIBE ORDERS (OUTPATIENT)
Dept: ADMINISTRATIVE | Facility: HOSPITAL | Age: 66
End: 2021-03-22

## 2021-03-22 DIAGNOSIS — R74.8 ELEVATED LIVER ENZYMES: Primary | ICD-10-CM

## 2021-03-23 ENCOUNTER — TRANSCRIBE ORDERS (OUTPATIENT)
Dept: ADMINISTRATIVE | Facility: HOSPITAL | Age: 66
End: 2021-03-23

## 2021-03-23 ENCOUNTER — APPOINTMENT (OUTPATIENT)
Dept: ULTRASOUND IMAGING | Facility: HOSPITAL | Age: 66
End: 2021-03-23

## 2021-03-23 DIAGNOSIS — Z20.822 ENCOUNTER FOR PREPROCEDURE SCREENING LABORATORY TESTING FOR COVID-19: Primary | ICD-10-CM

## 2021-03-23 DIAGNOSIS — Z01.812 ENCOUNTER FOR PREPROCEDURE SCREENING LABORATORY TESTING FOR COVID-19: Primary | ICD-10-CM

## 2021-03-24 ENCOUNTER — TELEPHONE (OUTPATIENT)
Dept: ONCOLOGY | Facility: CLINIC | Age: 66
End: 2021-03-24

## 2021-03-24 NOTE — TELEPHONE ENCOUNTER
ALIX SAYS SHE'S STARTING ON HER LAST SAMPLE BOTTLE OF XARELTO TOMORROW. SHE'S ALSO WANTING TO KNOW HOW SOON PRIOR TO HER LIVER BIOPSY 04/19 DOES SHE NEED TO STOP TAKING THE XARELTO.       # [phone]

## 2021-03-25 ENCOUNTER — TELEPHONE (OUTPATIENT)
Dept: ONCOLOGY | Facility: CLINIC | Age: 66
End: 2021-03-25

## 2021-03-25 ENCOUNTER — DOCUMENTATION (OUTPATIENT)
Dept: ONCOLOGY | Facility: CLINIC | Age: 66
End: 2021-03-25

## 2021-03-25 ENCOUNTER — DOCUMENTATION (OUTPATIENT)
Dept: PHARMACY | Facility: HOSPITAL | Age: 66
End: 2021-03-25

## 2021-03-25 NOTE — TELEPHONE ENCOUNTER
ALIX IS RETURNING A CALL FROM  EARLIER TODAY, SHE THINKS REGARDING HER XARELTO. PLEASE CALL HER BACK WHENEVER POSSIBLE    # [phone]

## 2021-03-25 NOTE — TELEPHONE ENCOUNTER
Patient called in to let me know she was on her last bottle of Xarelto samples. I let her know I would message Barb from pharmacy to let her know that we need to set her up for a new month supply. I also mentioned that we would want her to hold the Xarelto 2 days prior to the liver biopsy she has scheduled and restart it the day after.

## 2021-03-25 NOTE — PROGRESS NOTES
Site of Appt/Pickup: (n) [place]; [facility] ([place];    Prescriber (code) requested the following medication samples to be given to the patient during the Nurse Practitioner visit.    NDC:  96731-675-58  Drug name: xarelto  Strength: 20mg  Total Quantity:  28 (Containers- 4 X Units per Containers- 7)  Lot#:  [id number]  Expiration: 12/22    Product to be placed in the [employer] under 'Patient Specialty Medication' entry and is to be removed at time of visit.

## 2021-03-25 NOTE — PROGRESS NOTES
Staff message rec from Barb STUBBS In pharmacy-she rec a message from Cassie that pt will need Xarelto samples.    I have sent a message to scheduling to to coordinate the NP visit for sample .    I have sent a message to Barb/Tiffanie/Yvonne to place 4 bottles of the Xarelto 20 mg into the [employer].    Barb Nguyen sent to Jacklyn Bhagat,   Can you take care of this?   Thanks   Barb.           Previous Messages       ----- Message -----   From: Cassie Loaiza RN   Sent: 3/25/2021   2:09 PM EDT   To: Barb Day,     This patient is on her last bottle of Xarelto samples. Just making you aware she will need to come  a monthly supply soon.     Thank you

## 2021-03-31 ENCOUNTER — APPOINTMENT (OUTPATIENT)
Dept: LAB | Facility: HOSPITAL | Age: 66
End: 2021-03-31

## 2021-03-31 ENCOUNTER — OFFICE VISIT (OUTPATIENT)
Dept: ONCOLOGY | Facility: CLINIC | Age: 66
End: 2021-03-31

## 2021-03-31 VITALS
HEART RATE: 89 BPM | OXYGEN SATURATION: 96 % | DIASTOLIC BLOOD PRESSURE: 74 MMHG | WEIGHT: 178 LBS | TEMPERATURE: 97.7 F | BODY MASS INDEX: 31.54 KG/M2 | HEIGHT: 63 IN | RESPIRATION RATE: 19 BRPM | SYSTOLIC BLOOD PRESSURE: 120 MMHG

## 2021-03-31 DIAGNOSIS — I81 PORTAL VEIN THROMBOSIS: Primary | ICD-10-CM

## 2021-03-31 PROCEDURE — 99212 OFFICE O/P EST SF 10 MIN: CPT | Performed by: NURSE PRACTITIONER

## 2021-03-31 RX ORDER — FAMOTIDINE 40 MG/1
TABLET, FILM COATED ORAL
COMMUNITY
Start: 2021-03-12

## 2021-03-31 NOTE — PROGRESS NOTES
.     REASONS FOR FOLLOWUP: Portal vein thrombosis, breast cancer    HISTORY OF PRESENT ILLNESS:  The patient is a 65 y.o. year old female  who is here for follow-up with the above-mentioned history.  She is here today for Xarelto samples.  She is tolerating it quite well.  She continues with 20 mg daily.  She denies any excess bleeding or bruising.  No chest pain or shortness of breath noted.    She will be undergoing a liver biopsy on April 21 for evaluation of elevated liver enzymes.  She has already been given instructions to withhold the Xarelto 3 days prior and to restart the day after the liver biopsy.    She has no other concerns today.        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)    • Breast cancer (CMS/HCC) 09/2019    Right IDC   • Clostridium difficile infection 12/2019   • GERD (gastroesophageal reflux disease)    • History of Clostridium difficile colitis    • IBS (irritable bowel syndrome)    • PONV (postoperative nausea and vomiting)    • Radiation-induced angiosarcoma of breast (CMS/HCC)    • Situational stress     DEATH OF BROTHER 10/2019, RECENT DIAGNOSIS   • Stress headaches      Past Surgical History:   Procedure Laterality Date   • BILE DUCT STENT PLACEMENT  2014   • BREAST BIOPSY Right 09/2019    malignant   • BREAST LUMPECTOMY WITH SENTINEL NODE BIOPSY Right 11/4/2019    Procedure: BREAST LUMPECTOMY WITH SENTINEL NODE BIOPSY AND NEEDLE LOCALIZATION And possible axillary dissection;  Surgeon: Jean-Paul Ramos MD;  Location: [facility] OR [facility] [place] [facility];  Service: General   • COLONOSCOPY     • COLONOSCOPY N/A 6/26/2020    Procedure: COLONOSCOPY TO TERMINAL ILEUM WITH BX;  Surgeon: Sage Gonzalez MD;  Location: [facility] ENDOSCOPY;  Service: Gastroenterology;  Laterality: N/A;  PREOP/ SCREENING  POSTOP/ VASCULAR PATTERN TO COLON   • KNEE ARTHROSCOPY Left    • LAPAROSCOPIC CHOLECYSTECTOMY  2014   • TUBAL ABDOMINAL LIGATION          MEDICATIONS    Current Outpatient Medications:   •  acetaminophen (TYLENOL) 325 MG tablet, Take 650 mg by mouth Every 6 (Six) Hours As Needed for Mild Pain ., Disp: , Rfl:   •  anastrozole (ARIMIDEX) 1 MG tablet, Take 1 tablet by mouth Daily., Disp: 90 tablet, Rfl: 1  •  doxylamine (UNISOM) 25 MG tablet, Take 25 mg by mouth At Night As Needed., Disp: , Rfl:   •  melatonin 5 MG tablet tablet, Take 10 mg by mouth At Night As Needed., Disp: , Rfl:   •  Probiotic Product (PROBIOTIC PO), Take 1 tablet by mouth Daily., Disp: , Rfl:   •  rivaroxaban (XARELTO) 20 MG tablet, Take 1 tablet by mouth Daily., Disp: 28 tablet, Rfl: 0    ALLERGIES:   No Known Allergies    SOCIAL HISTORY:       Social History     Socioeconomic History   • Marital status:      Spouse name: Not on file   • Number of children: 2   • Years of education: Not on file   • Highest education level: Not on file   Tobacco Use   • Smoking status: Former Smoker     Packs/day: 0.50     Years: 10.00     Pack years: 5.00     Types: Cigarettes     Quit date:      Years since quittin.2   • Smokeless tobacco: Former User   Substance and Sexual Activity   • Alcohol use: No   • Drug use: No   • Sexual activity: Yes     Birth control/protection: Post-menopausal, Surgical         FAMILY HISTORY:  Family History   Problem Relation Age of Onset   • Alcohol abuse Father    • Heart failure Mother    • Diabetes Brother    • Prostate cancer Brother    • Throat cancer Brother    • Breast cancer Maternal Aunt    • Breast cancer Maternal Cousin    • Breast cancer Maternal Cousin    • Malig Hyperthermia Neg Hx        REVIEW OF SYSTEMS:  Review of Systems   Constitutional: Negative for activity change.   HENT: Negative for nosebleeds and trouble swallowing.    Respiratory: Negative for shortness of breath and wheezing.    Cardiovascular: Negative for chest pain and palpitations.   Gastrointestinal: Negative for constipation, diarrhea and nausea.    Genitourinary: Negative for dysuria and hematuria.   Musculoskeletal: Negative for arthralgias and myalgias.   Skin: Negative for rash and wound.   Neurological: Negative for seizures and syncope.   Hematological: Negative for adenopathy. Does not bruise/bleed easily.   Psychiatric/Behavioral: Negative for confusion.            There were no vitals filed for this visit.  Current Status 1/4/2021   ECOG score 1        PHYSICAL EXAM:        CONSTITUTIONAL:  Vital signs reviewed.  No distress, looks comfortable.  EYES:  Conjunctiva and lids unremarkable.  PERRLA  EARS,NOSE,MOUTH,THROAT:  Ears and nose appear unremarkable.  Lips, teeth, gums appear unremarkable.  RESPIRATORY:  Normal respiratory effort.  Lungs clear to auscultation bilaterally.  CARDIOVASCULAR:  Normal S1, S2.  No murmurs rubs or gallops.  No significant lower extremity edema.  GASTROINTESTINAL: Abdomen appears unremarkable.  Nontender.  No hepatomegaly.  No splenomegaly.  LYMPHATIC:  No cervical, supraclavicular, axillary lymphadenopathy.  SKIN:  Warm.  No rashes.  PSYCHIATRIC:  Normal judgment and insight.  Normal mood and affect.    I have reexamined the patient and the results are consistent with the previously documented exam. FAWAD Carty       RECENT LABS:        WBC   Date/Time Value Ref Range Status   02/17/2021 08:10 AM 6.00 3.40 - 10.80 10*3/mm3 Final     Hemoglobin   Date/Time Value Ref Range Status   02/17/2021 08:10 AM 14.1 12.0 - 15.9 g/dL Final     Platelets   Date/Time Value Ref Range Status   02/17/2021 08:10  140 - 450 10*3/mm3 Final       Assessment/Plan   There are no diagnoses linked to this encounter.  *Right breast cancer.  Invasive ductal adenocarcinoma.  Upper inner quadrant  · Right lumpectomy by Dr. Ramos 11/4/2019.  1:00.  Overall grade 1.  Greatest dimension 5 mm.  Single focus of invasive carcinoma.  No lymphovascular invasion.  Margins negative.  0 out of 2 nodes involved.  · [id number], stage 1  · ER 91%.   OR 96%.  HER-2 negative.  Ki-67 4.7% (favorable).  · (Initial needle biopsy 9/16/2019: Low-grade invasive ductal carcinoma, grade 1.  5 mm in greatest dimension.)  · (Initial mammogram measured the lesion at 5 mm)  · From the information we currently know about her cancer, I do not recommend chemotherapy.  I do recommend hormonal therapy for 5 years.  Hopefully, aromatase inhibitors if DEXA scan allows (with portal vein thrombosis would like to avoid tamoxifen).  · Oncotype DX: Recurrence score 3, 3% distant recurrence risk at 9 years if tamoxifen or AI is used.  · Therefore, do not recommend chemotherapy.  · Adjuvant XRT completed 2/12/2020  · Arimidex 2/26/2020- 2/26/2025  · Overall doing well and Arimidex.  No evidence of recurrence.  Remain on Arimidex.  · Patient is tolerating Arimidex well    *Mild hand joint achiness since beginning Arimidex.  Remains tolerable.  Continue Arimidex.    *Bone health.  · DEXA 12/2/2019: Normal.  T score lumbar spine 0.1, T score left hip 0.1, T score right hip 0.6.  · She takes 2-4 times daily.  Therefore, did not begin a calcium supplement when a rheumatic started on 12/9/2019.  She was encouraged to take a PPI on a schedule and follow-up with her PCP.  States she has had several EGDs in the past none of which have shown problems.  (States in the past her PCP told her to stop calcium supplements because she developed hypercalcemia due to the combination of calcium supplements and Tums).     *Portal vein thrombosis (right branch of portal vein).  · No evidence of cirrhosis on MRI or ultrasound.  Dr. Berna Gonzalez, GI note, has no mention of cirrhosis.  · Hormonal therapy for breast cancer has a slight increased risk of thrombosis.  Therefore, I think it is in her best interest to undergo anticoagulation until she completes hormonal therapy.  · Generally, Lovenox or Coumadin are the preferred agents.  However, because of significant elevations in INR, and the desire for an oral  agent, changed to Xarelto on 2/19/2020.   · Patient remains on Xarelto 20 mg daily.  No clinical evidence of recurrent thrombosis.  A 1 month supply of samples was given to the patient today    *Length of anticoagulation  Plan Xarelto throughout her anastrozole therapy due to slight increased risk of clotting on anastrozole    *Risk factors for portal vein thrombosis:  · Unremarkable: Prothrombin gene mutation, factor V Leiden gene mutation, lupus anticoagulant, beta-2 glycoprotein antibodies, anti-thrombin, protein S free.    *Low protein S activity, 14% (normal range %), on 11/13/2019     *Low protein C activity, 44% (normal range %) on 11/13/2019.    *Indeterminant anticardiolipin  · IgM 14 (indeterminate range 13-20) on 11/13/2019.  IgA and IgG negative.    *Prior leukocytosis, likely due to C. difficile colitis.  WBC 6 on February 17, 2021     *Anemia.  Previously felt to likely due to C. difficile colitis.  Hb 14.1 on February 17, 2021     *Prominent nodes within the hepatogastric ligament and around the celiac axis and in the retroperitoneum on MRI abdomen 11/11/2019.  · Etiology uncertain.  Radiologist recommended follow-up  · CT 2/19/2020, from 12/13/2019: Abdominal and pelvic and cardiophrenic LAD unchanged.  · CT 8/14/2020: Overall improvement with interval decrease in right cardiophrenic node, size of spleen, and degree of wall thickening and surrounding fat stranding in the colon.  Persistent abdominal pelvic and lower thoracic LAD, indeterminant etiology.    · Radiologist recommended continued follow-up.  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  I explained if this were the case it would not be expected to be curable and we would undergo observation instead of treatment regardless.  We would consider treatment if she develops a problem.  Therefore, I think further observation is very reasonable.  (The LAD might be due to her  previous problems with extensive colitis and the mild splenomegaly might be due to the right hepatic lobe atrophy).  · CT 2/17/2021: Unchanged enlarged nodes, from 8/14/2020.  · CT images personally viewed by Dr. Rodgers    *C. difficile colitis during November 2019 hospitalization.  Thought to perhaps be related to receiving 1 dose of antibiotics for breast surgery.  She was treated with Flagyl and oral vancomycin     *Extensive colitis again seen on CT 2/19/2020, unchanged from 11/10/2019.  Patient reports she had some recent diarrhea but it became better and is more solid and it does not seem similar to the diarrhea she had back in November during the diagnosis of C. difficile.  On 2/24/2020 she was told to contact her PCP if the diarrhea worsens.  In the meantime, we referred her back to Dr. Cano of ID and Dr. Berna Gonzalez of GI (she saw both during the C. difficile hospitalization November 2019).  · Dr. Cano appointment on 3/4/2020  · Dr. Berna Gonzalez colonoscopy 6/26/2020: Decreased mucosa vascular pattern in the entire examined colon, biopsied.  Biopsy showed no evidence of malignancy.  It did reveal prominent lymphoid aggregates.    *Elevated LFTs.  Refer to Dr. Berna Gonzalez  · As noted above, the patient will be undergoing a liver biopsy on April 21.  She has already been given instructions for withholding and restarting Xarelto    Plan  · Continue anastrozole  · MD 8 months.  1 week prior: CBC, CMP, CT abdomen with contrast.  (This will complete 2 years of CT follow-up.  If nodes remain unchanged, patient and Dr. Rodgers discussed possibly no further CT follow-up after that)  · Last mammogram 11/23/2020: BI-RADS 3.  Radiologist recommended 6-month follow-up mammogram  · Referral back to Dr. Berna Gonzalez, her GI MD due to elevated LFTs-patient will be undergoing liver biopsy on April 21  · The patient was provided a 1 month supply of Xarelto samples.

## 2021-04-16 DIAGNOSIS — R59.9 LYMPH NODE ENLARGEMENT: ICD-10-CM

## 2021-04-16 DIAGNOSIS — Z09 FOLLOW-UP EXAM, 3-6 MONTHS SINCE PREVIOUS EXAM: Primary | ICD-10-CM

## 2021-04-19 ENCOUNTER — LAB (OUTPATIENT)
Dept: LAB | Facility: HOSPITAL | Age: 66
End: 2021-04-19

## 2021-04-19 DIAGNOSIS — Z01.812 ENCOUNTER FOR PREPROCEDURE SCREENING LABORATORY TESTING FOR COVID-19: ICD-10-CM

## 2021-04-19 DIAGNOSIS — Z20.822 ENCOUNTER FOR PREPROCEDURE SCREENING LABORATORY TESTING FOR COVID-19: ICD-10-CM

## 2021-04-19 PROCEDURE — U0004 COV-19 TEST NON-CDC HGH THRU: HCPCS

## 2021-04-19 PROCEDURE — C9803 HOPD COVID-19 SPEC COLLECT: HCPCS

## 2021-04-19 PROCEDURE — U0005 INFEC AGEN DETEC AMPLI PROBE: HCPCS

## 2021-04-20 LAB — SARS-COV-2 RNA RESP QL NAA+PROBE: NOT DETECTED

## 2021-04-20 NOTE — PROGRESS NOTES
04/21/21 0000   Pre-Procedure Phone Call   Procedure Time Verified Yes   Arrival Time 1045  (4/19/20)   Procedure Location Verified Yes   Medical History Reviewed No   NPO Status Reinforced Yes   Ride and Caregiver Arranged Yes   Phone Number for Ride/Caregiver Nikolski, spouse   Patient Knows to Bring Current Medications No  (Held her Xarelto since 4/18/2021. Last dose was 4/17/2021 evening.)   Bring Outside Films Requested No

## 2021-04-21 ENCOUNTER — HOSPITAL ENCOUNTER (OUTPATIENT)
Dept: CT IMAGING | Facility: HOSPITAL | Age: 66
Discharge: HOME | End: 2021-04-21
Admitting: NURSE PRACTITIONER

## 2021-04-21 VITALS
BODY MASS INDEX: 31.01 KG/M2 | HEART RATE: 89 BPM | OXYGEN SATURATION: 97 % | HEIGHT: 63 IN | RESPIRATION RATE: 18 BRPM | TEMPERATURE: 98.2 F | WEIGHT: 175 LBS | DIASTOLIC BLOOD PRESSURE: 59 MMHG | SYSTOLIC BLOOD PRESSURE: 116 MMHG

## 2021-04-21 DIAGNOSIS — R74.8 ELEVATED LIVER ENZYMES: ICD-10-CM

## 2021-04-21 LAB
BASOPHILS # BLD AUTO: 0.06 10*3/MM3 (ref 0–0.2)
BASOPHILS NFR BLD AUTO: 1 % (ref 0–1.5)
DEPRECATED RDW RBC AUTO: 44.6 FL (ref 37–54)
EOSINOPHIL # BLD AUTO: 0.21 10*3/MM3 (ref 0–0.4)
EOSINOPHIL NFR BLD AUTO: 3.5 % (ref 0.3–6.2)
ERYTHROCYTE [DISTWIDTH] IN BLOOD BY AUTOMATED COUNT: 13 % (ref 12.3–15.4)
HCT VFR BLD AUTO: 37.6 % (ref 34–46.6)
HGB BLD-MCNC: 12.6 G/DL (ref 12–15.9)
IMM GRANULOCYTES # BLD AUTO: 0.01 10*3/MM3 (ref 0–0.05)
IMM GRANULOCYTES NFR BLD AUTO: 0.2 % (ref 0–0.5)
INR PPP: 1.2 (ref 0.8–1.2)
LYMPHOCYTES # BLD AUTO: 2.14 10*3/MM3 (ref 0.7–3.1)
LYMPHOCYTES NFR BLD AUTO: 36 % (ref 19.6–45.3)
MCH RBC QN AUTO: 31.5 PG (ref 26.6–33)
MCHC RBC AUTO-ENTMCNC: 33.5 G/DL (ref 31.5–35.7)
MCV RBC AUTO: 94 FL (ref 79–97)
MONOCYTES # BLD AUTO: 0.67 10*3/MM3 (ref 0.1–0.9)
MONOCYTES NFR BLD AUTO: 11.3 % (ref 5–12)
NEUTROPHILS NFR BLD AUTO: 2.85 10*3/MM3 (ref 1.7–7)
NEUTROPHILS NFR BLD AUTO: 48 % (ref 42.7–76)
NRBC BLD AUTO-RTO: 0 /100 WBC (ref 0–0.2)
PLATELET # BLD AUTO: 122 10*3/MM3 (ref 140–450)
PLATELET # BLD AUTO: 127 10*3/MM3 (ref 140–450)
PMV BLD AUTO: 11.1 FL (ref 6–12)
PROTHROMBIN TIME: 14.4 SECONDS (ref 12.8–15.2)
RBC # BLD AUTO: 4 10*6/MM3 (ref 3.77–5.28)
WBC # BLD AUTO: 5.94 10*3/MM3 (ref 3.4–10.8)

## 2021-04-21 PROCEDURE — 85049 AUTOMATED PLATELET COUNT: CPT | Performed by: RADIOLOGY

## 2021-04-21 PROCEDURE — 25010000002 MIDAZOLAM PER 1 MG: Performed by: RADIOLOGY

## 2021-04-21 PROCEDURE — 88307 TISSUE EXAM BY PATHOLOGIST: CPT | Performed by: NURSE PRACTITIONER

## 2021-04-21 PROCEDURE — 85610 PROTHROMBIN TIME: CPT

## 2021-04-21 PROCEDURE — 25010000002 FENTANYL CITRATE (PF) 100 MCG/2ML SOLUTION: Performed by: RADIOLOGY

## 2021-04-21 PROCEDURE — 88313 SPECIAL STAINS GROUP 2: CPT | Performed by: NURSE PRACTITIONER

## 2021-04-21 PROCEDURE — 25010000003 LIDOCAINE 1 % SOLUTION: Performed by: RADIOLOGY

## 2021-04-21 PROCEDURE — 77012 CT SCAN FOR NEEDLE BIOPSY: CPT

## 2021-04-21 PROCEDURE — 25010000002 MORPHINE PER 10 MG: Performed by: RADIOLOGY

## 2021-04-21 PROCEDURE — 85025 COMPLETE CBC W/AUTO DIFF WBC: CPT | Performed by: RADIOLOGY

## 2021-04-21 RX ORDER — SODIUM CHLORIDE 0.9 % (FLUSH) 0.9 %
10 SYRINGE (ML) INJECTION AS NEEDED
Status: DISCONTINUED | OUTPATIENT
Start: 2021-04-21 | End: 2021-04-22 | Stop reason: HOSPADM

## 2021-04-21 RX ORDER — LIDOCAINE HYDROCHLORIDE 10 MG/ML
20 INJECTION, SOLUTION INFILTRATION; PERINEURAL ONCE
Status: COMPLETED | OUTPATIENT
Start: 2021-04-21 | End: 2021-04-21

## 2021-04-21 RX ORDER — SODIUM CHLORIDE 9 MG/ML
INJECTION, SOLUTION INTRAVENOUS
Status: COMPLETED | OUTPATIENT
Start: 2021-04-21 | End: 2021-04-21

## 2021-04-21 RX ORDER — MIDAZOLAM HYDROCHLORIDE 1 MG/ML
INJECTION INTRAMUSCULAR; INTRAVENOUS
Status: COMPLETED | OUTPATIENT
Start: 2021-04-21 | End: 2021-04-21

## 2021-04-21 RX ORDER — SODIUM CHLORIDE 0.9 % (FLUSH) 0.9 %
3 SYRINGE (ML) INJECTION EVERY 12 HOURS SCHEDULED
Status: DISCONTINUED | OUTPATIENT
Start: 2021-04-21 | End: 2021-04-22 | Stop reason: HOSPADM

## 2021-04-21 RX ORDER — SODIUM CHLORIDE 9 MG/ML
25 INJECTION, SOLUTION INTRAVENOUS ONCE
Status: DISCONTINUED | OUTPATIENT
Start: 2021-04-21 | End: 2021-04-22 | Stop reason: HOSPADM

## 2021-04-21 RX ORDER — FENTANYL CITRATE 50 UG/ML
INJECTION, SOLUTION INTRAMUSCULAR; INTRAVENOUS
Status: COMPLETED | OUTPATIENT
Start: 2021-04-21 | End: 2021-04-21

## 2021-04-21 RX ORDER — MORPHINE SULFATE 2 MG/ML
2 INJECTION, SOLUTION INTRAMUSCULAR; INTRAVENOUS ONCE
Status: COMPLETED | OUTPATIENT
Start: 2021-04-21 | End: 2021-04-21

## 2021-04-21 RX ADMIN — SODIUM CHLORIDE 25 ML/HR: 9 INJECTION, SOLUTION INTRAVENOUS at 12:43

## 2021-04-21 RX ADMIN — GELATIN ABSORBABLE SPONGE 12-7 MM: 12-7 MISC at 12:58

## 2021-04-21 RX ADMIN — FENTANYL CITRATE 25 MCG: 50 INJECTION INTRAMUSCULAR; INTRAVENOUS at 12:53

## 2021-04-21 RX ADMIN — MIDAZOLAM 1 MG: 1 INJECTION INTRAMUSCULAR; INTRAVENOUS at 12:52

## 2021-04-21 RX ADMIN — LIDOCAINE HYDROCHLORIDE 20 ML: 10 INJECTION, SOLUTION INFILTRATION; PERINEURAL at 12:54

## 2021-04-21 RX ADMIN — MORPHINE SULFATE 2 MG: 2 INJECTION, SOLUTION INTRAMUSCULAR; INTRAVENOUS at 13:22

## 2021-04-21 NOTE — NURSING NOTE
Patient arrived in xray triage for liver biopsy. Protective goggles and mask in place with all patient interactions today.

## 2021-04-21 NOTE — DISCHARGE INSTRUCTIONS
EDUCATION /DISCHARGE INSTRUCTIONS  CT/US guided biopsy:  A biopsy is a procedure done to remove tissue for further analysis.  Before images are taken to locate the target area.  Images can be obtained using ultrasound, CT or MRI.  A physician will clean your skin with antiseptic soap, place a sterile towel around the site and administer a local anesthetic to numb the area.  The physician will then insert a special needle.  Sometimes images are taken of the needle after it is inserted to ensure the needle is in the correct area to be biopsied.   A sample is obtained and sent to the laboratory for study.  Occasionally the laboratory is unable to make a diagnosis from the sample and the procedure may need to be repeated.  Within a week the radiologist will send a report to your physician.  A pathologist will also examine the tissue and send a report.    Risks of the procedure include but are not limited to:   *  Bleeding    *  Infection   *  Puncture of surrounding organs *  Death     *  Lung collapse if the biopsy is near the chest which may require insertion of a      chest tube to re-inflate the lung if severe.    Benefits of the procedure:  Using x-ray helps to locate the area that requires a biopsy. The procedure is less invasive than a surgical procedure, there are no large incisions and it does not require anesthesia.    Alternatives to the procedure:  A biopsy can be performed surgically.  Risks of a surgical biopsy include exposure to anesthesia, infection, excessive bleeding and injury to abdominal organs.  A benefit of surgical biopsy is the ability to see the area to be biopsied and remove of a larger piece of tissue.    THIS EDUCATION INFORMATION WAS REVIEWED PRIOR TO PROCEDURE AND CONSENT. Patient initials__________________Time________1116___________    Post Procedure:    *  Expect the biopsy site may be tender up to one week.    *  Rest today (no pushing pulling or straining).   *  Slowly increase  activity tomorrow.    *  If you received sedation do not drive for 24 hours.   *  Keep dressing clean and dry.   *  Leave dressing on puncture site for 24 hours.    *  You may shower when dressing removed.  Call your doctor if experiencing:   *  Signs of infection such as redness, swelling, excessive pain and / or foul        smelling drainage from the puncture site.   *  Chills or fever over 101 degrees (by mouth).   *  Unrelieved pain.   *  Any new or severe symptoms.   *  If experiencing sudden / severe shortness of breath or chest pain go to the       nearest emergency room.   Following the procedure:     Follow-up with the ordering physician as directed.    Continue to take other medications as directed by your physician unless    otherwise instructed.   If applicable, resume taking your blood thinners (eliquis) or Aspirin on _____4/22/21 night dose______.    If you have any concerns please call the Radiology Nurses Desk at [phone].  You are the most important factor in your recovery.  Follow the above instructions carefully.    Moderate Sedation  Sedation is the use of medicines to promote relaxation and relieve discomfort and anxiety during your procedure. Moderate sedation is a type of mild sedation, it is not anesthesia. When receiving moderate sedation you are less alert than normal, but you are still able to respond to instructions, touch, or both.    What are the risks?  Generally, this is a safe procedure. However, problems may occur, including but not limited to:  · Getting too much medicine (over sedation).  · Nausea or vomiting  · Allergic reaction to medicines.  · Trouble breathing. If this happens, a breathing tube may be used to help with breathing. It will be removed when you are awake and breathing on your own.  ·   What happens prior to the procedure?  · An IV catheter will be inserted into one of your veins.  · Your nurse will do a full work up including reviewing your medications, allergies,  medical history, who is taking your home and other pertinent information.  · Medicine to help you relax will be given through the IV tubing.  · The registered nurse and physician will monitor you closely during the entire procedure.  ·   What happens after the procedure?  · Your blood pressure, heart rate, breathing rate, and blood oxygen level will be monitored often until the medicines you were given have worn off.  · Do not drive for 24 hours.  · If you are an inpatient, you will return to your room where your nurse will monitor you appropriately.        I read, or had read to me, this education sheet.        __________________________________________________________      ____1116 4/21/21__________________________________  Patient/Person authorized to sign for patient                                                    Date/Time          ___________________________________________________________     ______1116 04/21/21________________________________    Discharge Instructions after receiving Moderate Sedation  These instructions provide you with information about caring for yourself after your procedure. Your health care provider may also give you more specific instructions. Your treatment has been planned according to current medical practices, but problems sometimes occur. Call your physican or the Radiology Nurses ([phone]) if you have any problems or questions after your procedure.    What can I expect after the procedure?  After your procedure, you may:  Feel sleepy or light headed for several hours.  Feel clumsy, dizzy or have poor balance for several hours.  Feel forgetful about what happened after the procedure.  Have poor judgment for several hours.  Feel nauseous or vomit.    For at least 24 hours after the procedure:  Have a responsible adult stay with you or frequently check on you until you are awake and alert.   Rest as needed.    Do not:  Participate in activities in which you could fall or  become injured.  Drive or operate heavy machinery  Take sleeping pills or medicines that cause drowsiness.  Make important decisions or sign legal documents.  Take care of children on your own.    Eating and drinking: Clear liquids then progress slowly to a normal diet.  If you vomit, drink water, juice, or soup when you can drink without vomiting.  Make sure you have little or no nausea before eating solid foods.    General instructions: Take over-the-counter and prescription medicines only as told by your health care provider .If you have sleep apnea, surgery and certain medicines can increase your risk for breathing problems. Follow instructions from your health care provider about wearing your sleep device: If you smoke, do not smoke without supervision.  Keep all follow-up visits as told by your health care provider. This is important.    Contact your physician if:  You continue to keep feeling nauseous or you keep vomiting. You continue to feel light-headed, develop a fever or a rash.  Get help right away if you have trouble breathing    I acknowledge the above instructions:    Patient/ Responsible person _________________________________Date ___4/21/21_________Time ___1117_________    Witnessed by____________________________________________ Date____4/21/21_________ Time____1117________       Witness signature                                                                                              Date/Time

## 2021-04-22 ENCOUNTER — TELEPHONE (OUTPATIENT)
Dept: INTERVENTIONAL RADIOLOGY/VASCULAR | Facility: HOSPITAL | Age: 66
End: 2021-04-22

## 2021-04-22 LAB
LAB AP CASE REPORT: NORMAL
LAB AP CLINICAL INFORMATION: NORMAL
LAB AP DIAGNOSIS COMMENT: NORMAL
LAB AP SPECIAL STAINS: NORMAL
PATH REPORT.FINAL DX SPEC: NORMAL
PATH REPORT.GROSS SPEC: NORMAL

## 2021-04-26 ENCOUNTER — TELEPHONE (OUTPATIENT)
Dept: ONCOLOGY | Facility: CLINIC | Age: 66
End: 2021-04-26

## 2021-04-26 NOTE — TELEPHONE ENCOUNTER
Caller: ALIX     Relationship to patient: SELF   Best call back number: [phone]    PATIENT GETS HER XARELTO FROM THE OFFICE. SHE JUST WANTED TO LET US KNOW SHE JUST OPENED HER LAST BOTTLE WITH 30 DAY SUPPLY     PLEASE CALL WHEN READY   THANK YOU

## 2021-04-28 ENCOUNTER — DOCUMENTATION (OUTPATIENT)
Dept: PHARMACY | Facility: HOSPITAL | Age: 66
End: 2021-04-28

## 2021-04-28 NOTE — TELEPHONE ENCOUNTER
Pt contacted office to request Xarelto samples. I asked scheduling to contact pt to arrange NP visit.     Pt has been scheduled for [facility] office on 4/29/2021.    I have sent a message to Barb/Jazmyne/Yvonne to place 4 bottles of the Xarelto 20 mg samples into the [employer].

## 2021-04-28 NOTE — PROGRESS NOTES
Site of Appt/Pickup: (n) [place]; [facility] (n) [place];    Prescriber (code) requested the following medication samples to be given to the patient during the Nurse Practitioner visit.    NDC:  73589-158-48  Drug name: xarelto  Strength: 20mg  Total Quantity:  28 (Containers- 4 X Units per Containers- 7)  Lot#:  [id number]  Expiration: 12/22    Product to be placed in the [employer] under 'Patient Specialty Medication' entry and is to be removed at time of visit.

## 2021-04-29 ENCOUNTER — APPOINTMENT (OUTPATIENT)
Dept: LAB | Facility: HOSPITAL | Age: 66
End: 2021-04-29

## 2021-04-29 ENCOUNTER — DOCUMENTATION (OUTPATIENT)
Dept: ONCOLOGY | Facility: CLINIC | Age: 66
End: 2021-04-29

## 2021-04-29 ENCOUNTER — OFFICE VISIT (OUTPATIENT)
Dept: ONCOLOGY | Facility: CLINIC | Age: 66
End: 2021-04-29

## 2021-04-29 VITALS
BODY MASS INDEX: 31.2 KG/M2 | HEART RATE: 86 BPM | WEIGHT: 176.1 LBS | DIASTOLIC BLOOD PRESSURE: 76 MMHG | SYSTOLIC BLOOD PRESSURE: 123 MMHG | OXYGEN SATURATION: 99 % | RESPIRATION RATE: 16 BRPM | TEMPERATURE: 98 F | HEIGHT: 63 IN

## 2021-04-29 DIAGNOSIS — R79.89 ELEVATED LFTS: ICD-10-CM

## 2021-04-29 DIAGNOSIS — Z17.0 MALIGNANT NEOPLASM OF UPPER-INNER QUADRANT OF RIGHT BREAST IN FEMALE, ESTROGEN RECEPTOR POSITIVE (HCC): Primary | ICD-10-CM

## 2021-04-29 DIAGNOSIS — C50.211 MALIGNANT NEOPLASM OF UPPER-INNER QUADRANT OF RIGHT BREAST IN FEMALE, ESTROGEN RECEPTOR POSITIVE (HCC): Primary | ICD-10-CM

## 2021-04-29 LAB
ALBUMIN SERPL-MCNC: 3.1 G/DL (ref 3.5–5.2)
ALBUMIN/GLOB SERPL: 0.6 G/DL (ref 1.1–2.4)
ALP SERPL-CCNC: 146 U/L (ref 38–116)
ALT SERPL W P-5'-P-CCNC: 89 U/L (ref 0–33)
ANION GAP SERPL CALCULATED.3IONS-SCNC: 6.8 MMOL/L (ref 5–15)
AST SERPL-CCNC: 178 U/L (ref 0–32)
BILIRUB SERPL-MCNC: 1 MG/DL (ref 0.2–1.2)
BUN SERPL-MCNC: 15 MG/DL (ref 6–20)
BUN/CREAT SERPL: 16.1 (ref 7.3–30)
CALCIUM SPEC-SCNC: 8.9 MG/DL (ref 8.5–10.2)
CHLORIDE SERPL-SCNC: 105 MMOL/L (ref 98–107)
CO2 SERPL-SCNC: 26.2 MMOL/L (ref 22–29)
CREAT SERPL-MCNC: 0.93 MG/DL (ref 0.6–1.1)
GFR SERPL CREATININE-BSD FRML MDRD: 61 ML/MIN/1.73
GLOBULIN UR ELPH-MCNC: 5 GM/DL (ref 1.8–3.5)
GLUCOSE SERPL-MCNC: 166 MG/DL (ref 74–124)
POTASSIUM SERPL-SCNC: 3.6 MMOL/L (ref 3.5–4.7)
PROT SERPL-MCNC: 8.1 G/DL (ref 6.3–8)
SODIUM SERPL-SCNC: 138 MMOL/L (ref 134–145)

## 2021-04-29 PROCEDURE — 99215 OFFICE O/P EST HI 40 MIN: CPT | Performed by: NURSE PRACTITIONER

## 2021-04-29 PROCEDURE — 36415 COLL VENOUS BLD VENIPUNCTURE: CPT | Performed by: NURSE PRACTITIONER

## 2021-04-29 PROCEDURE — 80053 COMPREHEN METABOLIC PANEL: CPT | Performed by: NURSE PRACTITIONER

## 2021-04-29 RX ORDER — PREDNISONE 10 MG/1
10 TABLET ORAL 2 TIMES DAILY
COMMUNITY
End: 2022-11-10

## 2021-04-29 NOTE — PROGRESS NOTES
Liver biopsy 4/12/2021: Chronic hepatitis with increased plasma cells.  Periportal and bridging fibrosis with architectural distortion, stage III fibrosis.  Overall findings worrisome for autoimmune hepatitis but a drug reaction cannot be entirely excluded.  · Patient was in our office today getting Xarelto samples.  She asked if her liver disease could be related to Arimidex or Xarelto.  GI has started her on prednisone since the path report.  (Therefore, treating for autoimmune hepatitis).  · LFTs did increase after Xarelto and Arimidex started.  (Changed to Xarelto when Arimidex added, 2/19/2020).  · 4/29/2021: Breast cancer is low risk.  Stop both Xarelto and Arimidex (portal vein thrombosis was November 2019.  We were planning to continue Xarelto only until Arimidex completes due to potential clotting risk of Arimidex.)  Follow-up in 2-1/2 months with CMP 1 day prior to see if LFTs improve.  Recognize it would be difficult to know if LFTs improved, if the prednisone for possible autoimmune hepatitis or if stopping Xarelto or if stopping Arimidex will be the reason for improvement.  However, in case the medicines are the reason, I think we should stop them.  Risks outweigh benefits at this point.    In summary, 4/29/2021, stop Arimidex and Xarelto.    Discussed with Mimi Serrano NP in our office.    Please send this note to HANSA Garcia

## 2021-04-30 ENCOUNTER — DOCUMENTATION (OUTPATIENT)
Dept: PHARMACY | Facility: HOSPITAL | Age: 66
End: 2021-04-30

## 2021-04-30 NOTE — PROGRESS NOTES
Ignore note from 4/28/21 about samples. Pt didn't get and they were returned to stock. Pt no longer taking per Mimi Serrano.

## 2021-05-14 ENCOUNTER — OFFICE (OUTPATIENT)
Dept: URBAN - METROPOLITAN AREA CLINIC 65 | Facility: CLINIC | Age: 66
End: 2021-05-14

## 2021-05-14 VITALS
DIASTOLIC BLOOD PRESSURE: 68 MMHG | WEIGHT: 178 LBS | HEART RATE: 90 BPM | SYSTOLIC BLOOD PRESSURE: 112 MMHG | HEIGHT: 63 IN | TEMPERATURE: 98.2 F

## 2021-05-14 DIAGNOSIS — R74.8 ABNORMAL LEVELS OF OTHER SERUM ENZYMES: ICD-10-CM

## 2021-05-14 DIAGNOSIS — K74.00 HEPATIC FIBROSIS, UNSPECIFIED: ICD-10-CM

## 2021-05-14 DIAGNOSIS — R76.8 OTHER SPECIFIED ABNORMAL IMMUNOLOGICAL FINDINGS IN SERUM: ICD-10-CM

## 2021-05-14 DIAGNOSIS — K75.4 AUTOIMMUNE HEPATITIS: ICD-10-CM

## 2021-05-14 PROCEDURE — 99215 OFFICE O/P EST HI 40 MIN: CPT | Performed by: INTERNAL MEDICINE

## 2021-05-14 RX ORDER — URSODIOL 500 MG/1
1000 TABLET ORAL
Qty: 60 | Refills: 11 | Status: ACTIVE
Start: 2021-05-14

## 2021-05-25 ENCOUNTER — HOSPITAL ENCOUNTER (OUTPATIENT)
Dept: MAMMOGRAPHY | Facility: HOSPITAL | Age: 66
Discharge: HOME | End: 2021-05-25
Admitting: OBSTETRICS & GYNECOLOGY

## 2021-05-25 DIAGNOSIS — R59.9 LYMPH NODE ENLARGEMENT: ICD-10-CM

## 2021-05-25 DIAGNOSIS — Z09 FOLLOW-UP EXAM, 3-6 MONTHS SINCE PREVIOUS EXAM: ICD-10-CM

## 2021-05-25 PROCEDURE — G0279 TOMOSYNTHESIS, MAMMO: HCPCS

## 2021-05-25 PROCEDURE — 77065 DX MAMMO INCL CAD UNI: CPT

## 2021-07-06 ENCOUNTER — LAB (OUTPATIENT)
Dept: LAB | Facility: HOSPITAL | Age: 66
End: 2021-07-06

## 2021-07-06 DIAGNOSIS — C50.211 MALIGNANT NEOPLASM OF UPPER-INNER QUADRANT OF RIGHT BREAST IN FEMALE, ESTROGEN RECEPTOR POSITIVE (HCC): ICD-10-CM

## 2021-07-06 DIAGNOSIS — Z17.0 MALIGNANT NEOPLASM OF UPPER-INNER QUADRANT OF RIGHT BREAST IN FEMALE, ESTROGEN RECEPTOR POSITIVE (HCC): ICD-10-CM

## 2021-07-06 DIAGNOSIS — R79.89 ELEVATED LFTS: ICD-10-CM

## 2021-07-06 LAB
ALBUMIN SERPL-MCNC: 3.2 G/DL (ref 3.5–5.2)
ALBUMIN/GLOB SERPL: 0.9 G/DL (ref 1.1–2.4)
ALP SERPL-CCNC: 111 U/L (ref 38–116)
ALT SERPL W P-5'-P-CCNC: 17 U/L (ref 0–33)
ANION GAP SERPL CALCULATED.3IONS-SCNC: 9.1 MMOL/L (ref 5–15)
AST SERPL-CCNC: 25 U/L (ref 0–32)
BASOPHILS # BLD AUTO: 0.05 10*3/MM3 (ref 0–0.2)
BASOPHILS NFR BLD AUTO: 0.5 % (ref 0–1.5)
BILIRUB SERPL-MCNC: 0.8 MG/DL (ref 0.2–1.2)
BUN SERPL-MCNC: 14 MG/DL (ref 6–20)
BUN/CREAT SERPL: 15.4 (ref 7.3–30)
CALCIUM SPEC-SCNC: 8.7 MG/DL (ref 8.5–10.2)
CHLORIDE SERPL-SCNC: 104 MMOL/L (ref 98–107)
CO2 SERPL-SCNC: 27.9 MMOL/L (ref 22–29)
CREAT SERPL-MCNC: 0.91 MG/DL (ref 0.6–1.1)
DEPRECATED RDW RBC AUTO: 51.2 FL (ref 37–54)
EOSINOPHIL # BLD AUTO: 0.16 10*3/MM3 (ref 0–0.4)
EOSINOPHIL NFR BLD AUTO: 1.7 % (ref 0.3–6.2)
ERYTHROCYTE [DISTWIDTH] IN BLOOD BY AUTOMATED COUNT: 14 % (ref 12.3–15.4)
GFR SERPL CREATININE-BSD FRML MDRD: 62 ML/MIN/1.73
GLOBULIN UR ELPH-MCNC: 3.6 GM/DL (ref 1.8–3.5)
GLUCOSE SERPL-MCNC: 140 MG/DL (ref 74–124)
HCT VFR BLD AUTO: 43.2 % (ref 34–46.6)
HGB BLD-MCNC: 14.1 G/DL (ref 12–15.9)
IMM GRANULOCYTES # BLD AUTO: 0.04 10*3/MM3 (ref 0–0.05)
IMM GRANULOCYTES NFR BLD AUTO: 0.4 % (ref 0–0.5)
LYMPHOCYTES # BLD AUTO: 3.52 10*3/MM3 (ref 0.7–3.1)
LYMPHOCYTES NFR BLD AUTO: 37.1 % (ref 19.6–45.3)
MCH RBC QN AUTO: 32 PG (ref 26.6–33)
MCHC RBC AUTO-ENTMCNC: 32.6 G/DL (ref 31.5–35.7)
MCV RBC AUTO: 98 FL (ref 79–97)
MONOCYTES # BLD AUTO: 0.81 10*3/MM3 (ref 0.1–0.9)
MONOCYTES NFR BLD AUTO: 8.5 % (ref 5–12)
NEUTROPHILS NFR BLD AUTO: 4.91 10*3/MM3 (ref 1.7–7)
NEUTROPHILS NFR BLD AUTO: 51.8 % (ref 42.7–76)
NRBC BLD AUTO-RTO: 0 /100 WBC (ref 0–0.2)
PLATELET # BLD AUTO: 140 10*3/MM3 (ref 140–450)
PMV BLD AUTO: 10.5 FL (ref 6–12)
POTASSIUM SERPL-SCNC: 3.7 MMOL/L (ref 3.5–4.7)
PROT SERPL-MCNC: 6.8 G/DL (ref 6.3–8)
RBC # BLD AUTO: 4.41 10*6/MM3 (ref 3.77–5.28)
SODIUM SERPL-SCNC: 141 MMOL/L (ref 134–145)
WBC # BLD AUTO: 9.49 10*3/MM3 (ref 3.4–10.8)

## 2021-07-06 PROCEDURE — 36415 COLL VENOUS BLD VENIPUNCTURE: CPT

## 2021-07-06 PROCEDURE — 85025 COMPLETE CBC W/AUTO DIFF WBC: CPT

## 2021-07-06 PROCEDURE — 80053 COMPREHEN METABOLIC PANEL: CPT

## 2021-07-06 NOTE — PROGRESS NOTES
.     REASONS FOR FOLLOWUP: Portal vein thrombosis, breast cancer    HISTORY OF PRESENT ILLNESS:  The patient is a 65 y.o. year old female  who is here for follow-up with the above-mentioned history.    Her Dr. Berna Gonzalez, GI, she is now on prednisone 20 mg daily and ursodiol since 5/14/2021.  As instructed by our office, she stopped Arimidex and Xarelto on 4/29/2021.  LFTs normalized when checked yesterday.    She has an appointment with a liver specialist, Dr. Adilson Hunt, 9/30/2021.  She has follow-up with Dr. Lucio lee on 8/13/2021.    Denies any new problems.  No complaints of shortness of breath, nausea, weight loss, pain    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)    • Breast cancer (CMS/HCC) 09/2019    Right IDC   • Clostridium difficile infection 12/2019   • GERD (gastroesophageal reflux disease)    • History of Clostridium difficile colitis    • Hx of radiation therapy    • IBS (irritable bowel syndrome)    • PONV (postoperative nausea and vomiting)    • Radiation-induced angiosarcoma of breast (CMS/HCC)    • Situational stress     DEATH OF BROTHER 10/2019, RECENT DIAGNOSIS   • Stress headaches      Past Surgical History:   Procedure Laterality Date   • BILE DUCT STENT PLACEMENT  2014   • BREAST BIOPSY Right 09/2019    malignant   • BREAST LUMPECTOMY Right    • BREAST LUMPECTOMY WITH SENTINEL NODE BIOPSY Right 11/4/2019    Procedure: BREAST LUMPECTOMY WITH SENTINEL NODE BIOPSY AND NEEDLE LOCALIZATION And possible axillary dissection;  Surgeon: Jean-Paul Ramos MD;  [place] [facility] [place] [facility] [place]  Service: General   • COLONOSCOPY     • COLONOSCOPY N/A 6/26/2020    Procedure: COLONOSCOPY TO TERMINAL ILEUM WITH BX;  Surgeon: Sage Gonzalez MD;  Location: [facility] ENDOSCOPY;  Service: Gastroenterology;  Laterality: N/A;  PREOP/ SCREENING  POSTOP/ VASCULAR PATTERN TO COLON   • KNEE ARTHROSCOPY Left    • LAPAROSCOPIC CHOLECYSTECTOMY  2014   •  TUBAL ABDOMINAL LIGATION         MEDICATIONS    Current Outpatient Medications:   •  acetaminophen (TYLENOL) 325 MG tablet, Take 650 mg by mouth Every 6 (Six) Hours As Needed for Mild Pain ., Disp: , Rfl:   •  anastrozole (ARIMIDEX) 1 MG tablet, Take 1 tablet by mouth Daily., Disp: 90 tablet, Rfl: 1  •  doxylamine (UNISOM) 25 MG tablet, Take 25 mg by mouth At Night As Needed., Disp: , Rfl:   •  famotidine (PEPCID) 40 MG tablet, , Disp: , Rfl:   •  melatonin 5 MG tablet tablet, Take 10 mg by mouth At Night As Needed., Disp: , Rfl:   •  predniSONE (DELTASONE) 10 MG tablet, Take 10 mg by mouth 2 (two) times a day., Disp: , Rfl:   •  Probiotic Product (PROBIOTIC PO), Take 1 tablet by mouth Daily., Disp: , Rfl:   •  ursodiol (ACTIGALL) 500 MG tablet, , Disp: , Rfl:   •  rivaroxaban (XARELTO) 20 MG tablet, Take 1 tablet by mouth Daily., Disp: 28 tablet, Rfl: 0    ALLERGIES:   No Known Allergies    SOCIAL HISTORY:       Social History     Socioeconomic History   • Marital status:      Spouse name: Not on file   • Number of children: 2   • Years of education: Not on file   • Highest education level: Not on file   Tobacco Use   • Smoking status: Former Smoker     Packs/day: 0.50     Years: 10.00     Pack years: 5.00     Types: Cigarettes     Quit date:      Years since quittin.5   • Smokeless tobacco: Former User   Substance and Sexual Activity   • Alcohol use: No   • Drug use: No   • Sexual activity: Yes     Birth control/protection: Post-menopausal, Surgical         FAMILY HISTORY:  Family History   Problem Relation Age of Onset   • Alcohol abuse Father    • Heart failure Mother    • Diabetes Brother    • Prostate cancer Brother    • Throat cancer Brother    • Breast cancer Maternal Aunt    • Breast cancer Maternal Cousin    • Breast cancer Maternal Cousin    • Malig Hyperthermia Neg Hx        REVIEW OF SYSTEMS:  Review of Systems   Constitutional: Negative for activity change.   HENT: Negative for  "nosebleeds and trouble swallowing.    Respiratory: Negative for shortness of breath and wheezing.    Cardiovascular: Negative for chest pain and palpitations.   Gastrointestinal: Negative for constipation, diarrhea and nausea.   Genitourinary: Negative for dysuria and hematuria.   Musculoskeletal: Negative for arthralgias and myalgias.   Skin: Negative for rash and wound.   Neurological: Negative for seizures and syncope.   Hematological: Negative for adenopathy. Does not bruise/bleed easily.   Psychiatric/Behavioral: Negative for confusion.            Vitals:    07/07/21 0752   BP: 132/79   Pulse: 82   Resp: 18   Temp: 97.5 °F (36.4 °C)   TempSrc: Temporal   SpO2: 97%   Weight: 85.6 kg (188 lb 11.2 oz)   Height: 160 cm (62.99\")   PainSc: 0-No pain     Current Status 7/7/2021   ECOG score 0        PHYSICAL EXAM:        CONSTITUTIONAL:  Vital signs reviewed.  No distress, looks comfortable.  EYES:  Conjunctiva and lids unremarkable.  PERRLA  EARS,NOSE,MOUTH,THROAT:  Ears and nose appear unremarkable.  Lips, teeth, gums appear unremarkable.  RESPIRATORY:  Normal respiratory effort.  Lungs clear to auscultation bilaterally.  CARDIOVASCULAR:  Normal S1, S2.  No murmurs rubs or gallops.  No significant lower extremity edema.  GASTROINTESTINAL: Abdomen appears unremarkable.  Nontender.  No hepatomegaly.  No splenomegaly.  LYMPHATIC:  No cervical, supraclavicular, axillary lymphadenopathy.  SKIN:  Warm.  No rashes.  PSYCHIATRIC:  Normal judgment and insight.  Normal mood and affect.          RECENT LABS:        WBC   Date/Time Value Ref Range Status   07/06/2021 10:33 AM 9.49 3.40 - 10.80 10*3/mm3 Final     Hemoglobin   Date/Time Value Ref Range Status   07/06/2021 10:33 AM 14.1 12.0 - 15.9 g/dL Final     Platelets   Date/Time Value Ref Range Status   07/06/2021 10:33  140 - 450 10*3/mm3 Final       Assessment/Plan   Malignant neoplasm of upper-inner quadrant of right breast in female, estrogen receptor positive " (CMS/HCC)  - CBC & Differential  - Comprehensive Metabolic Panel    *Right breast cancer.  Invasive ductal adenocarcinoma.  Upper inner quadrant  · Right lumpectomy by Dr. Ramos 11/4/2019.  1:00.  Overall grade 1.  Greatest dimension 5 mm.  Single focus of invasive carcinoma.  No lymphovascular invasion.  Margins negative.  0 out of 2 nodes involved.  · [id number], stage 1  · ER 91%.  VA 96%.  HER-2 negative.  Ki-67 4.7% (favorable).  · (Initial needle biopsy 9/16/2019: Low-grade invasive ductal carcinoma, grade 1.  5 mm in greatest dimension.)  · (Initial mammogram measured the lesion at 5 mm)  · From the information we currently know about her cancer, I do not recommend chemotherapy.  I do recommend hormonal therapy for 5 years.  Hopefully, aromatase inhibitors if DEXA scan allows (with portal vein thrombosis would like to avoid tamoxifen).  · Oncotype DX: Recurrence score 3, 3% distant recurrence risk at 9 years if tamoxifen or AI is used.  · Therefore, do not recommend chemotherapy.  · Adjuvant XRT completed 2/12/2020  · Arimidex 2/26/2020- 2/26/2025  · Arimidex stopped 4/29/2021 due to concern for possible drug-induced liver injury.  Might try to resume hormonal therapy with Aromasin at some point but for now remain off hormonal therapy    *Mild hand joint achiness since beginning Arimidex.    Currently holding Arimidex.    *Bone health.  · DEXA 12/2/2019: Normal.  T score lumbar spine 0.1, T score left hip 0.1, T score right hip 0.6.  · She takes 2-4 times daily.  Therefore, did not begin a calcium supplement when a rheumatic started on 12/9/2019.  She was encouraged to take a PPI on a schedule and follow-up with her PCP.  States she has had several EGDs in the past none of which have shown problems.  (States in the past her PCP told her to stop calcium supplements because she developed hypercalcemia due to the combination of calcium supplements and Tums).     *Portal vein thrombosis (right branch of portal  vein).  · No evidence of cirrhosis on MRI or ultrasound.  Dr. Berna Gonzalez, GI note, has no mention of cirrhosis.  · Hormonal therapy for breast cancer has a slight increased risk of thrombosis.  Therefore, I think it is in her best interest to undergo anticoagulation until she completes hormonal therapy.  · Generally, Lovenox or Coumadin are the preferred agents.  However, because of significant elevations in INR, and the desire for an oral agent, changed to Xarelto on 2/19/2020.   No evidence of recurrent clotting.    · Previous plan was to remain on therapeutic dose Xarelto until completes Arimidex.   · 4/29/2021, Xarelto (along with Arimidex) stopped due to concern for possible drug-induced liver injury.    *Risk factors for portal vein thrombosis:  · Unremarkable: Prothrombin gene mutation, factor V Leiden gene mutation, lupus anticoagulant, beta-2 glycoprotein antibodies, anti-thrombin, protein S free.    *Low protein S activity, 14% (normal range %), on 11/13/2019     *Low protein C activity, 44% (normal range %) on 11/13/2019.    *Indeterminant anticardiolipin  · IgM 14 (indeterminate range 13-20) on 11/13/2019.  IgA and IgG negative.    *Prior leukocytosis, likely due to C. difficile colitis.  WBC 9.5     *Anemia.  Previously felt to likely due to C. difficile colitis.  Hb 14.1     *Prominent nodes within the hepatogastric ligament and around the celiac axis and in the retroperitoneum on MRI abdomen 11/11/2019.  · Etiology uncertain.  Radiologist recommended follow-up  · CT 2/19/2020, from 12/13/2019: Abdominal and pelvic and cardiophrenic LAD unchanged.  · CT 8/14/2020: Overall improvement with interval decrease in right cardiophrenic node, size of spleen, and degree of wall thickening and surrounding fat stranding in the colon.  Persistent abdominal pelvic and lower thoracic LAD, indeterminant etiology.    · Radiologist recommended continued follow-up.  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  I explained if this were the case it would not be expected to be curable and we would undergo observation instead of treatment regardless.  We would consider treatment if she develops a problem.  Therefore, I think further observation is very reasonable.  (The LAD might be due to her previous problems with extensive colitis and the mild splenomegaly might be due to the right hepatic lobe atrophy).  · CT 2/17/2021: Unchanged enlarged nodes, from 8/14/2020.  · Plan to complete 2 years CT follow-up sometime around November 2021, and if stable, then no more CT follow-up    *C. difficile colitis during November 2019 hospitalization.  Thought to perhaps be related to receiving 1 dose of antibiotics for breast surgery.  She was treated with Flagyl and oral vancomycin     *Extensive colitis again seen on CT 2/19/2020, unchanged from 11/10/2019.  Patient reports she had some recent diarrhea but it became better and is more solid and it does not seem similar to the diarrhea she had back in November during the diagnosis of C. difficile.  On 2/24/2020 she was told to contact her PCP if the diarrhea worsens.  In the meantime, we referred her back to Dr. Cano of ID and Dr. Berna Gonzalez of GI (she saw both during the C. difficile hospitalization November 2019).  · Dr. Cano appointment on 3/4/2020  · Dr. Berna Gonzalez colonoscopy 6/26/2020: Decreased mucosa vascular pattern in the entire examined colon, biopsied.  Biopsy showed no evidence of malignancy.  It did reveal prominent lymphoid aggregates.    *Chronic hepatitis with periportal and bridging fibrosis and architectural distortion (stage III fibrosis), per liver biopsy, 4/21/2021.  Pathologist stated overall findings worrisome for autoimmune hepatitis, but a drug reaction could not entirely be excluded.  Findings worrisome for early cirrhosis.  · Patient states Dr. Berna Gonzalez has told her she has autoimmune  hepatitis and primary biliary cholangitis  · Patient states she had a previous bile duct injury at the time of cholecystectomy, previously requiring a stent and now has narrowing of her bile ducts.  She states she was told this is contributing to her elevated transaminases.  · 2/19/2020: LFTs normal  · 2/26/2020: Arimidex and Xarelto started  · 8/24/2020: LFTs increased  · 4/29/2021: Arimidex and Xarelto stopped  · 5/14/2021: Prednisone 20 mg daily and ursodiol started by Dr. Berna Gonzalez, GI  · 7/6/2021: LFTs normalized.  · 8/13/2021: Next Dr. Berna Gonzalez appointment  · 9/30/2021: Appointment Dr. Adilson Hunt, liver specialist at [facility], for consultation  · It is difficult to know what caused her liver dysfunction.  LFTs have normalized with: Stopping Arimidex/Xarelto, starting prednisone 20 mg daily, and starting ursodiol.  Her breast cancer is low risk, grade 1, 5 mm, stage I, Ki-67 4.7%.  Oncotype DX recurrence score only 3.  Therefore, at least for now I think it is in her best interest to remain off hormonal therapy in case Arimidex was part of the reason for the liver injury.  If we wanted to try hormonal therapy in the future, we could consider Aromasin and consider prophylactic dose Eliquis (we were previously using therapeutic dose Xarelto with Arimidex due to portal vein thrombosis and a slight increased risk of clotting from an AI).  However, at least for now I would probably favor remaining off hormonal therapy    Plan  · Await upcoming office note from Dr. Berna Gonzalez on 8/13/2021 and Dr. Adilson Hunt, [facility] liver specialist on 9/30/2021.  · Likely remain off AI and if not on AI, remain off anticoagulation  · Appointment with me in 3 months.  LFTs and CBC at least the day prior  · Will CT abdomen with contrast to complete 2 years of CT follow-up sometime around November 2021 or so  ? (This will complete 2 years of CT follow-up.  If nodes remain unchanged, patient and I discussed possibly no further CT follow-up  after that)  · Last mammogram 5/25/2021, BI-RADS 3.  Radiologist recommended bilateral diagnostic mammogram October 2021.  Patient aware.  She gets mammograms through her gynecologist.    71 minutes.  Total time.  Same day.    Send copy this to  HANSA Garcia Dr., [facility] liver specialist

## 2021-07-07 ENCOUNTER — APPOINTMENT (OUTPATIENT)
Dept: LAB | Facility: HOSPITAL | Age: 66
End: 2021-07-07

## 2021-07-07 ENCOUNTER — OFFICE VISIT (OUTPATIENT)
Dept: ONCOLOGY | Facility: CLINIC | Age: 66
End: 2021-07-07

## 2021-07-07 VITALS
RESPIRATION RATE: 18 BRPM | OXYGEN SATURATION: 97 % | SYSTOLIC BLOOD PRESSURE: 132 MMHG | BODY MASS INDEX: 33.43 KG/M2 | HEART RATE: 82 BPM | HEIGHT: 63 IN | TEMPERATURE: 97.5 F | DIASTOLIC BLOOD PRESSURE: 79 MMHG | WEIGHT: 188.7 LBS

## 2021-07-07 DIAGNOSIS — C50.211 MALIGNANT NEOPLASM OF UPPER-INNER QUADRANT OF RIGHT BREAST IN FEMALE, ESTROGEN RECEPTOR POSITIVE (HCC): Primary | ICD-10-CM

## 2021-07-07 DIAGNOSIS — Z17.0 MALIGNANT NEOPLASM OF UPPER-INNER QUADRANT OF RIGHT BREAST IN FEMALE, ESTROGEN RECEPTOR POSITIVE (HCC): Primary | ICD-10-CM

## 2021-07-07 PROCEDURE — 99215 OFFICE O/P EST HI 40 MIN: CPT | Performed by: INTERNAL MEDICINE

## 2021-07-07 PROCEDURE — G2212 PROLONG OUTPT/OFFICE VIS: HCPCS | Performed by: INTERNAL MEDICINE

## 2021-07-07 RX ORDER — URSODIOL 500 MG/1
TABLET, FILM COATED ORAL
COMMUNITY
Start: 2021-05-15 | End: 2022-11-10

## 2021-07-13 ENCOUNTER — TELEPHONE (OUTPATIENT)
Dept: OBSTETRICS AND GYNECOLOGY | Facility: CLINIC | Age: 66
End: 2021-07-13

## 2021-07-13 DIAGNOSIS — N64.89 BREAST ASYMMETRY: ICD-10-CM

## 2021-07-13 DIAGNOSIS — Z09 FOLLOW-UP EXAM, 3-6 MONTHS SINCE PREVIOUS EXAM: Primary | ICD-10-CM

## 2021-08-13 ENCOUNTER — OFFICE (OUTPATIENT)
Dept: URBAN - METROPOLITAN AREA CLINIC 65 | Facility: CLINIC | Age: 66
End: 2021-08-13

## 2021-08-13 ENCOUNTER — TELEPHONE (OUTPATIENT)
Dept: ONCOLOGY | Facility: CLINIC | Age: 66
End: 2021-08-13

## 2021-08-13 VITALS
HEART RATE: 89 BPM | DIASTOLIC BLOOD PRESSURE: 76 MMHG | HEIGHT: 63 IN | SYSTOLIC BLOOD PRESSURE: 122 MMHG | WEIGHT: 197 LBS

## 2021-08-13 DIAGNOSIS — K75.4 AUTOIMMUNE HEPATITIS: ICD-10-CM

## 2021-08-13 DIAGNOSIS — K74.02 HEPATIC FIBROSIS, ADVANCED FIBROSIS: ICD-10-CM

## 2021-08-13 DIAGNOSIS — K83.01 PRIMARY SCLEROSING CHOLANGITIS: ICD-10-CM

## 2021-08-13 PROCEDURE — 99214 OFFICE O/P EST MOD 30 MIN: CPT | Performed by: INTERNAL MEDICINE

## 2021-08-13 NOTE — TELEPHONE ENCOUNTER
Spoke w/ pt and explained to her we can not do another lab ordered by someone else unless she is coming in for another apt with us already. She oked

## 2021-08-13 NOTE — TELEPHONE ENCOUNTER
Caller: ALIX     Relationship: PATIENT    Best call back number: [phone]    Type of visit: LABS    Requested date: NEXT AVAILABLE APPT    Additional notes: HER GASTRO DR, DR FERRARA, GAVE HER A LAB ORDER FOR CBC DIFF TO HAVE DONE ASAP. SHE'D LIKE TO HAVE THESE DONE AT [facility]; SHE THOUGHT DR WILLS MAY WANT TO SEE THE RESULTS AS WELL.

## 2021-08-16 ENCOUNTER — LAB (OUTPATIENT)
Dept: LAB | Facility: HOSPITAL | Age: 66
End: 2021-08-16

## 2021-08-16 ENCOUNTER — TRANSCRIBE ORDERS (OUTPATIENT)
Dept: LAB | Facility: HOSPITAL | Age: 66
End: 2021-08-16

## 2021-08-16 DIAGNOSIS — K75.4 CHRONIC AGGRESSIVE HEPATITIS (HCC): Primary | ICD-10-CM

## 2021-08-16 DIAGNOSIS — K74.3 CHOLANGITIC CIRRHOSIS (HCC): ICD-10-CM

## 2021-08-16 DIAGNOSIS — K75.4 CHRONIC AGGRESSIVE HEPATITIS (HCC): ICD-10-CM

## 2021-08-16 LAB
ALBUMIN SERPL-MCNC: 3.4 G/DL (ref 3.5–5.2)
ALBUMIN/GLOB SERPL: 1.1 G/DL
ALP SERPL-CCNC: 117 U/L (ref 39–117)
ALT SERPL W P-5'-P-CCNC: 21 U/L (ref 1–33)
ANION GAP SERPL CALCULATED.3IONS-SCNC: 7.6 MMOL/L (ref 5–15)
AST SERPL-CCNC: 25 U/L (ref 1–32)
BASOPHILS # BLD AUTO: 0.05 10*3/MM3 (ref 0–0.2)
BASOPHILS NFR BLD AUTO: 0.5 % (ref 0–1.5)
BILIRUB SERPL-MCNC: 0.7 MG/DL (ref 0–1.2)
BUN SERPL-MCNC: 15 MG/DL (ref 8–23)
BUN/CREAT SERPL: 16.9 (ref 7–25)
CALCIUM SPEC-SCNC: 8.7 MG/DL (ref 8.6–10.5)
CHLORIDE SERPL-SCNC: 104 MMOL/L (ref 98–107)
CO2 SERPL-SCNC: 29.4 MMOL/L (ref 22–29)
CREAT SERPL-MCNC: 0.89 MG/DL (ref 0.57–1)
DEPRECATED RDW RBC AUTO: 47 FL (ref 37–54)
EOSINOPHIL # BLD AUTO: 0.14 10*3/MM3 (ref 0–0.4)
EOSINOPHIL NFR BLD AUTO: 1.3 % (ref 0.3–6.2)
ERYTHROCYTE [DISTWIDTH] IN BLOOD BY AUTOMATED COUNT: 13.1 % (ref 12.3–15.4)
GFR SERPL CREATININE-BSD FRML MDRD: 64 ML/MIN/1.73
GLOBULIN UR ELPH-MCNC: 3.1 GM/DL
GLUCOSE SERPL-MCNC: 90 MG/DL (ref 65–99)
HCT VFR BLD AUTO: 42.7 % (ref 34–46.6)
HGB BLD-MCNC: 14.3 G/DL (ref 12–15.9)
IMM GRANULOCYTES # BLD AUTO: 0.04 10*3/MM3 (ref 0–0.05)
IMM GRANULOCYTES NFR BLD AUTO: 0.4 % (ref 0–0.5)
LYMPHOCYTES # BLD AUTO: 3.76 10*3/MM3 (ref 0.7–3.1)
LYMPHOCYTES NFR BLD AUTO: 35.5 % (ref 19.6–45.3)
MCH RBC QN AUTO: 32.2 PG (ref 26.6–33)
MCHC RBC AUTO-ENTMCNC: 33.5 G/DL (ref 31.5–35.7)
MCV RBC AUTO: 96.2 FL (ref 79–97)
MONOCYTES # BLD AUTO: 1.14 10*3/MM3 (ref 0.1–0.9)
MONOCYTES NFR BLD AUTO: 10.8 % (ref 5–12)
NEUTROPHILS NFR BLD AUTO: 5.45 10*3/MM3 (ref 1.7–7)
NEUTROPHILS NFR BLD AUTO: 51.5 % (ref 42.7–76)
NRBC BLD AUTO-RTO: 0 /100 WBC (ref 0–0.2)
PLATELET # BLD AUTO: 131 10*3/MM3 (ref 140–450)
PMV BLD AUTO: 10.3 FL (ref 6–12)
POTASSIUM SERPL-SCNC: 4.1 MMOL/L (ref 3.5–5.2)
PROT SERPL-MCNC: 6.5 G/DL (ref 6–8.5)
RBC # BLD AUTO: 4.44 10*6/MM3 (ref 3.77–5.28)
SODIUM SERPL-SCNC: 141 MMOL/L (ref 136–145)
WBC # BLD AUTO: 10.58 10*3/MM3 (ref 3.4–10.8)

## 2021-08-16 PROCEDURE — 82542 COL CHROMOTOGRAPHY QUAL/QUAN: CPT

## 2021-08-16 PROCEDURE — 82787 IGG 1 2 3 OR 4 EACH: CPT

## 2021-08-16 PROCEDURE — 82784 ASSAY IGA/IGD/IGG/IGM EACH: CPT

## 2021-08-16 PROCEDURE — 36415 COLL VENOUS BLD VENIPUNCTURE: CPT

## 2021-08-16 PROCEDURE — 80053 COMPREHEN METABOLIC PANEL: CPT

## 2021-08-16 PROCEDURE — 82657 ENZYME CELL ACTIVITY: CPT

## 2021-08-16 PROCEDURE — 85025 COMPLETE CBC W/AUTO DIFF WBC: CPT

## 2021-08-17 LAB
IGG SERPL-MCNC: 1315 MG/DL (ref 586–1602)
IGG1 SER-MCNC: 698 MG/DL (ref 248–810)
IGG2 SER-MCNC: 418 MG/DL (ref 130–555)
IGG3 SER-MCNC: 7 MG/DL (ref 15–102)
IGG4 SER-MCNC: 14 MG/DL (ref 2–96)

## 2021-08-20 LAB — REF LAB TEST METHOD: NORMAL

## 2021-09-15 ENCOUNTER — TELEPHONE (OUTPATIENT)
Dept: ONCOLOGY | Facility: CLINIC | Age: 66
End: 2021-09-15

## 2021-09-15 NOTE — TELEPHONE ENCOUNTER
Caller: Taylor Larsen    Relationship: Self    Best call back number: [phone]    What is the best time to reach you: ANYTIME    Who are you requesting to speak with (clinical staff, provider,  specific staff member): DR WILLS    Do you know the name of the person who called: TAYLOR    What was the call regarding:     WANTED TO LET DR WILLS HAD TOOK COVID TEST Monday 9/13, AND FOUND OUT TESTED POSITIVE ON Tuesday 09/14    CURRENTLY EXPERIENCING SMALL COUGH, AND SINUS HEADACHE.       TOLD TO QUARANTINE FOR 10 DAYS, 09/23 WILL BE 10TH DAY     COMPLETED BOTH VACCINES IN April 2021    Do you require a callback: NO

## 2021-09-20 ENCOUNTER — TELEPHONE (OUTPATIENT)
Dept: ONCOLOGY | Facility: CLINIC | Age: 66
End: 2021-09-20

## 2021-09-20 NOTE — TELEPHONE ENCOUNTER
Caller: ALIX     Relationship to patient: SELF     Best call back number:[phone]    PATIENT HAS A LAB ON 09-27. THIS WILL BE HER LAST DAY FROM BEING QUARANTINED WITH COVID. SHE WAS WONDERING IF SHE COULD R/S TO THE 28TH BECAUSE SHE HAS A 9-29 FOLLOW UP 2  WITH DR. WILLS.   PLEASE CALL AND ADVISE   THANK YOU   COPIED FROM THE CHART

## 2021-09-27 ENCOUNTER — APPOINTMENT (OUTPATIENT)
Dept: LAB | Facility: HOSPITAL | Age: 66
End: 2021-09-27

## 2021-09-28 ENCOUNTER — TRANSCRIBE ORDERS (OUTPATIENT)
Dept: ADMINISTRATIVE | Facility: HOSPITAL | Age: 66
End: 2021-09-28

## 2021-09-28 ENCOUNTER — TELEPHONE (OUTPATIENT)
Dept: ONCOLOGY | Facility: CLINIC | Age: 66
End: 2021-09-28

## 2021-09-28 ENCOUNTER — LAB (OUTPATIENT)
Dept: LAB | Facility: HOSPITAL | Age: 66
End: 2021-09-28

## 2021-09-28 DIAGNOSIS — R74.8 ACID PHOSPHATASE ELEVATED: ICD-10-CM

## 2021-09-28 DIAGNOSIS — K75.4 CHRONIC AGGRESSIVE HEPATITIS (HCC): Primary | ICD-10-CM

## 2021-09-28 DIAGNOSIS — Z92.25 STATUS POST RECENT IMMUNOSUPPRESSIVE THERAPY: ICD-10-CM

## 2021-09-28 DIAGNOSIS — K74.3 CHOLANGITIC CIRRHOSIS (HCC): ICD-10-CM

## 2021-09-28 DIAGNOSIS — K75.4 CHRONIC AGGRESSIVE HEPATITIS (HCC): ICD-10-CM

## 2021-09-28 LAB
ALBUMIN SERPL-MCNC: 3.5 G/DL (ref 3.5–5.2)
ALBUMIN/GLOB SERPL: 1.1 G/DL
ALP SERPL-CCNC: 85 U/L (ref 39–117)
ALT SERPL W P-5'-P-CCNC: 18 U/L (ref 1–33)
ANION GAP SERPL CALCULATED.3IONS-SCNC: 10.5 MMOL/L (ref 5–15)
AST SERPL-CCNC: 28 U/L (ref 1–32)
BASOPHILS # BLD AUTO: 0.05 10*3/MM3 (ref 0–0.2)
BASOPHILS NFR BLD AUTO: 0.5 % (ref 0–1.5)
BILIRUB SERPL-MCNC: 0.7 MG/DL (ref 0–1.2)
BUN SERPL-MCNC: 15 MG/DL (ref 8–23)
BUN/CREAT SERPL: 20.8 (ref 7–25)
CALCIUM SPEC-SCNC: 8.8 MG/DL (ref 8.6–10.5)
CHLORIDE SERPL-SCNC: 103 MMOL/L (ref 98–107)
CO2 SERPL-SCNC: 30.5 MMOL/L (ref 22–29)
CREAT SERPL-MCNC: 0.72 MG/DL (ref 0.57–1)
DEPRECATED RDW RBC AUTO: 49.2 FL (ref 37–54)
EOSINOPHIL # BLD AUTO: 0.08 10*3/MM3 (ref 0–0.4)
EOSINOPHIL NFR BLD AUTO: 0.8 % (ref 0.3–6.2)
ERYTHROCYTE [DISTWIDTH] IN BLOOD BY AUTOMATED COUNT: 13.5 % (ref 12.3–15.4)
GFR SERPL CREATININE-BSD FRML MDRD: 81 ML/MIN/1.73
GLOBULIN UR ELPH-MCNC: 3.3 GM/DL
GLUCOSE SERPL-MCNC: 78 MG/DL (ref 65–99)
HCT VFR BLD AUTO: 43.1 % (ref 34–46.6)
HGB BLD-MCNC: 14.2 G/DL (ref 12–15.9)
IMM GRANULOCYTES # BLD AUTO: 0.04 10*3/MM3 (ref 0–0.05)
IMM GRANULOCYTES NFR BLD AUTO: 0.4 % (ref 0–0.5)
LYMPHOCYTES # BLD AUTO: 3.89 10*3/MM3 (ref 0.7–3.1)
LYMPHOCYTES NFR BLD AUTO: 41 % (ref 19.6–45.3)
MCH RBC QN AUTO: 31.8 PG (ref 26.6–33)
MCHC RBC AUTO-ENTMCNC: 32.9 G/DL (ref 31.5–35.7)
MCV RBC AUTO: 96.6 FL (ref 79–97)
MONOCYTES # BLD AUTO: 0.79 10*3/MM3 (ref 0.1–0.9)
MONOCYTES NFR BLD AUTO: 8.3 % (ref 5–12)
NEUTROPHILS NFR BLD AUTO: 4.64 10*3/MM3 (ref 1.7–7)
NEUTROPHILS NFR BLD AUTO: 49 % (ref 42.7–76)
NRBC BLD AUTO-RTO: 0 /100 WBC (ref 0–0.2)
PLATELET # BLD AUTO: 148 10*3/MM3 (ref 140–450)
PMV BLD AUTO: 10.1 FL (ref 6–12)
POTASSIUM SERPL-SCNC: 3.5 MMOL/L (ref 3.5–5.2)
PROT SERPL-MCNC: 6.8 G/DL (ref 6–8.5)
RBC # BLD AUTO: 4.46 10*6/MM3 (ref 3.77–5.28)
SODIUM SERPL-SCNC: 144 MMOL/L (ref 136–145)
WBC # BLD AUTO: 9.49 10*3/MM3 (ref 3.4–10.8)

## 2021-09-28 PROCEDURE — 80053 COMPREHEN METABOLIC PANEL: CPT

## 2021-09-28 PROCEDURE — 36415 COLL VENOUS BLD VENIPUNCTURE: CPT

## 2021-09-28 PROCEDURE — 85025 COMPLETE CBC W/AUTO DIFF WBC: CPT

## 2021-09-28 NOTE — PROGRESS NOTES
.     REASONS FOR FOLLOWUP: Portal vein thrombosis, breast cancer    HISTORY OF PRESENT ILLNESS:  The patient is a 66 y.o. year old female  who is here for follow-up with the above-mentioned history.    No new problems.  States Imuran was added early September by Dr. Berna Gonzalez.  She was continued on prednisone 10 mg twice per day    Also continues on ursodiol.    No neurological symptoms, SOA, problems eating, significant areas of pain.    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)    • Breast cancer (CMS/HCC) 09/2019    Right IDC   • Clostridium difficile infection 12/2019   • GERD (gastroesophageal reflux disease)    • History of Clostridium difficile colitis    • Hx of radiation therapy    • IBS (irritable bowel syndrome)    • PONV (postoperative nausea and vomiting)    • Radiation-induced angiosarcoma of breast (CMS/HCC)    • Situational stress     DEATH OF BROTHER 10/2019, RECENT DIAGNOSIS   • Stress headaches      Past Surgical History:   Procedure Laterality Date   • BILE DUCT STENT PLACEMENT  2014   • BREAST BIOPSY Right 09/2019    malignant   • BREAST LUMPECTOMY Right    • BREAST LUMPECTOMY WITH SENTINEL NODE BIOPSY Right 11/4/2019    Procedure: BREAST LUMPECTOMY WITH SENTINEL NODE BIOPSY AND NEEDLE LOCALIZATION And possible axillary dissection;  Surgeon: Jean-Paul Ramos MD;  Location: [facility] OR [facility];  Service: General   • COLONOSCOPY     • COLONOSCOPY N/A 6/26/2020    Procedure: COLONOSCOPY TO TERMINAL ILEUM WITH BX;  Surgeon: Sage Gonzalez MD;  Location: [facility] ENDOSCOPY;  Service: Gastroenterology;  Laterality: N/A;  PREOP/ SCREENING  POSTOP/ VASCULAR PATTERN TO COLON   • KNEE ARTHROSCOPY Left    • LAPAROSCOPIC CHOLECYSTECTOMY  2014   • TUBAL ABDOMINAL LIGATION         MEDICATIONS    Current Outpatient Medications:   •  acetaminophen (TYLENOL) 325 MG tablet, Take 650 mg by mouth Every 6 (Six) Hours As Needed for Mild Pain .,  Disp: , Rfl:   •  anastrozole (ARIMIDEX) 1 MG tablet, Take 1 tablet by mouth Daily., Disp: 90 tablet, Rfl: 1  •  azaTHIOprine (IMURAN) 50 MG tablet, Take 50 mg by mouth Daily., Disp: , Rfl:   •  doxylamine (UNISOM) 25 MG tablet, Take 25 mg by mouth At Night As Needed., Disp: , Rfl:   •  famotidine (PEPCID) 40 MG tablet, , Disp: , Rfl:   •  melatonin 5 MG tablet tablet, Take 10 mg by mouth At Night As Needed., Disp: , Rfl:   •  predniSONE (DELTASONE) 10 MG tablet, Take 10 mg by mouth 2 (two) times a day., Disp: , Rfl:   •  Probiotic Product (PROBIOTIC PO), Take 1 tablet by mouth Daily., Disp: , Rfl:   •  rivaroxaban (XARELTO) 20 MG tablet, Take 1 tablet by mouth Daily., Disp: 28 tablet, Rfl: 0  •  ursodiol (ACTIGALL) 500 MG tablet, , Disp: , Rfl:     ALLERGIES:   No Known Allergies    SOCIAL HISTORY:       Social History     Socioeconomic History   • Marital status:      Spouse name: Not on file   • Number of children: 2   • Years of education: Not on file   • Highest education level: Not on file   Tobacco Use   • Smoking status: Former Smoker     Packs/day: 0.50     Years: 10.00     Pack years: 5.00     Types: Cigarettes     Quit date:      Years since quittin.7   • Smokeless tobacco: Former User   Substance and Sexual Activity   • Alcohol use: No   • Drug use: No   • Sexual activity: Yes     Birth control/protection: Post-menopausal, Surgical         FAMILY HISTORY:  Family History   Problem Relation Age of Onset   • Alcohol abuse Father    • Heart failure Mother    • Diabetes Brother    • Prostate cancer Brother    • Throat cancer Brother    • Breast cancer Maternal Aunt    • Breast cancer Maternal Cousin    • Breast cancer Maternal Cousin    • Malig Hyperthermia Neg Hx        REVIEW OF SYSTEMS:  Review of Systems   Constitutional: Negative for activity change.   HENT: Negative for nosebleeds and trouble swallowing.    Respiratory: Negative for shortness of breath and wheezing.    Cardiovascular:  "Negative for chest pain and palpitations.   Gastrointestinal: Negative for constipation, diarrhea and nausea.   Genitourinary: Negative for dysuria and hematuria.   Musculoskeletal: Negative for arthralgias and myalgias.   Skin: Negative for rash and wound.   Neurological: Negative for seizures and syncope.   Hematological: Negative for adenopathy. Does not bruise/bleed easily.   Psychiatric/Behavioral: Negative for confusion.            Vitals:    09/29/21 0852   BP: 139/85   Pulse: 87   Resp: 18   Temp: 97.1 °F (36.2 °C)   TempSrc: Temporal   SpO2: 97%   Weight: 89.6 kg (197 lb 9.6 oz)   Height: 160 cm (62.99\")   PainSc: 0-No pain     Current Status 9/29/2021   ECOG score 0        PHYSICAL EXAM:        CONSTITUTIONAL:  Vital signs reviewed.  No distress, looks comfortable.  EYES:  Conjunctiva and lids unremarkable.  PERRLA  EARS,NOSE,MOUTH,THROAT:  Ears and nose appear unremarkable.  Lips, teeth, gums appear unremarkable.  RESPIRATORY:  Normal respiratory effort.  Lungs clear to auscultation bilaterally.  CARDIOVASCULAR:  Normal S1, S2.  No murmurs rubs or gallops.  No significant lower extremity edema.  GASTROINTESTINAL: Abdomen appears unremarkable.  Nontender.  No hepatomegaly.  No splenomegaly.  LYMPHATIC:  No cervical, supraclavicular, axillary lymphadenopathy.  SKIN:  Warm.  No rashes.  PSYCHIATRIC:  Normal judgment and insight.  Normal mood and affect.          RECENT LABS:        WBC   Date/Time Value Ref Range Status   09/28/2021 08:07 AM 9.49 3.40 - 10.80 10*3/mm3 Final     Hemoglobin   Date/Time Value Ref Range Status   09/28/2021 08:07 AM 14.2 12.0 - 15.9 g/dL Final     Platelets   Date/Time Value Ref Range Status   09/28/2021 08:07  140 - 450 10*3/mm3 Final       Assessment/Plan   There are no diagnoses linked to this encounter.  *Right breast cancer.  Invasive ductal adenocarcinoma.  Upper inner quadrant  · Right lumpectomy by Dr. Ramos 11/4/2019.  1:00.  Overall grade 1.  Greatest dimension " 5 mm.  Single focus of invasive carcinoma.  No lymphovascular invasion.  Margins negative.  0 out of 2 nodes involved.  · [id number], stage 1  · ER 91%.  ME 96%.  HER-2 negative.  Ki-67 4.7% (favorable).  · (Initial needle biopsy 9/16/2019: Low-grade invasive ductal carcinoma, grade 1.  5 mm in greatest dimension.)  · (Initial mammogram measured the lesion at 5 mm)  · From the information we currently know about her cancer, I do not recommend chemotherapy.  I do recommend hormonal therapy for 5 years.  Hopefully, aromatase inhibitors if DEXA scan allows (with portal vein thrombosis would like to avoid tamoxifen).  · Oncotype DX: Recurrence score 3, 3% distant recurrence risk at 9 years if tamoxifen or AI is used.  · Therefore, do not recommend chemotherapy.  · Adjuvant XRT completed 2/12/2020  · Arimidex 2/26/2020- 2/26/2025  · Arimidex stopped 4/29/2021 due to concern for possible drug-induced liver injury.  Might try to resume hormonal therapy with Aromasin at some point but for now remain off hormonal therapy  · 9/29/2021: Remain off Arimidex and anticoagulation for now as this was a low risk breast cancer and these might be the cause of her liver dysfunction.  She has not yet seen the hepatologist, Dr. Adilson Hunt.  She sees him tomorrow.  I told her unless Dr. Hunt thinks there is very little chance Arimidex or Xarelto caused her liver dysfunction, we would likely remain off Arimidex Xarelto.    *Mild hand joint achiness since beginning Arimidex.    Currently holding Arimidex.    *Bone health.  · DEXA 12/2/2019: Normal.  T score lumbar spine 0.1, T score left hip 0.1, T score right hip 0.6.  · She takes 2-4 times daily.  Therefore, did not begin a calcium supplement when a rheumatic started on 12/9/2019.  She was encouraged to take a PPI on a schedule and follow-up with her PCP.  States she has had several EGDs in the past none of which have shown problems.  (States in the past her PCP told her to stop calcium  supplements because she developed hypercalcemia due to the combination of calcium supplements and Tums).     *Portal vein thrombosis (right branch of portal vein).  · No evidence of cirrhosis on MRI or ultrasound.  Dr. Berna Gonzalez, GI note, has no mention of cirrhosis.  · Hormonal therapy for breast cancer has a slight increased risk of thrombosis.  Therefore, I think it is in her best interest to undergo anticoagulation until she completes hormonal therapy.  · Generally, Lovenox or Coumadin are the preferred agents.  However, because of significant elevations in INR, and the desire for an oral agent, changed to Xarelto on 2/19/2020.   · Previous plan was to remain on therapeutic dose Xarelto until completes Arimidex.   · 4/29/2021, Xarelto (along with Arimidex) stopped due to concern for possible drug-induced liver injury.  · No signs of recurrent clotting    *Risk factors for portal vein thrombosis:  · Unremarkable: Prothrombin gene mutation, factor V Leiden gene mutation, lupus anticoagulant, beta-2 glycoprotein antibodies, anti-thrombin, protein S free.    *Low protein S activity, 14% (normal range %), on 11/13/2019     *Low protein C activity, 44% (normal range %) on 11/13/2019.    *Indeterminant anticardiolipin  · IgM 14 (indeterminate range 13-20) on 11/13/2019.  IgA and IgG negative.    *Prior leukocytosis, likely due to C. difficile colitis.  WBC 9.5     *Anemia.  Previously felt to likely due to C. difficile colitis.  Hb 14.2     *Prominent nodes within the hepatogastric ligament and around the celiac axis and in the retroperitoneum on MRI abdomen 11/11/2019.  · Etiology uncertain.  Radiologist recommended follow-up  · CT 2/19/2020, from 12/13/2019: Abdominal and pelvic and cardiophrenic LAD unchanged.  · CT 8/14/2020: Overall improvement with interval decrease in right cardiophrenic node, size of spleen, and degree of wall thickening and surrounding fat stranding in the colon.  Persistent  abdominal pelvic and lower thoracic LAD, indeterminant etiology.    · Radiologist recommended continued follow-up.  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  I explained if this were the case it would not be expected to be curable and we would undergo observation instead of treatment regardless.  We would consider treatment if she develops a problem.  Therefore, I think further observation is very reasonable.  (The LAD might be due to her previous problems with extensive colitis and the mild splenomegaly might be due to the right hepatic lobe atrophy).  · CT 2/17/2021: Unchanged enlarged nodes, from 8/14/2020.  · Plan to complete 2 years CT follow-up sometime around November 2021, and if stable, then no more CT follow-up  Check the CT at next visit    *C. difficile colitis during November 2019 hospitalization.  Thought to perhaps be related to receiving 1 dose of antibiotics for breast surgery.  She was treated with Flagyl and oral vancomycin     *Extensive colitis again seen on CT 2/19/2020, unchanged from 11/10/2019.  Patient reports she had some recent diarrhea but it became better and is more solid and it does not seem similar to the diarrhea she had back in November during the diagnosis of C. difficile.  On 2/24/2020 she was told to contact her PCP if the diarrhea worsens.  In the meantime, we referred her back to Dr. Cano of ID and Dr. Berna Gonzalez of GI (she saw both during the C. difficile hospitalization November 2019).  · Dr. Cano appointment on 3/4/2020  · Dr. Berna Gonzalez colonoscopy 6/26/2020: Decreased mucosa vascular pattern in the entire examined colon, biopsied.  Biopsy showed no evidence of malignancy.  It did reveal prominent lymphoid aggregates.    *Chronic hepatitis with periportal and bridging fibrosis and architectural distortion (stage III fibrosis), per liver biopsy, 4/21/2021.  Pathologist stated overall findings worrisome  for autoimmune hepatitis, but a drug reaction could not entirely be excluded.  Findings worrisome for early cirrhosis.  · Patient states Dr. Berna Gonzalez has told her she has autoimmune hepatitis and primary biliary cholangitis  · Patient states she had a previous bile duct injury at the time of cholecystectomy, previously requiring a stent and now has narrowing of her bile ducts.  She states she was told this is contributing to her elevated transaminases.  · 2/19/2020: LFTs normal  · 2/26/2020: Arimidex and Xarelto started  · 8/24/2020: LFTs increased  · 4/29/2021: Arimidex and Xarelto stopped  · 5/14/2021: Prednisone 20 mg daily and ursodiol started by Dr. Berna Gonzalez, GI  · 7/6/2021: LFTs normalized.  · 8/13/2021: Next Dr. Berna Gonzalez appointment  · 9/30/2021: Appointment Dr. Adilson Hunt, liver specialist at [facility], for consultation  · It is difficult to know what caused her liver dysfunction.  LFTs have normalized with: Stopping Arimidex/Xarelto, starting prednisone 20 mg daily, and starting ursodiol.  Her breast cancer is low risk, grade 1, 5 mm, stage I, Ki-67 4.7%.  Oncotype DX recurrence score only 3.  Therefore, at least for now I think it is in her best interest to remain off hormonal therapy in case Arimidex was part of the reason for the liver injury.  If we wanted to try hormonal therapy in the future, we could consider Aromasin and consider prophylactic dose Eliquis (we were previously using therapeutic dose Xarelto with Arimidex due to portal vein thrombosis and a slight increased risk of clotting from an AI).  However, at least for now I would probably favor remaining off hormonal therapy    Plan  · MD mid-to-late November.  1 week prior: CT abdomen with contrast, CBC, CMP  · Await upcoming office note from  Dr. Adilson Hunt, [facility] liver specialist on 9/30/2021.  · Likely remain off AI and if not on AI, remain off anticoagulation    · The upcoming CT November 2021 will complete 2 years of CT  follow-up  ? (This will complete 2 years of CT follow-up.  If nodes remain unchanged, patient and I discussed possibly no further CT follow-up after that)  · Last mammogram 5/25/2021, BI-RADS 3.  Radiologist recommended bilateral diagnostic mammogram October 2021.  Patient aware.  She gets mammograms through her gynecologist.    41 minutes.  Total time.  Same day.    Send copy this to  HAN[address] [facility] liver specialist

## 2021-09-28 NOTE — TELEPHONE ENCOUNTER
Caller: ALIX    Relationship: PATIENT    Best call back number: [phone]     What is the best time to reach you: ANYTIME    What was the call regarding: SHE HAD LABS THIS MORNING AT THE HOSPITAL FOR DR MILLER, WHICH ARE THE SAME ORDERS AS WHAT DR WILLS ORDERED, SO SHE'S WANTING TO KNOW IF THOSE LABS CAN BE USED FOR HER APPT TOMORROW 09/29 RATHER THAN HAVE LABS DRAWN AGAIN    Do you require a callback: YES

## 2021-09-29 ENCOUNTER — APPOINTMENT (OUTPATIENT)
Dept: LAB | Facility: HOSPITAL | Age: 66
End: 2021-09-29

## 2021-09-29 ENCOUNTER — OFFICE VISIT (OUTPATIENT)
Dept: ONCOLOGY | Facility: CLINIC | Age: 66
End: 2021-09-29

## 2021-09-29 VITALS
HEART RATE: 87 BPM | SYSTOLIC BLOOD PRESSURE: 139 MMHG | DIASTOLIC BLOOD PRESSURE: 85 MMHG | TEMPERATURE: 97.1 F | OXYGEN SATURATION: 97 % | WEIGHT: 197.6 LBS | BODY MASS INDEX: 35.01 KG/M2 | RESPIRATION RATE: 18 BRPM | HEIGHT: 63 IN

## 2021-09-29 DIAGNOSIS — Z17.0 MALIGNANT NEOPLASM OF UPPER-INNER QUADRANT OF RIGHT BREAST IN FEMALE, ESTROGEN RECEPTOR POSITIVE (HCC): Primary | ICD-10-CM

## 2021-09-29 DIAGNOSIS — C50.211 MALIGNANT NEOPLASM OF UPPER-INNER QUADRANT OF RIGHT BREAST IN FEMALE, ESTROGEN RECEPTOR POSITIVE (HCC): Primary | ICD-10-CM

## 2021-09-29 PROCEDURE — 99215 OFFICE O/P EST HI 40 MIN: CPT | Performed by: INTERNAL MEDICINE

## 2021-09-29 RX ORDER — AZATHIOPRINE 50 MG/1
50 TABLET ORAL DAILY
COMMUNITY
End: 2022-11-10

## 2021-10-06 ENCOUNTER — HOSPITAL ENCOUNTER (OUTPATIENT)
Dept: MAMMOGRAPHY | Facility: HOSPITAL | Age: 66
Discharge: HOME | End: 2021-10-06
Admitting: OBSTETRICS & GYNECOLOGY

## 2021-10-06 DIAGNOSIS — N64.89 BREAST ASYMMETRY: ICD-10-CM

## 2021-10-06 DIAGNOSIS — Z09 FOLLOW-UP EXAM, 3-6 MONTHS SINCE PREVIOUS EXAM: ICD-10-CM

## 2021-10-06 PROCEDURE — 77066 DX MAMMO INCL CAD BI: CPT

## 2021-10-06 PROCEDURE — G0279 TOMOSYNTHESIS, MAMMO: HCPCS

## 2021-10-18 ENCOUNTER — TELEPHONE (OUTPATIENT)
Dept: ONCOLOGY | Facility: CLINIC | Age: 66
End: 2021-10-18

## 2021-10-18 NOTE — TELEPHONE ENCOUNTER
Called dental office to inform them Dr. Rodgers has no concerns from a hem-onc standpoint and she may proceed with a tooth extraction.

## 2021-11-03 ENCOUNTER — HOSPITAL ENCOUNTER (OUTPATIENT)
Dept: CT IMAGING | Facility: HOSPITAL | Age: 66
Discharge: HOME | End: 2021-11-03
Admitting: INTERNAL MEDICINE

## 2021-11-03 ENCOUNTER — HOSPITAL ENCOUNTER (OUTPATIENT)
Dept: PET IMAGING | Facility: HOSPITAL | Age: 66
End: 2021-11-03

## 2021-11-03 DIAGNOSIS — Z17.0 MALIGNANT NEOPLASM OF UPPER-INNER QUADRANT OF RIGHT BREAST IN FEMALE, ESTROGEN RECEPTOR POSITIVE (HCC): ICD-10-CM

## 2021-11-03 DIAGNOSIS — C50.211 MALIGNANT NEOPLASM OF UPPER-INNER QUADRANT OF RIGHT BREAST IN FEMALE, ESTROGEN RECEPTOR POSITIVE (HCC): ICD-10-CM

## 2021-11-03 LAB — CREAT BLDA-MCNC: 1 MG/DL (ref 0.6–1.3)

## 2021-11-03 PROCEDURE — 82565 ASSAY OF CREATININE: CPT

## 2021-11-03 PROCEDURE — 25010000002 IOPAMIDOL 61 % SOLUTION: Performed by: INTERNAL MEDICINE

## 2021-11-03 PROCEDURE — 74160 CT ABDOMEN W/CONTRAST: CPT

## 2021-11-03 RX ADMIN — IOPAMIDOL 85 ML: 612 INJECTION, SOLUTION INTRAVENOUS at 10:44

## 2021-11-09 NOTE — PROGRESS NOTES
.     REASONS FOR FOLLOWUP: Portal vein thrombosis, breast cancer    HISTORY OF PRESENT ILLNESS:  The patient is a 66 y.o. year old female  who is here for follow-up with the above-mentioned history.    No new problems.  No neurological symptoms, nausea, problems eating, SOA.    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 (HCC)    • Breast cancer (HCC) 09/2019    Right IDC   • Clostridium difficile infection 12/2019   • GERD (gastroesophageal reflux disease)    • History of Clostridium difficile colitis    • Hx of radiation therapy    • IBS (irritable bowel syndrome)    • PONV (postoperative nausea and vomiting)    • Radiation-induced angiosarcoma of breast (HCC)    • Situational stress     DEATH OF BROTHER 10/2019, RECENT DIAGNOSIS   • Stress headaches      Past Surgical History:   Procedure Laterality Date   • BILE DUCT STENT PLACEMENT  2014   • BREAST BIOPSY Right 09/2019    malignant   • BREAST LUMPECTOMY Right    • BREAST LUMPECTOMY WITH SENTINEL NODE BIOPSY Right 11/4/2019    Procedure: BREAST LUMPECTOMY WITH SENTINEL NODE BIOPSY AND NEEDLE LOCALIZATION And possible axillary dissection;  Surgeon: Jean-Paul Ramos MD;  Location: [facility] OR [facility];  Service: General   • COLONOSCOPY     • COLONOSCOPY N/A 6/26/2020    Procedure: COLONOSCOPY TO TERMINAL ILEUM WITH BX;  Surgeon: Sage Gonzalez MD;  Location: [facility] ENDOSCOPY;  Service: Gastroenterology;  Laterality: N/A;  PREOP/ SCREENING  POSTOP/ VASCULAR PATTERN TO COLON   • KNEE ARTHROSCOPY Left    • LAPAROSCOPIC CHOLECYSTECTOMY  2014   • TUBAL ABDOMINAL LIGATION         MEDICATIONS    Current Outpatient Medications:   •  acetaminophen (TYLENOL) 325 MG tablet, Take 650 mg by mouth Every 6 (Six) Hours As Needed for Mild Pain ., Disp: , Rfl:   •  anastrozole (ARIMIDEX) 1 MG tablet, Take 1 tablet by mouth Daily., Disp: 90 tablet, Rfl: 1  •  azaTHIOprine (IMURAN) 50 MG tablet, Take 50 mg by mouth Daily.,  Disp: , Rfl:   •  doxylamine (UNISOM) 25 MG tablet, Take 25 mg by mouth At Night As Needed., Disp: , Rfl:   •  famotidine (PEPCID) 40 MG tablet, , Disp: , Rfl:   •  melatonin 5 MG tablet tablet, Take 10 mg by mouth At Night As Needed., Disp: , Rfl:   •  predniSONE (DELTASONE) 10 MG tablet, Take 10 mg by mouth 2 (two) times a day., Disp: , Rfl:   •  Probiotic Product (PROBIOTIC PO), Take 1 tablet by mouth Daily., Disp: , Rfl:   •  rivaroxaban (XARELTO) 20 MG tablet, Take 1 tablet by mouth Daily., Disp: 28 tablet, Rfl: 0  •  ursodiol (ACTIGALL) 500 MG tablet, , Disp: , Rfl:     ALLERGIES:   No Known Allergies    SOCIAL HISTORY:       Social History     Socioeconomic History   • Marital status:    • Number of children: 2   Tobacco Use   • Smoking status: Former Smoker     Packs/day: 0.50     Years: 10.00     Pack years: 5.00     Types: Cigarettes     Quit date:      Years since quittin.8   • Smokeless tobacco: Former User   Substance and Sexual Activity   • Alcohol use: No   • Drug use: No   • Sexual activity: Yes     Birth control/protection: Post-menopausal, Surgical         FAMILY HISTORY:  Family History   Problem Relation Age of Onset   • Alcohol abuse Father    • Heart failure Mother    • Diabetes Brother    • Prostate cancer Brother    • Throat cancer Brother    • Breast cancer Maternal Aunt    • Breast cancer Maternal Cousin    • Breast cancer Maternal Cousin    • Malig Hyperthermia Neg Hx        REVIEW OF SYSTEMS:  Review of Systems   Constitutional: Negative for activity change.   HENT: Negative for nosebleeds and trouble swallowing.    Respiratory: Negative for shortness of breath and wheezing.    Cardiovascular: Negative for chest pain and palpitations.   Gastrointestinal: Negative for constipation, diarrhea and nausea.   Genitourinary: Negative for dysuria and hematuria.   Musculoskeletal: Negative for arthralgias and myalgias.   Skin: Negative for rash and wound.   Neurological: Negative  "for seizures and syncope.   Hematological: Negative for adenopathy. Does not bruise/bleed easily.   Psychiatric/Behavioral: Negative for confusion.            Vitals:    11/10/21 1124   BP: 125/76   Pulse: 103   Resp: 18   Temp: 96.9 °F (36.1 °C)   TempSrc: Temporal   SpO2: 97%   Weight: 90.9 kg (200 lb 6.4 oz)   Height: 160 cm (62.99\")   PainSc: 0-No pain     Current Status 11/10/2021   ECOG score 0        PHYSICAL EXAM:          CONSTITUTIONAL:  Vital signs reviewed.  No distress, looks comfortable.  EYES:  Conjunctiva and lids unremarkable.  PERRLA  EARS,NOSE,MOUTH,THROAT:  Ears and nose appear unremarkable.  Lips, teeth, gums appear unremarkable.  RESPIRATORY:  Normal respiratory effort.  Lungs clear to auscultation bilaterally.  CARDIOVASCULAR:  Normal S1, S2.  No murmurs rubs or gallops.  No significant lower extremity edema.  GASTROINTESTINAL: Abdomen appears unremarkable.  Nontender.  No hepatomegaly.  No splenomegaly.  LYMPHATIC:  No cervical, supraclavicular, axillary lymphadenopathy.  SKIN:  Warm.  No rashes.  PSYCHIATRIC:  Normal judgment and insight.  Normal mood and affect.          RECENT LABS:        WBC   Date/Time Value Ref Range Status   09/28/2021 08:07 AM 9.49 3.40 - 10.80 10*3/mm3 Final     Hemoglobin   Date/Time Value Ref Range Status   09/28/2021 08:07 AM 14.2 12.0 - 15.9 g/dL Final     Platelets   Date/Time Value Ref Range Status   09/28/2021 08:07  140 - 450 10*3/mm3 Final       Assessment/Plan   Malignant neoplasm of upper-inner quadrant of right breast in female, estrogen receptor positive (HCC)  - CBC & Differential  - Comprehensive Metabolic Panel    *Right breast cancer.  Invasive ductal adenocarcinoma.  Upper inner quadrant  · Right lumpectomy by Dr. Ramos 11/4/2019.  1:00.  Overall grade 1.  Greatest dimension 5 mm.  Single focus of invasive carcinoma.  No lymphovascular invasion.  Margins negative.  0 out of 2 nodes involved.  · [id number], stage 1  · ER 91%.  IA 96%.  HER-2 " negative.  Ki-67 4.7% (favorable).  · (Initial needle biopsy 9/16/2019: Low-grade invasive ductal carcinoma, grade 1.  5 mm in greatest dimension.)  · (Initial mammogram measured the lesion at 5 mm)  · From the information we currently know about her cancer, I do not recommend chemotherapy.  I do recommend hormonal therapy for 5 years.  Hopefully, aromatase inhibitors if DEXA scan allows (with portal vein thrombosis would like to avoid tamoxifen).  · Oncotype DX: Recurrence score 3, 3% distant recurrence risk at 9 years if tamoxifen or AI is used.  · Therefore, do not recommend chemotherapy.  · Adjuvant XRT completed 2/12/2020  · Arimidex 2/26/2020- planned 2/26/2025  · Arimidex stopped 4/29/2021 due to concern for possible drug-induced liver injury.  Might try to resume hormonal therapy with Aromasin at some point but for now remain off hormonal therapy  · 9/29/2021: Remain off Arimidex and anticoagulation for now as this was a low risk breast cancer and these might be the cause of her liver dysfunction.  She has not yet seen the hepatologist, Dr. Adilson Hunt.  She sees him tomorrow.  I told her unless Dr. Hunt thinks there is very little chance Arimidex or Xarelto caused her liver dysfunction, we would likely remain off Arimidex Xarelto.  No clinical signs of recurrence.    *Mild hand joint achiness since beginning Arimidex.    Currently holding Arimidex.    *Bone health.  · DEXA 12/2/2019: Normal.  T score lumbar spine 0.1, T score left hip 0.1, T score right hip 0.6.  · She takes 2-4 times daily.  Therefore, did not begin a calcium supplement when a rheumatic started on 12/9/2019.  She was encouraged to take a PPI on a schedule and follow-up with her PCP.  States she has had several EGDs in the past none of which have shown problems.  (States in the past her PCP told her to stop calcium supplements because she developed hypercalcemia due to the combination of calcium supplements and Tums).     *Portal vein  thrombosis (right branch of portal vein).  · No evidence of cirrhosis on MRI or ultrasound.  Dr. Berna Gonzalez, GI note, has no mention of cirrhosis.  · Hormonal therapy for breast cancer has a slight increased risk of thrombosis.  Therefore, I think it is in her best interest to undergo anticoagulation until she completes hormonal therapy.  · Generally, Lovenox or Coumadin are the preferred agents.  However, because of significant elevations in INR, and the desire for an oral agent, changed to Xarelto on 2/19/2020.   · Previous plan was to remain on therapeutic dose Xarelto until completes Arimidex.   · 4/29/2021, Xarelto (along with Arimidex) stopped due to concern for possible drug-induced liver injury.  · No signs of recurrent clotting    *Risk factors for portal vein thrombosis:  · Unremarkable: Prothrombin gene mutation, factor V Leiden gene mutation, lupus anticoagulant, beta-2 glycoprotein antibodies, anti-thrombin, protein S free.    *Low protein S activity, 14% (normal range %), on 11/13/2019     *Low protein C activity, 44% (normal range %) on 11/13/2019.    *Indeterminant anticardiolipin  · IgM 14 (indeterminate range 13-20) on 11/13/2019.  IgA and IgG negative.    *Prior leukocytosis, likely due to C. difficile colitis.  WBC 9.5     *Anemia.  Previously felt to likely due to C. difficile colitis.  Hb 14.2     *Prominent nodes within the hepatogastric ligament and around the celiac axis and in the retroperitoneum on MRI abdomen 11/11/2019.  · Etiology uncertain.  Radiologist recommended follow-up  · CT 2/19/2020, from 12/13/2019: Abdominal and pelvic and cardiophrenic LAD unchanged.  · CT 8/14/2020: Overall improvement with interval decrease in right cardiophrenic node, size of spleen, and degree of wall thickening and surrounding fat stranding in the colon.  Persistent abdominal pelvic and lower thoracic LAD, indeterminant etiology.    · Radiologist recommended continued follow-up.  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  I explained if this were the case it would not be expected to be curable and we would undergo observation instead of treatment regardless.  We would consider treatment if she develops a problem.  Therefore, I think further observation is very reasonable.  (The LAD might be due to her previous problems with extensive colitis and the mild splenomegaly might be due to the right hepatic lobe atrophy).  · CT 2/17/2021: Unchanged enlarged nodes, from 8/14/2020.  · CT 11/3/2021: No mention of LAD.  Stable.  · Plan to complete 2 years CT follow-up sometime around November 2021, and if stable, then no more CT follow-up  With no change on CT, stop CT follow-up.    *C. difficile colitis during November 2019 hospitalization.  Thought to perhaps be related to receiving 1 dose of antibiotics for breast surgery.  She was treated with Flagyl and oral vancomycin     *Extensive colitis again seen on CT 2/19/2020, unchanged from 11/10/2019.  Patient reports she had some recent diarrhea but it became better and is more solid and it does not seem similar to the diarrhea she had back in November during the diagnosis of C. difficile.  On 2/24/2020 she was told to contact her PCP if the diarrhea worsens.  In the meantime, we referred her back to Dr. Cano of ID and Dr. Berna Gonzalez of GI (she saw both during the C. difficile hospitalization November 2019).  · Dr. Cano appointment on 3/4/2020  · Dr. Berna Gonzalez colonoscopy 6/26/2020: Decreased mucosa vascular pattern in the entire examined colon, biopsied.  Biopsy showed no evidence of malignancy.  It did reveal prominent lymphoid aggregates.    *Chronic hepatitis with periportal and bridging fibrosis and architectural distortion (stage III fibrosis), per liver biopsy, 4/21/2021.  Pathologist stated overall findings worrisome for autoimmune hepatitis, but a drug reaction could not  entirely be excluded.  Findings worrisome for early cirrhosis.  · Patient states Dr. Berna Gonzalez has told her she has autoimmune hepatitis and primary biliary cholangitis  · Patient states she had a previous bile duct injury at the time of cholecystectomy, previously requiring a stent and now has narrowing of her bile ducts.  She states she was told this is contributing to her elevated transaminases.  · 2/19/2020: LFTs normal  · 2/26/2020: Arimidex and Xarelto started  · 8/24/2020: LFTs increased  · 4/29/2021: Arimidex and Xarelto stopped  · 5/14/2021: Prednisone 20 mg daily and ursodiol started by Dr. Berna Gonzalez, GI  · 7/6/2021: LFTs normalized.  · 8/13/2021: Next Dr. Berna Gonzalez appointment  · 9/30/2021: Appointment Dr. Adilson Hunt, liver specialist at [facility], for consultation  · It is difficult to know what caused her liver dysfunction.  LFTs have normalized with: Stopping Arimidex/Xarelto, starting prednisone 20 mg daily, and starting ursodiol.  Her breast cancer is low risk, grade 1, 5 mm, stage I, Ki-67 4.7%.  Oncotype DX recurrence score only 3.  Therefore, at least for now I think it is in her best interest to remain off hormonal therapy in case Arimidex was part of the reason for the liver injury.  If we wanted to try hormonal therapy in the future, we could consider Aromasin and consider prophylactic dose Eliquis (we were previously using therapeutic dose Xarelto with Arimidex due to portal vein thrombosis and a slight increased risk of clotting from an AI).  However, at least for now I would probably favor remaining off hormonal therapy    *Elevated AFP, on labs drawn through Dr. Adilson Hunt.  · CT 11/3/2021: No liver mass.  · Patient states Dr. Hunt plans MRI abdomen and short-term follow-up with him.    Plan  · MD CBC CMP 6 months  · Maintains follow-up with Dr. Adilson Hunt, [facility] liver specialist on 9/30/2021.  · Likely remain off AI and if not on AI, remain off anticoagulation  · Mammogram 10/26/2021:  BI-RADS 2  · No more CT scans.      Send copy this to  Dr. Berna Gonzalez, HANSA Hunt, [facility] liver specialist

## 2021-11-10 ENCOUNTER — APPOINTMENT (OUTPATIENT)
Dept: LAB | Facility: HOSPITAL | Age: 66
End: 2021-11-10

## 2021-11-10 ENCOUNTER — OFFICE VISIT (OUTPATIENT)
Dept: ONCOLOGY | Facility: CLINIC | Age: 66
End: 2021-11-10

## 2021-11-10 VITALS
SYSTOLIC BLOOD PRESSURE: 125 MMHG | OXYGEN SATURATION: 97 % | WEIGHT: 200.4 LBS | TEMPERATURE: 96.9 F | DIASTOLIC BLOOD PRESSURE: 76 MMHG | RESPIRATION RATE: 18 BRPM | HEART RATE: 103 BPM | HEIGHT: 63 IN | BODY MASS INDEX: 35.51 KG/M2

## 2021-11-10 DIAGNOSIS — C50.211 MALIGNANT NEOPLASM OF UPPER-INNER QUADRANT OF RIGHT BREAST IN FEMALE, ESTROGEN RECEPTOR POSITIVE (HCC): Primary | ICD-10-CM

## 2021-11-10 DIAGNOSIS — Z17.0 MALIGNANT NEOPLASM OF UPPER-INNER QUADRANT OF RIGHT BREAST IN FEMALE, ESTROGEN RECEPTOR POSITIVE (HCC): Primary | ICD-10-CM

## 2021-11-10 PROCEDURE — 99214 OFFICE O/P EST MOD 30 MIN: CPT | Performed by: INTERNAL MEDICINE

## 2021-11-19 ENCOUNTER — OFFICE (OUTPATIENT)
Dept: URBAN - METROPOLITAN AREA CLINIC 65 | Facility: CLINIC | Age: 66
End: 2021-11-19

## 2021-11-19 VITALS
WEIGHT: 202 LBS | HEART RATE: 101 BPM | HEIGHT: 63 IN | SYSTOLIC BLOOD PRESSURE: 120 MMHG | DIASTOLIC BLOOD PRESSURE: 78 MMHG

## 2021-11-19 DIAGNOSIS — K75.4 AUTOIMMUNE HEPATITIS: ICD-10-CM

## 2021-11-19 PROCEDURE — 99214 OFFICE O/P EST MOD 30 MIN: CPT | Performed by: INTERNAL MEDICINE

## 2022-05-16 ENCOUNTER — OFFICE VISIT (OUTPATIENT)
Dept: ONCOLOGY | Facility: CLINIC | Age: 67
End: 2022-05-16

## 2022-05-16 ENCOUNTER — LAB (OUTPATIENT)
Dept: LAB | Facility: HOSPITAL | Age: 67
End: 2022-05-16

## 2022-05-16 VITALS
HEIGHT: 63 IN | RESPIRATION RATE: 18 BRPM | OXYGEN SATURATION: 99 % | TEMPERATURE: 97.7 F | BODY MASS INDEX: 32.99 KG/M2 | WEIGHT: 186.2 LBS | DIASTOLIC BLOOD PRESSURE: 77 MMHG | HEART RATE: 94 BPM | SYSTOLIC BLOOD PRESSURE: 132 MMHG

## 2022-05-16 DIAGNOSIS — C50.211 MALIGNANT NEOPLASM OF UPPER-INNER QUADRANT OF RIGHT BREAST IN FEMALE, ESTROGEN RECEPTOR POSITIVE: Primary | ICD-10-CM

## 2022-05-16 DIAGNOSIS — Z17.0 MALIGNANT NEOPLASM OF UPPER-INNER QUADRANT OF RIGHT BREAST IN FEMALE, ESTROGEN RECEPTOR POSITIVE: Primary | ICD-10-CM

## 2022-05-16 DIAGNOSIS — C50.211 MALIGNANT NEOPLASM OF UPPER-INNER QUADRANT OF RIGHT BREAST IN FEMALE, ESTROGEN RECEPTOR POSITIVE: ICD-10-CM

## 2022-05-16 DIAGNOSIS — Z17.0 MALIGNANT NEOPLASM OF UPPER-INNER QUADRANT OF RIGHT BREAST IN FEMALE, ESTROGEN RECEPTOR POSITIVE: ICD-10-CM

## 2022-05-16 LAB
ALBUMIN SERPL-MCNC: 3.6 G/DL (ref 3.5–5.2)
ALBUMIN/GLOB SERPL: 1 G/DL (ref 1.1–2.4)
ALP SERPL-CCNC: 145 U/L (ref 38–116)
ALT SERPL W P-5'-P-CCNC: 10 U/L (ref 0–33)
ANION GAP SERPL CALCULATED.3IONS-SCNC: 9.1 MMOL/L (ref 5–15)
AST SERPL-CCNC: 27 U/L (ref 0–32)
BASOPHILS # BLD AUTO: 0.04 10*3/MM3 (ref 0–0.2)
BASOPHILS NFR BLD AUTO: 1 % (ref 0–1.5)
BILIRUB SERPL-MCNC: 1 MG/DL (ref 0.2–1.2)
BUN SERPL-MCNC: 20 MG/DL (ref 6–20)
BUN/CREAT SERPL: 25 (ref 7.3–30)
CALCIUM SPEC-SCNC: 9.1 MG/DL (ref 8.5–10.2)
CHLORIDE SERPL-SCNC: 105 MMOL/L (ref 98–107)
CO2 SERPL-SCNC: 25.9 MMOL/L (ref 22–29)
CREAT SERPL-MCNC: 0.8 MG/DL (ref 0.6–1.1)
DEPRECATED RDW RBC AUTO: 54.1 FL (ref 37–54)
EGFRCR SERPLBLD CKD-EPI 2021: 81.4 ML/MIN/1.73
EOSINOPHIL # BLD AUTO: 0.09 10*3/MM3 (ref 0–0.4)
EOSINOPHIL NFR BLD AUTO: 2.2 % (ref 0.3–6.2)
ERYTHROCYTE [DISTWIDTH] IN BLOOD BY AUTOMATED COUNT: 14.9 % (ref 12.3–15.4)
GLOBULIN UR ELPH-MCNC: 3.6 GM/DL (ref 1.8–3.5)
GLUCOSE SERPL-MCNC: 119 MG/DL (ref 74–124)
HCT VFR BLD AUTO: 35.2 % (ref 34–46.6)
HGB BLD-MCNC: 12.3 G/DL (ref 12–15.9)
IMM GRANULOCYTES # BLD AUTO: 0.01 10*3/MM3 (ref 0–0.05)
IMM GRANULOCYTES NFR BLD AUTO: 0.2 % (ref 0–0.5)
LYMPHOCYTES # BLD AUTO: 1.48 10*3/MM3 (ref 0.7–3.1)
LYMPHOCYTES NFR BLD AUTO: 35.6 % (ref 19.6–45.3)
MCH RBC QN AUTO: 34.7 PG (ref 26.6–33)
MCHC RBC AUTO-ENTMCNC: 34.9 G/DL (ref 31.5–35.7)
MCV RBC AUTO: 99.4 FL (ref 79–97)
MONOCYTES # BLD AUTO: 0.46 10*3/MM3 (ref 0.1–0.9)
MONOCYTES NFR BLD AUTO: 11.1 % (ref 5–12)
NEUTROPHILS NFR BLD AUTO: 2.08 10*3/MM3 (ref 1.7–7)
NEUTROPHILS NFR BLD AUTO: 49.9 % (ref 42.7–76)
NRBC BLD AUTO-RTO: 0 /100 WBC (ref 0–0.2)
PLATELET # BLD AUTO: 133 10*3/MM3 (ref 140–450)
PMV BLD AUTO: 10.5 FL (ref 6–12)
POTASSIUM SERPL-SCNC: 3.5 MMOL/L (ref 3.5–4.7)
PROT SERPL-MCNC: 7.2 G/DL (ref 6.3–8)
RBC # BLD AUTO: 3.54 10*6/MM3 (ref 3.77–5.28)
SODIUM SERPL-SCNC: 140 MMOL/L (ref 134–145)
WBC NRBC COR # BLD: 4.16 10*3/MM3 (ref 3.4–10.8)

## 2022-05-16 PROCEDURE — 85025 COMPLETE CBC W/AUTO DIFF WBC: CPT

## 2022-05-16 PROCEDURE — 99214 OFFICE O/P EST MOD 30 MIN: CPT | Performed by: INTERNAL MEDICINE

## 2022-05-16 PROCEDURE — 80053 COMPREHEN METABOLIC PANEL: CPT

## 2022-05-16 PROCEDURE — 36415 COLL VENOUS BLD VENIPUNCTURE: CPT

## 2022-05-16 RX ORDER — URSODIOL 500 MG/1
500 TABLET, FILM COATED ORAL
COMMUNITY
Start: 2022-05-12

## 2022-05-16 NOTE — PROGRESS NOTES
.     REASONS FOR FOLLOWUP: Portal vein thrombosis, breast cancer    HISTORY OF PRESENT ILLNESS:  The patient is a 66 y.o. year old female  who is here for follow-up with the above-mentioned history.    No new issues since last visit.  She continues to follow-up with Dr. Hunt closely regarding her liver.  She states she remains on Azathioprine and ursodiol.    No problems eating.  No weight loss.  No pain issues.  No nausea.    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 (HCC)    • Breast cancer (HCC) 09/2019    Right IDC   • Clostridium difficile infection 12/2019   • GERD (gastroesophageal reflux disease)    • History of Clostridium difficile colitis    • Hx of radiation therapy    • IBS (irritable bowel syndrome)    • PONV (postoperative nausea and vomiting)    • Radiation-induced angiosarcoma of breast (HCC)    • Situational stress     DEATH OF BROTHER 10/2019, RECENT DIAGNOSIS   • Stress headaches      Past Surgical History:   Procedure Laterality Date   • BILE DUCT STENT PLACEMENT  2014   • BREAST BIOPSY Right 09/2019    malignant   • BREAST LUMPECTOMY Right    • BREAST LUMPECTOMY WITH SENTINEL NODE BIOPSY Right 11/4/2019    Procedure: BREAST LUMPECTOMY WITH SENTINEL NODE BIOPSY AND NEEDLE LOCALIZATION And possible axillary dissection;  Surgeon: Jean-Paul Ramos MD;  Location: [facility] OR [facility] [place], [place];  Service: General   • COLONOSCOPY     • COLONOSCOPY N/A 6/26/2020    Procedure: COLONOSCOPY TO TERMINAL ILEUM WITH BX;  Surgeon: Sage Gonzalez MD;  Location: [facility] ENDOSCOPY;  Service: Gastroenterology;  Laterality: N/A;  PREOP/ SCREENING  POSTOP/ VASCULAR PATTERN TO COLON   • KNEE ARTHROSCOPY Left    • LAPAROSCOPIC CHOLECYSTECTOMY  2014   • TUBAL ABDOMINAL LIGATION         MEDICATIONS    Current Outpatient Medications:   •  acetaminophen (TYLENOL) 325 MG tablet, Take 650 mg by mouth Every 6 (Six) Hours As Needed for Mild Pain ., Disp: , Rfl:   •   azaTHIOprine (IMURAN) 50 MG tablet, Take 50 mg by mouth Daily., Disp: , Rfl:   •  doxylamine (UNISOM) 25 MG tablet, Take 25 mg by mouth At Night As Needed., Disp: , Rfl:   •  famotidine (PEPCID) 40 MG tablet, , Disp: , Rfl:   •  melatonin 5 MG tablet tablet, Take 10 mg by mouth At Night As Needed., Disp: , Rfl:   •  MELATONIN PO, Take 10 mg by mouth., Disp: , Rfl:   •  Probiotic Product (PROBIOTIC PO), Take 1 tablet by mouth Daily., Disp: , Rfl:   •  ursodiol (ACTIGALL) 500 MG tablet, Take 500 mg by mouth., Disp: , Rfl:   •  anastrozole (ARIMIDEX) 1 MG tablet, Take 1 tablet by mouth Daily., Disp: 90 tablet, Rfl: 1  •  predniSONE (DELTASONE) 10 MG tablet, Take 10 mg by mouth 2 (two) times a day., Disp: , Rfl:   •  rivaroxaban (XARELTO) 20 MG tablet, Take 1 tablet by mouth Daily., Disp: 28 tablet, Rfl: 0  •  ursodiol (ACTIGALL) 500 MG tablet, , Disp: , Rfl:     ALLERGIES:   No Known Allergies    SOCIAL HISTORY:       Social History     Socioeconomic History   • Marital status:    • Number of children: 2   Tobacco Use   • Smoking status: Former Smoker     Packs/day: 0.50     Years: 10.00     Pack years: 5.00     Types: Cigarettes     Quit date:      Years since quittin.3   • Smokeless tobacco: Former User   Substance and Sexual Activity   • Alcohol use: No   • Drug use: No   • Sexual activity: Yes     Birth control/protection: Post-menopausal, Surgical         FAMILY HISTORY:  Family History   Problem Relation Age of Onset   • Alcohol abuse Father    • Heart failure Mother    • Diabetes Brother    • Prostate cancer Brother    • Throat cancer Brother    • Breast cancer Maternal Aunt    • Breast cancer Maternal Cousin    • Breast cancer Maternal Cousin    • Malig Hyperthermia Neg Hx        REVIEW OF SYSTEMS:  Review of Systems   Constitutional: Negative for activity change.   HENT: Negative for nosebleeds and trouble swallowing.    Respiratory: Negative for shortness of breath and wheezing.   "  Cardiovascular: Negative for chest pain and palpitations.   Gastrointestinal: Negative for constipation, diarrhea and nausea.   Genitourinary: Negative for dysuria and hematuria.   Musculoskeletal: Negative for arthralgias and myalgias.   Skin: Negative for rash and wound.   Neurological: Negative for seizures and syncope.   Hematological: Negative for adenopathy. Does not bruise/bleed easily.   Psychiatric/Behavioral: Negative for confusion.            Vitals:    05/16/22 1331   BP: 132/77   Pulse: 94   Resp: 18   Temp: 97.7 °F (36.5 °C)   TempSrc: Temporal   SpO2: 99%   Weight: 84.5 kg (186 lb 3.2 oz)   Height: 160 cm (62.99\")   PainSc: 0-No pain     Current Status 5/16/2022   ECOG score 0        PHYSICAL EXAM:          CONSTITUTIONAL:  Vital signs reviewed.  No distress, looks comfortable.  EYES:  Conjunctiva and lids unremarkable.  PERRLA  EARS,NOSE,MOUTH,THROAT:  Ears and nose appear unremarkable.  Lips, teeth, gums appear unremarkable.  RESPIRATORY:  Normal respiratory effort.  Lungs clear to auscultation bilaterally.  CARDIOVASCULAR:  Normal S1, S2.  No murmurs rubs or gallops.  No significant lower extremity edema.  GASTROINTESTINAL: Abdomen appears unremarkable.  Nontender.  No hepatomegaly.  No splenomegaly.  LYMPHATIC:  No cervical, supraclavicular, axillary lymphadenopathy.  SKIN:  Warm.  No rashes.  PSYCHIATRIC:  Normal judgment and insight.  Normal mood and affect.        RECENT LABS:        WBC   Date/Time Value Ref Range Status   05/16/2022 01:08 PM 4.16 3.40 - 10.80 10*3/mm3 Final     Hemoglobin   Date/Time Value Ref Range Status   05/16/2022 01:08 PM 12.3 12.0 - 15.9 g/dL Final     Platelets   Date/Time Value Ref Range Status   05/16/2022 01:08  (L) 140 - 450 10*3/mm3 Final       Assessment & Plan   Malignant neoplasm of upper-inner quadrant of right breast in female, estrogen receptor positive (HCC)  - CBC & Differential  - Comprehensive Metabolic Panel    *Right breast cancer.  Invasive " ductal adenocarcinoma.  Upper inner quadrant  · Right lumpectomy by Dr. Ramos 11/4/2019.  1:00.  Overall grade 1.  Greatest dimension 5 mm.  Single focus of invasive carcinoma.  No lymphovascular invasion.  Margins negative.  0 out of 2 nodes involved.  · [id number], stage 1  · ER 91%.  AL 96%.  HER-2 negative.  Ki-67 4.7% (favorable).  · (Initial needle biopsy 9/16/2019: Low-grade invasive ductal carcinoma, grade 1.  5 mm in greatest dimension.)  · (Initial mammogram measured the lesion at 5 mm)  · From the information we currently know about her cancer, I do not recommend chemotherapy.  I do recommend hormonal therapy for 5 years.  Hopefully, aromatase inhibitors if DEXA scan allows (with portal vein thrombosis would like to avoid tamoxifen).  · Oncotype DX: Recurrence score 3, 3% distant recurrence risk at 9 years if tamoxifen or AI is used.  · Therefore, do not recommend chemotherapy.  · Adjuvant XRT completed 2/12/2020  · Arimidex 2/26/2020- planned 2/26/2025  · Arimidex stopped 4/29/2021 due to concern for possible drug-induced liver injury.  Might try to resume hormonal therapy with Aromasin at some point but for now remain off hormonal therapy  · 9/29/2021: Remain off Arimidex and anticoagulation for now as this was a low risk breast cancer and these might be the cause of her liver dysfunction.  She has not yet seen the hepatologist, Dr. Adilson Hunt.  She sees him tomorrow.  I told her unless Dr. Hunt thinks there is very little chance Arimidex or Xarelto caused her liver dysfunction, we would likely remain off Arimidex Xarelto.  · 5/16/2022: Discussed we could try tamoxifen with Pradaxa since Pradaxa has a different mechanism of action from Xarelto and tamoxifen has a different mechanism of action from Arimidex.  However, considering her liver disease may have been related to either Arimidex or Xarelto and breast cancer was low risk, I think it is in her best interest to not take any more adjuvant therapy  for breast cancer.  Patient very much agrees.  We plan no more adjuvant therapy of breast cancer.  No clinical evidence of recurrence.    *Mild hand joint achiness since beginning Arimidex.    Currently holding Arimidex.    *Bone health.  · DEXA 12/2/2019: Normal.  T score lumbar spine 0.1, T score left hip 0.1, T score right hip 0.6.  · She takes 2-4 times daily.  Therefore, did not begin a calcium supplement when a rheumatic started on 12/9/2019.  She was encouraged to take a PPI on a schedule and follow-up with her PCP.  States she has had several EGDs in the past none of which have shown problems.  (States in the past her PCP told her to stop calcium supplements because she developed hypercalcemia due to the combination of calcium supplements and Tums).     *Portal vein thrombosis (right branch of portal vein).  · No evidence of cirrhosis on MRI or ultrasound.  Dr. Berna Gonzalez, GI note, has no mention of cirrhosis.  · Hormonal therapy for breast cancer has a slight increased risk of thrombosis.  Therefore, I think it is in her best interest to undergo anticoagulation until she completes hormonal therapy.  · Generally, Lovenox or Coumadin are the preferred agents.  However, because of significant elevations in INR, and the desire for an oral agent, changed to Xarelto on 2/19/2020.   · Previous plan was to remain on therapeutic dose Xarelto until completes Arimidex.   · 4/29/2021, Xarelto (along with Arimidex) stopped due to concern for possible drug-induced liver injury.  · No evidence of recurrent clotting    *Risk factors for portal vein thrombosis:  · Unremarkable: Prothrombin gene mutation, factor V Leiden gene mutation, lupus anticoagulant, beta-2 glycoprotein antibodies, anti-thrombin, protein S free.    *Low protein S activity, 14% (normal range %), on 11/13/2019     *Low protein C activity, 44% (normal range %) on 11/13/2019.    *Indeterminant anticardiolipin  · IgM 14 (indeterminate range  13-20) on 11/13/2019.  IgA and IgG negative.    *Prior leukocytosis, likely due to C. difficile colitis.  WBC 4.2     *Anemia.  Previously felt to likely due to C. difficile colitis.  Hb 12.3     *Prominent nodes within the hepatogastric ligament and around the celiac axis and in the retroperitoneum on MRI abdomen 11/11/2019.  · Etiology uncertain.  Radiologist recommended follow-up  · CT 2/19/2020, from 12/13/2019: Abdominal and pelvic and cardiophrenic LAD unchanged.  · CT 8/14/2020: Overall improvement with interval decrease in right cardiophrenic node, size of spleen, and degree of wall thickening and surrounding fat stranding in the colon.  Persistent abdominal pelvic and lower thoracic LAD, indeterminant etiology.    · Radiologist recommended continued follow-up.  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  I explained if this were the case it would not be expected to be curable and we would undergo observation instead of treatment regardless.  We would consider treatment if she develops a problem.  Therefore, I think further observation is very reasonable.  (The LAD might be due to her previous problems with extensive colitis and the mild splenomegaly might be due to the right hepatic lobe atrophy).  · CT 2/17/2021: Unchanged enlarged nodes, from 8/14/2020.  · CT 11/3/2021: No mention of LAD.  Stable.  · Plan to complete 2 years CT follow-up sometime around November 2021, and if stable, then no more CT follow-up  With no change on CT, stop CT follow-up.    *C. difficile colitis during November 2019 hospitalization.  Thought to perhaps be related to receiving 1 dose of antibiotics for breast surgery.  She was treated with Flagyl and oral vancomycin     *Extensive colitis again seen on CT 2/19/2020, unchanged from 11/10/2019.  Patient reports she had some recent diarrhea but it became better and is more solid and it does not seem similar to the diarrhea  she had back in November during the diagnosis of C. difficile.  On 2/24/2020 she was told to contact her PCP if the diarrhea worsens.  In the meantime, we referred her back to Dr. Cano of ID and Dr. Berna Gonzalez of GI (she saw both during the C. difficile hospitalization November 2019).  · Dr. Cano appointment on 3/4/2020  · Dr. Berna Gonzalez colonoscopy 6/26/2020: Decreased mucosa vascular pattern in the entire examined colon, biopsied.  Biopsy showed no evidence of malignancy.  It did reveal prominent lymphoid aggregates.    *Chronic hepatitis with periportal and bridging fibrosis and architectural distortion (stage III fibrosis), per liver biopsy, 4/21/2021.  Pathologist stated overall findings worrisome for autoimmune hepatitis, but a drug reaction could not entirely be excluded.  Findings worrisome for early cirrhosis.  · Patient states Dr. Berna Gonzalez has told her she has autoimmune hepatitis and primary biliary cholangitis  · Patient states she had a previous bile duct injury at the time of cholecystectomy, previously requiring a stent and now has narrowing of her bile ducts.  She states she was told this is contributing to her elevated transaminases.  · 2/19/2020: LFTs normal  · 2/26/2020: Arimidex and Xarelto started  · 8/24/2020: LFTs increased  · 4/29/2021: Arimidex and Xarelto stopped  · 5/14/2021: Prednisone 20 mg daily and ursodiol started by Dr. Berna Gonzalez, GI  · 7/6/2021: LFTs normalized.  · 8/13/2021: Next Dr. Berna Gonzalez appointment  · 9/30/2021: Appointment Dr. Adilson Hunt, liver specialist at [facility], for consultation  · It is difficult to know what caused her liver dysfunction.  LFTs have normalized with: Stopping Arimidex/Xarelto, starting prednisone 20 mg daily, and starting ursodiol.  Her breast cancer is low risk, grade 1, 5 mm, stage I, Ki-67 4.7%.  Oncotype DX recurrence score only 3.  Therefore, at least for now I think it is in her best interest to remain off hormonal therapy in case  Arimidex was part of the reason for the liver injury.  If we wanted to try hormonal therapy in the future, we could consider Aromasin and consider prophylactic dose Eliquis (we were previously using therapeutic dose Xarelto with Arimidex due to portal vein thrombosis and a slight increased risk of clotting from an AI).  However, at least for now I would probably favor remaining off hormonal therapy    *Elevated AFP, on labs drawn through Dr. Adilson Hunt.  · CT 11/3/2021: No liver mass.  · Patient states Dr. Hunt plans MRI abdomen and short-term follow-up with him.  · She is following up with Dr. Hunt regularly.    Plan  · MD CBC CMP 6 months  · Maintains follow-up with Dr. Adilson Hunt, [facility] liver specialist  · Remain off AI and anticoagulation  · Mammogram 10/26/2021: BI-RADS 2  · No more CT scans.      Send copy this to  Dr. Adilson Hunt, [facility] liver specialist

## 2022-05-20 ENCOUNTER — OFFICE (OUTPATIENT)
Dept: URBAN - METROPOLITAN AREA CLINIC 65 | Facility: CLINIC | Age: 67
End: 2022-05-20

## 2022-05-20 DIAGNOSIS — K76.9 LIVER DISEASE, UNSPECIFIED: ICD-10-CM

## 2022-05-20 DIAGNOSIS — K75.4 AUTOIMMUNE HEPATITIS: ICD-10-CM

## 2022-05-20 PROCEDURE — 99214 OFFICE O/P EST MOD 30 MIN: CPT | Performed by: INTERNAL MEDICINE

## 2022-10-31 ENCOUNTER — LAB (OUTPATIENT)
Dept: LAB | Facility: HOSPITAL | Age: 67
End: 2022-10-31

## 2022-10-31 ENCOUNTER — OFFICE VISIT (OUTPATIENT)
Dept: ONCOLOGY | Facility: CLINIC | Age: 67
End: 2022-10-31

## 2022-10-31 VITALS
BODY MASS INDEX: 31.5 KG/M2 | SYSTOLIC BLOOD PRESSURE: 110 MMHG | HEIGHT: 63 IN | WEIGHT: 177.8 LBS | HEART RATE: 69 BPM | DIASTOLIC BLOOD PRESSURE: 55 MMHG | OXYGEN SATURATION: 99 % | TEMPERATURE: 97.3 F

## 2022-10-31 DIAGNOSIS — Z17.0 MALIGNANT NEOPLASM OF UPPER-INNER QUADRANT OF RIGHT BREAST IN FEMALE, ESTROGEN RECEPTOR POSITIVE: ICD-10-CM

## 2022-10-31 DIAGNOSIS — C50.211 MALIGNANT NEOPLASM OF UPPER-INNER QUADRANT OF RIGHT BREAST IN FEMALE, ESTROGEN RECEPTOR POSITIVE: ICD-10-CM

## 2022-10-31 DIAGNOSIS — Z17.0 MALIGNANT NEOPLASM OF UPPER-INNER QUADRANT OF RIGHT BREAST IN FEMALE, ESTROGEN RECEPTOR POSITIVE: Primary | ICD-10-CM

## 2022-10-31 DIAGNOSIS — C50.211 MALIGNANT NEOPLASM OF UPPER-INNER QUADRANT OF RIGHT BREAST IN FEMALE, ESTROGEN RECEPTOR POSITIVE: Primary | ICD-10-CM

## 2022-10-31 LAB
ALBUMIN SERPL-MCNC: 3.7 G/DL (ref 3.5–5.2)
ALBUMIN/GLOB SERPL: 1 G/DL (ref 1.1–2.4)
ALP SERPL-CCNC: 118 U/L (ref 38–116)
ALT SERPL W P-5'-P-CCNC: 7 U/L (ref 0–33)
ANION GAP SERPL CALCULATED.3IONS-SCNC: 9.7 MMOL/L (ref 5–15)
AST SERPL-CCNC: 26 U/L (ref 0–32)
BASOPHILS # BLD AUTO: 0.02 10*3/MM3 (ref 0–0.2)
BASOPHILS NFR BLD AUTO: 0.6 % (ref 0–1.5)
BILIRUB SERPL-MCNC: 0.9 MG/DL (ref 0.2–1.2)
BUN SERPL-MCNC: 23 MG/DL (ref 6–20)
BUN/CREAT SERPL: 27.7 (ref 7.3–30)
CALCIUM SPEC-SCNC: 8.9 MG/DL (ref 8.5–10.2)
CHLORIDE SERPL-SCNC: 105 MMOL/L (ref 98–107)
CO2 SERPL-SCNC: 26.3 MMOL/L (ref 22–29)
CREAT SERPL-MCNC: 0.83 MG/DL (ref 0.6–1.1)
DEPRECATED RDW RBC AUTO: 52 FL (ref 37–54)
EGFRCR SERPLBLD CKD-EPI 2021: 77.4 ML/MIN/1.73
EOSINOPHIL # BLD AUTO: 0.06 10*3/MM3 (ref 0–0.4)
EOSINOPHIL NFR BLD AUTO: 1.8 % (ref 0.3–6.2)
ERYTHROCYTE [DISTWIDTH] IN BLOOD BY AUTOMATED COUNT: 13.8 % (ref 12.3–15.4)
FERRITIN SERPL-MCNC: 202 NG/ML (ref 13–150)
FOLATE SERPL-MCNC: 7.8 NG/ML (ref 4.78–24.2)
GLOBULIN UR ELPH-MCNC: 3.7 GM/DL (ref 1.8–3.5)
GLUCOSE SERPL-MCNC: 103 MG/DL (ref 74–124)
HCT VFR BLD AUTO: 33.8 % (ref 34–46.6)
HGB BLD-MCNC: 11.8 G/DL (ref 12–15.9)
HGB RETIC QN AUTO: 39.5 PG (ref 29.8–36.1)
IMM GRANULOCYTES # BLD AUTO: 0.01 10*3/MM3 (ref 0–0.05)
IMM GRANULOCYTES NFR BLD AUTO: 0.3 % (ref 0–0.5)
IMM RETICS NFR: 9.7 % (ref 3–15.8)
IRON 24H UR-MRATE: 132 MCG/DL (ref 37–145)
IRON SATN MFR SERPL: 41 % (ref 14–48)
LYMPHOCYTES # BLD AUTO: 0.99 10*3/MM3 (ref 0.7–3.1)
LYMPHOCYTES NFR BLD AUTO: 29.3 % (ref 19.6–45.3)
MCH RBC QN AUTO: 35.9 PG (ref 26.6–33)
MCHC RBC AUTO-ENTMCNC: 34.9 G/DL (ref 31.5–35.7)
MCV RBC AUTO: 102.7 FL (ref 79–97)
MONOCYTES # BLD AUTO: 0.43 10*3/MM3 (ref 0.1–0.9)
MONOCYTES NFR BLD AUTO: 12.7 % (ref 5–12)
NEUTROPHILS NFR BLD AUTO: 1.87 10*3/MM3 (ref 1.7–7)
NEUTROPHILS NFR BLD AUTO: 55.3 % (ref 42.7–76)
NRBC BLD AUTO-RTO: 0 /100 WBC (ref 0–0.2)
PLATELET # BLD AUTO: 103 10*3/MM3 (ref 140–450)
PLATELETS.RETICULATED NFR BLD AUTO: 5.4 % (ref 0.9–6.5)
PMV BLD AUTO: 10.7 FL (ref 6–12)
POTASSIUM SERPL-SCNC: 3.8 MMOL/L (ref 3.5–4.7)
PROT SERPL-MCNC: 7.4 G/DL (ref 6.3–8)
RBC # BLD AUTO: 3.29 10*6/MM3 (ref 3.77–5.28)
RETICS # AUTO: 0.07 10*6/MM3 (ref 0.02–0.13)
RETICS/RBC NFR AUTO: 2.21 % (ref 0.7–1.9)
SODIUM SERPL-SCNC: 141 MMOL/L (ref 134–145)
TIBC SERPL-MCNC: 322 MCG/DL (ref 249–505)
TRANSFERRIN SERPL-MCNC: 230 MG/DL (ref 200–360)
VIT B12 BLD-MCNC: 1926 PG/ML (ref 211–946)
WBC NRBC COR # BLD: 3.38 10*3/MM3 (ref 3.4–10.8)

## 2022-10-31 PROCEDURE — 80053 COMPREHEN METABOLIC PANEL: CPT

## 2022-10-31 PROCEDURE — 36415 COLL VENOUS BLD VENIPUNCTURE: CPT

## 2022-10-31 PROCEDURE — 82607 VITAMIN B-12: CPT | Performed by: INTERNAL MEDICINE

## 2022-10-31 PROCEDURE — 85025 COMPLETE CBC W/AUTO DIFF WBC: CPT

## 2022-10-31 PROCEDURE — 85046 RETICYTE/HGB CONCENTRATE: CPT | Performed by: INTERNAL MEDICINE

## 2022-10-31 PROCEDURE — 84466 ASSAY OF TRANSFERRIN: CPT | Performed by: INTERNAL MEDICINE

## 2022-10-31 PROCEDURE — 82746 ASSAY OF FOLIC ACID SERUM: CPT | Performed by: INTERNAL MEDICINE

## 2022-10-31 PROCEDURE — 85055 RETICULATED PLATELET ASSAY: CPT | Performed by: INTERNAL MEDICINE

## 2022-10-31 PROCEDURE — 99214 OFFICE O/P EST MOD 30 MIN: CPT | Performed by: INTERNAL MEDICINE

## 2022-10-31 PROCEDURE — 83540 ASSAY OF IRON: CPT | Performed by: INTERNAL MEDICINE

## 2022-10-31 PROCEDURE — 82728 ASSAY OF FERRITIN: CPT | Performed by: INTERNAL MEDICINE

## 2022-10-31 RX ORDER — CARVEDILOL 6.25 MG/1
TABLET ORAL 2 TIMES DAILY WITH MEALS
COMMUNITY
Start: 2022-10-13

## 2022-10-31 RX ORDER — TRAZODONE HYDROCHLORIDE 50 MG/1
TABLET ORAL
COMMUNITY
Start: 2022-10-18

## 2022-11-10 ENCOUNTER — APPOINTMENT (OUTPATIENT)
Dept: WOMENS IMAGING | Facility: HOSPITAL | Age: 67
End: 2022-11-10

## 2022-11-10 ENCOUNTER — OFFICE VISIT (OUTPATIENT)
Dept: OBSTETRICS AND GYNECOLOGY | Facility: CLINIC | Age: 67
End: 2022-11-10

## 2022-11-10 ENCOUNTER — PROCEDURE VISIT (OUTPATIENT)
Dept: OBSTETRICS AND GYNECOLOGY | Facility: CLINIC | Age: 67
End: 2022-11-10

## 2022-11-10 VITALS
WEIGHT: 173 LBS | HEIGHT: 63 IN | DIASTOLIC BLOOD PRESSURE: 66 MMHG | SYSTOLIC BLOOD PRESSURE: 120 MMHG | BODY MASS INDEX: 30.65 KG/M2

## 2022-11-10 DIAGNOSIS — Z12.11 COLON CANCER SCREENING: ICD-10-CM

## 2022-11-10 DIAGNOSIS — Z12.31 VISIT FOR SCREENING MAMMOGRAM: Primary | ICD-10-CM

## 2022-11-10 DIAGNOSIS — Z01.419 VISIT FOR GYNECOLOGIC EXAMINATION: Primary | ICD-10-CM

## 2022-11-10 DIAGNOSIS — Z78.0 ASYMPTOMATIC MENOPAUSAL STATE: ICD-10-CM

## 2022-11-10 LAB
DEVELOPER EXPIRATION DATE: ABNORMAL
DEVELOPER LOT NUMBER: ABNORMAL
EXPIRATION DATE: ABNORMAL
FECAL OCCULT BLOOD SCREEN, POC: POSITIVE
Lab: ABNORMAL
NEGATIVE CONTROL: NEGATIVE
POSITIVE CONTROL: POSITIVE

## 2022-11-10 PROCEDURE — G0101 CA SCREEN;PELVIC/BREAST EXAM: HCPCS | Performed by: OBSTETRICS & GYNECOLOGY

## 2022-11-10 PROCEDURE — 77067 SCR MAMMO BI INCL CAD: CPT | Performed by: OBSTETRICS & GYNECOLOGY

## 2022-11-10 PROCEDURE — G0328 FECAL BLOOD SCRN IMMUNOASSAY: HCPCS | Performed by: OBSTETRICS & GYNECOLOGY

## 2022-11-10 PROCEDURE — 77063 BREAST TOMOSYNTHESIS BI: CPT | Performed by: OBSTETRICS & GYNECOLOGY

## 2022-11-10 PROCEDURE — 77063 BREAST TOMOSYNTHESIS BI: CPT | Performed by: RADIOLOGY

## 2022-11-10 PROCEDURE — 77067 SCR MAMMO BI INCL CAD: CPT | Performed by: RADIOLOGY

## 2022-11-10 RX ORDER — AZATHIOPRINE 100 MG/1
TABLET ORAL
COMMUNITY
Start: 2022-11-06

## 2022-11-10 NOTE — PROGRESS NOTES
[facility]  [address]  [place], [place] [address]  Phone: [phone] / Fax:  [phone]      11/10/2022    [address] [facility] [id number]    Bam Umaña MD    Chief Complaint   Patient presents with   • Gynecologic Exam     Annual Exam,last pap 7-25-18 NL HPV (-) , Mammogram today, 11-4-19 Right breast Lumpectomy. Colonoscopy 6-26-20 NL per patient. Patient has not had DEXA testing.       Taylor Larsen is here for annual gynecologic exam.  HPI - Patient over 65 with no significant pap history; last normal pap 4 years ago.  She underwent mammogram today; she has had breast cancer on right side in past.  She had a normal colonoscopy 2 years ago.  She has never had an assessment of bone density.    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 (HCC)    • Breast cancer (HCC) 09/2019    Right IDC   • Clostridium difficile infection 12/2019   • GERD (gastroesophageal reflux disease)    • Hx of radiation therapy    • IBS (irritable bowel syndrome)    • PONV (postoperative nausea and vomiting)    • Radiation-induced angiosarcoma of breast (HCC)    • Situational stress     DEATH OF BROTHER 10/2019, RECENT DIAGNOSIS   • Stress headaches        Past Surgical History:   Procedure Laterality Date   • BILE DUCT STENT PLACEMENT  2014   • BREAST BIOPSY Right 09/2019    malignant   • BREAST LUMPECTOMY Right    • BREAST LUMPECTOMY WITH SENTINEL NODE BIOPSY Right 11/04/2019    Procedure: BREAST LUMPECTOMY WITH SENTINEL NODE BIOPSY AND NEEDLE LOCALIZATION And possible axillary dissection;  Surgeon: Jean-Paul Ramos MD;  Location: [facility] OR [facility] [place];  Service: General   • COLONOSCOPY N/A 06/26/2020    Procedure: COLONOSCOPY TO TERMINAL ILEUM WITH BX;  Surgeon: Sage Gonzalez MD;  Location: [facility] ENDOSCOPY;  Service: Gastroenterology;  Laterality: N/A;  PREOP/ SCREENING  POSTOP/ VASCULAR PATTERN TO COLON   • ESOPHAGOSCOPY / EGD N/A  "10/06/2022   • KNEE ARTHROSCOPY Left    • LAPAROSCOPIC CHOLECYSTECTOMY     • TUBAL ABDOMINAL LIGATION         No Known Allergies    Social History     Socioeconomic History   • Marital status:    • Number of children: 2   Tobacco Use   • Smoking status: Former     Packs/day: 0.50     Years: 10.00     Pack years: 5.00     Types: Cigarettes     Quit date:      Years since quittin.8   • Smokeless tobacco: Former   Vaping Use   • Vaping Use: Never used   Substance and Sexual Activity   • Alcohol use: No   • Drug use: No   • Sexual activity: Not Currently     Birth control/protection: Post-menopausal, Surgical       Family History   Problem Relation Age of Onset   • Alcohol abuse Father    • Heart failure Mother    • Diabetes Brother    • Prostate cancer Brother    • Throat cancer Brother    • Breast cancer Maternal Aunt    • Breast cancer Maternal Cousin    • Breast cancer Maternal Cousin    • Malig Hyperthermia Neg Hx    • Ovarian cancer Neg Hx    • Uterine cancer Neg Hx    • Colon cancer Neg Hx        No LMP recorded (lmp unknown). Patient is postmenopausal.    OB History        2    Para   0    Term   0       0    AB   0    Living   2       SAB   0    IAB   0    Ectopic   0    Molar        Multiple   0    Live Births                    Vitals:    11/10/22 1019   BP: 120/66   Weight: 78.5 kg (173 lb)   Height: 160 cm (62.99\")       Physical Exam  Constitutional:       Appearance: Normal appearance. She is well-developed.   Genitourinary:      Bladder, vagina, uterus and urethral meatus normal.      Right Labia: No tenderness or lesions.     Left Labia: No tenderness or lesions.     No vaginal discharge or tenderness.        Right Adnexa: not tender and not full.     Left Adnexa: not tender and not full.     No cervical motion tenderness or lesion.      Uterus is not enlarged or tender.      No urethral tenderness or hypermobility present.      Pelvic exam was performed with patient " supine.   Rectum:      No rectal mass or tenderness.   Breasts:     Right: No mass or nipple discharge.      Left: No mass or nipple discharge.   HENT:      Right Ear: External ear normal.      Left Ear: External ear normal.      Nose: Nose normal.   Eyes:      Conjunctiva/sclera: Conjunctivae normal.   Neck:      Thyroid: No thyromegaly.   Cardiovascular:      Rate and Rhythm: Normal rate and regular rhythm.      Heart sounds: Normal heart sounds.   Pulmonary:      Effort: Pulmonary effort is normal.      Breath sounds: No stridor. No wheezing.   Abdominal:      Palpations: Abdomen is soft. There is no mass.      Tenderness: There is no guarding or rebound.   Musculoskeletal:         General: Normal range of motion.      Cervical back: Normal range of motion and neck supple.   Neurological:      Mental Status: She is alert.      Coordination: Coordination normal.   Skin:     General: Skin is warm and dry.   Psychiatric:         Mood and Affect: Mood normal.         Behavior: Behavior normal.         Thought Content: Thought content normal.         Judgment: Judgment normal.   Vitals reviewed. Exam conducted with a chaperone present.         Diagnoses and all orders for this visit:    1. Visit for gynecologic examination (Primary)        -     Discussed importance of regular screening and breast awareness.        -     Patient with declining health status due to cirrhosis/liver failure.  Discussed, however, continuing screening measures.        -     Discussed disease prevention through vaccination; patient has been vaccinated against Covid 19.  2. Colon cancer screening  -     POC Occult Blood Stool  -     Testing was positive.  Patient with discussed with PCP and Oncologist.  She has history of esophageal varices and has been having blood in stool.  Discussed importance of notification.  3. Asymptomatic menopausal state  -     dexa bone density axial; Future  -     Patient to check DEXA and improve Calcium/Vitamin  D in diet.        Eduardo Lorenzo MD

## 2022-12-13 VITALS — SYSTOLIC BLOOD PRESSURE: 118 MMHG | HEART RATE: 91 BPM | HEIGHT: 63 IN | DIASTOLIC BLOOD PRESSURE: 70 MMHG

## 2023-01-06 ENCOUNTER — OFFICE (OUTPATIENT)
Dept: URBAN - METROPOLITAN AREA CLINIC 65 | Facility: CLINIC | Age: 68
End: 2023-01-06

## 2023-01-06 VITALS
SYSTOLIC BLOOD PRESSURE: 115 MMHG | WEIGHT: 172 LBS | HEIGHT: 63 IN | HEART RATE: 68 BPM | DIASTOLIC BLOOD PRESSURE: 64 MMHG

## 2023-01-06 DIAGNOSIS — Z92.25 PERSONAL HISTORY OF IMMUNOSUPPRESSION THERAPY: ICD-10-CM

## 2023-01-06 DIAGNOSIS — K75.4 AUTOIMMUNE HEPATITIS: ICD-10-CM

## 2023-01-06 DIAGNOSIS — I81 PORTAL VEIN THROMBOSIS: ICD-10-CM

## 2023-01-06 DIAGNOSIS — R19.5 OTHER FECAL ABNORMALITIES: ICD-10-CM

## 2023-01-06 PROCEDURE — 99214 OFFICE O/P EST MOD 30 MIN: CPT | Performed by: INTERNAL MEDICINE

## 2023-01-23 ENCOUNTER — LAB (OUTPATIENT)
Dept: LAB | Facility: HOSPITAL | Age: 68
End: 2023-01-23
Payer: MEDICARE

## 2023-01-23 ENCOUNTER — OFFICE VISIT (OUTPATIENT)
Dept: ONCOLOGY | Facility: CLINIC | Age: 68
End: 2023-01-23
Payer: MEDICARE

## 2023-01-23 VITALS
WEIGHT: 171.1 LBS | BODY MASS INDEX: 30.32 KG/M2 | HEIGHT: 63 IN | SYSTOLIC BLOOD PRESSURE: 97 MMHG | TEMPERATURE: 97.3 F | OXYGEN SATURATION: 98 % | DIASTOLIC BLOOD PRESSURE: 49 MMHG | RESPIRATION RATE: 18 BRPM | HEART RATE: 69 BPM

## 2023-01-23 DIAGNOSIS — Z17.0 MALIGNANT NEOPLASM OF UPPER-INNER QUADRANT OF RIGHT BREAST IN FEMALE, ESTROGEN RECEPTOR POSITIVE: ICD-10-CM

## 2023-01-23 DIAGNOSIS — C50.211 MALIGNANT NEOPLASM OF UPPER-INNER QUADRANT OF RIGHT BREAST IN FEMALE, ESTROGEN RECEPTOR POSITIVE: ICD-10-CM

## 2023-01-23 DIAGNOSIS — C50.211 MALIGNANT NEOPLASM OF UPPER-INNER QUADRANT OF RIGHT BREAST IN FEMALE, ESTROGEN RECEPTOR POSITIVE: Primary | ICD-10-CM

## 2023-01-23 DIAGNOSIS — Z17.0 MALIGNANT NEOPLASM OF UPPER-INNER QUADRANT OF RIGHT BREAST IN FEMALE, ESTROGEN RECEPTOR POSITIVE: Primary | ICD-10-CM

## 2023-01-23 LAB
BASOPHILS # BLD AUTO: 0.02 10*3/MM3 (ref 0–0.2)
BASOPHILS NFR BLD AUTO: 0.5 % (ref 0–1.5)
DEPRECATED RDW RBC AUTO: 49.9 FL (ref 37–54)
EOSINOPHIL # BLD AUTO: 0.07 10*3/MM3 (ref 0–0.4)
EOSINOPHIL NFR BLD AUTO: 1.6 % (ref 0.3–6.2)
ERYTHROCYTE [DISTWIDTH] IN BLOOD BY AUTOMATED COUNT: 13.6 % (ref 12.3–15.4)
HCT VFR BLD AUTO: 34.4 % (ref 34–46.6)
HGB BLD-MCNC: 12.4 G/DL (ref 12–15.9)
IMM GRANULOCYTES # BLD AUTO: 0.01 10*3/MM3 (ref 0–0.05)
IMM GRANULOCYTES NFR BLD AUTO: 0.2 % (ref 0–0.5)
LYMPHOCYTES # BLD AUTO: 1.53 10*3/MM3 (ref 0.7–3.1)
LYMPHOCYTES NFR BLD AUTO: 34.7 % (ref 19.6–45.3)
MCH RBC QN AUTO: 36 PG (ref 26.6–33)
MCHC RBC AUTO-ENTMCNC: 36 G/DL (ref 31.5–35.7)
MCV RBC AUTO: 100 FL (ref 79–97)
MONOCYTES # BLD AUTO: 0.6 10*3/MM3 (ref 0.1–0.9)
MONOCYTES NFR BLD AUTO: 13.6 % (ref 5–12)
NEUTROPHILS NFR BLD AUTO: 2.18 10*3/MM3 (ref 1.7–7)
NEUTROPHILS NFR BLD AUTO: 49.4 % (ref 42.7–76)
NRBC BLD AUTO-RTO: 0 /100 WBC (ref 0–0.2)
PLATELET # BLD AUTO: 99 10*3/MM3 (ref 140–450)
PLATELETS.RETICULATED NFR BLD AUTO: 4.5 % (ref 0.9–6.5)
PMV BLD AUTO: 10.8 FL (ref 6–12)
RBC # BLD AUTO: 3.44 10*6/MM3 (ref 3.77–5.28)
WBC NRBC COR # BLD: 4.41 10*3/MM3 (ref 3.4–10.8)

## 2023-01-23 PROCEDURE — 85055 RETICULATED PLATELET ASSAY: CPT

## 2023-01-23 PROCEDURE — 85025 COMPLETE CBC W/AUTO DIFF WBC: CPT

## 2023-01-23 PROCEDURE — 36415 COLL VENOUS BLD VENIPUNCTURE: CPT

## 2023-01-23 PROCEDURE — 99214 OFFICE O/P EST MOD 30 MIN: CPT | Performed by: INTERNAL MEDICINE

## 2023-01-23 NOTE — PROGRESS NOTES
.     REASONS FOR FOLLOWUP: Portal vein thrombosis, breast cancer    HISTORY OF PRESENT ILLNESS:  The patient is a 67 y.o. year old female  who is here for follow-up with the above-mentioned history.    No new problems.  Feeling fine.  No fever, chills, weight loss, night sweats, chest pain, SOA, or significant bleeding.    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 (HCC)    • Breast cancer (HCC) 09/2019    Right IDC   • Clostridium difficile infection 12/2019   • GERD (gastroesophageal reflux disease)    • Hx of radiation therapy    • IBS (irritable bowel syndrome)    • PONV (postoperative nausea and vomiting)    • Radiation-induced angiosarcoma of breast (HCC)    • Situational stress     DEATH OF BROTHER 10/2019, RECENT DIAGNOSIS   • Stress headaches      Past Surgical History:   Procedure Laterality Date   • BILE DUCT STENT PLACEMENT  2014   • BREAST BIOPSY Right 09/2019    malignant   • BREAST LUMPECTOMY Right    • BREAST LUMPECTOMY WITH SENTINEL NODE BIOPSY Right 11/04/2019    Procedure: BREAST LUMPECTOMY WITH SENTINEL NODE BIOPSY AND NEEDLE LOCALIZATION And possible axillary dissection;  Surgeon: Jean-Paul Ramos MD;  Location: [facility] OR [facility];  Service: General   • COLONOSCOPY N/A 06/26/2020    Procedure: COLONOSCOPY TO TERMINAL ILEUM WITH BX;  Surgeon: Sage Gonzalez MD;  Location: [facility] ENDOSCOPY;  Service: Gastroenterology;  Laterality: N/A;  PREOP/ SCREENING  POSTOP/ VASCULAR PATTERN TO COLON   • ESOPHAGOSCOPY / EGD N/A 10/06/2022   • KNEE ARTHROSCOPY Left    • LAPAROSCOPIC CHOLECYSTECTOMY  2014   • TUBAL ABDOMINAL LIGATION         MEDICATIONS    Current Outpatient Medications:   •  acetaminophen (TYLENOL) 325 MG tablet, Take 650 mg by mouth Every 6 (Six) Hours As Needed for Mild Pain ., Disp: , Rfl:   •  azaTHIOprine (IMURAN) 100 MG tablet, , Disp: , Rfl:   •  carvedilol (COREG) 6.25 MG tablet, 2 (Two) Times a Day With Meals., Disp: , Rfl:    •  famotidine (PEPCID) 40 MG tablet, , Disp: , Rfl:   •  melatonin 5 MG tablet tablet, Take 10 mg by mouth At Night As Needed., Disp: , Rfl:   •  Probiotic Product (PROBIOTIC PO), Take 1 tablet by mouth Daily., Disp: , Rfl:   •  traZODone (DESYREL) 50 MG tablet, , Disp: , Rfl:   •  ursodiol (ACTIGALL) 500 MG tablet, Take 500 mg by mouth., Disp: , Rfl:     ALLERGIES:   No Known Allergies    SOCIAL HISTORY:       Social History     Socioeconomic History   • Marital status:    • Number of children: 2   Tobacco Use   • Smoking status: Former     Packs/day: 0.50     Years: 10.00     Pack years: 5.00     Types: Cigarettes     Quit date:      Years since quittin.0   • Smokeless tobacco: Former   Vaping Use   • Vaping Use: Never used   Substance and Sexual Activity   • Alcohol use: No   • Drug use: No   • Sexual activity: Not Currently     Birth control/protection: Post-menopausal, Surgical         FAMILY HISTORY:  Family History   Problem Relation Age of Onset   • Alcohol abuse Father    • Heart failure Mother    • Diabetes Brother    • Prostate cancer Brother    • Throat cancer Brother    • Breast cancer Maternal Aunt    • Breast cancer Maternal Cousin    • Breast cancer Maternal Cousin    • Malig Hyperthermia Neg Hx    • Ovarian cancer Neg Hx    • Uterine cancer Neg Hx    • Colon cancer Neg Hx        REVIEW OF SYSTEMS:  Review of Systems   Constitutional: Negative for activity change.   HENT: Negative for nosebleeds and trouble swallowing.    Respiratory: Negative for shortness of breath and wheezing.    Cardiovascular: Negative for chest pain and palpitations.   Gastrointestinal: Negative for constipation, diarrhea and nausea.   Genitourinary: Negative for dysuria and hematuria.   Musculoskeletal: Negative for arthralgias and myalgias.   Skin: Negative for rash and wound.   Neurological: Negative for seizures and syncope.   Hematological: Negative for adenopathy. Does not bruise/bleed easily.  "  Psychiatric/Behavioral: Negative for confusion.            Vitals:    01/23/23 1419   BP: 97/49   Pulse: 69   Resp: 18   Temp: 97.3 °F (36.3 °C)   TempSrc: Temporal   SpO2: 98%   Weight: 77.6 kg (171 lb 1.6 oz)   Height: 160 cm (62.99\")   PainSc:   7   PainLoc: Head  Comment: Headache     Current Status 1/23/2023   ECOG score 0        PHYSICAL EXAM:          CONSTITUTIONAL:  Vital signs reviewed.  No distress, looks comfortable.  EYES:  Conjunctiva and lids unremarkable.  PERRLA  EARS,NOSE,MOUTH,THROAT:  Ears and nose appear unremarkable.  Lips, teeth, gums appear unremarkable.  RESPIRATORY:  Normal respiratory effort.  Lungs clear to auscultation bilaterally.  CARDIOVASCULAR:  Normal S1, S2.  No murmurs rubs or gallops.  No significant lower extremity edema.  GASTROINTESTINAL: Abdomen appears unremarkable.  Nontender.  No hepatomegaly.  No splenomegaly.  LYMPHATIC:  No cervical, supraclavicular, axillary lymphadenopathy.  SKIN:  Warm.  No rashes.  PSYCHIATRIC:  Normal judgment and insight.  Normal mood and affect.          RECENT LABS:        WBC   Date/Time Value Ref Range Status   01/23/2023 01:45 PM 4.41 3.40 - 10.80 10*3/mm3 Final     Hemoglobin   Date/Time Value Ref Range Status   01/23/2023 01:45 PM 12.4 12.0 - 15.9 g/dL Final     Platelets   Date/Time Value Ref Range Status   01/23/2023 01:45 PM 99 (L) 140 - 450 10*3/mm3 Final       Assessment & Plan   Malignant neoplasm of upper-inner quadrant of right breast in female, estrogen receptor positive (HCC)  - CBC & Differential  - Comprehensive Metabolic Panel  - Immature Platelet Fraction    *Right breast cancer.  Invasive ductal adenocarcinoma.  Upper inner quadrant  · Right lumpectomy by Dr. Ramos 11/4/2019.  1:00.  Overall grade 1.  Greatest dimension 5 mm.  Single focus of invasive carcinoma.  No lymphovascular invasion.  Margins negative.  0 out of 2 nodes involved.  · [id number], stage 1  · ER 91%.  HI 96%.  HER-2 negative.  Ki-67 4.7% " (favorable).  · (Initial needle biopsy 9/16/2019: Low-grade invasive ductal carcinoma, grade 1.  5 mm in greatest dimension.)  · (Initial mammogram measured the lesion at 5 mm)  · From the information we currently know about her cancer, I do not recommend chemotherapy.  I do recommend hormonal therapy for 5 years.  Hopefully, aromatase inhibitors if DEXA scan allows (with portal vein thrombosis would like to avoid tamoxifen).  · Oncotype DX: Recurrence score 3, 3% distant recurrence risk at 9 years if tamoxifen or AI is used.  · Therefore, do not recommend chemotherapy.  · Adjuvant XRT completed 2/12/2020  · Arimidex 2/26/2020- planned 2/26/2025  · Arimidex stopped 4/29/2021 due to concern for possible drug-induced liver injury.  Might try to resume hormonal therapy with Aromasin at some point but for now remain off hormonal therapy  · 9/29/2021: Remain off Arimidex and anticoagulation for now as this was a low risk breast cancer and these might be the cause of her liver dysfunction.  She has not yet seen the hepatologist, Dr. Adilson Hunt.  She sees him tomorrow.  I told her unless Dr. Hunt thinks there is very little chance Arimidex or Xarelto caused her liver dysfunction, we would likely remain off Arimidex Xarelto.  · 5/16/2022: Discussed we could try tamoxifen with Pradaxa since Pradaxa has a different mechanism of action from Xarelto and tamoxifen has a different mechanism of action from Arimidex.  However, considering her liver disease may have been related to either Arimidex or Xarelto and breast cancer was low risk, I think it is in her best interest to not take any more adjuvant therapy for breast cancer.  Patient very much agrees.  We plan no more adjuvant therapy of breast cancer.  No clinical evidence of recurrence.    *Mild hand joint achiness since beginning Arimidex.    Remain off Arimidex    *Bone health.  · DEXA 12/2/2019: Normal.  T score lumbar spine 0.1, T score left hip 0.1, T score right hip  0.6.  · She takes 2-4 times daily.  Therefore, did not begin a calcium supplement when a rheumatic started on 12/9/2019.  She was encouraged to take a PPI on a schedule and follow-up with her PCP.  States she has had several EGDs in the past none of which have shown problems.  (States in the past her PCP told her to stop calcium supplements because she developed hypercalcemia due to the combination of calcium supplements and Tums).     *Portal vein thrombosis (right branch of portal vein).  · No evidence of cirrhosis on MRI or ultrasound.  Dr. Berna Gonzalez, GI note, has no mention of cirrhosis.  · Hormonal therapy for breast cancer has a slight increased risk of thrombosis.  Therefore, I think it is in her best interest to undergo anticoagulation until she completes hormonal therapy.  · Generally, Lovenox or Coumadin are the preferred agents.  However, because of significant elevations in INR, and the desire for an oral agent, changed to Xarelto on 2/19/2020.   · Previous plan was to remain on therapeutic dose Xarelto until completes Arimidex.   · 4/29/2021, Xarelto (along with Arimidex) stopped due to concern for possible drug-induced liver injury.  · No evidence of recurrent clotting    *Risk factors for portal vein thrombosis:  · Unremarkable: Prothrombin gene mutation, factor V Leiden gene mutation, lupus anticoagulant, beta-2 glycoprotein antibodies, anti-thrombin, protein S free.    *Low protein S activity, 14% (normal range %), on 11/13/2019     *Low protein C activity, 44% (normal range %) on 11/13/2019.    *Indeterminant anticardiolipin  · IgM 14 (indeterminate range 13-20) on 11/13/2019.  IgA and IgG negative.    *Prior leukocytosis, likely due to C. difficile colitis.  WBC 4.4, from 3.4, from 4.2     *Anemia.  Previously felt to likely due to C. difficile colitis.  · 10/31/2022: Hb 11.8 on 10/31/2022 (lower than usual, prompting additional labs), from 12.3.  Unremarkable: Iron labs, folate,  B12  · 1/23/2023: Hb back up to 12.4    *Thrombocytopenia, thought to be due to cirrhosis  · PLT down to 103 on 10/31/2022, prompting additional labs and closer follow-up  · PLT 99 on 1/23/2023     *Prominent nodes within the hepatogastric ligament and around the celiac axis and in the retroperitoneum on MRI abdomen 11/11/2019.  · Etiology uncertain.  Radiologist recommended follow-up  · CT 2/19/2020, from 12/13/2019: Abdominal and pelvic and cardiophrenic LAD unchanged.  · CT 8/14/2020: Overall improvement with interval decrease in right cardiophrenic node, size of spleen, and degree of wall thickening and surrounding fat stranding in the colon.  Persistent abdominal pelvic and lower thoracic LAD, indeterminant etiology.    · Radiologist recommended continued follow-up.  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  I explained if this were the case it would not be expected to be curable and we would undergo observation instead of treatment regardless.  We would consider treatment if she develops a problem.  Therefore, I think further observation is very reasonable.  (The LAD might be due to her previous problems with extensive colitis and the mild splenomegaly might be due to the right hepatic lobe atrophy).  · CT 2/17/2021: Unchanged enlarged nodes, from 8/14/2020.  · CT 11/3/2021: No mention of LAD.  Stable.  · Plan to complete 2 years CT follow-up sometime around November 2021, and if stable, then no more CT follow-up  · With no change on CT, stop CT follow-up.    *C. difficile colitis during November 2019 hospitalization.  Thought to perhaps be related to receiving 1 dose of antibiotics for breast surgery.  She was treated with Flagyl and oral vancomycin     *Extensive colitis again seen on CT 2/19/2020, unchanged from 11/10/2019.  Patient reports she had some recent diarrhea but it became better and is more solid and it does not seem similar to the  diarrhea she had back in November during the diagnosis of C. difficile.  On 2/24/2020 she was told to contact her PCP if the diarrhea worsens.  In the meantime, we referred her back to Dr. Cano of ID and Dr. Berna Gonzalez of GI (she saw both during the C. difficile hospitalization November 2019).  · Dr. Cano appointment on 3/4/2020  · Dr. Berna Gonzalez colonoscopy 6/26/2020: Decreased mucosa vascular pattern in the entire examined colon, biopsied.  Biopsy showed no evidence of malignancy.  It did reveal prominent lymphoid aggregates.    *Chronic hepatitis with periportal and bridging fibrosis and architectural distortion (stage III fibrosis), per liver biopsy, 4/21/2021.  Pathologist stated overall findings worrisome for autoimmune hepatitis, but a drug reaction could not entirely be excluded.  Findings worrisome for early cirrhosis.  · Patient states Dr. Berna Gonzalez has told her she has autoimmune hepatitis and primary biliary cholangitis  · Patient states she had a previous bile duct injury at the time of cholecystectomy, previously requiring a stent and now has narrowing of her bile ducts.  She states she was told this is contributing to her elevated transaminases.  · 2/19/2020: LFTs normal  · 2/26/2020: Arimidex and Xarelto started  · 8/24/2020: LFTs increased  · 4/29/2021: Arimidex and Xarelto stopped  · 5/14/2021: Prednisone 20 mg daily and ursodiol started by Dr. Berna Gonzalez, GI  · 7/6/2021: LFTs normalized.  · 8/13/2021: Next Dr. Berna Gonzalez appointment  · 9/30/2021: Appointment Dr. Adilson Hunt, liver specialist at [facility], for consultation  · It is difficult to know what caused her liver dysfunction.  LFTs have normalized with: Stopping Arimidex/Xarelto, starting prednisone 20 mg daily, and starting ursodiol.  Her breast cancer is low risk, grade 1, 5 mm, stage I, Ki-67 4.7%.  Oncotype DX recurrence score only 3.  Therefore, at least for now I think it is in her best interest to remain off hormonal therapy  in case Arimidex was part of the reason for the liver injury.  If we wanted to try hormonal therapy in the future, we could consider Aromasin and consider prophylactic dose Eliquis (we were previously using therapeutic dose Xarelto with Arimidex due to portal vein thrombosis and a slight increased risk of clotting from an AI).  However, at least for now I would probably favor remaining off hormonal therapy    *Elevated AFP, on labs drawn through Dr. Adilson Hunt.  · CT 11/3/2021: No liver mass.  · Patient states Dr. Hunt plans MRI abdomen and short-term follow-up with him.  · She is following up with Dr. Hunt regularly.    Plan  · MD CBC CMP IPF 6 months  · Maintains follow-up with Dr. Adilson Hunt, [facility] liver specialist  · Remain off AI and anticoagulation  · Mammogram 11/10/2022: BI-RADS 2  · No more CT scans.      Send copy this to  Dr. Adilson Hunt, [facility] liver specialist

## 2023-01-31 NOTE — TELEPHONE ENCOUNTER
Addended by: LILI MONGE on: 1/31/2023 11:12 AM     Modules accepted: Orders     CSW contacted patient again regarding her application for free Xarelto.  The patient did not sign the application.  The patient stated she will come to the office tomorrow around 9:30 to sign the application.

## 2023-03-28 NOTE — TELEPHONE ENCOUNTER
-per [facility] promethazine is out of stock  -med removed from med list     -patient was prescribed ABX in walk in for sinusitis     Symptom Management Recommended:    • Mucinex, Pseudoephedrine, Nyquil or Benadryl at bedtime for cough and postnasal drainage.    Asking for alternate    Pt is on last bottle of Xarelto and needs more samples called in until her insurance starts.    [phone] PH

## 2023-08-14 ENCOUNTER — LAB (OUTPATIENT)
Dept: LAB | Facility: HOSPITAL | Age: 68
End: 2023-08-14
Payer: MEDICARE

## 2023-08-14 ENCOUNTER — OFFICE VISIT (OUTPATIENT)
Dept: ONCOLOGY | Facility: CLINIC | Age: 68
End: 2023-08-14
Payer: MEDICARE

## 2023-08-14 VITALS
WEIGHT: 146.2 LBS | OXYGEN SATURATION: 98 % | HEIGHT: 63 IN | HEART RATE: 67 BPM | SYSTOLIC BLOOD PRESSURE: 103 MMHG | TEMPERATURE: 98.2 F | RESPIRATION RATE: 16 BRPM | BODY MASS INDEX: 25.91 KG/M2 | DIASTOLIC BLOOD PRESSURE: 64 MMHG

## 2023-08-14 DIAGNOSIS — Z17.0 MALIGNANT NEOPLASM OF UPPER-INNER QUADRANT OF RIGHT BREAST IN FEMALE, ESTROGEN RECEPTOR POSITIVE: Primary | ICD-10-CM

## 2023-08-14 DIAGNOSIS — C50.211 MALIGNANT NEOPLASM OF UPPER-INNER QUADRANT OF RIGHT BREAST IN FEMALE, ESTROGEN RECEPTOR POSITIVE: ICD-10-CM

## 2023-08-14 DIAGNOSIS — C50.211 MALIGNANT NEOPLASM OF UPPER-INNER QUADRANT OF RIGHT BREAST IN FEMALE, ESTROGEN RECEPTOR POSITIVE: Primary | ICD-10-CM

## 2023-08-14 DIAGNOSIS — Z17.0 MALIGNANT NEOPLASM OF UPPER-INNER QUADRANT OF RIGHT BREAST IN FEMALE, ESTROGEN RECEPTOR POSITIVE: ICD-10-CM

## 2023-08-14 LAB
ALBUMIN SERPL-MCNC: 3.2 G/DL (ref 3.5–5.2)
ALBUMIN/GLOB SERPL: 0.8 G/DL
ALP SERPL-CCNC: 80 U/L (ref 39–117)
ALT SERPL W P-5'-P-CCNC: 6 U/L (ref 1–33)
ANION GAP SERPL CALCULATED.3IONS-SCNC: 5.9 MMOL/L (ref 5–15)
AST SERPL-CCNC: 19 U/L (ref 1–32)
BASOPHILS # BLD AUTO: 0.01 10*3/MM3 (ref 0–0.2)
BASOPHILS NFR BLD AUTO: 0.3 % (ref 0–1.5)
BILIRUB SERPL-MCNC: 0.5 MG/DL (ref 0–1.2)
BUN SERPL-MCNC: 17 MG/DL (ref 8–23)
BUN/CREAT SERPL: 25 (ref 7–25)
CALCIUM SPEC-SCNC: 8.8 MG/DL (ref 8.6–10.5)
CHLORIDE SERPL-SCNC: 104 MMOL/L (ref 98–107)
CO2 SERPL-SCNC: 27.1 MMOL/L (ref 22–29)
CREAT SERPL-MCNC: 0.68 MG/DL (ref 0.57–1)
DEPRECATED RDW RBC AUTO: 60.5 FL (ref 37–54)
EGFRCR SERPLBLD CKD-EPI 2021: 95.6 ML/MIN/1.73
EOSINOPHIL # BLD AUTO: 0.14 10*3/MM3 (ref 0–0.4)
EOSINOPHIL NFR BLD AUTO: 4.3 % (ref 0.3–6.2)
ERYTHROCYTE [DISTWIDTH] IN BLOOD BY AUTOMATED COUNT: 15.2 % (ref 12.3–15.4)
FERRITIN SERPL-MCNC: 405 NG/ML (ref 13–150)
FOLATE SERPL-MCNC: 7.47 NG/ML (ref 4.78–24.2)
GLOBULIN UR ELPH-MCNC: 4 GM/DL
GLUCOSE SERPL-MCNC: 100 MG/DL (ref 65–99)
HAPTOGLOB SERPL-MCNC: 171 MG/DL (ref 30–200)
HCT VFR BLD AUTO: 30.5 % (ref 34–46.6)
HGB BLD-MCNC: 10 G/DL (ref 12–15.9)
HGB RETIC QN AUTO: 38.4 PG (ref 29.8–36.1)
IMM GRANULOCYTES # BLD AUTO: 0.01 10*3/MM3 (ref 0–0.05)
IMM GRANULOCYTES NFR BLD AUTO: 0.3 % (ref 0–0.5)
IMM RETICS NFR: 12.5 % (ref 3–15.8)
IRON 24H UR-MRATE: 62 MCG/DL (ref 37–145)
IRON SATN MFR SERPL: 23 % (ref 20–50)
LDH SERPL-CCNC: 160 U/L (ref 135–214)
LYMPHOCYTES # BLD AUTO: 1.15 10*3/MM3 (ref 0.7–3.1)
LYMPHOCYTES NFR BLD AUTO: 35 % (ref 19.6–45.3)
MCH RBC QN AUTO: 35.6 PG (ref 26.6–33)
MCHC RBC AUTO-ENTMCNC: 32.8 G/DL (ref 31.5–35.7)
MCV RBC AUTO: 108.5 FL (ref 79–97)
MONOCYTES # BLD AUTO: 0.33 10*3/MM3 (ref 0.1–0.9)
MONOCYTES NFR BLD AUTO: 10 % (ref 5–12)
NEUTROPHILS NFR BLD AUTO: 1.65 10*3/MM3 (ref 1.7–7)
NEUTROPHILS NFR BLD AUTO: 50.1 % (ref 42.7–76)
NRBC BLD AUTO-RTO: 0 /100 WBC (ref 0–0.2)
PLATELET # BLD AUTO: 136 10*3/MM3 (ref 140–450)
PLATELETS.RETICULATED NFR BLD AUTO: 3.4 % (ref 0.9–6.5)
PMV BLD AUTO: 10.1 FL (ref 6–12)
POTASSIUM SERPL-SCNC: 3.9 MMOL/L (ref 3.5–5.2)
PROT SERPL-MCNC: 7.2 G/DL (ref 6–8.5)
RBC # BLD AUTO: 2.81 10*6/MM3 (ref 3.77–5.28)
RETICS # AUTO: 0.06 10*6/MM3 (ref 0.02–0.13)
RETICS/RBC NFR AUTO: 2.07 % (ref 0.7–1.9)
SODIUM SERPL-SCNC: 137 MMOL/L (ref 136–145)
TIBC SERPL-MCNC: 268 MCG/DL (ref 298–536)
TRANSFERRIN SERPL-MCNC: 180 MG/DL (ref 200–360)
VIT B12 BLD-MCNC: 569 PG/ML (ref 211–946)
WBC NRBC COR # BLD: 3.29 10*3/MM3 (ref 3.4–10.8)

## 2023-08-14 PROCEDURE — 83615 LACTATE (LD) (LDH) ENZYME: CPT | Performed by: INTERNAL MEDICINE

## 2023-08-14 PROCEDURE — 82607 VITAMIN B-12: CPT | Performed by: INTERNAL MEDICINE

## 2023-08-14 PROCEDURE — 82728 ASSAY OF FERRITIN: CPT | Performed by: INTERNAL MEDICINE

## 2023-08-14 PROCEDURE — 80053 COMPREHEN METABOLIC PANEL: CPT | Performed by: INTERNAL MEDICINE

## 2023-08-14 PROCEDURE — 83010 ASSAY OF HAPTOGLOBIN QUANT: CPT | Performed by: INTERNAL MEDICINE

## 2023-08-14 PROCEDURE — 36415 COLL VENOUS BLD VENIPUNCTURE: CPT

## 2023-08-14 PROCEDURE — 82746 ASSAY OF FOLIC ACID SERUM: CPT | Performed by: INTERNAL MEDICINE

## 2023-08-14 PROCEDURE — 85046 RETICYTE/HGB CONCENTRATE: CPT | Performed by: INTERNAL MEDICINE

## 2023-08-14 PROCEDURE — 85025 COMPLETE CBC W/AUTO DIFF WBC: CPT

## 2023-08-14 PROCEDURE — 85055 RETICULATED PLATELET ASSAY: CPT

## 2023-08-14 PROCEDURE — 84466 ASSAY OF TRANSFERRIN: CPT | Performed by: INTERNAL MEDICINE

## 2023-08-14 PROCEDURE — 83540 ASSAY OF IRON: CPT | Performed by: INTERNAL MEDICINE

## 2023-08-14 RX ORDER — AZATHIOPRINE 50 MG/1
2 TABLET ORAL DAILY
COMMUNITY
Start: 2023-04-24

## 2023-08-14 RX ORDER — CLONAZEPAM 0.5 MG/1
TABLET ORAL
COMMUNITY
Start: 2023-05-17 | End: 2023-08-15

## 2023-08-14 NOTE — PROGRESS NOTES
.     REASONS FOR FOLLOWUP: Portal vein thrombosis, breast cancer    HISTORY OF PRESENT ILLNESS:  The patient is a 67 y.o. year old female  who is here for follow-up with the above-mentioned history.    States she has had 4 or 5 episodes of pneumonia since March 2023 with the last episode involving a hospital discharge just a couple of weeks ago.  States she has had 2 bronchoscopies and was found to have MRSA pneumonia.  Follows with pulmonology and has an appointment to meet an ID specialist    Has lost about 25 pounds since January.  She attributes this to coughing resulting in emesis and not feeling like eating.  She has not tried to supplement her calories.  I encouraged her to do this.    Denies bleeding.    Past Medical History:   Diagnosis Date    Blood clot of common bile duct of transplanted liver     Breast cancer 09/2019    Right IDC    Clostridium difficile infection 12/2019    GERD (gastroesophageal reflux disease)     Hx of radiation therapy     IBS (irritable bowel syndrome)     PONV (postoperative nausea and vomiting)     Radiation-induced angiosarcoma of breast     Situational stress     DEATH OF BROTHER 10/2019, RECENT DIAGNOSIS    Stress headaches      Past Surgical History:   Procedure Laterality Date    BILE DUCT STENT PLACEMENT  2014    BREAST BIOPSY Right 09/2019    malignant    BREAST LUMPECTOMY Right     BREAST LUMPECTOMY WITH SENTINEL NODE BIOPSY Right 11/04/2019    Procedure: BREAST LUMPECTOMY WITH SENTINEL NODE BIOPSY AND NEEDLE LOCALIZATION And possible axillary dissection;  Surgeon: Jean-Paul Ramos MD;  [place] [facility] [place] [facility] [place]: [place]    COLONOSCOPY N/A 06/26/2020    Procedure: COLONOSCOPY TO TERMINAL ILEUM WITH BX;  Surgeon: Sage Gonzalez MD;  Location: [facility] ENDOSCOPY;  Service: Gastroenterology;  Laterality: N/A;  PREOP/ SCREENING  POSTOP/ VASCULAR PATTERN TO COLON    ESOPHAGOSCOPY / EGD N/A 10/06/2022    KNEE ARTHROSCOPY Left     LAPAROSCOPIC CHOLECYSTECTOMY       TUBAL ABDOMINAL LIGATION         MEDICATIONS    Current Outpatient Medications:     acetaminophen (TYLENOL) 325 MG tablet, Take 2 tablets by mouth Every 6 (Six) Hours As Needed for Mild Pain., Disp: , Rfl:     azaTHIOprine (IMURAN) 50 MG tablet, Take 2 tablets by mouth Daily., Disp: , Rfl:     carvedilol (COREG) 6.25 MG tablet, 2 (Two) Times a Day With Meals., Disp: , Rfl:     clonazePAM (KlonoPIN) 0.5 MG tablet, Take  by mouth., Disp: , Rfl:     famotidine (PEPCID) 40 MG tablet, , Disp: , Rfl:     melatonin 5 MG tablet tablet, Take 2 tablets by mouth At Night As Needed., Disp: , Rfl:     Probiotic Product (PROBIOTIC PO), Take 1 tablet by mouth Daily., Disp: , Rfl:     traZODone (DESYREL) 50 MG tablet, , Disp: , Rfl:     ursodiol (ACTIGALL) 500 MG tablet, Take 1 tablet by mouth., Disp: , Rfl:     azaTHIOprine (IMURAN) 100 MG tablet, , Disp: , Rfl:     ALLERGIES:   No Known Allergies    SOCIAL HISTORY:       Social History     Socioeconomic History    Marital status:     Number of children: 2   Tobacco Use    Smoking status: Former     Packs/day: 0.50     Years: 10.00     Pack years: 5.00     Types: Cigarettes     Quit date:      Years since quittin.6    Smokeless tobacco: Former   Vaping Use    Vaping Use: Never used   Substance and Sexual Activity    Alcohol use: No    Drug use: No    Sexual activity: Not Currently     Birth control/protection: Post-menopausal, Surgical         FAMILY HISTORY:  Family History   Problem Relation Age of Onset    Alcohol abuse Father     Heart failure Mother     Diabetes Brother     Prostate cancer Brother     Throat cancer Brother     Breast cancer Maternal Aunt     Breast cancer Maternal Cousin     Breast cancer Maternal Cousin     Malig Hyperthermia Neg Hx     Ovarian cancer Neg Hx     Uterine cancer Neg Hx     Colon cancer Neg Hx        REVIEW OF SYSTEMS:  Review of Systems   Constitutional:  Negative for activity change.   HENT:  Negative for nosebleeds  "and trouble swallowing.    Respiratory:  Negative for shortness of breath and wheezing.    Cardiovascular:  Negative for chest pain and palpitations.   Gastrointestinal:  Negative for constipation, diarrhea and nausea.   Genitourinary:  Negative for dysuria and hematuria.   Musculoskeletal:  Negative for arthralgias and myalgias.   Skin:  Negative for rash and wound.   Neurological:  Negative for seizures and syncope.   Hematological:  Negative for adenopathy. Does not bruise/bleed easily.   Psychiatric/Behavioral:  Negative for confusion.           Vitals:    08/14/23 1523   BP: 103/64   Pulse: 67   Resp: 16   Temp: 98.2 øF (36.8 øC)   TempSrc: Temporal   SpO2: 98%   Weight: 66.3 kg (146 lb 3.2 oz)   Height: 160 cm (63\")   PainSc: 0-No pain         8/14/2023     3:25 PM   Current Status   ECOG score 0        PHYSICAL EXAM:        CONSTITUTIONAL:  Vital signs reviewed.  No distress, looks comfortable.  EYES:  Conjunctiva and lids unremarkable.  PERRLA  EARS,NOSE,MOUTH,THROAT:  Ears and nose appear unremarkable.  Lips, teeth, gums appear unremarkable.  RESPIRATORY:  Normal respiratory effort.  Lungs clear to auscultation bilaterally.  CARDIOVASCULAR:  Normal S1, S2.  No murmurs rubs or gallops.  No significant lower extremity edema.  GASTROINTESTINAL: Abdomen appears unremarkable.  Nontender.  No hepatomegaly.  No splenomegaly.  LYMPHATIC:  No cervical, supraclavicular, axillary lymphadenopathy.  SKIN:  Warm.  No rashes.  PSYCHIATRIC:  Normal judgment and insight.  Normal mood and affect.        RECENT LABS:        WBC   Date/Time Value Ref Range Status   08/14/2023 03:09 PM 3.29 (L) 3.40 - 10.80 10*3/mm3 Final     Hemoglobin   Date/Time Value Ref Range Status   08/14/2023 03:09 PM 10.0 (L) 12.0 - 15.9 g/dL Final     Platelets   Date/Time Value Ref Range Status   08/14/2023 03:09  (L) 140 - 450 10*3/mm3 Final       Assessment & Plan   Malignant neoplasm of upper-inner quadrant of right breast in female, " estrogen receptor positive  - Vitamin B12  - Folate  - Ferritin  - Iron Profile  - Retic With IRF & RET-He  - Lactate Dehydrogenase  - Haptoglobin  - CBC & Differential  - Comprehensive Metabolic Panel  - Immature Platelet Fraction    *Right breast cancer.  Invasive ductal adenocarcinoma.  Upper inner quadrant  Right lumpectomy by Dr. Ramos 11/4/2019.  1:00.  Overall grade 1.  Greatest dimension 5 mm.  Single focus of invasive carcinoma.  No lymphovascular invasion.  Margins negative.  0 out of 2 nodes involved.  uT1yxO1C2, stage 1  ER 91%.  VT 96%.  HER-2 negative.  Ki-67 4.7% (favorable).  (Initial needle biopsy 9/16/2019: Low-grade invasive ductal carcinoma, grade 1.  5 mm in greatest dimension.)  (Initial mammogram measured the lesion at 5 mm)  From the information we currently know about her cancer, I do not recommend chemotherapy.  I do recommend hormonal therapy for 5 years.  Hopefully, aromatase inhibitors if DEXA scan allows (with portal vein thrombosis would like to avoid tamoxifen).  Oncotype DX: Recurrence score 3, 3% distant recurrence risk at 9 years if tamoxifen or AI is used.  Therefore, do not recommend chemotherapy.  Adjuvant XRT completed 2/12/2020  Arimidex 2/26/2020- planned 2/26/2025  Arimidex stopped 4/29/2021 due to concern for possible drug-induced liver injury.  Might try to resume hormonal therapy with Aromasin at some point but for now remain off hormonal therapy  9/29/2021: Remain off Arimidex and anticoagulation for now as this was a low risk breast cancer and these might be the cause of her liver dysfunction.  She has not yet seen the hepatologist, Dr. Adilson Hunt.  She sees him tomorrow.  I told her unless Dr. Hunt thinks there is very little chance Arimidex or Xarelto caused her liver dysfunction, we would likely remain off Arimidex Xarelto.  5/16/2022: Discussed we could try tamoxifen with Pradaxa since Pradaxa has a different mechanism of action from Xarelto and tamoxifen has a  different mechanism of action from Arimidex.  However, considering her liver disease may have been related to either Arimidex or Xarelto and breast cancer was low risk, I think it is in her best interest to not take any more adjuvant therapy for breast cancer.  Patient very much agrees.  We plan no more adjuvant therapy of breast cancer.  No clinical signs of recurrence.    *Mild hand joint achiness since beginning Arimidex.    Remaining off Arimidex    *Bone health.  DEXA 12/2/2019: Normal.  T score lumbar spine 0.1, T score left hip 0.1, T score right hip 0.6.  She takes 2-4 times daily.  Therefore, did not begin a calcium supplement when a rheumatic started on 12/9/2019.  She was encouraged to take a PPI on a schedule and follow-up with her PCP.  States she has had several EGDs in the past none of which have shown problems.  (States in the past her PCP told her to stop calcium supplements because she developed hypercalcemia due to the combination of calcium supplements and Tums).     *Portal vein thrombosis (right branch of portal vein).  No evidence of cirrhosis on MRI or ultrasound.  Dr. Berna Gonzalez, GI note, has no mention of cirrhosis.  Hormonal therapy for breast cancer has a slight increased risk of thrombosis.  Therefore, I think it is in her best interest to undergo anticoagulation until she completes hormonal therapy.  Generally, Lovenox or Coumadin are the preferred agents.  However, because of significant elevations in INR, and the desire for an oral agent, changed to Xarelto on 2/19/2020.   Previous plan was to remain on therapeutic dose Xarelto until completes Arimidex.   4/29/2021, Xarelto (along with Arimidex) stopped due to concern for possible drug-induced liver injury.  No signs of recurrent clotting    *Risk factors for portal vein thrombosis:  Unremarkable: Prothrombin gene mutation, factor V Leiden gene mutation, lupus anticoagulant, beta-2 glycoprotein antibodies, anti-thrombin, protein S  free.    *Low protein S activity, 14% (normal range %), on 11/13/2019     *Low protein C activity, 44% (normal range %) on 11/13/2019.    *Indeterminant anticardiolipin  IgM 14 (indeterminate range 13-20) on 11/13/2019.  IgA and IgG negative.    *Prior leukocytosis, likely due to C. difficile colitis.  WBC 3.3, from 4.4, from 3.4, from 4.2     *Anemia.  Previously felt to likely due to C. difficile colitis.  10/31/2022: Hb 11.8 on 10/31/2022 (lower than usual, prompting additional labs), from 12.3.  Unremarkable: Iron labs, folate, B12  1/23/2023: Hb back up to 12.4  8/14/2023: Hb 10.  Hb is lower than usual.  I suspect this is due to several episodes of pneumonia since March 2023 including the last episode just 2 weeks ago.  However, check anemia labs today.    *Thrombocytopenia, thought to be due to cirrhosis  PLT down to 103 on 10/31/2022, prompting additional labs and closer follow-up   on 8/14/2023, from 99 on 1/23/2023     *Prominent nodes within the hepatogastric ligament and around the celiac axis and in the retroperitoneum on MRI abdomen 11/11/2019.  Etiology uncertain.  Radiologist recommended follow-up  CT 2/19/2020, from 12/13/2019: Abdominal and pelvic and cardiophrenic LAD unchanged.  CT 8/14/2020: Overall improvement with interval decrease in right cardiophrenic node, size of spleen, and degree of wall thickening and surrounding fat stranding in the colon.  Persistent abdominal pelvic and lower thoracic LAD, indeterminant etiology.    Radiologist recommended continued follow-up.  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  I explained if this were the case it would not be expected to be curable and we would undergo observation instead of treatment regardless.  We would consider treatment if she develops a problem.  Therefore, I think further observation is very reasonable.  (The LAD might be due to her previous problems with  extensive colitis and the mild splenomegaly might be due to the right hepatic lobe atrophy).  CT 2/17/2021: Unchanged enlarged nodes, from 8/14/2020.  CT 11/3/2021: No mention of LAD.  Stable.  Plan to complete 2 years CT follow-up sometime around November 2021, and if stable, then no more CT follow-up  With no change on CT, stop CT follow-up.    *C. difficile colitis during November 2019 hospitalization.  Thought to perhaps be related to receiving 1 dose of antibiotics for breast surgery.  She was treated with Flagyl and oral vancomycin     *Extensive colitis again seen on CT 2/19/2020, unchanged from 11/10/2019.  Patient reports she had some recent diarrhea but it became better and is more solid and it does not seem similar to the diarrhea she had back in November during the diagnosis of C. difficile.  On 2/24/2020 she was told to contact her PCP if the diarrhea worsens.  In the meantime, we referred her back to Dr. Cano of ID and Dr. Berna Gonzalez of GI (she saw both during the C. difficile hospitalization November 2019).  Dr. Cano appointment on 3/4/2020  Dr. Berna Gonzalez colonoscopy 6/26/2020: Decreased mucosa vascular pattern in the entire examined colon, biopsied.  Biopsy showed no evidence of malignancy.  It did reveal prominent lymphoid aggregates.    *Chronic hepatitis with periportal and bridging fibrosis and architectural distortion (stage III fibrosis), per liver biopsy, 4/21/2021.  Pathologist stated overall findings worrisome for autoimmune hepatitis, but a drug reaction could not entirely be excluded.  Findings worrisome for early cirrhosis.  Patient states Dr. Berna Gonzalez has told her she has autoimmune hepatitis and primary biliary cholangitis  Patient states she had a previous bile duct injury at the time of cholecystectomy, previously requiring a stent and now has narrowing of her bile ducts.  She states she was told this is contributing to her elevated transaminases.  2/19/2020: LFTs  normal  2/26/2020: Arimidex and Xarelto started  8/24/2020: LFTs increased  4/29/2021: Arimidex and Xarelto stopped  5/14/2021: Prednisone 20 mg daily and ursodiol started by Dr. Beran Gonzalez, GI  7/6/2021: LFTs normalized.  8/13/2021: Next Dr. Berna Gonzalez appointment  9/30/2021: Appointment Dr. Adilson Hunt, liver specialist at [facility], for consultation  It is difficult to know what caused her liver dysfunction.  LFTs have normalized with: Stopping Arimidex/Xarelto, starting prednisone 20 mg daily, and starting ursodiol.  Her breast cancer is low risk, grade 1, 5 mm, stage I, Ki-67 4.7%.  Oncotype DX recurrence score only 3.  Therefore, at least for now I think it is in her best interest to remain off hormonal therapy in case Arimidex was part of the reason for the liver injury.  If we wanted to try hormonal therapy in the future, we could consider Aromasin and consider prophylactic dose Eliquis (we were previously using therapeutic dose Xarelto with Arimidex due to portal vein thrombosis and a slight increased risk of clotting from an AI).  However, at least for now I would probably favor remaining off hormonal therapy    *Elevated AFP, on labs drawn through Dr. Adilson Hunt.  CT 11/3/2021: No liver mass.  Patient states Dr. Hunt plans MRI abdomen and short-term follow-up with him.  She is following up with Dr. Hunt regularly.    *4 or 5 bouts of pneumonia March 2023-July 2023 8/14/2023: States she is following with pulmonology and has an upcoming consultation with ID.  States she has already had 2 bronchoscopies revealing MRSA pneumonia    Plan  Anemia labs today  MD CBC CMP IPF 3 months (seeing her earlier than usual due to Hb being lower than usual).    Usual plan is:  MD SUMMERS CMP IPF 6 months  Maintains follow-up with Dr. Adilson Hunt, [facility] liver specialist  Remain off AI and anticoagulation  Mammogram 11/10/2022: BI-RADS 2  No more CT scans.    Send copy this to  Dr. Adilson Hunt, [facility] liver  specialist    41 minutes.  Total time.  Same day.

## 2023-08-17 ENCOUNTER — TELEMEDICINE - AUDIO (OUTPATIENT)
Dept: OTHER | Facility: HOSPITAL | Age: 68
End: 2023-08-17
Payer: MEDICARE

## 2023-08-17 NOTE — PROGRESS NOTES
OUTPATIENT ONCOLOGY NUTRITION ASSESSMENT    Patient Name: Taylor Larsen  YOB: 1955  MRN: [id number]  Assessment Date: 8/17/2023    COMMENTS: Spoke to pt via phone d/t no upcoming appts, pt with wt loss over the past 7 months. Pt reports she does not have much of an appetite, and when she does it eat it causes her to cough significantly d/t a significant h/o pnemonia. Pt was able to eat a sausage biscuit this AM, but has not had anything since. Pt has tried ensure/boost in the past, but was unable to tolerate the taste, is going to try [employer]life shakes. Pt states she also struggles with esophageal varices so she can only eat soft foods. Encouraged pt to utilize lots of gravies/sauces and take sips of fluid in between bites to limit irritation of her throat. Also encouraged pt to try to eat every few hours as she is not able to tolerate a full meal. Reviewed high kcal/high protein diet, encouraged pt to add lots of butter, cheese, sour cream, heavy cream, jam, honey etc. To her foods to increase overall caloric intake. Also encouraged pt to freeze supplements into a popsicle as she may tolerate this best. Encouraged pt to reach out with any questions or concerns.         Reason for Assessment New assessment, Malnutrition Screening Tool Trigger, Education      Diagnosis/Problem   Breast cancer    Treatment Plan None at this time, no clinical signs of recurrence, was on arimidex      Encounter Information        Nutrition/Diet History:  Patient does not have much of an appetite, chronic pnemonia/coughing make it difficult for her to eat, tries to avoid hard foods    Oral Nutrition Supplements: None at this time, cannot tolerate the taste of ensure/boost    Factors/Symptoms Affecting Intake: Anorexia, Sore throat   Comments:      Medical/Surgical History Past Medical History:   Diagnosis Date    Blood clot of common bile duct of transplanted liver     Breast cancer 09/2019    Right IDC    Clostridium  "difficile infection 12/2019    GERD (gastroesophageal reflux disease)     Hx of radiation therapy     IBS (irritable bowel syndrome)     PONV (postoperative nausea and vomiting)     Radiation-induced angiosarcoma of breast     Situational stress     DEATH OF BROTHER 10/2019, RECENT DIAGNOSIS    Stress headaches      Past Surgical History:   Procedure Laterality Date    BILE DUCT STENT PLACEMENT  2014    BREAST BIOPSY Right 09/2019    malignant    BREAST LUMPECTOMY Right     BREAST LUMPECTOMY WITH SENTINEL NODE BIOPSY Right 11/04/2019    Procedure: BREAST LUMPECTOMY WITH SENTINEL NODE BIOPSY AND NEEDLE LOCALIZATION And possible axillary dissection;  Surgeon: Jean-Paul Ramos MD;  Location:  [facility];  Service: General    COLONOSCOPY N/A 06/26/2020    Procedure: COLONOSCOPY TO TERMINAL ILEUM WITH BX;  Surgeon: Sage Gonzalez MD;  [facility]: [facility] END[facility]COPY;  Service: Gastroenterology;  Laterality: N/A;  PREOP/ SCREENING  POSTOP/ VASCULAR PATTERN TO COLON    ESOPHAGOSCOPY / EGD N/A 10/06/2022    KNEE ARTHROSCOPY Left     LAPAROSCOPIC CHOLECYSTECTOMY  2014    TUBAL ABDOMINAL LIGATION          Anthropometrics        Current Height Ht Readings from Last 1 Encounters:   08/14/23 160 cm (63\")      Current Weight Wt Readings from Last 1 Encounters:   08/14/23 66.3 kg (146 lb 3.2 oz)      BMI  25.9   Ideal Body Weight (IBW) 115#   Usual Body Weight (UBW) ~175#   Weight Change/Trend Loss; -25#/14.6% x 7 mo, significant    Weight History Wt Readings from Last 30 Encounters:   08/14/23 1523 66.3 kg (146 lb 3.2 oz)   01/23/23 1419 77.6 kg (171 lb 1.6 oz)   11/10/22 1019 78.5 kg (173 lb)   10/31/22 1422 80.6 kg (177 lb 12.8 oz)   05/16/22 1331 84.5 kg (186 lb 3.2 oz)   11/10/21 1124 90.9 kg (200 lb 6.4 oz)   09/29/21 0852 89.6 kg (197 lb 9.6 oz)   07/07/21 0752 85.6 kg (188 lb 11.2 oz)   04/29/21 1301 79.9 kg (176 lb 1.6 oz)   04/21/21 1111 79.4 kg (175 lb)   03/31/21 1429 80.7 kg (178 lb)   02/24/21 1112 78.9 kg (174 " lb)   02/02/21 1328 77.7 kg (171 lb 3.2 oz)   01/04/21 1532 77.3 kg (170 lb 8 oz)   12/04/20 1446 76.9 kg (169 lb 9.6 oz)   11/06/20 1324 75.5 kg (166 lb 8 oz)   10/26/20 1406 76.2 kg (168 lb)   10/13/20 1108 75.9 kg (167 lb 4.8 oz)   08/24/20 1145 74 kg (163 lb 1.6 oz)   06/26/20 0742 72.1 kg (159 lb)   06/25/20 1453 72.6 kg (160 lb)   03/13/20 1541 73.6 kg (162 lb 3.2 oz)   03/04/20 1109 74.1 kg (163 lb 6.4 oz)   02/26/20 0824 74.3 kg (163 lb 11.2 oz)   02/04/20 1555 74.4 kg (164 lb)   01/24/20 1513 76.7 kg (169 lb)   01/21/20 1806 77.1 kg (170 lb)   12/13/19 2350 82.4 kg (181 lb 11.2 oz)   12/13/19 1914 81.2 kg (179 lb)   12/09/19 0819 83.3 kg (183 lb 9.6 oz)   11/25/19 0937 83 kg (183 lb)   11/18/19 1138 84.9 kg (187 lb 3.2 oz)          Medications           Current medications: Probiotic Product, acetaminophen, azaTHIOprine, carvedilol, clonazePAM, famotidine, melatonin, traZODone, and ursodiol     Tests/Procedures        Tests/Procedures No new tests/procedures     Labs       Pertinent Labs    Results from last 7 days   Lab Units 08/14/23  1509   SODIUM mmol/L 137   POTASSIUM mmol/L 3.9   CHLORIDE mmol/L 104   CO2 mmol/L 27.1   BUN mg/dL 17   CREATININE mg/dL 0.68   CALCIUM mg/dL 8.8   BILIRUBIN mg/dL 0.5   ALK PHOS U/L 80   ALT (SGPT) U/L 6   AST (SGOT) U/L 19   GLUCOSE mg/dL 100*     Results from last 7 days   Lab Units 08/14/23  1509   HEMOGLOBIN g/dL 10.0*   HEMATOCRIT % 30.5*   WBC 10*3/mm3 3.29*   ALBUMIN g/dL 3.2*     Results from last 7 days   Lab Units 08/14/23  1509   PLATELETS 10*3/mm3 136*     SARS-CoV-2 PCR   Date Value Ref Range Status   04/19/2021 Not Detected Not Detected Final     Comment:     Nucleic acid specific to SARS-CoV-2 (COVID-19) virus was not detected in  this sample by the TaqPath (TM) COVID-19 Combo Kit.          SARS-CoV-2 (COVID-19) nucleic acid testing performed using [employer] Aptima (R) SARS-CoV-2 Assay or [employer] TaqPath (TM)  COVID-19 Combo Kit as indicated  "above under Emergency Use Authorization (EUA) from the FDA. Aptima (R) and TaqPath (TM) test performance  characteristics verified by [facility] in accordance with the FDAs Guidance Document (Policy for Diagnostic Tests for Coronavirus Disease-2019  during the Public Health Emergency) issued on March 16, 2020. The laboratory is regulated under CLIA as qualified to perform high-complexity testing  Unless otherwise noted, all testing was performed at [facility], CLIA No. [id number], [facility] Licensee No. [id number]     No results found for: HGBA1C       Estimated/Assessed Needs        Energy Requirements    Height for Calculation   63\"   Weight for Calculation 66.3 kg    Method for Estimation  25 kcal/kg   EST Needs (kcal/day) 1650 kcal/day       Protein Requirements    Weight for Calculation 66.3 kg   EST Protein Needs (g/kg) 1.0 gm/kg   EST Daily Needs (g/day) 66 g/day       Fluid Requirements     Method for Estimation 1 mL/kcal    Estimated Needs (mL/day) 1700 mL/day           PES STATEMENT / NUTRITION DIAGNOSIS      Nutrition Dx Problem Problem:    Underweight, Unintentional Weight Loss, and Inadequate Oral Intake    Etiology:  Medical diagnosis: Breast cancer  Factors affecting nutrition: Reported/Observed By, Condition; coughing that leads to emesis    Signs/Symptoms:  PO intake and Unintended Weight Change    Comment:      NUTRITION INTERVENTION / PLAN OF CARE      Intervention Goal(s) Maintain nutrition status, Provide information, Meet estimated needs, Tolerate PO , Increase intake, Modify texture/consistency, Maintain weight, and No significant weight loss         RD Intervention/Action Encouraged intake, Follow Tx progress         Recommendations:       PO Diet Small, frequent meals, prioritize foods high in calorie and protein      Supplements Try fairlife, CIB, whole milk, try freezing supplements into a popsicle as this may be more palatable and soothing for pt       Snacks       Other        "   Monitor/Evaluation PO intake, Weight, Symptoms   Education Education provided, Will provide eduction as needed   --    RD to follow per protocol.    Electronically signed by:  Angela Ferraro RD  08/17/23 11:48 EDT

## 2023-09-19 ENCOUNTER — HOSPITAL ENCOUNTER (OUTPATIENT)
Dept: BONE DENSITY | Facility: HOSPITAL | Age: 68
Discharge: HOME | End: 2023-09-19
Admitting: OBSTETRICS & GYNECOLOGY
Payer: MEDICARE

## 2023-09-19 ENCOUNTER — TELEPHONE (OUTPATIENT)
Dept: OBSTETRICS AND GYNECOLOGY | Facility: CLINIC | Age: 68
End: 2023-09-19
Payer: MEDICARE

## 2023-09-19 DIAGNOSIS — Z78.0 ASYMPTOMATIC MENOPAUSAL STATE: ICD-10-CM

## 2023-09-19 PROCEDURE — 77080 DXA BONE DENSITY AXIAL: CPT

## 2023-09-20 NOTE — TELEPHONE ENCOUNTER
Spoke with Taylor to let her know that Dr Lorenzo said that your bone density testing looks stable. You do not have osteoporosis. You should take or maintain daily Calcium and Vitamin D.

## 2023-09-29 ENCOUNTER — OFFICE (OUTPATIENT)
Dept: URBAN - METROPOLITAN AREA CLINIC 65 | Facility: CLINIC | Age: 68
End: 2023-09-29

## 2023-09-29 VITALS
WEIGHT: 150 LBS | HEART RATE: 83 BPM | HEIGHT: 63 IN | DIASTOLIC BLOOD PRESSURE: 60 MMHG | SYSTOLIC BLOOD PRESSURE: 105 MMHG

## 2023-09-29 DIAGNOSIS — Z92.25 PERSONAL HISTORY OF IMMUNOSUPPRESSION THERAPY: ICD-10-CM

## 2023-09-29 DIAGNOSIS — K75.4 AUTOIMMUNE HEPATITIS: ICD-10-CM

## 2023-09-29 DIAGNOSIS — K74.3 PRIMARY BILIARY CIRRHOSIS: ICD-10-CM

## 2023-09-29 PROCEDURE — 99214 OFFICE O/P EST MOD 30 MIN: CPT | Performed by: INTERNAL MEDICINE

## 2023-11-10 NOTE — PROGRESS NOTES
.     REASONS FOR FOLLOWUP: Portal vein thrombosis, breast cancer    HISTORY OF PRESENT ILLNESS:  The patient is a 68 y.o. year old female  who is here for follow-up with the above-mentioned history.    States she continues to have episodes of pneumonia and is following with pulmonary.  Continues to struggle with weight loss.  States she just does not have a desire to eat    Past Medical History:   Diagnosis Date    Blood clot of common bile duct of transplanted liver     Breast cancer 09/2019    Right IDC    Clostridium difficile infection 12/2019    GERD (gastroesophageal reflux disease)     Hx of radiation therapy     IBS (irritable bowel syndrome)     PONV (postoperative nausea and vomiting)     Radiation-induced angiosarcoma of breast     Situational stress     DEATH OF BROTHER 10/2019, RECENT DIAGNOSIS    Stress headaches      Past Surgical History:   Procedure Laterality Date    BILE DUCT STENT PLACEMENT  2014    BREAST BIOPSY Right 09/2019    malignant    BREAST LUMPECTOMY Right     BREAST LUMPECTOMY WITH SENTINEL NODE BIOPSY Right 11/04/2019    Procedure: BREAST LUMPECTOMY WITH SENTINEL NODE BIOPSY AND NEEDLE LOCALIZATION And possible axillary dissection;  Surgeon: Jean-Paul Ramos MD;  Location: [facility] OR [facility];  Service: General    COLONOSCOPY N/A 06/26/2020    Procedure: COLONOSCOPY TO TERMINAL ILEUM WITH BX;  Surgeon: Sage Gonzalez MD;  Location: [facility] ENDOSCOPY;  Service: Gastroenterology;  Laterality: N/A;  PREOP/ SCREENING  POSTOP/ VASCULAR PATTERN TO COLON    ESOPHAGOSCOPY / EGD N/A 10/06/2022    KNEE ARTHROSCOPY Left     LAPAROSCOPIC CHOLECYSTECTOMY  2014    TUBAL ABDOMINAL LIGATION         MEDICATIONS    Current Outpatient Medications:     acetaminophen (TYLENOL) 325 MG tablet, Take 2 tablets by mouth Every 6 (Six) Hours As Needed for Mild Pain., Disp: , Rfl:     albuterol (ACCUNEB) 1.25 MG/3ML nebulizer solution, Inhale 3 mL 3 (Three) Times a Day., Disp: , Rfl:     azaTHIOprine  (IMURAN) 50 MG tablet, Take 2 tablets by mouth Daily., Disp: , Rfl:     carvedilol (COREG) 6.25 MG tablet, 2 (Two) Times a Day With Meals., Disp: , Rfl:     famotidine (PEPCID) 40 MG tablet, , Disp: , Rfl:     melatonin 5 MG tablet tablet, Take 2 tablets by mouth At Night As Needed., Disp: , Rfl:     Probiotic Product (PROBIOTIC PO), Take 1 tablet by mouth Daily., Disp: , Rfl:     traZODone (DESYREL) 50 MG tablet, , Disp: , Rfl:     ursodiol (ACTIGALL) 500 MG tablet, Take 1 tablet by mouth., Disp: , Rfl:     azaTHIOprine (IMURAN) 100 MG tablet, , Disp: , Rfl:     clonazePAM (KlonoPIN) 0.5 MG tablet, Take  by mouth., Disp: , Rfl:     ALLERGIES:   No Known Allergies    SOCIAL HISTORY:       Social History     Socioeconomic History    Marital status:     Number of children: 2   Tobacco Use    Smoking status: Former     Packs/day: 0.50     Years: 10.00     Additional pack years: 0.00     Total pack years: 5.00     Types: Cigarettes     Quit date:      Years since quittin.8    Smokeless tobacco: Former   Vaping Use    Vaping Use: Never used   Substance and Sexual Activity    Alcohol use: No    Drug use: No    Sexual activity: Not Currently     Birth control/protection: Post-menopausal, Surgical         FAMILY HISTORY:  Family History   Problem Relation Age of Onset    Alcohol abuse Father     Heart failure Mother     Diabetes Brother     Prostate cancer Brother     Throat cancer Brother     Breast cancer Maternal Aunt     Breast cancer Maternal Cousin     Breast cancer Maternal Cousin     Malig Hyperthermia Neg Hx     Ovarian cancer Neg Hx     Uterine cancer Neg Hx     Colon cancer Neg Hx        REVIEW OF SYSTEMS:  Review of Systems   Constitutional:  Negative for activity change.   HENT:  Negative for nosebleeds and trouble swallowing.    Respiratory:  Negative for shortness of breath and wheezing.    Cardiovascular:  Negative for chest pain and palpitations.   Gastrointestinal:  Negative for  "constipation, diarrhea and nausea.   Genitourinary:  Negative for dysuria and hematuria.   Musculoskeletal:  Negative for arthralgias and myalgias.   Skin:  Negative for rash and wound.   Neurological:  Negative for seizures and syncope.   Hematological:  Negative for adenopathy. Does not bruise/bleed easily.   Psychiatric/Behavioral:  Negative for confusion.             Vitals:    11/20/23 1437   BP: 101/51   Pulse: 87   Resp: 16   Temp: 97.8 °F (36.6 °C)   TempSrc: Temporal   SpO2: 97%   Weight: 62.7 kg (138 lb 3.2 oz)   Height: 160 cm (62.99\")   PainSc: 0-No pain           11/20/2023     2:38 PM   Current Status   ECOG score 0        PHYSICAL EXAM:        CONSTITUTIONAL:  Vital signs reviewed.  No distress, looks comfortable.  EYES:  Conjunctiva and lids unremarkable.  PERRLA  EARS,NOSE,MOUTH,THROAT:  Ears and nose appear unremarkable.  Lips, teeth, gums appear unremarkable.  RESPIRATORY:  Normal respiratory effort.  Lungs clear to auscultation bilaterally.  CARDIOVASCULAR:  Normal S1, S2.  No murmurs rubs or gallops.  No significant lower extremity edema.  GASTROINTESTINAL: Abdomen appears unremarkable.  Nontender.  No hepatomegaly.  No splenomegaly.  LYMPHATIC:  No cervical, supraclavicular, axillary lymphadenopathy.  SKIN:  Warm.  No rashes.  PSYCHIATRIC:  Normal judgment and insight.  Normal mood and affect.          RECENT LABS:        WBC   Date/Time Value Ref Range Status   11/20/2023 02:30 PM 5.16 3.40 - 10.80 10*3/mm3 Final   09/03/2023 01:56 PM 4.19 (L) 4.5 - 11.0 10*3/uL Final     Hemoglobin   Date/Time Value Ref Range Status   11/20/2023 02:30 PM 9.3 (L) 12.0 - 15.9 g/dL Final   09/05/2023 12:58 AM 10.1 (L) 12.0 - 16.0 g/dL Final     Platelets   Date/Time Value Ref Range Status   11/20/2023 02:30  (L) 140 - 450 10*3/mm3 Final   09/03/2023 01:56 PM 99 (L) 140 - 440 10*3/uL Final       Assessment & Plan   Malignant neoplasm of upper-inner quadrant of right breast in female, estrogen receptor " positive  - Retic With IRF & RET-He  - CBC & Differential  - Ferritin  - Iron Profile  - Comprehensive Metabolic Panel  - Immature Platelet Fraction  - Vitamin B12  - Folate RBC  - Zinc  - Copper, Serum  - JERMAINE,PE and FLC, Serum      *Right breast cancer.  Invasive ductal adenocarcinoma.  Upper inner quadrant  Right lumpectomy by Dr. Ramos 11/4/2019.  1:00.  Overall grade 1.  Greatest dimension 5 mm.  Single focus of invasive carcinoma.  No lymphovascular invasion.  Margins negative.  0 out of 2 nodes involved.  [id number], stage 1  ER 91%.  IA 96%.  HER-2 negative.  Ki-67 4.7% (favorable).  (Initial needle biopsy 9/16/2019: Low-grade invasive ductal carcinoma, grade 1.  5 mm in greatest dimension.)  (Initial mammogram measured the lesion at 5 mm)  From the information we currently know about her cancer, I do not recommend chemotherapy.  I do recommend hormonal therapy for 5 years.  Hopefully, aromatase inhibitors if DEXA scan allows (with portal vein thrombosis would like to avoid tamoxifen).  Oncotype DX: Recurrence score 3, 3% distant recurrence risk at 9 years if tamoxifen or AI is used.  Therefore, do not recommend chemotherapy.  Adjuvant XRT completed 2/12/2020  Arimidex 2/26/2020- planned 2/26/2025  Arimidex stopped 4/29/2021 due to concern for possible drug-induced liver injury.  Might try to resume hormonal therapy with Aromasin at some point but for now remain off hormonal therapy  9/29/2021: Remain off Arimidex and anticoagulation for now as this was a low risk breast cancer and these might be the cause of her liver dysfunction.  She has not yet seen the hepatologist, Dr. Adilson Hunt.  She sees him tomorrow.  I told her unless Dr. Hunt thinks there is very little chance Arimidex or Xarelto caused her liver dysfunction, we would likely remain off Arimidex Xarelto.  5/16/2022: Discussed we could try tamoxifen with Pradaxa since Pradaxa has a different mechanism of action from Xarelto and tamoxifen has a  different mechanism of action from Arimidex.  However, considering her liver disease may have been related to either Arimidex or Xarelto and breast cancer was low risk, I think it is in her best interest to not take any more adjuvant therapy for breast cancer.  Patient very much agrees.  We plan no more adjuvant therapy of breast cancer.  No clinical evidence of recurrence.    *Mild hand joint achiness since beginning Arimidex.    Remains off Arimidex    *Bone health.  DEXA 12/2/2019: Normal.  T score lumbar spine 0.1, T score left hip 0.1, T score right hip 0.6.  She takes 2-4 times daily.  Therefore, did not begin a calcium supplement when a rheumatic started on 12/9/2019.  She was encouraged to take a PPI on a schedule and follow-up with her PCP.  States she has had several EGDs in the past none of which have shown problems.  (States in the past her PCP told her to stop calcium supplements because she developed hypercalcemia due to the combination of calcium supplements and Tums).     *Portal vein thrombosis (right branch of portal vein).  No evidence of cirrhosis on MRI or ultrasound.  Dr. Berna Gonzalez, GI note, has no mention of cirrhosis.  Hormonal therapy for breast cancer has a slight increased risk of thrombosis.  Therefore, I think it is in her best interest to undergo anticoagulation until she completes hormonal therapy.  Generally, Lovenox or Coumadin are the preferred agents.  However, because of significant elevations in INR, and the desire for an oral agent, changed to Xarelto on 2/19/2020.   Previous plan was to remain on therapeutic dose Xarelto until completes Arimidex.   4/29/2021, Xarelto (along with Arimidex) stopped due to concern for possible drug-induced liver injury.  No evidence of recurrent clotting    *Risk factors for portal vein thrombosis:  Unremarkable: Prothrombin gene mutation, factor V Leiden gene mutation, lupus anticoagulant, beta-2 glycoprotein antibodies, anti-thrombin, protein S  free.    *Low protein S activity, 14% (normal range %), on 11/13/2019     *Low protein C activity, 44% (normal range %) on 11/13/2019.    *Indeterminant anticardiolipin  IgM 14 (indeterminate range 13-20) on 11/13/2019.  IgA and IgG negative.    *Prior leukocytosis, likely due to C. difficile colitis.  WBC 5.2, from 3.3, from 4.4, from 3.4, from 4.2     *Anemia.  Previously felt to likely due to C. difficile colitis.  10/31/2022: Hb 11.8 on 10/31/2022 (lower than usual, prompting additional labs), from 12.3.  Unremarkable: Iron labs, folate, B12  1/23/2023: Hb back up to 12.4  8/14/2023: Hb 10.  Hb is lower than usual.  I suspect this is due to several episodes of pneumonia since March 2023 including the last episode just 2 weeks ago.  However, check anemia labs today.  8/14/2023, unremarkable: Iron labs, B12, serum folate, haptoglobin, LDH, bilirubin, creatinine  11/20/2023: Hb 9.3.  Still having recurrent pneumonia which is believed to be the reason for further drop in Hb    *Thrombocytopenia, thought to be due to cirrhosis  PLT down to 103 on 10/31/2022, prompting additional labs and closer follow-up   on 8/14/2023, from 99 on 1/23/2023   on 11/20/2023.     *Prominent nodes within the hepatogastric ligament and around the celiac axis and in the retroperitoneum on MRI abdomen 11/11/2019.  Etiology uncertain.  Radiologist recommended follow-up  CT 2/19/2020, from 12/13/2019: Abdominal and pelvic and cardiophrenic LAD unchanged.  CT 8/14/2020: Overall improvement with interval decrease in right cardiophrenic node, size of spleen, and degree of wall thickening and surrounding fat stranding in the colon.  Persistent abdominal pelvic and lower thoracic LAD, indeterminant etiology.    Radiologist recommended continued follow-up.  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  I explained if this were the case it would not be expected to be  curable and we would undergo observation instead of treatment regardless.  We would consider treatment if she develops a problem.  Therefore, I think further observation is very reasonable.  (The LAD might be due to her previous problems with extensive colitis and the mild splenomegaly might be due to the right hepatic lobe atrophy).  CT 2/17/2021: Unchanged enlarged nodes, from 8/14/2020.  CT 11/3/2021: No mention of LAD.  Stable.  Plan to complete 2 years CT follow-up sometime around November 2021, and if stable, then no more CT follow-up  With no change on CT, stop CT follow-up.    *C. difficile colitis during November 2019 hospitalization.  Thought to perhaps be related to receiving 1 dose of antibiotics for breast surgery.  She was treated with Flagyl and oral vancomycin     *Extensive colitis again seen on CT 2/19/2020, unchanged from 11/10/2019.  Patient reports she had some recent diarrhea but it became better and is more solid and it does not seem similar to the diarrhea she had back in November during the diagnosis of C. difficile.  On 2/24/2020 she was told to contact her PCP if the diarrhea worsens.  In the meantime, we referred her back to Dr. Cano of ID and Dr. Berna Gonzalez of GI (she saw both during the C. difficile hospitalization November 2019).  Dr. Cano appointment on 3/4/2020  Dr. Berna Gonzalez colonoscopy 6/26/2020: Decreased mucosa vascular pattern in the entire examined colon, biopsied.  Biopsy showed no evidence of malignancy.  It did reveal prominent lymphoid aggregates.    *Chronic hepatitis with periportal and bridging fibrosis and architectural distortion (stage III fibrosis), per liver biopsy, 4/21/2021.  Pathologist stated overall findings worrisome for autoimmune hepatitis, but a drug reaction could not entirely be excluded.  Findings worrisome for early cirrhosis.  Patient states Dr. Berna Gonzalez has told her she has autoimmune hepatitis and primary biliary cholangitis  Patient  states she had a previous bile duct injury at the time of cholecystectomy, previously requiring a stent and now has narrowing of her bile ducts.  She states she was told this is contributing to her elevated transaminases.  2/19/2020: LFTs normal  2/26/2020: Arimidex and Xarelto started  8/24/2020: LFTs increased  4/29/2021: Arimidex and Xarelto stopped  5/14/2021: Prednisone 20 mg daily and ursodiol started by Dr. Berna Gonzalez, GI  7/6/2021: LFTs normalized.  8/13/2021: Next Dr. Berna Gonzalez appointment  9/30/2021: Appointment Dr. Adilson Hunt, liver specialist at [facility], for consultation  It is difficult to know what caused her liver dysfunction.  LFTs have normalized with: Stopping Arimidex/Xarelto, starting prednisone 20 mg daily, and starting ursodiol.  Her breast cancer is low risk, grade 1, 5 mm, stage I, Ki-67 4.7%.  Oncotype DX recurrence score only 3.  Therefore, at least for now I think it is in her best interest to remain off hormonal therapy in case Arimidex was part of the reason for the liver injury.  If we wanted to try hormonal therapy in the future, we could consider Aromasin and consider prophylactic dose Eliquis (we were previously using therapeutic dose Xarelto with Arimidex due to portal vein thrombosis and a slight increased risk of clotting from an AI).  However, at least for now I would probably favor remaining off hormonal therapy    *Elevated AFP, on labs drawn through Dr. Adilson Hunt.  CT 11/3/2021: No liver mass.  Patient states Dr. Hunt plans MRI abdomen and short-term follow-up with him.  She is following up with Dr. Hunt regularly.    *4 or 5 bouts of pneumonia March 2023-July 2023 8/14/2023: States she is following with pulmonology and has an upcoming consultation with ID.  States she has already had 2 bronchoscopies revealing MRSA pneumonia    Plan  MD and CBC, 2 units, roughly 1 month with labs same day: CBC, CMP, IPF, ferritin, iron panel, reticulated hemoglobin, B12, RBC folate, zinc,  copper, SPEP, SIFE, serum free light chains.   (Seeing her more often than her previous 6-month follow-up due to further drop in hemoglobin).  11/20/2023: Hb continues to drop but states she continues to have recurrent pneumonia.  Suspect further drop in Hb is due to multiple bouts of recurrent pneumonia.  However, I told her if she begins to need recurrent transfusions and may be a good idea to do a bone marrow biopsy.  She is agreeable to this.  She is also agreeable to monthly visits    Usual plan is:  MD CBC CMP IPF 6 months  Maintains follow-up with Dr. Adilson Hunt, [facility] liver specialist  Remain off AI and anticoagulation  Mammogram 11/10/2022: BI-RADS 2  No more CT scans.    Send copy this to  Dr. Adilson Hunt, [facility] liver specialist    41 minutes.  Total time.  Same day.

## 2023-11-20 ENCOUNTER — LAB (OUTPATIENT)
Dept: LAB | Facility: HOSPITAL | Age: 68
End: 2023-11-20
Payer: MEDICARE

## 2023-11-20 ENCOUNTER — OFFICE VISIT (OUTPATIENT)
Dept: ONCOLOGY | Facility: CLINIC | Age: 68
End: 2023-11-20
Payer: MEDICARE

## 2023-11-20 VITALS
HEIGHT: 63 IN | TEMPERATURE: 97.8 F | DIASTOLIC BLOOD PRESSURE: 51 MMHG | SYSTOLIC BLOOD PRESSURE: 101 MMHG | OXYGEN SATURATION: 97 % | HEART RATE: 87 BPM | WEIGHT: 138.2 LBS | RESPIRATION RATE: 16 BRPM | BODY MASS INDEX: 24.49 KG/M2

## 2023-11-20 DIAGNOSIS — Z17.0 MALIGNANT NEOPLASM OF UPPER-INNER QUADRANT OF RIGHT BREAST IN FEMALE, ESTROGEN RECEPTOR POSITIVE: ICD-10-CM

## 2023-11-20 DIAGNOSIS — C50.211 MALIGNANT NEOPLASM OF UPPER-INNER QUADRANT OF RIGHT BREAST IN FEMALE, ESTROGEN RECEPTOR POSITIVE: ICD-10-CM

## 2023-11-20 DIAGNOSIS — C50.211 MALIGNANT NEOPLASM OF UPPER-INNER QUADRANT OF RIGHT BREAST IN FEMALE, ESTROGEN RECEPTOR POSITIVE: Primary | ICD-10-CM

## 2023-11-20 DIAGNOSIS — Z17.0 MALIGNANT NEOPLASM OF UPPER-INNER QUADRANT OF RIGHT BREAST IN FEMALE, ESTROGEN RECEPTOR POSITIVE: Primary | ICD-10-CM

## 2023-11-20 LAB
ALBUMIN SERPL-MCNC: 3 G/DL (ref 3.5–5.2)
ALBUMIN/GLOB SERPL: 0.7 G/DL
ALP SERPL-CCNC: 111 U/L (ref 39–117)
ALT SERPL W P-5'-P-CCNC: 8 U/L (ref 1–33)
ANION GAP SERPL CALCULATED.3IONS-SCNC: 7.9 MMOL/L (ref 5–15)
AST SERPL-CCNC: 26 U/L (ref 1–32)
BASOPHILS # BLD AUTO: 0.02 10*3/MM3 (ref 0–0.2)
BASOPHILS NFR BLD AUTO: 0.4 % (ref 0–1.5)
BILIRUB SERPL-MCNC: 0.4 MG/DL (ref 0–1.2)
BUN SERPL-MCNC: 18 MG/DL (ref 8–23)
BUN/CREAT SERPL: 25.7 (ref 7–25)
CALCIUM SPEC-SCNC: 8.5 MG/DL (ref 8.6–10.5)
CHLORIDE SERPL-SCNC: 101 MMOL/L (ref 98–107)
CO2 SERPL-SCNC: 28.1 MMOL/L (ref 22–29)
CREAT SERPL-MCNC: 0.7 MG/DL (ref 0.6–1.1)
DEPRECATED RDW RBC AUTO: 50.7 FL (ref 37–54)
EGFRCR SERPLBLD CKD-EPI 2021: 94.3 ML/MIN/1.73
EOSINOPHIL # BLD AUTO: 0.11 10*3/MM3 (ref 0–0.4)
EOSINOPHIL NFR BLD AUTO: 2.1 % (ref 0.3–6.2)
ERYTHROCYTE [DISTWIDTH] IN BLOOD BY AUTOMATED COUNT: 13.6 % (ref 12.3–15.4)
GLOBULIN UR ELPH-MCNC: 4.4 GM/DL
GLUCOSE SERPL-MCNC: 119 MG/DL (ref 65–99)
HCT VFR BLD AUTO: 28.3 % (ref 34–46.6)
HGB BLD-MCNC: 9.3 G/DL (ref 12–15.9)
IMM GRANULOCYTES # BLD AUTO: 0.03 10*3/MM3 (ref 0–0.05)
IMM GRANULOCYTES NFR BLD AUTO: 0.6 % (ref 0–0.5)
LYMPHOCYTES # BLD AUTO: 1.7 10*3/MM3 (ref 0.7–3.1)
LYMPHOCYTES NFR BLD AUTO: 32.9 % (ref 19.6–45.3)
MCH RBC QN AUTO: 33.7 PG (ref 26.6–33)
MCHC RBC AUTO-ENTMCNC: 32.9 G/DL (ref 31.5–35.7)
MCV RBC AUTO: 102.5 FL (ref 79–97)
MONOCYTES # BLD AUTO: 0.6 10*3/MM3 (ref 0.1–0.9)
MONOCYTES NFR BLD AUTO: 11.6 % (ref 5–12)
NEUTROPHILS NFR BLD AUTO: 2.7 10*3/MM3 (ref 1.7–7)
NEUTROPHILS NFR BLD AUTO: 52.4 % (ref 42.7–76)
NRBC BLD AUTO-RTO: 0 /100 WBC (ref 0–0.2)
PLATELET # BLD AUTO: 107 10*3/MM3 (ref 140–450)
PLATELETS.RETICULATED NFR BLD AUTO: 4.5 % (ref 0.9–6.5)
PMV BLD AUTO: 10 FL (ref 6–12)
POTASSIUM SERPL-SCNC: 3.8 MMOL/L (ref 3.5–5.2)
PROT SERPL-MCNC: 7.4 G/DL (ref 6–8.5)
RBC # BLD AUTO: 2.76 10*6/MM3 (ref 3.77–5.28)
SODIUM SERPL-SCNC: 137 MMOL/L (ref 136–145)
WBC NRBC COR # BLD AUTO: 5.16 10*3/MM3 (ref 3.4–10.8)

## 2023-11-20 PROCEDURE — 85055 RETICULATED PLATELET ASSAY: CPT

## 2023-11-20 PROCEDURE — 99214 OFFICE O/P EST MOD 30 MIN: CPT | Performed by: INTERNAL MEDICINE

## 2023-11-20 PROCEDURE — 36415 COLL VENOUS BLD VENIPUNCTURE: CPT

## 2023-11-20 PROCEDURE — 85025 COMPLETE CBC W/AUTO DIFF WBC: CPT

## 2023-11-20 PROCEDURE — 80053 COMPREHEN METABOLIC PANEL: CPT

## 2023-11-20 PROCEDURE — 1126F AMNT PAIN NOTED NONE PRSNT: CPT | Performed by: INTERNAL MEDICINE

## 2023-11-20 RX ORDER — ALBUTEROL SULFATE 1.25 MG/3ML
1.25 SOLUTION RESPIRATORY (INHALATION) 3 TIMES DAILY
COMMUNITY
Start: 2023-09-28

## 2023-11-21 ENCOUNTER — PROCEDURE VISIT (OUTPATIENT)
Dept: OBSTETRICS AND GYNECOLOGY | Facility: CLINIC | Age: 68
End: 2023-11-21
Payer: MEDICARE

## 2023-11-21 DIAGNOSIS — Z12.31 VISIT FOR SCREENING MAMMOGRAM: Primary | ICD-10-CM

## 2023-12-19 NOTE — PROGRESS NOTES
.     REASONS FOR FOLLOWUP: Portal vein thrombosis, breast cancer    HISTORY OF PRESENT ILLNESS:  The patient is a 68 y.o. year old female  who is here for follow-up with the above-mentioned history.    States she continues to have recurrent pneumonia.  Following with ID.  On Bactrim.  Continues to have difficulty gaining weight.    Past Medical History:   Diagnosis Date    Blood clot of common bile duct of transplanted liver     Breast cancer 09/2019    Right IDC    Clostridium difficile infection 12/2019    GERD (gastroesophageal reflux disease)     Hx of radiation therapy     IBS (irritable bowel syndrome)     PONV (postoperative nausea and vomiting)     Radiation-induced angiosarcoma of breast     Situational stress     DEATH OF BROTHER 10/2019, RECENT DIAGNOSIS    Stress headaches      Past Surgical History:   Procedure Laterality Date    BILE DUCT STENT PLACEMENT  2014    BREAST BIOPSY Right 09/2019    malignant    BREAST LUMPECTOMY Right     BREAST LUMPECTOMY WITH SENTINEL NODE BIOPSY Right 11/04/2019    Procedure: BREAST LUMPECTOMY WITH SENTINEL NODE BIOPSY AND NEEDLE LOCALIZATION And possible axillary dissection;  Surgeon: Jean-Paul Ramos MD;  Location: [facility] OR [facility];  Service: General    COLONOSCOPY N/A 06/26/2020    Procedure: COLONOSCOPY TO TERMINAL ILEUM WITH BX;  Surgeon: Sage Gonzalez MD;  Location: [facility] ENDOSCOPY;  Service: Gastroenterology;  Laterality: N/A;  PREOP/ SCREENING  POSTOP/ VASCULAR PATTERN TO COLON    ESOPHAGOSCOPY / EGD N/A 10/06/2022    KNEE ARTHROSCOPY Left     LAPAROSCOPIC CHOLECYSTECTOMY  2014    TUBAL ABDOMINAL LIGATION         MEDICATIONS    Current Outpatient Medications:     acetaminophen (TYLENOL) 325 MG tablet, Take 2 tablets by mouth Every 6 (Six) Hours As Needed for Mild Pain., Disp: , Rfl:     albuterol (ACCUNEB) 1.25 MG/3ML nebulizer solution, Inhale 3 mL 3 (Three) Times a Day., Disp: , Rfl:     azaTHIOprine (IMURAN) 50 MG tablet, Take 2 tablets by mouth  Daily., Disp: , Rfl:     carvedilol (COREG) 6.25 MG tablet, 2 (Two) Times a Day With Meals., Disp: , Rfl:     famotidine (PEPCID) 40 MG tablet, , Disp: , Rfl:     melatonin 5 MG tablet tablet, Take 2 tablets by mouth At Night As Needed., Disp: , Rfl:     Probiotic Product (PROBIOTIC PO), Take 1 tablet by mouth Daily., Disp: , Rfl:     sulfamethoxazole-trimethoprim (BACTRIM DS,SEPTRA DS) 800-160 MG per tablet, Take 1 tablet by mouth Daily., Disp: , Rfl:     traZODone (DESYREL) 50 MG tablet, , Disp: , Rfl:     ursodiol (ACTIGALL) 500 MG tablet, Take 1 tablet by mouth., Disp: , Rfl:     azaTHIOprine (IMURAN) 100 MG tablet, , Disp: , Rfl:     clonazePAM (KlonoPIN) 0.5 MG tablet, Take  by mouth., Disp: , Rfl:     ALLERGIES:   No Known Allergies    SOCIAL HISTORY:       Social History     Socioeconomic History    Marital status:     Number of children: 2   Tobacco Use    Smoking status: Former     Packs/day: 0.50     Years: 10.00     Additional pack years: 0.00     Total pack years: 5.00     Types: Cigarettes     Quit date:      Years since quittin.9    Smokeless tobacco: Former   Vaping Use    Vaping Use: Never used   Substance and Sexual Activity    Alcohol use: No    Drug use: No    Sexual activity: Not Currently     Birth control/protection: Post-menopausal, Surgical         FAMILY HISTORY:  Family History   Problem Relation Age of Onset    Alcohol abuse Father     Heart failure Mother     Diabetes Brother     Prostate cancer Brother     Throat cancer Brother     Breast cancer Maternal Aunt     Breast cancer Maternal Cousin     Breast cancer Maternal Cousin     Malig Hyperthermia Neg Hx     Ovarian cancer Neg Hx     Uterine cancer Neg Hx     Colon cancer Neg Hx        REVIEW OF SYSTEMS:  Review of Systems   Constitutional:  Negative for activity change.   HENT:  Negative for nosebleeds and trouble swallowing.    Respiratory:  Negative for shortness of breath and wheezing.    Cardiovascular:  Negative  "for chest pain and palpitations.   Gastrointestinal:  Negative for constipation, diarrhea and nausea.   Genitourinary:  Negative for dysuria and hematuria.   Musculoskeletal:  Negative for arthralgias and myalgias.   Skin:  Negative for rash and wound.   Neurological:  Negative for seizures and syncope.   Hematological:  Negative for adenopathy. Does not bruise/bleed easily.   Psychiatric/Behavioral:  Negative for confusion.             Vitals:    12/20/23 0803   BP: 104/72   Pulse: 77   Resp: 16   Temp: 98.2 °F (36.8 °C)   TempSrc: Temporal   SpO2: 95%   Weight: 62 kg (136 lb 11.2 oz)   Height: 160 cm (62.99\")   PainSc: 0-No pain             12/20/2023     8:03 AM   Current Status   ECOG score 0        PHYSICAL EXAM:        CONSTITUTIONAL:  Vital signs reviewed.  No distress, looks comfortable.  EYES:  Conjunctiva and lids unremarkable.  PERRLA  EARS,NOSE,MOUTH,THROAT:  Ears and nose appear unremarkable.  Lips, teeth, gums appear unremarkable.  RESPIRATORY:  Normal respiratory effort.  Lungs clear to auscultation bilaterally.  CARDIOVASCULAR:  Normal S1, S2.  No murmurs rubs or gallops.  No significant lower extremity edema.  BREAST: no masses by palpation or inspection   GASTROINTESTINAL: Abdomen appears unremarkable.  Nontender.  No hepatomegaly.  No splenomegaly.  LYMPHATIC:  No cervical, supraclavicular, axillary lymphadenopathy.  SKIN:  Warm.  No rashes.  PSYCHIATRIC:  Normal judgment and insight.  Normal mood and affect.          RECENT LABS:        WBC   Date/Time Value Ref Range Status   12/20/2023 07:56 AM 4.01 3.40 - 10.80 10*3/mm3 Final   09/03/2023 01:56 PM 4.19 (L) 4.5 - 11.0 10*3/uL Final     Hemoglobin   Date/Time Value Ref Range Status   12/20/2023 07:56 AM 11.4 (L) 12.0 - 15.9 g/dL Final   09/05/2023 12:58 AM 10.1 (L) 12.0 - 16.0 g/dL Final     Platelets   Date/Time Value Ref Range Status   12/20/2023 07:56  (L) 140 - 450 10*3/mm3 Final   09/03/2023 01:56 PM 99 (L) 140 - 440 10*3/uL Final "       Assessment & Plan   There are no diagnoses linked to this encounter.      *Right breast cancer.  Invasive ductal adenocarcinoma.  Upper inner quadrant  Right lumpectomy by Dr. Ramos 11/4/2019.  1:00.  Overall grade 1.  Greatest dimension 5 mm.  Single focus of invasive carcinoma.  No lymphovascular invasion.  Margins negative.  0 out of 2 nodes involved.  [id number], stage 1  ER 91%.  WI 96%.  HER-2 negative.  Ki-67 4.7% (favorable).  (Initial needle biopsy 9/16/2019: Low-grade invasive ductal carcinoma, grade 1.  5 mm in greatest dimension.)  (Initial mammogram measured the lesion at 5 mm)  From the information we currently know about her cancer, I do not recommend chemotherapy.  I do recommend hormonal therapy for 5 years.  Hopefully, aromatase inhibitors if DEXA scan allows (with portal vein thrombosis would like to avoid tamoxifen).  Oncotype DX: Recurrence score 3, 3% distant recurrence risk at 9 years if tamoxifen or AI is used.  Therefore, do not recommend chemotherapy.  Adjuvant XRT completed 2/12/2020  Arimidex 2/26/2020- planned 2/26/2025  Arimidex stopped 4/29/2021 due to concern for possible drug-induced liver injury.  Might try to resume hormonal therapy with Aromasin at some point but for now remain off hormonal therapy  9/29/2021: Remain off Arimidex and anticoagulation for now as this was a low risk breast cancer and these might be the cause of her liver dysfunction.  She has not yet seen the hepatologist, Dr. Adilson Hunt.  She sees him tomorrow.  I told her unless Dr. Hunt thinks there is very little chance Arimidex or Xarelto caused her liver dysfunction, we would likely remain off Arimidex Xarelto.  5/16/2022: Discussed we could try tamoxifen with Pradaxa since Pradaxa has a different mechanism of action from Xarelto and tamoxifen has a different mechanism of action from Arimidex.  However, considering her liver disease may have been related to either Arimidex or Xarelto and breast cancer  was low risk, I think it is in her best interest to not take any more adjuvant therapy for breast cancer.  Patient very much agrees.  We plan no more adjuvant therapy of breast cancer.  No clinical signs of recurrence.    *Mild hand joint achiness since beginning Arimidex.    Remaining off Arimidex    *Bone health.  DEXA 12/2/2019: Normal.  T score lumbar spine 0.1, T score left hip 0.1, T score right hip 0.6.  She takes 2-4 times daily.  Therefore, did not begin a calcium supplement when a rheumatic started on 12/9/2019.  She was encouraged to take a PPI on a schedule and follow-up with her PCP.  States she has had several EGDs in the past none of which have shown problems.  (States in the past her PCP told her to stop calcium supplements because she developed hypercalcemia due to the combination of calcium supplements and Tums).     *Portal vein thrombosis (right branch of portal vein).  No evidence of cirrhosis on MRI or ultrasound.  Dr. Berna Gonzalez, GI note, has no mention of cirrhosis.  Hormonal therapy for breast cancer has a slight increased risk of thrombosis.  Therefore, I think it is in her best interest to undergo anticoagulation until she completes hormonal therapy.  Generally, Lovenox or Coumadin are the preferred agents.  However, because of significant elevations in INR, and the desire for an oral agent, changed to Xarelto on 2/19/2020.   Previous plan was to remain on therapeutic dose Xarelto until completes Arimidex.   4/29/2021, Xarelto (along with Arimidex) stopped due to concern for possible drug-induced liver injury.  No signs of recurrent clotting    *Risk factors for portal vein thrombosis:  Unremarkable: Prothrombin gene mutation, factor V Leiden gene mutation, lupus anticoagulant, beta-2 glycoprotein antibodies, anti-thrombin, protein S free.    *Low protein S activity, 14% (normal range %), on 11/13/2019     *Low protein C activity, 44% (normal range %) on  11/13/2019.    *Indeterminant anticardiolipin  IgM 14 (indeterminate range 13-20) on 11/13/2019.  IgA and IgG negative.    *Prior leukocytosis, likely due to C. difficile colitis.  WBC 4, from 5.2, from 3.3, from 4.4, from 3.4, from 4.2     *Anemia.  Previously felt to likely due to C. difficile colitis.  10/31/2022: Hb 11.8 on 10/31/2022 (lower than usual, prompting additional labs), from 12.3.  Unremarkable: Iron labs, folate, B12  1/23/2023: Hb back up to 12.4  8/14/2023: Hb 10.  Hb is lower than usual.  I suspect this is due to several episodes of pneumonia since March 2023 including the last episode just 2 weeks ago.  However, check anemia labs today.  8/14/2023, unremarkable: Iron labs, B12, serum folate, haptoglobin, LDH, bilirubin, creatinine  11/20/2023: Hb 9.3.  Still having recurrent pneumonia which is believed to be the reason for further drop in Hb  12/20/2023: Hb up to 11.4.    *Thrombocytopenia, thought to be due to cirrhosis  PLT down to 103 on 10/31/2022, prompting additional labs and closer follow-up   on 8/14/2023, from 99 on 1/23/2023   on 12/20/2023, from 107 on 11/20/2023.     *Prominent nodes within the hepatogastric ligament and around the celiac axis and in the retroperitoneum on MRI abdomen 11/11/2019.  Etiology uncertain.  Radiologist recommended follow-up  CT 2/19/2020, from 12/13/2019: Abdominal and pelvic and cardiophrenic LAD unchanged.  CT 8/14/2020: Overall improvement with interval decrease in right cardiophrenic node, size of spleen, and degree of wall thickening and surrounding fat stranding in the colon.  Persistent abdominal pelvic and lower thoracic LAD, indeterminant etiology.    Radiologist recommended continued follow-up.  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  I explained if this were the case it would not be expected to be curable and we would undergo observation instead of treatment regardless.  We  would consider treatment if she develops a problem.  Therefore, I think further observation is very reasonable.  (The LAD might be due to her previous problems with extensive colitis and the mild splenomegaly might be due to the right hepatic lobe atrophy).  CT 2/17/2021: Unchanged enlarged nodes, from 8/14/2020.  CT 11/3/2021: No mention of LAD.  Stable.  Plan to complete 2 years CT follow-up sometime around November 2021, and if stable, then no more CT follow-up  With no change on CT, stop CT follow-up.    *C. difficile colitis during November 2019 hospitalization.  Thought to perhaps be related to receiving 1 dose of antibiotics for breast surgery.  She was treated with Flagyl and oral vancomycin     *Extensive colitis again seen on CT 2/19/2020, unchanged from 11/10/2019.  Patient reports she had some recent diarrhea but it became better and is more solid and it does not seem similar to the diarrhea she had back in November during the diagnosis of C. difficile.  On 2/24/2020 she was told to contact her PCP if the diarrhea worsens.  In the meantime, we referred her back to Dr. Cano of ID and Dr. Berna Gonzalez of GI (she saw both during the C. difficile hospitalization November 2019).  Dr. Cano appointment on 3/4/2020  Dr. Berna Gonzalez colonoscopy 6/26/2020: Decreased mucosa vascular pattern in the entire examined colon, biopsied.  Biopsy showed no evidence of malignancy.  It did reveal prominent lymphoid aggregates.    *Chronic hepatitis with periportal and bridging fibrosis and architectural distortion (stage III fibrosis), per liver biopsy, 4/21/2021.  Pathologist stated overall findings worrisome for autoimmune hepatitis, but a drug reaction could not entirely be excluded.  Findings worrisome for early cirrhosis.  Patient states Dr. Berna Gonzalez has told her she has autoimmune hepatitis and primary biliary cholangitis  Patient states she had a previous bile duct injury at the time of cholecystectomy,  previously requiring a stent and now has narrowing of her bile ducts.  She states she was told this is contributing to her elevated transaminases.  2/19/2020: LFTs normal  2/26/2020: Arimidex and Xarelto started  8/24/2020: LFTs increased  4/29/2021: Arimidex and Xarelto stopped  5/14/2021: Prednisone 20 mg daily and ursodiol started by Dr. Berna Gonzalez, GI  7/6/2021: LFTs normalized.  8/13/2021: Next Dr. Berna Gonzalez appointment  9/30/2021: Appointment Dr. Adilson Hunt, liver specialist at [facility], for consultation  It is difficult to know what caused her liver dysfunction.  LFTs have normalized with: Stopping Arimidex/Xarelto, starting prednisone 20 mg daily, and starting ursodiol.  Her breast cancer is low risk, grade 1, 5 mm, stage I, Ki-67 4.7%.  Oncotype DX recurrence score only 3.  Therefore, at least for now I think it is in her best interest to remain off hormonal therapy in case Arimidex was part of the reason for the liver injury.  If we wanted to try hormonal therapy in the future, we could consider Aromasin and consider prophylactic dose Eliquis (we were previously using therapeutic dose Xarelto with Arimidex due to portal vein thrombosis and a slight increased risk of clotting from an AI).  However, at least for now I would probably favor remaining off hormonal therapy    *Elevated AFP, on labs drawn through Dr. Adilson Hunt.  CT 11/3/2021: No liver mass.  Patient states Dr. Hunt plans MRI abdomen and short-term follow-up with him.  She is continuing to follow up with Dr. Hunt regularly.    *4 or 5 bouts of pneumonia March 2023-July 2023 8/14/2023: States she is following with pulmonology and has an upcoming consultation with ID.  States she has already had 2 bronchoscopies revealing MRSA pneumonia    Plan  MD CBC CMP IPF 3 month   Await additional labs today: CBC, CMP, IPF, ferritin, iron panel, reticulated hemoglobin, B12, RBC folate, zinc, copper, SPEP, SIFE, serum free light chains.   (Seeing her more often  than her previous 6-month follow-up due to intermittent drops in hemoglobin).  11/20/2023: Hb dropped to 9.3 but states she continues to have recurrent pneumonia.  Suspect further drop in Hb is due to multiple bouts of recurrent pneumonia.  However, I told her if she begins to need recurrent transfusions and may be a good idea to do a bone marrow biopsy.  She is agreeable to this.    12/20/2023: Hb up to 11.2.  Stretch out to 3-month follow-up from the 1 month follow-up from 11/20/2023    Usual plan is:  MD CBC CMP IPF 6 months  Maintains follow-up with Dr. Adilson Hunt, [facility] liver specialist  Remain off AI and anticoagulation  Mammogram 11/10/2022: BI-RADS 2  No more CT scans.    Send copy this to  Dr. Adilson Hunt, [facility] liver specialist

## 2023-12-20 ENCOUNTER — OFFICE VISIT (OUTPATIENT)
Dept: ONCOLOGY | Facility: CLINIC | Age: 68
End: 2023-12-20
Payer: MEDICARE

## 2023-12-20 ENCOUNTER — LAB (OUTPATIENT)
Dept: LAB | Facility: HOSPITAL | Age: 68
End: 2023-12-20
Payer: MEDICARE

## 2023-12-20 VITALS
TEMPERATURE: 98.2 F | OXYGEN SATURATION: 95 % | HEART RATE: 77 BPM | BODY MASS INDEX: 24.22 KG/M2 | HEIGHT: 63 IN | SYSTOLIC BLOOD PRESSURE: 104 MMHG | WEIGHT: 136.7 LBS | RESPIRATION RATE: 16 BRPM | DIASTOLIC BLOOD PRESSURE: 72 MMHG

## 2023-12-20 DIAGNOSIS — Z17.0 MALIGNANT NEOPLASM OF UPPER-INNER QUADRANT OF RIGHT BREAST IN FEMALE, ESTROGEN RECEPTOR POSITIVE: ICD-10-CM

## 2023-12-20 DIAGNOSIS — Z17.0 MALIGNANT NEOPLASM OF UPPER-INNER QUADRANT OF RIGHT BREAST IN FEMALE, ESTROGEN RECEPTOR POSITIVE: Primary | ICD-10-CM

## 2023-12-20 DIAGNOSIS — C50.211 MALIGNANT NEOPLASM OF UPPER-INNER QUADRANT OF RIGHT BREAST IN FEMALE, ESTROGEN RECEPTOR POSITIVE: ICD-10-CM

## 2023-12-20 DIAGNOSIS — C50.211 MALIGNANT NEOPLASM OF UPPER-INNER QUADRANT OF RIGHT BREAST IN FEMALE, ESTROGEN RECEPTOR POSITIVE: Primary | ICD-10-CM

## 2023-12-20 LAB
ALBUMIN SERPL-MCNC: 3.3 G/DL (ref 3.5–5.2)
ALBUMIN/GLOB SERPL: 0.7 G/DL
ALP SERPL-CCNC: 90 U/L (ref 39–117)
ALT SERPL W P-5'-P-CCNC: <5 U/L (ref 1–33)
ANION GAP SERPL CALCULATED.3IONS-SCNC: 8 MMOL/L (ref 5–15)
AST SERPL-CCNC: 28 U/L (ref 1–32)
BASOPHILS # BLD AUTO: 0.02 10*3/MM3 (ref 0–0.2)
BASOPHILS NFR BLD AUTO: 0.5 % (ref 0–1.5)
BILIRUB SERPL-MCNC: 0.6 MG/DL (ref 0–1.2)
BUN SERPL-MCNC: 15 MG/DL (ref 8–23)
BUN/CREAT SERPL: 16.5 (ref 7–25)
CALCIUM SPEC-SCNC: 8.8 MG/DL (ref 8.6–10.5)
CHLORIDE SERPL-SCNC: 102 MMOL/L (ref 98–107)
CO2 SERPL-SCNC: 27 MMOL/L (ref 22–29)
CREAT SERPL-MCNC: 0.91 MG/DL (ref 0.57–1)
DEPRECATED RDW RBC AUTO: 51.6 FL (ref 37–54)
EGFRCR SERPLBLD CKD-EPI 2021: 68.9 ML/MIN/1.73
EOSINOPHIL # BLD AUTO: 0.12 10*3/MM3 (ref 0–0.4)
EOSINOPHIL NFR BLD AUTO: 3 % (ref 0.3–6.2)
ERYTHROCYTE [DISTWIDTH] IN BLOOD BY AUTOMATED COUNT: 13.6 % (ref 12.3–15.4)
FERRITIN SERPL-MCNC: 271 NG/ML (ref 13–150)
GLOBULIN UR ELPH-MCNC: 4.8 GM/DL
GLUCOSE SERPL-MCNC: 101 MG/DL (ref 65–99)
HCT VFR BLD AUTO: 35.2 % (ref 34–46.6)
HGB BLD-MCNC: 11.4 G/DL (ref 12–15.9)
HGB RETIC QN AUTO: 36.2 PG (ref 29.8–36.1)
IMM GRANULOCYTES # BLD AUTO: 0.01 10*3/MM3 (ref 0–0.05)
IMM GRANULOCYTES NFR BLD AUTO: 0.2 % (ref 0–0.5)
IMM RETICS NFR: 12.6 % (ref 3–15.8)
IRON 24H UR-MRATE: 81 MCG/DL (ref 37–145)
IRON SATN MFR SERPL: 28 % (ref 20–50)
LYMPHOCYTES # BLD AUTO: 1.29 10*3/MM3 (ref 0.7–3.1)
LYMPHOCYTES NFR BLD AUTO: 32.2 % (ref 19.6–45.3)
MCH RBC QN AUTO: 33.1 PG (ref 26.6–33)
MCHC RBC AUTO-ENTMCNC: 32.4 G/DL (ref 31.5–35.7)
MCV RBC AUTO: 102.3 FL (ref 79–97)
MONOCYTES # BLD AUTO: 0.4 10*3/MM3 (ref 0.1–0.9)
MONOCYTES NFR BLD AUTO: 10 % (ref 5–12)
NEUTROPHILS NFR BLD AUTO: 2.17 10*3/MM3 (ref 1.7–7)
NEUTROPHILS NFR BLD AUTO: 54.1 % (ref 42.7–76)
NRBC BLD AUTO-RTO: 0 /100 WBC (ref 0–0.2)
PLATELET # BLD AUTO: 120 10*3/MM3 (ref 140–450)
PLATELETS.RETICULATED NFR BLD AUTO: 3.4 % (ref 0.9–6.5)
PMV BLD AUTO: 10.1 FL (ref 6–12)
POTASSIUM SERPL-SCNC: 4.1 MMOL/L (ref 3.5–5.2)
PROT SERPL-MCNC: 8.1 G/DL (ref 6–8.5)
RBC # BLD AUTO: 3.44 10*6/MM3 (ref 3.77–5.28)
RETICS # AUTO: 0.05 10*6/MM3 (ref 0.02–0.13)
RETICS/RBC NFR AUTO: 1.5 % (ref 0.7–1.9)
SODIUM SERPL-SCNC: 137 MMOL/L (ref 136–145)
TIBC SERPL-MCNC: 287 MCG/DL (ref 298–536)
TRANSFERRIN SERPL-MCNC: 205 MG/DL (ref 200–360)
VIT B12 BLD-MCNC: 677 PG/ML (ref 211–946)
WBC NRBC COR # BLD AUTO: 4.01 10*3/MM3 (ref 3.4–10.8)

## 2023-12-20 PROCEDURE — 36415 COLL VENOUS BLD VENIPUNCTURE: CPT

## 2023-12-20 PROCEDURE — 99214 OFFICE O/P EST MOD 30 MIN: CPT | Performed by: INTERNAL MEDICINE

## 2023-12-20 PROCEDURE — 82607 VITAMIN B-12: CPT | Performed by: INTERNAL MEDICINE

## 2023-12-20 PROCEDURE — 85055 RETICULATED PLATELET ASSAY: CPT

## 2023-12-20 PROCEDURE — 82728 ASSAY OF FERRITIN: CPT

## 2023-12-20 PROCEDURE — 1126F AMNT PAIN NOTED NONE PRSNT: CPT | Performed by: INTERNAL MEDICINE

## 2023-12-20 PROCEDURE — 85025 COMPLETE CBC W/AUTO DIFF WBC: CPT

## 2023-12-20 PROCEDURE — 85046 RETICYTE/HGB CONCENTRATE: CPT

## 2023-12-20 PROCEDURE — 84466 ASSAY OF TRANSFERRIN: CPT

## 2023-12-20 PROCEDURE — 80053 COMPREHEN METABOLIC PANEL: CPT

## 2023-12-20 PROCEDURE — 83540 ASSAY OF IRON: CPT

## 2023-12-20 RX ORDER — SULFAMETHOXAZOLE AND TRIMETHOPRIM 800; 160 MG/1; MG/1
1 TABLET ORAL DAILY
COMMUNITY
Start: 2023-12-13 | End: 2024-03-12

## 2023-12-21 LAB
ALBUMIN SERPL ELPH-MCNC: 3 G/DL (ref 2.9–4.4)
ALBUMIN/GLOB SERPL: 0.7 {RATIO} (ref 0.7–1.7)
ALPHA1 GLOB SERPL ELPH-MCNC: 0.3 G/DL (ref 0–0.4)
ALPHA2 GLOB SERPL ELPH-MCNC: 0.8 G/DL (ref 0.4–1)
B-GLOBULIN SERPL ELPH-MCNC: 1 G/DL (ref 0.7–1.3)
FOLATE BLD-MCNC: 286 NG/ML
FOLATE RBC-MCNC: 836 NG/ML
GAMMA GLOB SERPL ELPH-MCNC: 2.7 G/DL (ref 0.4–1.8)
GLOBULIN SER-MCNC: 4.7 G/DL (ref 2.2–3.9)
HCT VFR BLD AUTO: 34.2 % (ref 34–46.6)
IGA SERPL-MCNC: 500 MG/DL (ref 87–352)
IGG SERPL-MCNC: 2847 MG/DL (ref 586–1602)
IGM SERPL-MCNC: 124 MG/DL (ref 26–217)
INTERPRETATION SERPL IEP-IMP: ABNORMAL
KAPPA LC FREE SER-MCNC: 113.3 MG/L (ref 3.3–19.4)
KAPPA LC FREE/LAMBDA FREE SER: 1.44 {RATIO} (ref 0.26–1.65)
LABORATORY COMMENT REPORT: ABNORMAL
LAMBDA LC FREE SERPL-MCNC: 78.8 MG/L (ref 5.7–26.3)
M PROTEIN SERPL ELPH-MCNC: ABNORMAL G/DL
PROT SERPL-MCNC: 7.7 G/DL (ref 6–8.5)

## 2023-12-29 LAB
COPPER SERPL-MCNC: 128 UG/DL (ref 80–158)
ZINC SERPL-MCNC: 54 UG/DL (ref 44–115)

## 2024-03-25 ENCOUNTER — OFFICE VISIT (OUTPATIENT)
Dept: ONCOLOGY | Facility: CLINIC | Age: 69
End: 2024-03-25
Payer: MEDICARE

## 2024-03-25 ENCOUNTER — LAB (OUTPATIENT)
Dept: LAB | Facility: HOSPITAL | Age: 69
End: 2024-03-25
Payer: MEDICARE

## 2024-03-25 VITALS
BODY MASS INDEX: 26.17 KG/M2 | OXYGEN SATURATION: 99 % | WEIGHT: 147.7 LBS | HEART RATE: 66 BPM | TEMPERATURE: 96.2 F | HEIGHT: 63 IN | DIASTOLIC BLOOD PRESSURE: 61 MMHG | RESPIRATION RATE: 14 BRPM | SYSTOLIC BLOOD PRESSURE: 101 MMHG

## 2024-03-25 DIAGNOSIS — C50.211 MALIGNANT NEOPLASM OF UPPER-INNER QUADRANT OF RIGHT BREAST IN FEMALE, ESTROGEN RECEPTOR POSITIVE: ICD-10-CM

## 2024-03-25 DIAGNOSIS — C50.211 MALIGNANT NEOPLASM OF UPPER-INNER QUADRANT OF RIGHT BREAST IN FEMALE, ESTROGEN RECEPTOR POSITIVE: Primary | ICD-10-CM

## 2024-03-25 DIAGNOSIS — Z17.0 MALIGNANT NEOPLASM OF UPPER-INNER QUADRANT OF RIGHT BREAST IN FEMALE, ESTROGEN RECEPTOR POSITIVE: Primary | ICD-10-CM

## 2024-03-25 DIAGNOSIS — Z17.0 MALIGNANT NEOPLASM OF UPPER-INNER QUADRANT OF RIGHT BREAST IN FEMALE, ESTROGEN RECEPTOR POSITIVE: ICD-10-CM

## 2024-03-25 LAB
ALBUMIN SERPL-MCNC: 3.4 G/DL (ref 3.5–5.2)
ALBUMIN/GLOB SERPL: 0.9 G/DL
ALP SERPL-CCNC: 86 U/L (ref 39–117)
ALT SERPL W P-5'-P-CCNC: <5 U/L (ref 1–33)
ANION GAP SERPL CALCULATED.3IONS-SCNC: 8.3 MMOL/L (ref 5–15)
AST SERPL-CCNC: 29 U/L (ref 1–32)
BASOPHILS # BLD AUTO: 0.02 10*3/MM3 (ref 0–0.2)
BASOPHILS NFR BLD AUTO: 0.6 % (ref 0–1.5)
BILIRUB SERPL-MCNC: 0.6 MG/DL (ref 0–1.2)
BUN SERPL-MCNC: 20 MG/DL (ref 8–23)
BUN/CREAT SERPL: 25 (ref 7–25)
CALCIUM SPEC-SCNC: 8.4 MG/DL (ref 8.6–10.5)
CHLORIDE SERPL-SCNC: 104 MMOL/L (ref 98–107)
CO2 SERPL-SCNC: 25.7 MMOL/L (ref 22–29)
CREAT SERPL-MCNC: 0.8 MG/DL (ref 0.57–1)
DEPRECATED RDW RBC AUTO: 48.9 FL (ref 37–54)
EGFRCR SERPLBLD CKD-EPI 2021: 80.4 ML/MIN/1.73
EOSINOPHIL # BLD AUTO: 0.1 10*3/MM3 (ref 0–0.4)
EOSINOPHIL NFR BLD AUTO: 2.8 % (ref 0.3–6.2)
ERYTHROCYTE [DISTWIDTH] IN BLOOD BY AUTOMATED COUNT: 13.6 % (ref 12.3–15.4)
GLOBULIN UR ELPH-MCNC: 4 GM/DL
GLUCOSE SERPL-MCNC: 93 MG/DL (ref 65–99)
HCT VFR BLD AUTO: 34.3 % (ref 34–46.6)
HGB BLD-MCNC: 11.4 G/DL (ref 12–15.9)
IMM GRANULOCYTES # BLD AUTO: 0.01 10*3/MM3 (ref 0–0.05)
IMM GRANULOCYTES NFR BLD AUTO: 0.3 % (ref 0–0.5)
LYMPHOCYTES # BLD AUTO: 1.39 10*3/MM3 (ref 0.7–3.1)
LYMPHOCYTES NFR BLD AUTO: 38.8 % (ref 19.6–45.3)
MCH RBC QN AUTO: 32.2 PG (ref 26.6–33)
MCHC RBC AUTO-ENTMCNC: 33.2 G/DL (ref 31.5–35.7)
MCV RBC AUTO: 96.9 FL (ref 79–97)
MONOCYTES # BLD AUTO: 0.34 10*3/MM3 (ref 0.1–0.9)
MONOCYTES NFR BLD AUTO: 9.5 % (ref 5–12)
NEUTROPHILS NFR BLD AUTO: 1.72 10*3/MM3 (ref 1.7–7)
NEUTROPHILS NFR BLD AUTO: 48 % (ref 42.7–76)
NRBC BLD AUTO-RTO: 0 /100 WBC (ref 0–0.2)
PLATELET # BLD AUTO: 84 10*3/MM3 (ref 140–450)
PLATELETS.RETICULATED NFR BLD AUTO: 4.4 % (ref 0.9–6.5)
PMV BLD AUTO: 10.3 FL (ref 6–12)
POTASSIUM SERPL-SCNC: 3.7 MMOL/L (ref 3.5–5.2)
PROT SERPL-MCNC: 7.4 G/DL (ref 6–8.5)
RBC # BLD AUTO: 3.54 10*6/MM3 (ref 3.77–5.28)
SODIUM SERPL-SCNC: 138 MMOL/L (ref 136–145)
WBC NRBC COR # BLD AUTO: 3.58 10*3/MM3 (ref 3.4–10.8)

## 2024-03-25 PROCEDURE — 36415 COLL VENOUS BLD VENIPUNCTURE: CPT

## 2024-03-25 PROCEDURE — 99214 OFFICE O/P EST MOD 30 MIN: CPT | Performed by: INTERNAL MEDICINE

## 2024-03-25 PROCEDURE — 1126F AMNT PAIN NOTED NONE PRSNT: CPT | Performed by: INTERNAL MEDICINE

## 2024-03-25 PROCEDURE — 85025 COMPLETE CBC W/AUTO DIFF WBC: CPT

## 2024-03-25 PROCEDURE — G2211 COMPLEX E/M VISIT ADD ON: HCPCS | Performed by: INTERNAL MEDICINE

## 2024-03-25 PROCEDURE — 85055 RETICULATED PLATELET ASSAY: CPT

## 2024-03-25 PROCEDURE — 80053 COMPREHEN METABOLIC PANEL: CPT

## 2024-03-25 RX ORDER — SULFAMETHOXAZOLE AND TRIMETHOPRIM 800; 160 MG/1; MG/1
1 TABLET ORAL DAILY
COMMUNITY

## 2024-03-25 NOTE — PROGRESS NOTES
.     REASONS FOR FOLLOWUP: Portal vein thrombosis, breast cancer    HISTORY OF PRESENT ILLNESS:  The patient is a 68 y.o. year old female  who is here for follow-up with the above-mentioned history.    States she is scheduled to have a stress test because CT showed coronary calcifications.  No bleeding.    Past Medical History:   Diagnosis Date    Blood clot of common bile duct of transplanted liver     Breast cancer 09/2019    Right IDC    Clostridium difficile infection 12/2019    GERD (gastroesophageal reflux disease)     Hx of radiation therapy     IBS (irritable bowel syndrome)     PONV (postoperative nausea and vomiting)     Radiation-induced angiosarcoma of breast     Situational stress     DEATH OF BROTHER 10/2019, RECENT DIAGNOSIS    Stress headaches     Visit for gynecologic examination 06/28/2017     Past Surgical History:   Procedure Laterality Date    BILE DUCT STENT PLACEMENT  2014    BREAST BIOPSY Right 09/2019    malignant    BREAST LUMPECTOMY Right     BREAST LUMPECTOMY WITH SENTINEL NODE BIOPSY Right 11/04/2019    Procedure: BREAST LUMPECTOMY WITH SENTINEL NODE BIOPSY AND NEEDLE LOCALIZATION And possible axillary dissection;  Surgeon: Jean-Paul Ramos MD;  Location: [facility];  Service: General    COLONOSCOPY N/A 06/26/2020    Procedure: COLONOSCOPY TO TERMINAL ILEUM WITH BX;  Surgeon: Sage Gonzalez MD;  Location: [facility];  Service: Gastroenterology;  Laterality: N/A;  PREOP/ SCREENING  POSTOP/ VASCULAR PATTERN TO COLON    ESOPHAGOSCOPY / EGD N/A 10/06/2022    KNEE ARTHROSCOPY Left     LAPAROSCOPIC CHOLECYSTECTOMY  2014    TUBAL ABDOMINAL LIGATION         MEDICATIONS    Current Outpatient Medications:     acetaminophen (TYLENOL) 325 MG tablet, Take 2 tablets by mouth Every 6 (Six) Hours As Needed for Mild Pain., Disp: , Rfl:     albuterol (ACCUNEB) 1.25 MG/3ML nebulizer solution, Inhale 3 mL 3 (Three) Times a Day., Disp: , Rfl:     azaTHIOprine (IMURAN) 50 MG tablet, Take  2 tablets by mouth Daily., Disp: , Rfl:     carvedilol (COREG) 6.25 MG tablet, 2 (Two) Times a Day With Meals., Disp: , Rfl:     clonazePAM (KlonoPIN) 0.5 MG tablet, Take  by mouth., Disp: , Rfl:     famotidine (PEPCID) 40 MG tablet, , Disp: , Rfl:     melatonin 5 MG tablet tablet, Take 2 tablets by mouth At Night As Needed., Disp: , Rfl:     Probiotic Product (PROBIOTIC PO), Take 1 tablet by mouth Daily., Disp: , Rfl:     sulfamethoxazole-trimethoprim (BACTRIM DS,SEPTRA DS) 800-160 MG per tablet, Take 1 tablet by mouth Daily., Disp: , Rfl:     traZODone (DESYREL) 50 MG tablet, , Disp: , Rfl:     ursodiol (ACTIGALL) 500 MG tablet, Take 1 tablet by mouth., Disp: , Rfl:     ALLERGIES:   No Known Allergies    SOCIAL HISTORY:       Social History     Socioeconomic History    Marital status:     Number of children: 2   Tobacco Use    Smoking status: Former     Current packs/day: 0.00     Average packs/day: 0.5 packs/day for 10.0 years (5.0 ttl pk-yrs)     Types: Cigarettes     Start date: 2004     Quit date: 2014     Years since quitting: 10.2    Smokeless tobacco: Former   Vaping Use    Vaping status: Never Used   Substance and Sexual Activity    Alcohol use: No    Drug use: No    Sexual activity: Not Currently     Birth control/protection: Post-menopausal, Surgical         FAMILY HISTORY:  Family History   Problem Relation Age of Onset    Alcohol abuse Father     Heart failure Mother     Diabetes Brother     Prostate cancer Brother     Throat cancer Brother     Breast cancer Maternal Aunt     Breast cancer Maternal Cousin     Breast cancer Maternal Cousin     Malig Hyperthermia Neg Hx     Ovarian cancer Neg Hx     Uterine cancer Neg Hx     Colon cancer Neg Hx        REVIEW OF SYSTEMS:  Review of Systems   Constitutional:  Negative for activity change.   HENT:  Negative for nosebleeds and trouble swallowing.    Respiratory:  Negative for shortness of breath and wheezing.    Cardiovascular:  Negative for chest  "pain and palpitations.   Gastrointestinal:  Negative for constipation, diarrhea and nausea.   Genitourinary:  Negative for dysuria and hematuria.   Musculoskeletal:  Negative for arthralgias and myalgias.   Skin:  Negative for rash and wound.   Neurological:  Negative for seizures and syncope.   Hematological:  Negative for adenopathy. Does not bruise/bleed easily.   Psychiatric/Behavioral:  Negative for confusion.             Vitals:    03/25/24 1603   BP: 101/61   Pulse: 66   Resp: 14   Temp: 96.2 °F (35.7 °C)   TempSrc: Temporal   SpO2: 99%   Weight: 67 kg (147 lb 11.2 oz)   Height: 160 cm (62.99\")   PainSc: 0-No pain             3/25/2024     4:01 PM   Current Status   ECOG score 0        PHYSICAL EXAM:        CONSTITUTIONAL:  Vital signs reviewed.  No distress, looks comfortable.  EYES:  Conjunctiva and lids unremarkable.  PERRLA  EARS,NOSE,MOUTH,THROAT:  Ears and nose appear unremarkable.  Lips, teeth, gums appear unremarkable.  RESPIRATORY:  Normal respiratory effort.  Lungs clear to auscultation bilaterally.  CARDIOVASCULAR:  Normal S1, S2.  No murmurs rubs or gallops.  No significant lower extremity edema.  GASTROINTESTINAL: Abdomen appears unremarkable.  Nontender.  No hepatomegaly.  No splenomegaly.  LYMPHATIC:  No cervical, supraclavicular, axillary lymphadenopathy.  SKIN:  Warm.  No rashes.  PSYCHIATRIC:  Normal judgment and insight.  Normal mood and affect.        RECENT LABS:        WBC   Date/Time Value Ref Range Status   03/25/2024 03:44 PM 3.58 3.40 - 10.80 10*3/mm3 Final   02/13/2024 02:17 PM 3.99 (L) 4.5 - 11.0 10*3/uL Final     Hemoglobin   Date/Time Value Ref Range Status   03/25/2024 03:44 PM 11.4 (L) 12.0 - 15.9 g/dL Final   02/13/2024 02:17 PM 12.0 12.0 - 16.0 g/dL Final     Platelets   Date/Time Value Ref Range Status   03/25/2024 03:44 PM 84 (L) 140 - 450 10*3/mm3 Final   02/13/2024 02:17 PM 97 (L) 140 - 440 10*3/uL Final       Assessment & Plan   Malignant neoplasm of upper-inner quadrant " of right breast in female, estrogen receptor positive  - Comprehensive Metabolic Panel        *Right breast cancer.  Invasive ductal adenocarcinoma.  Upper inner quadrant  Right lumpectomy by Dr. Ramos 11/4/2019.  1:00.  Overall grade 1.  Greatest dimension 5 mm.  Single focus of invasive carcinoma.  No lymphovascular invasion.  Margins negative.  0 out of 2 nodes involved.  [id number], stage 1  ER 91%.  MS 96%.  HER-2 negative.  Ki-67 4.7% (favorable).  (Initial needle biopsy 9/16/2019: Low-grade invasive ductal carcinoma, grade 1.  5 mm in greatest dimension.)  (Initial mammogram measured the lesion at 5 mm)  From the information we currently know about her cancer, I do not recommend chemotherapy.  I do recommend hormonal therapy for 5 years.  Hopefully, aromatase inhibitors if DEXA scan allows (with portal vein thrombosis would like to avoid tamoxifen).  Oncotype DX: Recurrence score 3, 3% distant recurrence risk at 9 years if tamoxifen or AI is used.  Therefore, do not recommend chemotherapy.  Adjuvant XRT completed 2/12/2020  Arimidex 2/26/2020- planned 2/26/2025  Arimidex stopped 4/29/2021 due to concern for possible drug-induced liver injury.  Might try to resume hormonal therapy with Aromasin at some point but for now remain off hormonal therapy  9/29/2021: Remain off Arimidex and anticoagulation for now as this was a low risk breast cancer and these might be the cause of her liver dysfunction.  She has not yet seen the hepatologist, Dr. Adilson Hunt.  She sees him tomorrow.  I told her unless Dr. Hunt thinks there is very little chance Arimidex or Xarelto caused her liver dysfunction, we would likely remain off Arimidex Xarelto.  5/16/2022: Discussed we could try tamoxifen with Pradaxa since Pradaxa has a different mechanism of action from Xarelto and tamoxifen has a different mechanism of action from Arimidex.  However, considering her liver disease may have been related to either Arimidex or Xarelto and  breast cancer was low risk, I think it is in her best interest to not take any more adjuvant therapy for breast cancer.  Patient very much agrees.  We plan no more adjuvant therapy of breast cancer.  No clinical evidence of recurrence.    *Mild hand joint achiness since beginning Arimidex.    Remains off Arimidex    *Bone health.  DEXA 12/2/2019: Normal.  T score lumbar spine 0.1, T score left hip 0.1, T score right hip 0.6.  She takes 2-4 times daily.  Therefore, did not begin a calcium supplement when a rheumatic started on 12/9/2019.  She was encouraged to take a PPI on a schedule and follow-up with her PCP.  States she has had several EGDs in the past none of which have shown problems.  (States in the past her PCP told her to stop calcium supplements because she developed hypercalcemia due to the combination of calcium supplements and Tums).     *Portal vein thrombosis (right branch of portal vein).  No evidence of cirrhosis on MRI or ultrasound.  Dr. Berna Gonzalez, GI note, has no mention of cirrhosis.  Hormonal therapy for breast cancer has a slight increased risk of thrombosis.  Therefore, I think it is in her best interest to undergo anticoagulation until she completes hormonal therapy.  Generally, Lovenox or Coumadin are the preferred agents.  However, because of significant elevations in INR, and the desire for an oral agent, changed to Xarelto on 2/19/2020.   Previous plan was to remain on therapeutic dose Xarelto until completes Arimidex.   4/29/2021, Xarelto (along with Arimidex) stopped due to concern for possible drug-induced liver injury.  No evidence of recurrent clotting    *Risk factors for portal vein thrombosis:  Unremarkable: Prothrombin gene mutation, factor V Leiden gene mutation, lupus anticoagulant, beta-2 glycoprotein antibodies, anti-thrombin, protein S free.    *Low protein S activity, 14% (normal range %), on 11/13/2019     *Low protein C activity, 44% (normal range %) on  11/13/2019.    *Indeterminant anticardiolipin  IgM 14 (indeterminate range 13-20) on 11/13/2019.  IgA and IgG negative.    *Prior leukocytosis, likely due to C. difficile colitis.  WBC 4, from 5.2, from 3.3, from 4.4, from 3.4, from 4.2     *Anemia.  Previously felt to likely due to C. difficile colitis.  10/31/2022: Hb 11.8 on 10/31/2022 (lower than usual, prompting additional labs), from 12.3.  Unremarkable: Iron labs, folate, B12  1/23/2023: Hb back up to 12.4  8/14/2023: Hb 10.  Hb is lower than usual.  I suspect this is due to several episodes of pneumonia since March 2023 including the last episode just 2 weeks ago.  However, check anemia labs today.  8/14/2023, unremarkable: Iron labs, B12, serum folate, haptoglobin, LDH, bilirubin, creatinine  11/20/2023: Hb 9.3.  Still having recurrent pneumonia which is believed to be the reason for further drop in Hb  12/20/2023: Hb up to 11.4. unremarkable: Iron labs, B12, RBC folate, copper, zinc, SPEP, SIFE, creatinine, bilirubin, serum free light chain ratio  3/25/2024: Hb 11.4, stable    *Thrombocytopenia, thought to be due to cirrhosis  PLT down to 103 on 10/31/2022, prompting additional labs and closer follow-up   on 8/14/2023, from 99 on 1/23/2023  PLT 84 on 3/25/2024, from 120 on 12/20/2023, from 107 on 11/20/2023.     *Prominent nodes within the hepatogastric ligament and around the celiac axis and in the retroperitoneum on MRI abdomen 11/11/2019.  Etiology uncertain.  Radiologist recommended follow-up  CT 2/19/2020, from 12/13/2019: Abdominal and pelvic and cardiophrenic LAD unchanged.  CT 8/14/2020: Overall improvement with interval decrease in right cardiophrenic node, size of spleen, and degree of wall thickening and surrounding fat stranding in the colon.  Persistent abdominal pelvic and lower thoracic LAD, indeterminant etiology.    Radiologist recommended continued follow-up.  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  I explained if this were the case it would not be expected to be curable and we would undergo observation instead of treatment regardless.  We would consider treatment if she develops a problem.  Therefore, I think further observation is very reasonable.  (The LAD might be due to her previous problems with extensive colitis and the mild splenomegaly might be due to the right hepatic lobe atrophy).  CT 2/17/2021: Unchanged enlarged nodes, from 8/14/2020.  CT 11/3/2021: No mention of LAD.  Stable.  Plan to complete 2 years CT follow-up sometime around November 2021, and if stable, then no more CT follow-up  With no change on CT, stop CT follow-up.    *C. difficile colitis during November 2019 hospitalization.  Thought to perhaps be related to receiving 1 dose of antibiotics for breast surgery.  She was treated with Flagyl and oral vancomycin     *Extensive colitis again seen on CT 2/19/2020, unchanged from 11/10/2019.  Patient reports she had some recent diarrhea but it became better and is more solid and it does not seem similar to the diarrhea she had back in November during the diagnosis of C. difficile.  On 2/24/2020 she was told to contact her PCP if the diarrhea worsens.  In the meantime, we referred her back to Dr. Cano of ID and Dr. Berna Gonzalez of GI (she saw both during the C. difficile hospitalization November 2019).  Dr. Cano appointment on 3/4/2020  Dr. Berna Gonzalez colonoscopy 6/26/2020: Decreased mucosa vascular pattern in the entire examined colon, biopsied.  Biopsy showed no evidence of malignancy.  It did reveal prominent lymphoid aggregates.    *Chronic hepatitis with periportal and bridging fibrosis and architectural distortion (stage III fibrosis), per liver biopsy, 4/21/2021.  Pathologist stated overall findings worrisome for autoimmune hepatitis, but a drug reaction could not entirely be excluded.  Findings worrisome for early cirrhosis.  Patient states   Berna Gonzalez has told her she has autoimmune hepatitis and primary biliary cholangitis  Patient states she had a previous bile duct injury at the time of cholecystectomy, previously requiring a stent and now has narrowing of her bile ducts.  She states she was told this is contributing to her elevated transaminases.  2/19/2020: LFTs normal  2/26/2020: Arimidex and Xarelto started  8/24/2020: LFTs increased  4/29/2021: Arimidex and Xarelto stopped  5/14/2021: Prednisone 20 mg daily and ursodiol started by Dr. Berna Gonzalez, GI  7/6/2021: LFTs normalized.  8/13/2021: Next Dr. Berna Gonzalez appointment  9/30/2021: Appointment Dr. Adilson Hunt, liver specialist at [facility], for consultation  It is difficult to know what caused her liver dysfunction.  LFTs have normalized with: Stopping Arimidex/Xarelto, starting prednisone 20 mg daily, and starting ursodiol.  Her breast cancer is low risk, grade 1, 5 mm, stage I, Ki-67 4.7%.  Oncotype DX recurrence score only 3.  Therefore, at least for now I think it is in her best interest to remain off hormonal therapy in case Arimidex was part of the reason for the liver injury.  If we wanted to try hormonal therapy in the future, we could consider Aromasin and consider prophylactic dose Eliquis (we were previously using therapeutic dose Xarelto with Arimidex due to portal vein thrombosis and a slight increased risk of clotting from an AI).  However, at least for now I would probably favor remaining off hormonal therapy    *Elevated AFP, on labs drawn through Dr. Adilson Hunt.  CT 11/3/2021: No liver mass.  Patient states Dr. Hunt plans MRI abdomen and short-term follow-up with him.  She is continuing to follow up with Dr. Hunt regularly.    *4 or 5 bouts of pneumonia March 2023-July 2023 8/14/2023: States she is following with pulmonology and has an upcoming consultation with ID.  States she has already had 2 bronchoscopies revealing MRSA pneumonia    Plan  NP CBC CMP IPF 3 month   MD CBC CMP IPF  6 months (September).  (Seeing her more often than her previous 6-month follow-up due to intermittent drops in hemoglobin).  11/20/2023: Hb dropped to 9.3 but states she continues to have recurrent pneumonia.  Suspect further drop in Hb is due to multiple bouts of recurrent pneumonia.  However, I told her if she begins to need recurrent transfusions and may be a good idea to do a bone marrow biopsy.  She is agreeable to this.    12/20/2023: Hb up to 11.2.  Stretch out to 3-month follow-up from the 1 month follow-up from 11/20/2023    Prior plan was::  MD CBC CMP IPF 6 months  Maintains follow-up with Dr. Adilson Hunt, [facility] liver specialist  Remain off AI and anticoagulation  Mammogram 11/21/2023: BI-RADS 2  No more CT scans.    Send copy this to  Dr. Adilson Hunt, [facility] liver specialist

## 2024-03-26 NOTE — TELEPHONE ENCOUNTER
Problem: PHYSICAL THERAPY ADULT  Goal: Performs mobility at highest level of function for planned discharge setting.  See evaluation for individualized goals.  Description: Treatment/Interventions: ADL retraining, Functional transfer training, LE strengthening/ROM, Elevations, Therapeutic exercise, Endurance training, Patient/family training, Equipment eval/education, Bed mobility, Gait training, Spoke to nursing, Spoke to case management, OT  Equipment Recommended: Walker       See flowsheet documentation for full assessment, interventions and recommendations.  Outcome: Progressing  Note: Prognosis: Good  Problem List: Decreased strength, Decreased range of motion, Decreased endurance, Impaired balance, Decreased mobility, Decreased coordination, Pain     Barriers to Discharge: Inaccessible home environment, Decreased caregiver support     Rehab Resource Intensity Level, PT: III (Minimum Resource Intensity)    See flowsheet documentation for full assessment.         [place]    I spoke to her and she is aware of her need for further breast testing.    I entered orders into Epic.  Can you help schedule for her.    Thanks    Maura            Pt is asking for a call back to go over Mammo.       Thank you

## 2024-06-14 NOTE — PROGRESS NOTES
.     REASONS FOR FOLLOWUP: Portal vein thrombosis, breast cancer    HISTORY OF PRESENT ILLNESS:  The patient is a 68 y.o. year old female  who is here for follow-up with the above-mentioned history.    Returns today for 3-month follow-up.  She remains off endocrine therapy.  Over the last several weeks she has had right-sided abdominal pain that comes and goes.  Sometimes the pain radiates to her back.  Pain is tolerable and she has not required any medications for this.  Her appetite has remained adequate.  Bowels moving regularly.  Denies fever, chills, nausea, vomiting.  Denies signs or symptoms of bleeding.    She underwent MRI last month with [facility], she was told that she had a 3.4 cm lesion on the right side of her liver.  Because of this, she scheduled follow-up with Dr. Hunt, hepatology, who recommended CT liver protocol which has been scheduled for 7/15/2024.  She will subsequently follow-up with Dr. Hunt to review results once they are available.    Screening mammogram November 2023 benign.  Continues to follow-up with Dr. Ramos.    Past Medical History:   Diagnosis Date    Blood clot of common bile duct of transplanted liver     Breast cancer 09/2019    Right IDC    Clostridium difficile infection 12/2019    GERD (gastroesophageal reflux disease)     Hx of radiation therapy     IBS (irritable bowel syndrome)     PONV (postoperative nausea and vomiting)     Radiation-induced angiosarcoma of breast     Situational stress     DEATH OF BROTHER 10/2019, RECENT DIAGNOSIS    Stress headaches     Visit for gynecologic examination 06/28/2017     Past Surgical History:   Procedure Laterality Date    BILE DUCT STENT PLACEMENT  2014    BREAST BIOPSY Right 09/2019    malignant    BREAST LUMPECTOMY Right     BREAST LUMPECTOMY WITH SENTINEL NODE BIOPSY Right 11/04/2019    Procedure: BREAST LUMPECTOMY WITH SENTINEL NODE BIOPSY AND NEEDLE LOCALIZATION And possible axillary dissection;  Surgeon: Jean-Paul Ramos  MD ANDRE;  Location:  [facility];  Service: General    COLONOSCOPY N/A 06/26/2020    Procedure: COLONOSCOPY TO TERMINAL ILEUM WITH BX;  Surgeon: Sage Gonzalez MD;  Location: [facility] ENDOSCOPY;  Service: Gastroenterology;  Laterality: N/A;  PREOP/ SCREENING  POSTOP/ VASCULAR PATTERN TO COLON    ESOPHAGOSCOPY / EGD N/A 10/06/2022    KNEE ARTHROSCOPY Left     LAPAROSCOPIC CHOLECYSTECTOMY  2014    TUBAL ABDOMINAL LIGATION         MEDICATIONS    Current Outpatient Medications:     acetaminophen (TYLENOL) 325 MG tablet, Take 2 tablets by mouth Every 6 (Six) Hours As Needed for Mild Pain., Disp: , Rfl:     albuterol (ACCUNEB) 1.25 MG/3ML nebulizer solution, Inhale 3 mL 3 (Three) Times a Day., Disp: , Rfl:     azaTHIOprine (IMURAN) 50 MG tablet, Take 2 tablets by mouth Daily., Disp: , Rfl:     carvedilol (COREG) 6.25 MG tablet, 2 (Two) Times a Day With Meals., Disp: , Rfl:     famotidine (PEPCID) 40 MG tablet, , Disp: , Rfl:     melatonin 5 MG tablet tablet, Take 2 tablets by mouth At Night As Needed., Disp: , Rfl:     Probiotic Product (PROBIOTIC PO), Take 1 tablet by mouth Daily., Disp: , Rfl:     sulfamethoxazole-trimethoprim (BACTRIM DS,SEPTRA DS) 800-160 MG per tablet, Take 1 tablet by mouth Daily., Disp: , Rfl:     traZODone (DESYREL) 50 MG tablet, , Disp: , Rfl:     ursodiol (ACTIGALL) 500 MG tablet, Take 1 tablet by mouth., Disp: , Rfl:     clonazePAM (KlonoPIN) 0.5 MG tablet, Take  by mouth., Disp: , Rfl:     ALLERGIES:     Allergies   Allergen Reactions    Sulfamethoxazole-Trimethoprim Hives       SOCIAL HISTORY:       Social History     Socioeconomic History    Marital status:     Number of children: 2   Tobacco Use    Smoking status: Former     Current packs/day: 0.00     Average packs/day: 0.5 packs/day for 10.0 years (5.0 ttl pk-yrs)     Types: Cigarettes     Start date: 2004     Quit date: 2014     Years since quitting: 10.4    Smokeless tobacco: Former   Vaping Use    Vaping status: Never Used    Substance and Sexual Activity    Alcohol use: No    Drug use: No    Sexual activity: Not Currently     Birth control/protection: Post-menopausal, Surgical         FAMILY HISTORY:  Family History   Problem Relation Age of Onset    Alcohol abuse Father     Heart failure Mother     Diabetes Brother     Prostate cancer Brother     Throat cancer Brother     Breast cancer Maternal Aunt     Breast cancer Maternal Cousin     Breast cancer Maternal Cousin     Malig Hyperthermia Neg Hx     Ovarian cancer Neg Hx     Uterine cancer Neg Hx     Colon cancer Neg Hx        REVIEW OF SYSTEMS:  Review of Systems   Constitutional:  Negative for activity change.   HENT:  Negative for nosebleeds and trouble swallowing.    Respiratory:  Negative for shortness of breath and wheezing.    Cardiovascular:  Negative for chest pain and palpitations.   Gastrointestinal:  Negative for constipation, diarrhea and nausea.   Genitourinary:  Negative for dysuria and hematuria.   Musculoskeletal:  Negative for arthralgias and myalgias.   Skin:  Negative for rash and wound.   Neurological:  Negative for seizures and syncope.   Hematological:  Negative for adenopathy. Does not bruise/bleed easily.   Psychiatric/Behavioral:  Negative for confusion.             Vitals:    06/17/24 1424   BP: 108/52   Pulse: 75   Resp: 19   Temp: 98.1 °F (36.7 °C)   SpO2: 95%   Weight: 68.6 kg (151 lb 4.8 oz)   PainSc: 0-No pain               3/25/2024     4:01 PM   Current Status   ECOG score 0        PHYSICAL EXAM:        CONSTITUTIONAL:  Vital signs reviewed.  No distress, looks comfortable.  EYES:  Conjunctiva and lids unremarkable.  PERRLA  EARS,NOSE,MOUTH,THROAT:  Ears and nose appear unremarkable.  Lips, teeth, gums appear unremarkable.  RESPIRATORY:  Normal respiratory effort.  Lungs clear to auscultation bilaterally.  CARDIOVASCULAR:  Normal S1, S2.  No murmurs rubs or gallops.  No significant lower extremity edema.  GASTROINTESTINAL: Abdomen appears  unremarkable.  Nontender.  No hepatomegaly.  No splenomegaly.  LYMPHATIC:  No cervical, supraclavicular, axillary lymphadenopathy.  SKIN:  Warm.  No rashes.  PSYCHIATRIC:  Normal judgment and insight.  Normal mood and affect.        RECENT LABS:        WBC   Date/Time Value Ref Range Status   06/17/2024 01:50 PM 4.50 3.40 - 10.80 10*3/mm3 Final   02/13/2024 02:17 PM 3.99 (L) 4.5 - 11.0 10*3/uL Final     Hemoglobin   Date/Time Value Ref Range Status   06/17/2024 01:50 PM 12.1 12.0 - 15.9 g/dL Final   02/13/2024 02:17 PM 12.0 12.0 - 16.0 g/dL Final     Platelets   Date/Time Value Ref Range Status   06/17/2024 01:50 PM 96 (L) 140 - 450 10*3/mm3 Final   02/13/2024 02:17 PM 97 (L) 140 - 440 10*3/uL Final       Assessment & Plan   There are no diagnoses linked to this encounter.        *Right breast cancer.  Invasive ductal adenocarcinoma.  Upper inner quadrant  Right lumpectomy by Dr. Ramos 11/4/2019.  1:00.  Overall grade 1.  Greatest dimension 5 mm.  Single focus of invasive carcinoma.  No lymphovascular invasion.  Margins negative.  0 out of 2 nodes involved.  [id number], stage 1  ER 91%.  IL 96%.  HER-2 negative.  Ki-67 4.7% (favorable).  (Initial needle biopsy 9/16/2019: Low-grade invasive ductal carcinoma, grade 1.  5 mm in greatest dimension.)  (Initial mammogram measured the lesion at 5 mm)  From the information we currently know about her cancer, I do not recommend chemotherapy.  I do recommend hormonal therapy for 5 years.  Hopefully, aromatase inhibitors if DEXA scan allows (with portal vein thrombosis would like to avoid tamoxifen).  Oncotype DX: Recurrence score 3, 3% distant recurrence risk at 9 years if tamoxifen or AI is used.  Therefore, do not recommend chemotherapy.  Adjuvant XRT completed 2/12/2020  Arimidex 2/26/2020- planned 2/26/2025  Arimidex stopped 4/29/2021 due to concern for possible drug-induced liver injury.  Might try to resume hormonal therapy with Aromasin at some point but for now  remain off hormonal therapy  9/29/2021: Remain off Arimidex and anticoagulation for now as this was a low risk breast cancer and these might be the cause of her liver dysfunction.  She has not yet seen the hepatologist, Dr. Adilson Hunt.  She sees him tomorrow.  I told her unless Dr. Hunt thinks there is very little chance Arimidex or Xarelto caused her liver dysfunction, we would likely remain off Arimidex Xarelto.  5/16/2022: Discussed we could try tamoxifen with Pradaxa since Pradaxa has a different mechanism of action from Xarelto and tamoxifen has a different mechanism of action from Arimidex.  However, considering her liver disease may have been related to either Arimidex or Xarelto and breast cancer was low risk, I think it is in her best interest to not take any more adjuvant therapy for breast cancer.  Patient very much agrees.  We plan no more adjuvant therapy of breast cancer.  Screening mammogram November 2023 benign.  No clinical evidence of recurrence.    *Mild hand joint achiness since beginning Arimidex.    Remains off Arimidex    *Bone health.  DEXA 12/2/2019: Normal.  T score lumbar spine 0.1, T score left hip 0.1, T score right hip 0.6.  She takes 2-4 times daily.  Therefore, did not begin a calcium supplement when a rheumatic started on 12/9/2019.  She was encouraged to take a PPI on a schedule and follow-up with her PCP.  States she has had several EGDs in the past none of which have shown problems.  (States in the past her PCP told her to stop calcium supplements because she developed hypercalcemia due to the combination of calcium supplements and Tums).     *Portal vein thrombosis (right branch of portal vein).  No evidence of cirrhosis on MRI or ultrasound.  Dr. Berna Gonzalez, GI note, has no mention of cirrhosis.  Hormonal therapy for breast cancer has a slight increased risk of thrombosis.  Therefore, I think it is in her best interest to undergo anticoagulation until she completes hormonal  therapy.  Generally, Lovenox or Coumadin are the preferred agents.  However, because of significant elevations in INR, and the desire for an oral agent, changed to Xarelto on 2/19/2020.   Previous plan was to remain on therapeutic dose Xarelto until completes Arimidex.   4/29/2021, Xarelto (along with Arimidex) stopped due to concern for possible drug-induced liver injury.  No evidence of recurrent clotting    *Risk factors for portal vein thrombosis:  Unremarkable: Prothrombin gene mutation, factor V Leiden gene mutation, lupus anticoagulant, beta-2 glycoprotein antibodies, anti-thrombin, protein S free.    *Low protein S activity, 14% (normal range %), on 11/13/2019     *Low protein C activity, 44% (normal range %) on 11/13/2019.    *Indeterminant anticardiolipin  IgM 14 (indeterminate range 13-20) on 11/13/2019.  IgA and IgG negative.    *Prior leukocytosis, likely due to C. difficile colitis.  WBC 4.5 from 4, from 5.2, from 3.3, from 4.4, from 3.4, from 4.2     *Anemia.  Previously felt to likely due to C. difficile colitis.  10/31/2022: Hb 11.8 on 10/31/2022 (lower than usual, prompting additional labs), from 12.3.  Unremarkable: Iron labs, folate, B12  1/23/2023: Hb back up to 12.4  8/14/2023: Hb 10.  Hb is lower than usual.  I suspect this is due to several episodes of pneumonia since March 2023 including the last episode just 2 weeks ago.  However, check anemia labs today.  8/14/2023, unremarkable: Iron labs, B12, serum folate, haptoglobin, LDH, bilirubin, creatinine  11/20/2023: Hb 9.3.  Still having recurrent pneumonia which is believed to be the reason for further drop in Hb  12/20/2023: Hb up to 11.4. unremarkable: Iron labs, B12, RBC folate, copper, zinc, SPEP, SIFE, creatinine, bilirubin, serum free light chain ratio  3/25/2024: Hb 11.4, stable  6/17/2024: Hb 12.1    *Thrombocytopenia, thought to be due to cirrhosis  PLT down to 103 on 10/31/2022, prompting additional labs and closer  follow-up   on 8/14/2023, from 99 on 1/23/2023  PLT 84 on 3/25/2024, from 120 on 12/20/2023, from 107 on 11/20/2023.  6/17/2024 PLT 96     *Prominent nodes within the hepatogastric ligament and around the celiac axis and in the retroperitoneum on MRI abdomen 11/11/2019.  Etiology uncertain.  Radiologist recommended follow-up  CT 2/19/2020, from 12/13/2019: Abdominal and pelvic and cardiophrenic LAD unchanged.  CT 8/14/2020: Overall improvement with interval decrease in right cardiophrenic node, size of spleen, and degree of wall thickening and surrounding fat stranding in the colon.  Persistent abdominal pelvic and lower thoracic LAD, indeterminant etiology.    Radiologist recommended continued follow-up.  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  I explained if this were the case it would not be expected to be curable and we would undergo observation instead of treatment regardless.  We would consider treatment if she develops a problem.  Therefore, I think further observation is very reasonable.  (The LAD might be due to her previous problems with extensive colitis and the mild splenomegaly might be due to the right hepatic lobe atrophy).  CT 2/17/2021: Unchanged enlarged nodes, from 8/14/2020.  CT 11/3/2021: No mention of LAD.  Stable.  Plan to complete 2 years CT follow-up sometime around November 2021, and if stable, then no more CT follow-up  With no change on CT, stop CT follow-up.    *C. difficile colitis during November 2019 hospitalization.  Thought to perhaps be related to receiving 1 dose of antibiotics for breast surgery.  She was treated with Flagyl and oral vancomycin     *Extensive colitis again seen on CT 2/19/2020, unchanged from 11/10/2019.  Patient reports she had some recent diarrhea but it became better and is more solid and it does not seem similar to the diarrhea she had back in November during the diagnosis of C. difficile.  On  2/24/2020 she was told to contact her PCP if the diarrhea worsens.  In the meantime, we referred her back to Dr. Cano of ID and Dr. Berna Gonzalez of GI (she saw both during the C. difficile hospitalization November 2019).  Dr. Cano appointment on 3/4/2020  Dr. Berna Gonzalez colonoscopy 6/26/2020: Decreased mucosa vascular pattern in the entire examined colon, biopsied.  Biopsy showed no evidence of malignancy.  It did reveal prominent lymphoid aggregates.    *Chronic hepatitis with periportal and bridging fibrosis and architectural distortion (stage III fibrosis), per liver biopsy, 4/21/2021.  Pathologist stated overall findings worrisome for autoimmune hepatitis, but a drug reaction could not entirely be excluded.  Findings worrisome for early cirrhosis.  Patient states Dr. Berna Gonzalez has told her she has autoimmune hepatitis and primary biliary cholangitis  Patient states she had a previous bile duct injury at the time of cholecystectomy, previously requiring a stent and now has narrowing of her bile ducts.  She states she was told this is contributing to her elevated transaminases.  2/19/2020: LFTs normal  2/26/2020: Arimidex and Xarelto started  8/24/2020: LFTs increased  4/29/2021: Arimidex and Xarelto stopped  5/14/2021: Prednisone 20 mg daily and ursodiol started by Dr. Berna Gonzalez, GI  7/6/2021: LFTs normalized.  8/13/2021: Next Dr. Berna Gonzalez appointment  9/30/2021: Appointment Dr. Adilson Hunt, liver specialist at [facility], for consultation  It is difficult to know what caused her liver dysfunction.  LFTs have normalized with: Stopping Arimidex/Xarelto, starting prednisone 20 mg daily, and starting ursodiol.  Her breast cancer is low risk, grade 1, 5 mm, stage I, Ki-67 4.7%.  Oncotype DX recurrence score only 3.  Therefore, at least for now I think it is in her best interest to remain off hormonal therapy in case Arimidex was part of the reason for the liver injury.  If we wanted to try hormonal therapy in  the future, we could consider Aromasin and consider prophylactic dose Eliquis (we were previously using therapeutic dose Xarelto with Arimidex due to portal vein thrombosis and a slight increased risk of clotting from an AI).  However, at least for now I would probably favor remaining off hormonal therapy  Recent evaluation with Dr. Hunt on 6/6/2024, this was scheduled after having a recent CT at [facility] in patient was told she had a 3.4 cm lesion on the right side of her liver.  She underwent MRI on 5/7/2024 with and without contrast that showed no lesions.  He recommended proceeding with CT abdomen with and without contrast for liver mass protocol as some HCC can present on CT and on MRI.  CT liver is scheduled for 7/15/2024, she will follow with Dr. Hunt once results are available to determine further plan of care.     *Elevated AFP, on labs drawn through Dr. Adilson Hunt.  CT 11/3/2021: No liver mass.  Patient states Dr. Hunt plans MRI abdomen and short-term follow-up with him.  She is continuing to follow up with Dr. Hunt regularly.    *4 or 5 bouts of pneumonia March 2023-July 2023 8/14/2023: States she is following with pulmonology and has an upcoming consultation with ID.  States she has already had 2 bronchoscopies revealing MRSA pneumonia  Chest 3/6/2024 per pulmonary, planning 6-month follow-up.  Continues follow-up with pulmonary and ID.    Plan   Will send message to Dr. Rodgers to discuss moving up follow-up due to recent imaging showing questionable liver mass.  She is undergoing CT liver on 7/15/2024 per Dr. Hunt ([facility] hepatology).  Screening mammogram due November 2024, sees Dr. Ramos.  MD CBC CMP IPF 3 months (September).  (Seeing her more often than her previous 6-month follow-up due to intermittent drops in hemoglobin).  11/20/2023: Hb dropped to 9.3 but states she continues to have recurrent pneumonia.  Suspect further drop in Hb is due to multiple bouts of recurrent pneumonia.  However, I told  her if she begins to need recurrent transfusions and may be a good idea to do a bone marrow biopsy.  She is agreeable to this.    12/20/2023: Hb up to 11.2.  Stretch out to 3-month follow-up from the 1 month follow-up from 11/20/2023    Prior plan was::  MD CBC CMP IPF 6 months  Maintains follow-up with Dr. Adilson Hunt, [facility] liver specialist  Remain off AI and anticoagulation  Mammogram 11/21/2023: BI-RADS 2  No more CT scans.    Send copy this to  Dr. Adilson Hunt, [facility] liver specialist    Reviewed notes from Dr. Hunt and Dr. Vicente.    I spent 36 minutes caring for Taylor on this date of service. This time includes time spent by me in the following activities: preparing for the visit, reviewing tests, obtaining and/or reviewing a separately obtained history, performing a medically appropriate examination and/or evaluation, counseling and educating the patient/family/caregiver, documenting information in the medical record, and care coordination.

## 2024-06-17 ENCOUNTER — OFFICE VISIT (OUTPATIENT)
Dept: ONCOLOGY | Facility: CLINIC | Age: 69
End: 2024-06-17
Payer: MEDICARE

## 2024-06-17 ENCOUNTER — LAB (OUTPATIENT)
Dept: LAB | Facility: HOSPITAL | Age: 69
End: 2024-06-17
Payer: MEDICARE

## 2024-06-17 VITALS
RESPIRATION RATE: 19 BRPM | OXYGEN SATURATION: 95 % | HEART RATE: 75 BPM | SYSTOLIC BLOOD PRESSURE: 108 MMHG | WEIGHT: 151.3 LBS | BODY MASS INDEX: 26.81 KG/M2 | TEMPERATURE: 98.1 F | DIASTOLIC BLOOD PRESSURE: 52 MMHG

## 2024-06-17 DIAGNOSIS — C50.211 MALIGNANT NEOPLASM OF UPPER-INNER QUADRANT OF RIGHT BREAST IN FEMALE, ESTROGEN RECEPTOR POSITIVE: Primary | ICD-10-CM

## 2024-06-17 DIAGNOSIS — C50.211 MALIGNANT NEOPLASM OF UPPER-INNER QUADRANT OF RIGHT BREAST IN FEMALE, ESTROGEN RECEPTOR POSITIVE: ICD-10-CM

## 2024-06-17 DIAGNOSIS — Z17.0 MALIGNANT NEOPLASM OF UPPER-INNER QUADRANT OF RIGHT BREAST IN FEMALE, ESTROGEN RECEPTOR POSITIVE: ICD-10-CM

## 2024-06-17 DIAGNOSIS — Z17.0 MALIGNANT NEOPLASM OF UPPER-INNER QUADRANT OF RIGHT BREAST IN FEMALE, ESTROGEN RECEPTOR POSITIVE: Primary | ICD-10-CM

## 2024-06-17 LAB
ALBUMIN SERPL-MCNC: 3.5 G/DL (ref 3.5–5.2)
ALBUMIN/GLOB SERPL: 0.9 G/DL
ALP SERPL-CCNC: 93 U/L (ref 39–117)
ALT SERPL W P-5'-P-CCNC: <5 U/L (ref 1–33)
ANION GAP SERPL CALCULATED.3IONS-SCNC: 7.7 MMOL/L (ref 5–15)
AST SERPL-CCNC: 27 U/L (ref 1–32)
BASOPHILS # BLD AUTO: 0.02 10*3/MM3 (ref 0–0.2)
BASOPHILS NFR BLD AUTO: 0.4 % (ref 0–1.5)
BILIRUB SERPL-MCNC: 0.4 MG/DL (ref 0–1.2)
BUN SERPL-MCNC: 22 MG/DL (ref 8–23)
BUN/CREAT SERPL: 23.2 (ref 7–25)
CALCIUM SPEC-SCNC: 8.7 MG/DL (ref 8.6–10.5)
CHLORIDE SERPL-SCNC: 103 MMOL/L (ref 98–107)
CO2 SERPL-SCNC: 26.3 MMOL/L (ref 22–29)
CREAT SERPL-MCNC: 0.95 MG/DL (ref 0.57–1)
DEPRECATED RDW RBC AUTO: 45.4 FL (ref 37–54)
EGFRCR SERPLBLD CKD-EPI 2021: 65.4 ML/MIN/1.73
EOSINOPHIL # BLD AUTO: 0.13 10*3/MM3 (ref 0–0.4)
EOSINOPHIL NFR BLD AUTO: 2.9 % (ref 0.3–6.2)
ERYTHROCYTE [DISTWIDTH] IN BLOOD BY AUTOMATED COUNT: 12.7 % (ref 12.3–15.4)
GLOBULIN UR ELPH-MCNC: 4.1 GM/DL
GLUCOSE SERPL-MCNC: 110 MG/DL (ref 65–99)
HCT VFR BLD AUTO: 35.8 % (ref 34–46.6)
HGB BLD-MCNC: 12.1 G/DL (ref 12–15.9)
IMM GRANULOCYTES # BLD AUTO: 0 10*3/MM3 (ref 0–0.05)
IMM GRANULOCYTES NFR BLD AUTO: 0 % (ref 0–0.5)
LYMPHOCYTES # BLD AUTO: 1.61 10*3/MM3 (ref 0.7–3.1)
LYMPHOCYTES NFR BLD AUTO: 35.8 % (ref 19.6–45.3)
MCH RBC QN AUTO: 33.2 PG (ref 26.6–33)
MCHC RBC AUTO-ENTMCNC: 33.8 G/DL (ref 31.5–35.7)
MCV RBC AUTO: 98.1 FL (ref 79–97)
MONOCYTES # BLD AUTO: 0.44 10*3/MM3 (ref 0.1–0.9)
MONOCYTES NFR BLD AUTO: 9.8 % (ref 5–12)
NEUTROPHILS NFR BLD AUTO: 2.3 10*3/MM3 (ref 1.7–7)
NEUTROPHILS NFR BLD AUTO: 51.1 % (ref 42.7–76)
NRBC BLD AUTO-RTO: 0 /100 WBC (ref 0–0.2)
PLATELET # BLD AUTO: 96 10*3/MM3 (ref 140–450)
PLATELETS.RETICULATED NFR BLD AUTO: 3.2 % (ref 0.9–6.5)
PMV BLD AUTO: 10.1 FL (ref 6–12)
POTASSIUM SERPL-SCNC: 4.3 MMOL/L (ref 3.5–5.2)
PROT SERPL-MCNC: 7.6 G/DL (ref 6–8.5)
RBC # BLD AUTO: 3.65 10*6/MM3 (ref 3.77–5.28)
SODIUM SERPL-SCNC: 137 MMOL/L (ref 136–145)
WBC NRBC COR # BLD AUTO: 4.5 10*3/MM3 (ref 3.4–10.8)

## 2024-06-17 PROCEDURE — 1160F RVW MEDS BY RX/DR IN RCRD: CPT | Performed by: NURSE PRACTITIONER

## 2024-06-17 PROCEDURE — 99214 OFFICE O/P EST MOD 30 MIN: CPT | Performed by: NURSE PRACTITIONER

## 2024-06-17 PROCEDURE — 1126F AMNT PAIN NOTED NONE PRSNT: CPT | Performed by: NURSE PRACTITIONER

## 2024-06-17 PROCEDURE — 80053 COMPREHEN METABOLIC PANEL: CPT

## 2024-06-17 PROCEDURE — 1159F MED LIST DOCD IN RCRD: CPT | Performed by: NURSE PRACTITIONER

## 2024-06-17 PROCEDURE — 85025 COMPLETE CBC W/AUTO DIFF WBC: CPT

## 2024-06-17 PROCEDURE — 36415 COLL VENOUS BLD VENIPUNCTURE: CPT

## 2024-06-17 PROCEDURE — 85055 RETICULATED PLATELET ASSAY: CPT

## 2024-06-28 ENCOUNTER — DOCUMENTATION (OUTPATIENT)
Dept: ONCOLOGY | Facility: CLINIC | Age: 69
End: 2024-06-28
Payer: MEDICARE

## 2024-06-28 NOTE — PROGRESS NOTES
"Okay to defer followup of the questionable liver mass seen on ct to dr. Hunt, her liver specialist who has ordered a liver mri.  She can keep the alreadiy scheduled appt with me in September.  Of course if something new arises and dr. Hunt asks her to see me earlier we can arrange at that time.    Thx    To scheduling: please call the upcoming appt with me a 2 UNIT (can leave in same time slot but call it a 2 unit and put on the scheuling line: \"mri results\"    ===View-only below this line===  ----- Message -----  From: Megha Farrell APRN  Sent: 6/17/2024   3:07 PM EDT  To: Aguilar Rodgers II, MD    I saw this patient today for 3-month follow-up with [facility] hepatology.  She was seen by him on 6/6/2024 after having imaging at [place] that showed questionable liver mass.  He has ordered CT liver protocol that will be completed 7/15/2024.  You are scheduled to see her in September, was wondering if you wanted to see her late August instead to review findings on this imaging?  Or since she is following with Dr. Hunt, keep follow-up as scheduled?    Thanks!  "

## 2024-09-10 ENCOUNTER — TELEPHONE (OUTPATIENT)
Dept: ONCOLOGY | Facility: CLINIC | Age: 69
End: 2024-09-10
Payer: MEDICARE

## 2024-09-10 NOTE — TELEPHONE ENCOUNTER
----- Message from Adama ZABALA sent at 9/10/2024  2:58 PM EDT -----  Pt came to the wrong location, and needs to get back in to see

## 2024-09-25 ENCOUNTER — LAB (OUTPATIENT)
Dept: LAB | Facility: HOSPITAL | Age: 69
End: 2024-09-25
Payer: MEDICARE

## 2024-09-25 ENCOUNTER — OFFICE VISIT (OUTPATIENT)
Dept: ONCOLOGY | Facility: CLINIC | Age: 69
End: 2024-09-25
Payer: MEDICARE

## 2024-09-25 VITALS
RESPIRATION RATE: 16 BRPM | WEIGHT: 154.2 LBS | TEMPERATURE: 98 F | SYSTOLIC BLOOD PRESSURE: 118 MMHG | HEART RATE: 77 BPM | BODY MASS INDEX: 27.32 KG/M2 | OXYGEN SATURATION: 96 % | HEIGHT: 63 IN | DIASTOLIC BLOOD PRESSURE: 70 MMHG

## 2024-09-25 DIAGNOSIS — Z17.0 MALIGNANT NEOPLASM OF UPPER-INNER QUADRANT OF RIGHT BREAST IN FEMALE, ESTROGEN RECEPTOR POSITIVE: ICD-10-CM

## 2024-09-25 DIAGNOSIS — C50.211 MALIGNANT NEOPLASM OF UPPER-INNER QUADRANT OF RIGHT BREAST IN FEMALE, ESTROGEN RECEPTOR POSITIVE: ICD-10-CM

## 2024-09-25 DIAGNOSIS — Z17.0 MALIGNANT NEOPLASM OF UPPER-INNER QUADRANT OF RIGHT BREAST IN FEMALE, ESTROGEN RECEPTOR POSITIVE: Primary | ICD-10-CM

## 2024-09-25 DIAGNOSIS — C50.211 MALIGNANT NEOPLASM OF UPPER-INNER QUADRANT OF RIGHT BREAST IN FEMALE, ESTROGEN RECEPTOR POSITIVE: Primary | ICD-10-CM

## 2024-09-25 DIAGNOSIS — D69.6 THROMBOCYTOPENIA: ICD-10-CM

## 2024-09-25 DIAGNOSIS — N64.4 BREAST PAIN, RIGHT: ICD-10-CM

## 2024-09-25 LAB
ALBUMIN SERPL-MCNC: 3.3 G/DL (ref 3.5–5.2)
ALBUMIN/GLOB SERPL: 0.9 G/DL
ALP SERPL-CCNC: 90 U/L (ref 39–117)
ALT SERPL W P-5'-P-CCNC: 5 U/L (ref 1–33)
ANION GAP SERPL CALCULATED.3IONS-SCNC: 8.8 MMOL/L (ref 5–15)
AST SERPL-CCNC: 23 U/L (ref 1–32)
BASOPHILS # BLD AUTO: 0.02 10*3/MM3 (ref 0–0.2)
BASOPHILS NFR BLD AUTO: 0.4 % (ref 0–1.5)
BILIRUB SERPL-MCNC: 0.5 MG/DL (ref 0–1.2)
BUN SERPL-MCNC: 20 MG/DL (ref 8–23)
BUN/CREAT SERPL: 25 (ref 7–25)
CALCIUM SPEC-SCNC: 8.5 MG/DL (ref 8.6–10.5)
CHLORIDE SERPL-SCNC: 103 MMOL/L (ref 98–107)
CO2 SERPL-SCNC: 28.2 MMOL/L (ref 22–29)
CREAT SERPL-MCNC: 0.8 MG/DL (ref 0.57–1)
DEPRECATED RDW RBC AUTO: 48.8 FL (ref 37–54)
EGFRCR SERPLBLD CKD-EPI 2021: 80.4 ML/MIN/1.73
EOSINOPHIL # BLD AUTO: 0.18 10*3/MM3 (ref 0–0.4)
EOSINOPHIL NFR BLD AUTO: 3.8 % (ref 0.3–6.2)
ERYTHROCYTE [DISTWIDTH] IN BLOOD BY AUTOMATED COUNT: 13.7 % (ref 12.3–15.4)
GLOBULIN UR ELPH-MCNC: 3.7 GM/DL
GLUCOSE SERPL-MCNC: 126 MG/DL (ref 65–99)
HCT VFR BLD AUTO: 35.1 % (ref 34–46.6)
HGB BLD-MCNC: 11.9 G/DL (ref 12–15.9)
IMM GRANULOCYTES # BLD AUTO: 0.01 10*3/MM3 (ref 0–0.05)
IMM GRANULOCYTES NFR BLD AUTO: 0.2 % (ref 0–0.5)
LYMPHOCYTES # BLD AUTO: 1.76 10*3/MM3 (ref 0.7–3.1)
LYMPHOCYTES NFR BLD AUTO: 36.8 % (ref 19.6–45.3)
MCH RBC QN AUTO: 32.7 PG (ref 26.6–33)
MCHC RBC AUTO-ENTMCNC: 33.9 G/DL (ref 31.5–35.7)
MCV RBC AUTO: 96.4 FL (ref 79–97)
MONOCYTES # BLD AUTO: 0.38 10*3/MM3 (ref 0.1–0.9)
MONOCYTES NFR BLD AUTO: 7.9 % (ref 5–12)
NEUTROPHILS NFR BLD AUTO: 2.43 10*3/MM3 (ref 1.7–7)
NEUTROPHILS NFR BLD AUTO: 50.9 % (ref 42.7–76)
NRBC BLD AUTO-RTO: 0 /100 WBC (ref 0–0.2)
PLATELET # BLD AUTO: 97 10*3/MM3 (ref 140–450)
PLATELETS.RETICULATED NFR BLD AUTO: 2.6 % (ref 0.9–6.5)
PMV BLD AUTO: 10 FL (ref 6–12)
POTASSIUM SERPL-SCNC: 3.7 MMOL/L (ref 3.5–5.2)
PROT SERPL-MCNC: 7 G/DL (ref 6–8.5)
RBC # BLD AUTO: 3.64 10*6/MM3 (ref 3.77–5.28)
SODIUM SERPL-SCNC: 140 MMOL/L (ref 136–145)
WBC NRBC COR # BLD AUTO: 4.78 10*3/MM3 (ref 3.4–10.8)

## 2024-09-25 PROCEDURE — 85055 RETICULATED PLATELET ASSAY: CPT

## 2024-09-25 PROCEDURE — 85025 COMPLETE CBC W/AUTO DIFF WBC: CPT

## 2024-09-25 PROCEDURE — 36415 COLL VENOUS BLD VENIPUNCTURE: CPT

## 2024-09-25 PROCEDURE — 80053 COMPREHEN METABOLIC PANEL: CPT

## 2024-09-25 RX ORDER — DOXYCYCLINE 100 MG/1
100 TABLET ORAL
COMMUNITY
Start: 2024-08-20 | End: 2024-12-18

## 2024-09-26 ENCOUNTER — DOCUMENTATION (OUTPATIENT)
Dept: ONCOLOGY | Facility: CLINIC | Age: 69
End: 2024-09-26
Payer: MEDICARE

## 2024-09-26 ENCOUNTER — HOSPITAL ENCOUNTER (OUTPATIENT)
Dept: MAMMOGRAPHY | Facility: HOSPITAL | Age: 69
Discharge: HOME | End: 2024-09-26
Payer: MEDICARE

## 2024-09-26 ENCOUNTER — TELEPHONE (OUTPATIENT)
Dept: MAMMOGRAPHY | Facility: HOSPITAL | Age: 69
End: 2024-09-26
Payer: MEDICARE

## 2024-09-26 ENCOUNTER — HOSPITAL ENCOUNTER (OUTPATIENT)
Dept: ULTRASOUND IMAGING | Facility: HOSPITAL | Age: 69
Discharge: HOME | End: 2024-09-26
Payer: MEDICARE

## 2024-09-26 DIAGNOSIS — R92.8 ABNORMAL ULTRASOUND OF BREAST: ICD-10-CM

## 2024-09-26 DIAGNOSIS — Z17.0 MALIGNANT NEOPLASM OF UPPER-INNER QUADRANT OF RIGHT BREAST IN FEMALE, ESTROGEN RECEPTOR POSITIVE: ICD-10-CM

## 2024-09-26 DIAGNOSIS — C50.211 MALIGNANT NEOPLASM OF UPPER-INNER QUADRANT OF RIGHT BREAST IN FEMALE, ESTROGEN RECEPTOR POSITIVE: ICD-10-CM

## 2024-09-26 DIAGNOSIS — N64.4 BREAST PAIN, RIGHT: ICD-10-CM

## 2024-09-26 DIAGNOSIS — N64.4 BREAST PAIN, RIGHT: Primary | ICD-10-CM

## 2024-09-26 PROCEDURE — 77066 DX MAMMO INCL CAD BI: CPT

## 2024-09-26 PROCEDURE — G0279 TOMOSYNTHESIS, MAMMO: HCPCS

## 2024-09-26 PROCEDURE — 76642 ULTRASOUND BREAST LIMITED: CPT

## 2024-09-30 NOTE — PROGRESS NOTES
10/04/24 0001   Pre-Procedure Phone Call   Procedure Time Verified Yes   Arrival Time 1330   Procedure Location Verified Yes   Medical History Reviewed Yes   NPO Status Reinforced No   Ride and Caregiver Arranged No   Patient Knows to Bring Current Medications No   Bring Outside Films Requested No

## 2024-10-04 ENCOUNTER — HOSPITAL ENCOUNTER (OUTPATIENT)
Dept: ULTRASOUND IMAGING | Facility: HOSPITAL | Age: 69
Discharge: HOME | End: 2024-10-04
Payer: MEDICARE

## 2024-10-07 ENCOUNTER — HOSPITAL ENCOUNTER (OUTPATIENT)
Dept: ULTRASOUND IMAGING | Facility: HOSPITAL | Age: 69
Discharge: HOME | End: 2024-10-07
Payer: MEDICARE

## 2024-10-07 ENCOUNTER — HOSPITAL ENCOUNTER (OUTPATIENT)
Dept: MAMMOGRAPHY | Facility: HOSPITAL | Age: 69
Discharge: HOME | End: 2024-10-07
Payer: MEDICARE

## 2024-10-07 VITALS
HEIGHT: 63 IN | BODY MASS INDEX: 27.29 KG/M2 | WEIGHT: 154 LBS | OXYGEN SATURATION: 98 % | HEART RATE: 74 BPM | DIASTOLIC BLOOD PRESSURE: 72 MMHG | SYSTOLIC BLOOD PRESSURE: 127 MMHG | TEMPERATURE: 98 F | RESPIRATION RATE: 16 BRPM

## 2024-10-07 DIAGNOSIS — Z98.890 STATUS POST BIOPSY: ICD-10-CM

## 2024-10-07 DIAGNOSIS — N64.4 BREAST PAIN, RIGHT: ICD-10-CM

## 2024-10-07 DIAGNOSIS — R92.8 ABNORMAL ULTRASOUND OF BREAST: ICD-10-CM

## 2024-10-07 PROCEDURE — 88305 TISSUE EXAM BY PATHOLOGIST: CPT | Performed by: NURSE PRACTITIONER

## 2024-10-07 PROCEDURE — 88342 IMHCHEM/IMCYTCHM 1ST ANTB: CPT | Performed by: NURSE PRACTITIONER

## 2024-10-07 PROCEDURE — 25010000002 LIDOCAINE 1 % SOLUTION: Performed by: RADIOLOGY

## 2024-10-07 PROCEDURE — A4648 IMPLANTABLE TISSUE MARKER: HCPCS

## 2024-10-07 PROCEDURE — 77065 DX MAMMO INCL CAD UNI: CPT

## 2024-10-07 PROCEDURE — 25010000002 LIDOCAINE 1% - EPINEPHRINE 1:100000 1 %-1:100000 SOLUTION: Performed by: RADIOLOGY

## 2024-10-07 RX ORDER — LIDOCAINE HYDROCHLORIDE 10 MG/ML
10 INJECTION, SOLUTION INFILTRATION; PERINEURAL ONCE
Status: COMPLETED | OUTPATIENT
Start: 2024-10-07 | End: 2024-10-07

## 2024-10-07 RX ORDER — LIDOCAINE HYDROCHLORIDE AND EPINEPHRINE 10; 10 MG/ML; UG/ML
10 INJECTION, SOLUTION INFILTRATION; PERINEURAL ONCE
Status: COMPLETED | OUTPATIENT
Start: 2024-10-07 | End: 2024-10-07

## 2024-10-07 RX ADMIN — LIDOCAINE HYDROCHLORIDE 2 ML: 10 INJECTION, SOLUTION INFILTRATION; PERINEURAL at 09:46

## 2024-10-07 RX ADMIN — Medication 10 ML: at 09:47

## 2024-10-07 NOTE — NURSING NOTE
Biopsy site to right slightly outer mid breast clear with Exofin dry and intact. No firmness or swelling noted at or around biopsy site. Small amount of bright red blood dried in with Exofin. Denies pain. Ice pack with protective covering applied to biopsy site. Discharge instructions discussed with understanding voiced by patient. Copies provided to patient. No distress noted. To home via private vehicle.

## 2024-10-09 ENCOUNTER — TELEPHONE (OUTPATIENT)
Dept: ONCOLOGY | Facility: CLINIC | Age: 69
End: 2024-10-09
Payer: MEDICARE

## 2024-10-09 ENCOUNTER — DOCUMENTATION (OUTPATIENT)
Dept: ONCOLOGY | Facility: CLINIC | Age: 69
End: 2024-10-09
Payer: MEDICARE

## 2024-10-09 LAB
CYTO UR: NORMAL
LAB AP CASE REPORT: NORMAL
LAB AP CLINICAL INFORMATION: NORMAL
LAB AP SPECIAL STAINS: NORMAL
PATH REPORT.FINAL DX SPEC: NORMAL
PATH REPORT.GROSS SPEC: NORMAL

## 2024-10-09 NOTE — PROGRESS NOTES
Yes please call and let her know.  I think the radiologist who reads the mammogram or ultrasound that recommends a breast biopsy usually follows up on the breast biopsy pathology and then puts an addendum to recommend subsequent breast imaging.  Please watch for this.  If they do not do this then I would say plan next breast imaging in 6 months but if they do this and recommend 12-month follow-up breast imaging I would defer to them.  ===View-only below this line===  ----- Message -----  From: Cassie Núñez RN  Sent: 10/9/2024   8:52 AM EDT  To: MD Dr. Vishal Soto II,     Patients pathology from her breast biopsy came back. It is:    1.  Right breast, 12:00, ultrasound-guided biopsies ([place] clip):   -Fat necrosis.               -No atypical hyperplasia, in situ nor invasive carcinoma identified by routine and immunohistochemical staining.    Are you okay with me calling her to make her aware?    Thank you  ----- Message -----  From: Aguilar Rodgers II, MD  Sent: 9/26/2024   3:00 PM EDT  To: JUANITO Zuleta,    Mammogram recommended breast biopsy which has already been scheduled and discussed with patient by radiology.    Please keep an eye on her chart.  Just to make sure if malignancy is found it is appropriately acted upon.    Thanks!

## 2024-10-09 NOTE — TELEPHONE ENCOUNTER
Called the patient to let her know the biopsy showed fat necrosis and no malignancy. Patient read back what I explained and then stated she was having discomfort in that area. I explained that we dont normally see this causing pain, but that it could. She sees Dr. Lorenzo at [facility] first. I let her know I would fax the biopsy report to have him review it and see what interventions he would recommend. She v/u.

## 2024-10-09 NOTE — TELEPHONE ENCOUNTER
----- Message from Aguilar Rodgers sent at 10/9/2024 11:00 AM EDT -----  Yes please call and let her know.  I think the radiologist who reads the mammogram or ultrasound that recommends a breast biopsy usually follows up on the breast biopsy pathology and then puts an addendum to recommend subsequent breast imaging.  Please watch for this.  If they do not do this then I would say plan next breast imaging in 6 months but if they do this and recommend 12-month follow-up breast imaging I would defer to them.  ----- Message -----  From: Cassie Núñez, RN  Sent: 10/9/2024   8:52 AM EDT  To: MD Dr. Vishal Soto II,     Patients pathology from her breast biopsy came back. It is:    1.  Right breast, 12:00, ultrasound-guided biopsies ([place] clip):   -Fat necrosis.               -No atypical hyperplasia, in situ nor invasive carcinoma identified by routine and immunohistochemical staining.    Are you okay with me calling her to make her aware?    Thank you  ----- Message -----  From: Aguilar Rodgers II, MD  Sent: 9/26/2024   3:00 PM EDT  To: Cassie Núñez, JUANITO Matthews,    Mammogram recommended breast biopsy which has already been scheduled and discussed with patient by radiology.    Please keep an eye on her chart.  Just to make sure if malignancy is found it is appropriately acted upon.    Thanks!

## 2024-10-10 DIAGNOSIS — Z12.31 VISIT FOR SCREENING MAMMOGRAM: Primary | ICD-10-CM

## 2025-01-13 ENCOUNTER — TELEPHONE (OUTPATIENT)
Dept: OBSTETRICS AND GYNECOLOGY | Facility: CLINIC | Age: 70
End: 2025-01-13

## 2025-01-13 NOTE — TELEPHONE ENCOUNTER
Caller: Taylor Larsen    Relationship:  Self    Best call back number: [phone] (home)     PATIENT CALLED REQUESTING TO CANCEL SAME DAY APPT.    Did the patient call AFTER the start time of their scheduled appointment?  []YES  [x]NO    Was the patient's appointment rescheduled? [x]YES  []NO    Any additional information: PT UNABLE TO GET OFF OF WORK

## 2025-03-03 ENCOUNTER — OFFICE VISIT (OUTPATIENT)
Dept: OBSTETRICS AND GYNECOLOGY | Facility: CLINIC | Age: 70
End: 2025-03-03
Payer: MEDICARE

## 2025-03-03 VITALS
HEIGHT: 62 IN | DIASTOLIC BLOOD PRESSURE: 73 MMHG | SYSTOLIC BLOOD PRESSURE: 119 MMHG | BODY MASS INDEX: 28.16 KG/M2 | WEIGHT: 153 LBS

## 2025-03-03 DIAGNOSIS — Z85.3 PERSONAL HISTORY OF BREAST CANCER: ICD-10-CM

## 2025-03-03 DIAGNOSIS — Z12.11 COLON CANCER SCREENING: ICD-10-CM

## 2025-03-03 DIAGNOSIS — Z01.419 VISIT FOR GYNECOLOGIC EXAMINATION: Primary | ICD-10-CM

## 2025-03-03 RX ORDER — MUPIROCIN 20 MG/G
OINTMENT TOPICAL
COMMUNITY
Start: 2025-02-08

## 2025-03-03 RX ORDER — LEVETIRACETAM 500 MG/1
500 TABLET ORAL
COMMUNITY
Start: 2024-11-13 | End: 2025-11-13

## 2025-03-03 RX ORDER — DOXYCYCLINE 100 MG/1
100 CAPSULE ORAL
COMMUNITY
Start: 2025-02-12

## 2025-03-03 NOTE — PROGRESS NOTES
[facility]  [address], [address]  [place], [place] [address]  Phone: [phone] / Fax:  [phone]      03/03/2025    [facility] [id number]    Bam Umaña MD    Chief Complaint   Patient presents with    Gynecologic Exam     Annual Exam,last pap 7-25-18 NL HPV (-) , Mammogram, 11-4-19 Right breast Lumpectomy. Colonoscopy 6-26-20 NL per patient.  DEXA 9-19-23-NL..       Taylor Larsen is here for annual gynecologic exam.  HPI - Patient with normal pap in 2018 and no longer indicated due to age.  Patient with diagnosis of breast cancer in 2019.  She underwent screening mammogram as well as diagnostic imaging and right biopsy late last year with benign findings.  Her last colonoscopy was 5 years ago and benign appearing.  A 10 year interval to rescreening was recommended.  She had a normal DEXA in 2023.    Past Medical History:   Diagnosis Date    Blood clot of common bile duct of transplanted liver     Breast cancer 09/2019    Right IDC    Clostridium difficile infection 12/2019    GERD (gastroesophageal reflux disease)     PONV (postoperative nausea and vomiting)     Radiation-induced angiosarcoma of breast     Situational stress     DEATH OF BROTHER 10/2019, RECENT DIAGNOSIS    Stress headaches        Past Surgical History:   Procedure Laterality Date    BILE DUCT STENT PLACEMENT  2014    BREAST BIOPSY Right 09/2019    malignant    BREAST LUMPECTOMY Right     BREAST LUMPECTOMY WITH SENTINEL NODE BIOPSY Right 11/04/2019    Procedure: BREAST LUMPECTOMY WITH SENTINEL NODE BIOPSY AND NEEDLE LOCALIZATION And possible axillary dissection;  Surgeon: Jean-Paul Ramos MD;  [place] [facility] [place] [facility] [place]  Service: General    BRONCHOSCOPY      x2    COLONOSCOPY N/A 06/26/2020    Procedure: COLONOSCOPY TO TERMINAL ILEUM WITH BX;  Surgeon: Sage Gonzalez MD;  Location: [facility] ENDOSCOPY;  Service: Gastroenterology;  Laterality: N/A;  PREOP/ SCREENING  POSTOP/ VASCULAR PATTERN TO  "COLON    ESOPHAGOSCOPY / EGD N/A 10/06/2022    KNEE ARTHROSCOPY Left     LAPAROSCOPIC CHOLECYSTECTOMY  2014    TUBAL ABDOMINAL LIGATION         Allergies   Allergen Reactions    Sulfamethoxazole-Trimethoprim Hives       Social History     Socioeconomic History    Marital status:     Number of children: 2   Tobacco Use    Smoking status: Former     Current packs/day: 0.00     Average packs/day: 0.5 packs/day for 10.0 years (5.0 ttl pk-yrs)     Types: Cigarettes     Start date:      Quit date:      Years since quittin.1    Smokeless tobacco: Former   Vaping Use    Vaping status: Never Used   Substance and Sexual Activity    Alcohol use: No    Drug use: No    Sexual activity: Not Currently     Birth control/protection: Post-menopausal, Surgical       Family History   Problem Relation Age of Onset    Heart failure Mother     Alcohol abuse Father     No Known Problems Sister     Diabetes Brother     Prostate cancer Brother     Throat cancer Brother     No Known Problems Brother     No Known Problems Brother     No Known Problems Brother     No Known Problems Daughter     Breast cancer Maternal Aunt     Breast cancer Maternal Cousin     Breast cancer Maternal Cousin     Malig Hyperthermia Neg Hx     Ovarian cancer Neg Hx     Uterine cancer Neg Hx     Colon cancer Neg Hx        No LMP recorded (lmp unknown). Patient is postmenopausal.    OB History          4    Para   2    Term   2       0    AB   0    Living   2         SAB   0    IAB   0    Ectopic   0    Molar        Multiple   0    Live Births   2                Vitals:    25 1447   BP: 119/73   Weight: 69.4 kg (153 lb)   Height: 157.5 cm (62\")       Physical Exam  Constitutional:       Appearance: Normal appearance. She is well-developed.   Genitourinary:      Bladder, rectum and urethral meatus normal.      Right Labia: No tenderness or lesions.     Left Labia: No tenderness or lesions.     No vaginal discharge or tenderness. "        Right Adnexa: not tender and not full.     Left Adnexa: not tender and not full.     No cervical motion tenderness or lesion.      Uterus is not enlarged or tender.      No urethral tenderness or hypermobility present.   Rectum:      No rectal mass or tenderness.   Breasts:     Right: Skin change present. No mass or nipple discharge.      Left: No mass, nipple discharge or skin change.   HENT:      Right Ear: External ear normal.      Left Ear: External ear normal.      Nose: Nose normal.   Eyes:      Conjunctiva/sclera: Conjunctivae normal.   Neck:      Thyroid: No thyromegaly.   Cardiovascular:      Rate and Rhythm: Normal rate and regular rhythm.      Heart sounds: Normal heart sounds.   Pulmonary:      Effort: Pulmonary effort is normal.      Breath sounds: No stridor. No wheezing.   Abdominal:      Palpations: Abdomen is soft.      Tenderness: There is no abdominal tenderness. There is no guarding or rebound.   Musculoskeletal:         General: Normal range of motion.      Cervical back: Normal range of motion and neck supple.   Neurological:      Mental Status: She is alert.      Coordination: Coordination normal.   Skin:     General: Skin is warm and dry.   Psychiatric:         Mood and Affect: Mood normal.         Behavior: Behavior normal.         Thought Content: Thought content normal.         Judgment: Judgment normal.   Vitals reviewed. Exam conducted with a chaperone present.         Diagnoses and all orders for this visit:    1. Visit for gynecologic examination (Primary)        -     Discussed importance of regular screening and breast awareness.        -     Discussed taking Calcium and Vitamin D for osteoporosis prevention.  2. Personal history of breast cancer        -     Screening in September 2025  3. Colon cancer screening  -     POC Occult Blood Stool  -     FOBT negative and screening up to date.        Eduardo Lorenzo MD

## 2025-03-17 ENCOUNTER — OFFICE VISIT (OUTPATIENT)
Dept: ONCOLOGY | Facility: CLINIC | Age: 70
End: 2025-03-17
Payer: MEDICARE

## 2025-03-17 ENCOUNTER — LAB (OUTPATIENT)
Dept: LAB | Facility: HOSPITAL | Age: 70
End: 2025-03-17
Payer: MEDICARE

## 2025-03-17 VITALS
RESPIRATION RATE: 16 BRPM | WEIGHT: 154.5 LBS | TEMPERATURE: 98.1 F | BODY MASS INDEX: 28.43 KG/M2 | DIASTOLIC BLOOD PRESSURE: 55 MMHG | OXYGEN SATURATION: 95 % | HEIGHT: 62 IN | SYSTOLIC BLOOD PRESSURE: 125 MMHG | HEART RATE: 78 BPM

## 2025-03-17 DIAGNOSIS — C50.211 MALIGNANT NEOPLASM OF UPPER-INNER QUADRANT OF RIGHT BREAST IN FEMALE, ESTROGEN RECEPTOR POSITIVE: Primary | ICD-10-CM

## 2025-03-17 DIAGNOSIS — C50.211 MALIGNANT NEOPLASM OF UPPER-INNER QUADRANT OF RIGHT BREAST IN FEMALE, ESTROGEN RECEPTOR POSITIVE: ICD-10-CM

## 2025-03-17 DIAGNOSIS — Z17.0 MALIGNANT NEOPLASM OF UPPER-INNER QUADRANT OF RIGHT BREAST IN FEMALE, ESTROGEN RECEPTOR POSITIVE: Primary | ICD-10-CM

## 2025-03-17 DIAGNOSIS — D69.6 THROMBOCYTOPENIA: ICD-10-CM

## 2025-03-17 DIAGNOSIS — N64.4 BREAST PAIN, RIGHT: ICD-10-CM

## 2025-03-17 DIAGNOSIS — Z17.0 MALIGNANT NEOPLASM OF UPPER-INNER QUADRANT OF RIGHT BREAST IN FEMALE, ESTROGEN RECEPTOR POSITIVE: ICD-10-CM

## 2025-03-17 LAB
ALBUMIN SERPL-MCNC: 3.3 G/DL (ref 3.5–5.2)
ALBUMIN/GLOB SERPL: 0.9 G/DL
ALP SERPL-CCNC: 102 U/L (ref 39–117)
ALT SERPL W P-5'-P-CCNC: 10 U/L (ref 1–33)
ANION GAP SERPL CALCULATED.3IONS-SCNC: 9.4 MMOL/L (ref 5–15)
AST SERPL-CCNC: 23 U/L (ref 1–32)
BASOPHILS # BLD AUTO: 0.05 10*3/MM3 (ref 0–0.2)
BASOPHILS NFR BLD AUTO: 0.9 % (ref 0–1.5)
BILIRUB SERPL-MCNC: 0.5 MG/DL (ref 0–1.2)
BUN SERPL-MCNC: 21 MG/DL (ref 8–23)
BUN/CREAT SERPL: 28 (ref 7–25)
CALCIUM SPEC-SCNC: 9 MG/DL (ref 8.6–10.5)
CHLORIDE SERPL-SCNC: 103 MMOL/L (ref 98–107)
CO2 SERPL-SCNC: 27.6 MMOL/L (ref 22–29)
CREAT SERPL-MCNC: 0.75 MG/DL (ref 0.57–1)
DEPRECATED RDW RBC AUTO: 44.5 FL (ref 37–54)
EGFRCR SERPLBLD CKD-EPI 2021: 86.3 ML/MIN/1.73
EOSINOPHIL # BLD AUTO: 0.26 10*3/MM3 (ref 0–0.4)
EOSINOPHIL NFR BLD AUTO: 4.6 % (ref 0.3–6.2)
ERYTHROCYTE [DISTWIDTH] IN BLOOD BY AUTOMATED COUNT: 12.8 % (ref 12.3–15.4)
GLOBULIN UR ELPH-MCNC: 3.8 GM/DL
GLUCOSE SERPL-MCNC: 98 MG/DL (ref 65–99)
HCT VFR BLD AUTO: 37.3 % (ref 34–46.6)
HGB BLD-MCNC: 12.4 G/DL (ref 12–15.9)
IMM GRANULOCYTES # BLD AUTO: 0.01 10*3/MM3 (ref 0–0.05)
IMM GRANULOCYTES NFR BLD AUTO: 0.2 % (ref 0–0.5)
LYMPHOCYTES # BLD AUTO: 1.66 10*3/MM3 (ref 0.7–3.1)
LYMPHOCYTES NFR BLD AUTO: 29.5 % (ref 19.6–45.3)
MCH RBC QN AUTO: 31.4 PG (ref 26.6–33)
MCHC RBC AUTO-ENTMCNC: 33.2 G/DL (ref 31.5–35.7)
MCV RBC AUTO: 94.4 FL (ref 79–97)
MONOCYTES # BLD AUTO: 0.52 10*3/MM3 (ref 0.1–0.9)
MONOCYTES NFR BLD AUTO: 9.3 % (ref 5–12)
NEUTROPHILS NFR BLD AUTO: 3.12 10*3/MM3 (ref 1.7–7)
NEUTROPHILS NFR BLD AUTO: 55.5 % (ref 42.7–76)
NRBC BLD AUTO-RTO: 0 /100 WBC (ref 0–0.2)
PLATELET # BLD AUTO: 146 10*3/MM3 (ref 140–450)
PLATELETS.RETICULATED NFR BLD AUTO: 3.4 % (ref 0.9–6.5)
PMV BLD AUTO: 10.7 FL (ref 6–12)
POTASSIUM SERPL-SCNC: 3.7 MMOL/L (ref 3.5–5.2)
PROT SERPL-MCNC: 7.1 G/DL (ref 6–8.5)
RBC # BLD AUTO: 3.95 10*6/MM3 (ref 3.77–5.28)
SODIUM SERPL-SCNC: 140 MMOL/L (ref 136–145)
WBC NRBC COR # BLD AUTO: 5.62 10*3/MM3 (ref 3.4–10.8)

## 2025-03-17 PROCEDURE — 85055 RETICULATED PLATELET ASSAY: CPT

## 2025-03-17 PROCEDURE — 85025 COMPLETE CBC W/AUTO DIFF WBC: CPT

## 2025-03-17 PROCEDURE — 80053 COMPREHEN METABOLIC PANEL: CPT

## 2025-03-17 PROCEDURE — 36415 COLL VENOUS BLD VENIPUNCTURE: CPT

## 2025-03-17 NOTE — PROGRESS NOTES
.     REASONS FOR FOLLOWUP: Portal vein thrombosis, breast cancer    HISTORY OF PRESENT ILLNESS:  The patient is a 69 y.o. year old female  who is here for follow-up with the above-mentioned history.    No new problems.  No bleeding    Past Medical History:   Diagnosis Date    Blood clot of common bile duct of transplanted liver     Breast cancer 09/2019    Right IDC    Clostridium difficile infection 12/2019    GERD (gastroesophageal reflux disease)     PONV (postoperative nausea and vomiting)     Radiation-induced angiosarcoma of breast     Situational stress     DEATH OF BROTHER 10/2019, RECENT DIAGNOSIS    Stress headaches      Past Surgical History:   Procedure Laterality Date    BILE DUCT STENT PLACEMENT  2014    BREAST BIOPSY Right 09/2019    malignant    BREAST LUMPECTOMY Right     BREAST LUMPECTOMY WITH SENTINEL NODE BIOPSY Right 11/04/2019    Procedure: BREAST LUMPECTOMY WITH SENTINEL NODE BIOPSY AND NEEDLE LOCALIZATION And possible axillary dissection;  Surgeon: Jean-Paul Ramos MD;  [place] [facility] [place] [facility] [place]  Service: General    BRONCHOSCOPY      x2    COLONOSCOPY N/A 06/26/2020    Procedure: COLONOSCOPY TO TERMINAL ILEUM WITH BX;  Surgeon: Sage Gonzalez MD;  Location: [facility] ENDOSCOPY;  Service: Gastroenterology;  Laterality: N/A;  PREOP/ SCREENING  POSTOP/ VASCULAR PATTERN TO COLON    ESOPHAGOSCOPY / EGD N/A 10/06/2022    KNEE ARTHROSCOPY Left     LAPAROSCOPIC CHOLECYSTECTOMY  2014    TUBAL ABDOMINAL LIGATION         MEDICATIONS    Current Outpatient Medications:     albuterol (ACCUNEB) 1.25 MG/3ML nebulizer solution, Inhale 3 mL 2 (Two) Times a Day., Disp: , Rfl:     azaTHIOprine (IMURAN) 50 MG tablet, Take 2 tablets by mouth Daily., Disp: , Rfl:     carvedilol (COREG) 6.25 MG tablet, 2 (Two) Times a Day With Meals., Disp: , Rfl:     doxycycline (VIBRAMYCIN) 100 MG capsule, Take 1 capsule by mouth., Disp: , Rfl:     famotidine (PEPCID) 40 MG tablet, , Disp: , Rfl:     levETIRAcetam (KEPPRA)  500 MG tablet, Take 1 tablet by mouth., Disp: , Rfl:     melatonin 5 MG tablet tablet, Take 2 tablets by mouth At Night As Needed., Disp: , Rfl:     mupirocin (BACTROBAN) 2 % ointment, , Disp: , Rfl:     Probiotic Product (PROBIOTIC PO), Take 1 tablet by mouth Daily., Disp: , Rfl:     traZODone (DESYREL) 50 MG tablet, , Disp: , Rfl:     ursodiol (ACTIGALL) 500 MG tablet, Take 1 tablet by mouth., Disp: , Rfl:     acetaminophen (TYLENOL) 325 MG tablet, Take 2 tablets by mouth Every 6 (Six) Hours As Needed for Mild Pain. (Patient not taking: Reported on 3/17/2025), Disp: , Rfl:     clonazePAM (KlonoPIN) 0.5 MG tablet, Take  by mouth., Disp: , Rfl:     ALLERGIES:     Allergies   Allergen Reactions    Sulfamethoxazole-Trimethoprim Hives       SOCIAL HISTORY:       Social History     Socioeconomic History    Marital status:     Number of children: 2   Tobacco Use    Smoking status: Former     Current packs/day: 0.00     Average packs/day: 0.5 packs/day for 10.0 years (5.0 ttl pk-yrs)     Types: Cigarettes     Start date:      Quit date:      Years since quittin.2    Smokeless tobacco: Former   Vaping Use    Vaping status: Never Used   Substance and Sexual Activity    Alcohol use: No    Drug use: No    Sexual activity: Not Currently     Birth control/protection: Post-menopausal, Surgical         FAMILY HISTORY:  Family History   Problem Relation Age of Onset    Heart failure Mother     Alcohol abuse Father     No Known Problems Sister     Diabetes Brother     Prostate cancer Brother     Throat cancer Brother     No Known Problems Brother     No Known Problems Brother     No Known Problems Brother     No Known Problems Daughter     Breast cancer Maternal Aunt     Breast cancer Maternal Cousin     Breast cancer Maternal Cousin     Malig Hyperthermia Neg Hx     Ovarian cancer Neg Hx     Uterine cancer Neg Hx     Colon cancer Neg Hx        REVIEW OF SYSTEMS:  Review of Systems   Constitutional:  Negative  "for activity change.   HENT:  Negative for nosebleeds and trouble swallowing.    Respiratory:  Negative for shortness of breath and wheezing.    Cardiovascular:  Negative for chest pain and palpitations.   Gastrointestinal:  Negative for constipation, diarrhea and nausea.   Genitourinary:  Negative for dysuria and hematuria.   Musculoskeletal:  Negative for arthralgias and myalgias.   Skin:  Negative for rash and wound.   Neurological:  Negative for seizures and syncope.   Hematological:  Negative for adenopathy. Does not bruise/bleed easily.   Psychiatric/Behavioral:  Negative for confusion.             Vitals:    03/17/25 1416   BP: 125/55   Pulse: 78   Resp: 16   Temp: 98.1 °F (36.7 °C)   TempSrc: Oral   SpO2: 95%   Weight: 70.1 kg (154 lb 8 oz)   Height: 157.5 cm (62\")   PainSc: 0-No pain             3/17/2025     2:21 PM   Current Status   ECOG score 0        PHYSICAL EXAM:        CONSTITUTIONAL:  Vital signs reviewed.  No distress, looks comfortable.  EYES:  Conjunctiva and lids unremarkable.  PERRLA  EARS,NOSE,MOUTH,THROAT:  Ears and nose appear unremarkable.  Lips, teeth, gums appear unremarkable.  RESPIRATORY:  Normal respiratory effort.  Lungs clear to auscultation bilaterally.  CARDIOVASCULAR:  Normal S1, S2.  No murmurs rubs or gallops.  No significant lower extremity edema.  GASTROINTESTINAL: Abdomen appears unremarkable.  Nontender.  No hepatomegaly.  No splenomegaly.  LYMPHATIC:  No cervical, supraclavicular, axillary lymphadenopathy.  SKIN:  Warm.  No rashes.  PSYCHIATRIC:  Normal judgment and insight.  Normal mood and affect.        RECENT LABS:        WBC   Date/Time Value Ref Range Status   03/17/2025 02:15 PM 5.62 3.40 - 10.80 10*3/mm3 Final   02/13/2024 02:17 PM 3.99 (L) 4.5 - 11.0 10*3/uL Final     Hemoglobin   Date/Time Value Ref Range Status   03/17/2025 02:15 PM 12.4 12.0 - 15.9 g/dL Final   02/13/2024 02:17 PM 12.0 12.0 - 16.0 g/dL Final     Platelets   Date/Time Value Ref Range Status "   03/17/2025 02:15  140 - 450 10*3/mm3 Final   02/13/2024 02:17 PM 97 (L) 140 - 440 10*3/uL Final       Assessment & Plan   Malignant neoplasm of upper-inner quadrant of right breast in female, estrogen receptor positive  - CBC & Differential  - Comprehensive Metabolic Panel  - Immature Platelet Fraction  - CBC & Differential  - Comprehensive Metabolic Panel  - Immature Platelet Fraction    Thrombocytopenia  - CBC & Differential  - Comprehensive Metabolic Panel  - Immature Platelet Fraction  - CBC & Differential  - Comprehensive Metabolic Panel  - Immature Platelet Fraction          *Right breast cancer.  Invasive ductal adenocarcinoma.  Upper inner quadrant  Right lumpectomy by Dr. Ramos 11/4/2019.  1:00.  Overall grade 1.  Greatest dimension 5 mm.  Single focus of invasive carcinoma.  No lymphovascular invasion.  Margins negative.  0 out of 2 nodes involved.  [id number], stage 1  ER 91%.  HI 96%.  HER-2 negative.  Ki-67 4.7% (favorable).  (Initial needle biopsy 9/16/2019: Low-grade invasive ductal carcinoma, grade 1.  5 mm in greatest dimension.)  (Initial mammogram measured the lesion at 5 mm)  From the information we currently know about her cancer, I do not recommend chemotherapy.  I do recommend hormonal therapy for 5 years.  Hopefully, aromatase inhibitors if DEXA scan allows (with portal vein thrombosis would like to avoid tamoxifen).  Oncotype DX: Recurrence score 3, 3% distant recurrence risk at 9 years if tamoxifen or AI is used.  Therefore, do not recommend chemotherapy.  Adjuvant XRT completed 2/12/2020  Arimidex 2/26/2020- planned 2/26/2025  Arimidex stopped 4/29/2021 due to concern for possible drug-induced liver injury.  Might try to resume hormonal therapy with Aromasin at some point but for now remain off hormonal therapy  9/29/2021: Remain off Arimidex and anticoagulation for now as this was a low risk breast cancer and these might be the cause of her liver dysfunction.  She has not yet  seen the hepatologist, Dr. Adilson Hunt.  She sees him tomorrow.  I told her unless Dr. Hunt thinks there is very little chance Arimidex or Xarelto caused her liver dysfunction, we would likely remain off Arimidex Xarelto.  5/16/2022: Discussed we could try tamoxifen with Pradaxa since Pradaxa has a different mechanism of action from Xarelto and tamoxifen has a different mechanism of action from Arimidex.  However, considering her liver disease may have been related to either Arimidex or Xarelto and breast cancer was low risk, I think it is in her best interest to not take any more adjuvant therapy for breast cancer.  Patient very much agrees.  We plan no more adjuvant therapy of breast cancer.  No clinical signs of recurrence.    *Mild hand joint achiness since beginning Arimidex.    Remains off Arimidex    *Bone health.  DEXA 12/2/2019: Normal.  T score lumbar spine 0.1, T score left hip 0.1, T score right hip 0.6.  She takes 2-4 times daily.  Therefore, did not begin a calcium supplement when a rheumatic started on 12/9/2019.  She was encouraged to take a PPI on a schedule and follow-up with her PCP.  States she has had several EGDs in the past none of which have shown problems.  (States in the past her PCP told her to stop calcium supplements because she developed hypercalcemia due to the combination of calcium supplements and Tums).     *Portal vein thrombosis (right branch of portal vein).  No evidence of cirrhosis on MRI or ultrasound.  Dr. Berna Gonzalez, GI note, has no mention of cirrhosis.  Hormonal therapy for breast cancer has a slight increased risk of thrombosis.  Therefore, I think it is in her best interest to undergo anticoagulation until she completes hormonal therapy.  Generally, Lovenox or Coumadin are the preferred agents.  However, because of significant elevations in INR, and the desire for an oral agent, changed to Xarelto on 2/19/2020.   Previous plan was to remain on therapeutic dose Xarelto  until completes Arimidex.   4/29/2021, Xarelto (along with Arimidex) stopped due to concern for possible drug-induced liver injury.  No signs of recurrent clotting    *Risk factors for portal vein thrombosis:  Unremarkable: Prothrombin gene mutation, factor V Leiden gene mutation, lupus anticoagulant, beta-2 glycoprotein antibodies, anti-thrombin, protein S free.    *Low protein S activity, 14% (normal range %), on 11/13/2019     *Low protein C activity, 44% (normal range %) on 11/13/2019.    *Indeterminant anticardiolipin  IgM 14 (indeterminate range 13-20) on 11/13/2019.  IgA and IgG negative.    *Prior leukocytosis, likely due to C. difficile colitis.  WBC 5.6, from 4, from 5.2, from 3.3, from 4.4, from 3.4, from 4.2     *Anemia.  Previously felt to likely due to C. difficile colitis.  10/31/2022: Hb 11.8 on 10/31/2022 (lower than usual, prompting additional labs), from 12.3.  Unremarkable: Iron labs, folate, B12  1/23/2023: Hb back up to 12.4  8/14/2023: Hb 10.  Hb is lower than usual.  I suspect this is due to several episodes of pneumonia since March 2023 including the last episode just 2 weeks ago.  However, check anemia labs today.  8/14/2023, unremarkable: Iron labs, B12, serum folate, haptoglobin, LDH, bilirubin, creatinine  11/20/2023: Hb 9.3.  Still having recurrent pneumonia which is believed to be the reason for further drop in Hb  12/20/2023: Hb up to 11.4. unremarkable: Iron labs, B12, RBC folate, copper, zinc, SPEP, SIFE, creatinine, bilirubin, serum free light chain ratio  3/25/2024: Hb 11.4, stable  3/7/2025: Hb 12.4    *Thrombocytopenia, thought to be due to cirrhosis  PLT down to 103 on 10/31/2022, prompting additional labs and closer follow-up   on 8/14/2023, from 99 on 1/23/2023   on 3/17/2025, from 84 on 3/25/2024, from 120 on 12/20/2023, from 107 on 11/20/2023.     *Prominent nodes within the hepatogastric ligament and around the celiac axis and in the  retroperitoneum on MRI abdomen 11/11/2019.  Etiology uncertain.  Radiologist recommended follow-up  CT 2/19/2020, from 12/13/2019: Abdominal and pelvic and cardiophrenic LAD unchanged.  CT 8/14/2020: Overall improvement with interval decrease in right cardiophrenic node, size of spleen, and degree of wall thickening and surrounding fat stranding in the colon.  Persistent abdominal pelvic and lower thoracic LAD, indeterminant etiology.    Radiologist recommended continued follow-up.  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  I explained if this were the case it would not be expected to be curable and we would undergo observation instead of treatment regardless.  We would consider treatment if she develops a problem.  Therefore, I think further observation is very reasonable.  (The LAD might be due to her previous problems with extensive colitis and the mild splenomegaly might be due to the right hepatic lobe atrophy).  CT 2/17/2021: Unchanged enlarged nodes, from 8/14/2020.  CT 11/3/2021: No mention of LAD.  Stable.  Plan to complete 2 years CT follow-up sometime around November 2021, and if stable, then no more CT follow-up  With no change on CT, stop CT follow-up.    *C. difficile colitis during November 2019 hospitalization.  Thought to perhaps be related to receiving 1 dose of antibiotics for breast surgery.  She was treated with Flagyl and oral vancomycin     *Extensive colitis again seen on CT 2/19/2020, unchanged from 11/10/2019.  Patient reports she had some recent diarrhea but it became better and is more solid and it does not seem similar to the diarrhea she had back in November during the diagnosis of C. difficile.  On 2/24/2020 she was told to contact her PCP if the diarrhea worsens.  In the meantime, we referred her back to Dr. Cano of ID and Dr. Berna Gonzalez of GI (she saw both during the C. difficile hospitalization November 2019).    [place] appointment on 3/4/2020  Dr. Berna Gonzalez colonoscopy 6/26/2020: Decreased mucosa vascular pattern in the entire examined colon, biopsied.  Biopsy showed no evidence of malignancy.  It did reveal prominent lymphoid aggregates.    *Chronic hepatitis with periportal and bridging fibrosis and architectural distortion (stage III fibrosis), per liver biopsy, 4/21/2021.  Pathologist stated overall findings worrisome for autoimmune hepatitis, but a drug reaction could not entirely be excluded.  Findings worrisome for early cirrhosis.  Patient states Dr. Berna Gonzalez has told her she has autoimmune hepatitis and primary biliary cholangitis  Patient states she had a previous bile duct injury at the time of cholecystectomy, previously requiring a stent and now has narrowing of her bile ducts.  She states she was told this is contributing to her elevated transaminases.  2/19/2020: LFTs normal  2/26/2020: Arimidex and Xarelto started  8/24/2020: LFTs increased  4/29/2021: Arimidex and Xarelto stopped  5/14/2021: Prednisone 20 mg daily and ursodiol started by Dr. Berna Gonzalez, GI  7/6/2021: LFTs normalized.  8/13/2021: Next Dr. Berna Gonzalez appointment  9/30/2021: Appointment Dr. Adilson Hunt, liver specialist at [facility], for consultation  It is difficult to know what caused her liver dysfunction.  LFTs have normalized with: Stopping Arimidex/Xarelto, starting prednisone 20 mg daily, and starting ursodiol.  Her breast cancer is low risk, grade 1, 5 mm, stage I, Ki-67 4.7%.  Oncotype DX recurrence score only 3.  Therefore, at least for now I think it is in her best interest to remain off hormonal therapy in case Arimidex was part of the reason for the liver injury.  If we wanted to try hormonal therapy in the future, we could consider Aromasin and consider prophylactic dose Eliquis (we were previously using therapeutic dose Xarelto with Arimidex due to portal vein thrombosis and a slight increased risk of clotting from an AI).   However, at least for now I would probably favor remaining off hormonal therapy    *Elevated AFP, on labs drawn through Dr. Adilson Hunt.  CT 11/3/2021: No liver mass.  Patient states Dr. Hunt plans MRI abdomen and short-term follow-up with him.  She is continuing to follow up with Dr. Hunt regularly.    *4 or 5 bouts of pneumonia March 2023-July 2023 8/14/2023: States she is following with pulmonology and has an upcoming consultation with ID.  States she has already had 2 bronchoscopies revealing MRSA pneumonia    Plan  NP CBC CMP IPF 3 month   MD [facility] CMP IPF 6 months (September).  (Seeing her more often than her previous 6-month follow-up due to intermittent drops in hemoglobin).  11/20/2023: Hb dropped to 9.3 but states she continues to have recurrent pneumonia.  Suspect further drop in Hb is due to multiple bouts of recurrent pneumonia.  However, I told her if she begins to need recurrent transfusions and may be a good idea to do a bone marrow biopsy.  She is agreeable to this.    12/20/2023: Hb up to 11.2.  Stretch out to 3-month follow-up from the 1 month follow-up from 11/20/2023    Prior plan was::  MD [facility] CMP IPF 6 months  Maintains follow-up with Dr. Adilson Hunt, [facility] liver specialist  Remain off AI and anticoagulation  Mammogram 11/21/2023: BI-RADS 2  No more CT scans.    Send copy this to  Dr. Adilson Hunt, [facility] liver specialist

## 2025-06-11 ENCOUNTER — LAB (OUTPATIENT)
Dept: LAB | Facility: HOSPITAL | Age: 70
End: 2025-06-11
Payer: MEDICARE

## 2025-06-11 ENCOUNTER — OFFICE VISIT (OUTPATIENT)
Dept: ONCOLOGY | Facility: CLINIC | Age: 70
End: 2025-06-11
Payer: MEDICARE

## 2025-06-11 VITALS
BODY MASS INDEX: 28.45 KG/M2 | HEART RATE: 66 BPM | OXYGEN SATURATION: 95 % | TEMPERATURE: 97.7 F | SYSTOLIC BLOOD PRESSURE: 102 MMHG | WEIGHT: 154.6 LBS | HEIGHT: 62 IN | DIASTOLIC BLOOD PRESSURE: 64 MMHG

## 2025-06-11 DIAGNOSIS — Z17.0 MALIGNANT NEOPLASM OF UPPER-INNER QUADRANT OF RIGHT BREAST IN FEMALE, ESTROGEN RECEPTOR POSITIVE: ICD-10-CM

## 2025-06-11 DIAGNOSIS — D69.6 THROMBOCYTOPENIA: ICD-10-CM

## 2025-06-11 DIAGNOSIS — C50.211 MALIGNANT NEOPLASM OF UPPER-INNER QUADRANT OF RIGHT BREAST IN FEMALE, ESTROGEN RECEPTOR POSITIVE: ICD-10-CM

## 2025-06-11 DIAGNOSIS — Z12.31 VISIT FOR SCREENING MAMMOGRAM: ICD-10-CM

## 2025-06-11 DIAGNOSIS — C50.211 MALIGNANT NEOPLASM OF UPPER-INNER QUADRANT OF RIGHT BREAST IN FEMALE, ESTROGEN RECEPTOR POSITIVE: Primary | ICD-10-CM

## 2025-06-11 DIAGNOSIS — N64.4 BREAST PAIN, RIGHT: ICD-10-CM

## 2025-06-11 DIAGNOSIS — Z17.0 MALIGNANT NEOPLASM OF UPPER-INNER QUADRANT OF RIGHT BREAST IN FEMALE, ESTROGEN RECEPTOR POSITIVE: Primary | ICD-10-CM

## 2025-06-11 LAB
ALBUMIN SERPL-MCNC: 3.6 G/DL (ref 3.5–5.2)
ALBUMIN/GLOB SERPL: 1 G/DL
ALP SERPL-CCNC: 110 U/L (ref 39–117)
ALT SERPL W P-5'-P-CCNC: 10 U/L (ref 1–33)
ANION GAP SERPL CALCULATED.3IONS-SCNC: 9.5 MMOL/L (ref 5–15)
AST SERPL-CCNC: 22 U/L (ref 1–32)
BASOPHILS # BLD AUTO: 0.03 10*3/MM3 (ref 0–0.2)
BASOPHILS NFR BLD AUTO: 0.5 % (ref 0–1.5)
BILIRUB SERPL-MCNC: 0.4 MG/DL (ref 0–1.2)
BUN SERPL-MCNC: 22.4 MG/DL (ref 8–23)
BUN/CREAT SERPL: 29.9 (ref 7–25)
CALCIUM SPEC-SCNC: 8.8 MG/DL (ref 8.6–10.5)
CHLORIDE SERPL-SCNC: 103 MMOL/L (ref 98–107)
CO2 SERPL-SCNC: 27.5 MMOL/L (ref 22–29)
CREAT SERPL-MCNC: 0.75 MG/DL (ref 0.57–1)
DEPRECATED RDW RBC AUTO: 41.6 FL (ref 37–54)
EGFRCR SERPLBLD CKD-EPI 2021: 86.3 ML/MIN/1.73
EOSINOPHIL # BLD AUTO: 0.21 10*3/MM3 (ref 0–0.4)
EOSINOPHIL NFR BLD AUTO: 3.8 % (ref 0.3–6.2)
ERYTHROCYTE [DISTWIDTH] IN BLOOD BY AUTOMATED COUNT: 12.5 % (ref 12.3–15.4)
GLOBULIN UR ELPH-MCNC: 3.5 GM/DL
GLUCOSE SERPL-MCNC: 98 MG/DL (ref 65–99)
HCT VFR BLD AUTO: 37.2 % (ref 34–46.6)
HGB BLD-MCNC: 12.5 G/DL (ref 12–15.9)
IMM GRANULOCYTES # BLD AUTO: 0.01 10*3/MM3 (ref 0–0.05)
IMM GRANULOCYTES NFR BLD AUTO: 0.2 % (ref 0–0.5)
LYMPHOCYTES # BLD AUTO: 1.72 10*3/MM3 (ref 0.7–3.1)
LYMPHOCYTES NFR BLD AUTO: 31.1 % (ref 19.6–45.3)
MCH RBC QN AUTO: 30.8 PG (ref 26.6–33)
MCHC RBC AUTO-ENTMCNC: 33.6 G/DL (ref 31.5–35.7)
MCV RBC AUTO: 91.6 FL (ref 79–97)
MONOCYTES # BLD AUTO: 0.54 10*3/MM3 (ref 0.1–0.9)
MONOCYTES NFR BLD AUTO: 9.8 % (ref 5–12)
NEUTROPHILS NFR BLD AUTO: 3.02 10*3/MM3 (ref 1.7–7)
NEUTROPHILS NFR BLD AUTO: 54.6 % (ref 42.7–76)
NRBC BLD AUTO-RTO: 0 /100 WBC (ref 0–0.2)
PLATELET # BLD AUTO: 117 10*3/MM3 (ref 140–450)
PLATELETS.RETICULATED NFR BLD AUTO: 4 % (ref 0.9–6.5)
PMV BLD AUTO: 10.4 FL (ref 6–12)
POTASSIUM SERPL-SCNC: 3.7 MMOL/L (ref 3.5–5.2)
PROT SERPL-MCNC: 7.1 G/DL (ref 6–8.5)
RBC # BLD AUTO: 4.06 10*6/MM3 (ref 3.77–5.28)
SODIUM SERPL-SCNC: 140 MMOL/L (ref 136–145)
WBC NRBC COR # BLD AUTO: 5.53 10*3/MM3 (ref 3.4–10.8)

## 2025-06-11 PROCEDURE — 80053 COMPREHEN METABOLIC PANEL: CPT

## 2025-06-11 PROCEDURE — 85055 RETICULATED PLATELET ASSAY: CPT

## 2025-06-11 PROCEDURE — 36415 COLL VENOUS BLD VENIPUNCTURE: CPT

## 2025-06-11 PROCEDURE — 85025 COMPLETE CBC W/AUTO DIFF WBC: CPT

## 2025-06-11 NOTE — PROGRESS NOTES
.     REASONS FOR FOLLOWUP: Portal vein thrombosis, breast cancer    HISTORY OF PRESENT ILLNESS:  The patient is a 69 y.o. year old female  who is here for follow-up with the above-mentioned history.    The patient returns to the office today, 6/11/2025 for 3-month follow-up and review.  She continues to struggle with chronic fatigue though this is unchanged from her baseline.  She has no signs or symptoms of bleeding.    She does express concern regarding her previous lumpectomy site.  She had a biopsy performed at this location October 2024.  Since then, she has noticed some puckering.  This area is slightly tender.  She does state it is uncomfortable laying on her right side    Past Medical History:   Diagnosis Date    Blood clot of common bile duct of transplanted liver     Breast cancer 09/2019    Right IDC    Clostridium difficile infection 12/2019    GERD (gastroesophageal reflux disease)     PONV (postoperative nausea and vomiting)     Radiation-induced angiosarcoma of breast     Situational stress     DEATH OF BROTHER 10/2019, RECENT DIAGNOSIS    Stress headaches      Past Surgical History:   Procedure Laterality Date    BILE DUCT STENT PLACEMENT  2014    BREAST BIOPSY Right 09/2019    malignant    BREAST LUMPECTOMY Right     BREAST LUMPECTOMY WITH SENTINEL NODE BIOPSY Right 11/04/2019    Procedure: BREAST LUMPECTOMY WITH SENTINEL NODE BIOPSY AND NEEDLE LOCALIZATION And possible axillary dissection;  Surgeon: Jean-Paul Ramos MD;  Location: [facility] [place];  Service: General    BRONCHOSCOPY      x2    COLONOSCOPY N/A 06/26/2020    Procedure: COLONOSCOPY TO TERMINAL ILEUM WITH BX;  Surgeon: Sage Gonzalez MD;  Location: [facility] ENDOSCOPY;  Service: Gastroenterology;  Laterality: N/A;  PREOP/ SCREENING  POSTOP/ VASCULAR PATTERN TO COLON    ESOPHAGOSCOPY / EGD N/A 10/06/2022    KNEE ARTHROSCOPY Left     LAPAROSCOPIC CHOLECYSTECTOMY  2014    TUBAL ABDOMINAL LIGATION         MEDICATIONS    Current  Outpatient Medications:     albuterol (ACCUNEB) 1.25 MG/3ML nebulizer solution, Inhale 3 mL 2 (Two) Times a Day., Disp: , Rfl:     azaTHIOprine (IMURAN) 50 MG tablet, Take 2 tablets by mouth Daily., Disp: , Rfl:     carvedilol (COREG) 6.25 MG tablet, 2 (Two) Times a Day With Meals., Disp: , Rfl:     doxycycline (VIBRAMYCIN) 100 MG capsule, Take 1 capsule by mouth., Disp: , Rfl:     levETIRAcetam (KEPPRA) 500 MG tablet, Take 1 tablet by mouth., Disp: , Rfl:     melatonin 5 MG tablet tablet, Take 2 tablets by mouth At Night As Needed., Disp: , Rfl:     mupirocin (BACTROBAN) 2 % ointment, , Disp: , Rfl:     Probiotic Product (PROBIOTIC PO), Take 1 tablet by mouth Daily., Disp: , Rfl:     traZODone (DESYREL) 50 MG tablet, , Disp: , Rfl:     ursodiol (ACTIGALL) 500 MG tablet, Take 1 tablet by mouth., Disp: , Rfl:     acetaminophen (TYLENOL) 325 MG tablet, Take 2 tablets by mouth Every 6 (Six) Hours As Needed for Mild Pain. (Patient not taking: Reported on 3/17/2025), Disp: , Rfl:     clonazePAM (KlonoPIN) 0.5 MG tablet, Take  by mouth., Disp: , Rfl:     famotidine (PEPCID) 40 MG tablet, , Disp: , Rfl:     ALLERGIES:     Allergies   Allergen Reactions    Sulfamethoxazole-Trimethoprim Hives       SOCIAL HISTORY:       Social History     Socioeconomic History    Marital status:     Number of children: 2   Tobacco Use    Smoking status: Former     Current packs/day: 0.00     Average packs/day: 0.5 packs/day for 10.0 years (5.0 ttl pk-yrs)     Types: Cigarettes     Start date:      Quit date: 2014     Years since quittin.4    Smokeless tobacco: Former   Vaping Use    Vaping status: Never Used   Substance and Sexual Activity    Alcohol use: No    Drug use: No    Sexual activity: Not Currently     Birth control/protection: Post-menopausal, Surgical         FAMILY HISTORY:  Family History   Problem Relation Age of Onset    Heart failure Mother     Alcohol abuse Father     No Known Problems Sister     Diabetes  "Brother     Prostate cancer Brother     Throat cancer Brother     No Known Problems Brother     No Known Problems Brother     No Known Problems Brother     No Known Problems Daughter     Breast cancer Maternal Aunt     Breast cancer Maternal Cousin     Breast cancer Maternal Cousin     Malig Hyperthermia Neg Hx     Ovarian cancer Neg Hx     Uterine cancer Neg Hx     Colon cancer Neg Hx        REVIEW OF SYSTEMS:  Review of Systems  ROS as per HPI       Vitals:    06/11/25 1411   BP: 102/64   Pulse: 66   Temp: 97.7 °F (36.5 °C)   TempSrc: Oral   SpO2: 95%   Weight: 70.1 kg (154 lb 9.6 oz)   Height: 157.5 cm (62.01\")   PainSc: 0-No pain               6/11/2025     2:11 PM   Current Status   ECOG score 0        PHYSICAL EXAM:    CONSTITUTIONAL:  Vital signs reviewed.  No distress, looks comfortable.  EYES:  Conjunctiva and lids unremarkable.  PERRLA  EARS,NOSE,MOUTH,THROAT:  Ears and nose appear unremarkable.  Lips, teeth, gums appear unremarkable.  RESPIRATORY:  Normal respiratory effort.  Lungs clear to auscultation bilaterally.  CARDIOVASCULAR:  Normal S1, S2.  No murmurs rubs or gallops.  No significant lower extremity edema.  GASTROINTESTINAL: Abdomen appears unremarkable.  Nontender.    LYMPHATIC:  No cervical, supraclavicular lymphadenopathy.  SKIN:  Warm.  No rashes.  PSYCHIATRIC:  Normal judgment and insight.  Normal mood and affect.    Right breast exam, status post lumpectomy at the 11 to 12 o'clock position.  The lumpectomy incision is puckering with some erythema and fluid collection superior to the incision.    RECENT LABS:  Results from last 7 days   Lab Units 06/11/25  1359   WBC 10*3/mm3 5.53   NEUTROS ABS 10*3/mm3 3.02   HEMOGLOBIN g/dL 12.5   HEMATOCRIT % 37.2   PLATELETS 10*3/mm3 117*     Results from last 7 days   Lab Units 06/11/25  1359   SODIUM mmol/L 140   POTASSIUM mmol/L 3.7   CHLORIDE mmol/L 103   CO2 mmol/L 27.5   BUN mg/dL 22.4   CREATININE mg/dL 0.75   CALCIUM mg/dL 8.8   ALBUMIN g/dL 3.6 "   BILIRUBIN mg/dL 0.4   ALK PHOS U/L 110   ALT (SGPT) U/L 10   AST (SGOT) U/L 22   GLUCOSE mg/dL 98           Assessment & Plan   Malignant neoplasm of upper-inner quadrant of right breast in female, estrogen receptor positive  - US Breast Right Complete    Thrombocytopenia    Visit for screening mammogram    Breast pain, right  - US Breast Right Complete      *Right breast cancer.  Invasive ductal adenocarcinoma.  Upper inner quadrant  Right lumpectomy by Dr. Ramos 11/4/2019.  1:00.  Overall grade 1.  Greatest dimension 5 mm.  Single focus of invasive carcinoma.  No lymphovascular invasion.  Margins negative.  0 out of 2 nodes involved.  [id number], stage 1  ER 91%.  MN 96%.  HER-2 negative.  Ki-67 4.7% (favorable).  (Initial needle biopsy 9/16/2019: Low-grade invasive ductal carcinoma, grade 1.  5 mm in greatest dimension.)  (Initial mammogram measured the lesion at 5 mm)  From the information we currently know about her cancer, I do not recommend chemotherapy.  I do recommend hormonal therapy for 5 years.  Hopefully, aromatase inhibitors if DEXA scan allows (with portal vein thrombosis would like to avoid tamoxifen).  Oncotype DX: Recurrence score 3, 3% distant recurrence risk at 9 years if tamoxifen or AI is used.  Therefore, do not recommend chemotherapy.  Adjuvant XRT completed 2/12/2020  Arimidex 2/26/2020- planned 2/26/2025  Arimidex stopped 4/29/2021 due to concern for possible drug-induced liver injury.  Might try to resume hormonal therapy with Aromasin at some point but for now remain off hormonal therapy  9/29/2021: Remain off Arimidex and anticoagulation for now as this was a low risk breast cancer and these might be the cause of her liver dysfunction.  She has not yet seen the hepatologist, Dr. Adilson Hunt.  She sees him tomorrow.  I told her unless Dr. Hunt thinks there is very little chance Arimidex or Xarelto caused her liver dysfunction, we would likely remain off Arimidex Xarelto.  5/16/2022:  Discussed we could try tamoxifen with Pradaxa since Pradaxa has a different mechanism of action from Xarelto and tamoxifen has a different mechanism of action from Arimidex.  However, considering her liver disease may have been related to either Arimidex or Xarelto and breast cancer was low risk, I think it is in her best interest to not take any more adjuvant therapy for breast cancer.  Patient very much agrees.  We plan no more adjuvant therapy of breast cancer.  Mammogram September 2024 noted right breast abnormality prompting diagnostic mammogram and ultrasound and ultimately biopsy 10/7/2024 with pathology negative for malignancy, fat necrosis  Puckering at the previous lumpectomy site following recent biopsy (pathology fat necrosis) with some erythema and fluid collection.  Will proceed with an ultrasound at this location.  Additionally, she is due for annual mammogram due September 2025      *Mild hand joint achiness since beginning Arimidex.    Remains off Arimidex    *Bone health.  DEXA 12/2/2019: Normal.  T score lumbar spine 0.1, T score left hip 0.1, T score right hip 0.6.  She takes 2-4 times daily.  Therefore, did not begin a calcium supplement when a rheumatic started on 12/9/2019.  She was encouraged to take a PPI on a schedule and follow-up with her PCP.  States she has had several EGDs in the past none of which have shown problems.  (States in the past her PCP told her to stop calcium supplements because she developed hypercalcemia due to the combination of calcium supplements and Tums).     *Portal vein thrombosis (right branch of portal vein).  No evidence of cirrhosis on MRI or ultrasound.  Dr. Berna Gonzalez, GI note, has no mention of cirrhosis.  Hormonal therapy for breast cancer has a slight increased risk of thrombosis.  Therefore, I think it is in her best interest to undergo anticoagulation until she completes hormonal therapy.  Generally, Lovenox or Coumadin are the preferred agents.   However, because of significant elevations in INR, and the desire for an oral agent, changed to Xarelto on 2/19/2020.   Previous plan was to remain on therapeutic dose Xarelto until completes Arimidex.   4/29/2021, Xarelto (along with Arimidex) stopped due to concern for possible drug-induced liver injury.  No signs of recurrent clotting    *Risk factors for portal vein thrombosis:  Unremarkable: Prothrombin gene mutation, factor V Leiden gene mutation, lupus anticoagulant, beta-2 glycoprotein antibodies, anti-thrombin, protein S free.    *Low protein S activity, 14% (normal range %), on 11/13/2019     *Low protein C activity, 44% (normal range %) on 11/13/2019.    *Indeterminant anticardiolipin  IgM 14 (indeterminate range 13-20) on 11/13/2019.  IgA and IgG negative.    *Prior leukocytosis, likely due to C. difficile colitis.  WBC 5.53     *Anemia.  Previously felt to likely due to C. difficile colitis.  10/31/2022: Hb 11.8 on 10/31/2022 (lower than usual, prompting additional labs), from 12.3.  Unremarkable: Iron labs, folate, B12  1/23/2023: Hb back up to 12.4  8/14/2023: Hb 10.  Hb is lower than usual.  I suspect this is due to several episodes of pneumonia since March 2023 including the last episode just 2 weeks ago.  However, check anemia labs today.  8/14/2023, unremarkable: Iron labs, B12, serum folate, haptoglobin, LDH, bilirubin, creatinine  11/20/2023: Hb 9.3.  Still having recurrent pneumonia which is believed to be the reason for further drop in Hb  12/20/2023: Hb up to 11.4. unremarkable: Iron labs, B12, RBC folate, copper, zinc, SPEP, SIFE, creatinine, bilirubin, serum free light chain ratio  3/25/2024: Hb 11.4, stable  66/11/2025: Hb 12.5    *Thrombocytopenia, thought to be due to cirrhosis  PLT down to 103 on 10/31/2022, prompting additional labs and closer follow-up   on 8/14/2023, from 99 on 1/23/2023   on 3/17/2025, from 84 on 3/25/2024, from 120 on 12/20/2023, from 107  on 11/20/2023.  6/11/2025 platelets at baseline 117,000 without signs or symptoms of bleeding     *Prominent nodes within the hepatogastric ligament and around the celiac axis and in the retroperitoneum on MRI abdomen 11/11/2019.  Etiology uncertain.  Radiologist recommended follow-up  CT 2/19/2020, from 12/13/2019: Abdominal and pelvic and cardiophrenic LAD unchanged.  CT 8/14/2020: Overall improvement with interval decrease in right cardiophrenic node, size of spleen, and degree of wall thickening and surrounding fat stranding in the colon.  Persistent abdominal pelvic and lower thoracic LAD, indeterminant etiology.    Radiologist recommended continued follow-up.  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  I explained if this were the case it would not be expected to be curable and we would undergo observation instead of treatment regardless.  We would consider treatment if she develops a problem.  Therefore, I think further observation is very reasonable.  (The LAD might be due to her previous problems with extensive colitis and the mild splenomegaly might be due to the right hepatic lobe atrophy).  CT 2/17/2021: Unchanged enlarged nodes, from 8/14/2020.  CT 11/3/2021: No mention of LAD.  Stable.  Plan to complete 2 years CT follow-up sometime around November 2021, and if stable, then no more CT follow-up  With no change on CT, stop CT follow-up.    *C. difficile colitis during November 2019 hospitalization.  Thought to perhaps be related to receiving 1 dose of antibiotics for breast surgery.  She was treated with Flagyl and oral vancomycin     *Extensive colitis again seen on CT 2/19/2020, unchanged from 11/10/2019.  Patient reports she had some recent diarrhea but it became better and is more solid and it does not seem similar to the diarrhea she had back in November during the diagnosis of C. difficile.  On 2/24/2020 she was told to contact her PCP if  the diarrhea worsens.  In the meantime, we referred her back to Dr. Cano of ID and Dr. Berna Gonzalez of GI (she saw both during the C. difficile hospitalization November 2019).  Dr. Cano appointment on 3/4/2020  Dr. Berna Gonzalez colonoscopy 6/26/2020: Decreased mucosa vascular pattern in the entire examined colon, biopsied.  Biopsy showed no evidence of malignancy.  It did reveal prominent lymphoid aggregates.    *Chronic hepatitis with periportal and bridging fibrosis and architectural distortion (stage III fibrosis), per liver biopsy, 4/21/2021.  Pathologist stated overall findings worrisome for autoimmune hepatitis, but a drug reaction could not entirely be excluded.  Findings worrisome for early cirrhosis.  Patient states Dr. Berna Gonzalez has told her she has autoimmune hepatitis and primary biliary cholangitis  Patient states she had a previous bile duct injury at the time of cholecystectomy, previously requiring a stent and now has narrowing of her bile ducts.  She states she was told this is contributing to her elevated transaminases.  2/19/2020: LFTs normal  2/26/2020: Arimidex and Xarelto started  8/24/2020: LFTs increased  4/29/2021: Arimidex and Xarelto stopped  5/14/2021: Prednisone 20 mg daily and ursodiol started by Dr. Berna Gonzalez, GI  7/6/2021: LFTs normalized.  8/13/2021: Next Dr. Berna Gonzalez appointment  9/30/2021: Appointment Dr. Adilson Hunt, liver specialist at [facility], for consultation  It is difficult to know what caused her liver dysfunction.  LFTs have normalized with: Stopping Arimidex/Xarelto, starting prednisone 20 mg daily, and starting ursodiol.  Her breast cancer is low risk, grade 1, 5 mm, stage I, Ki-67 4.7%.  Oncotype DX recurrence score only 3.  Therefore, at least for now I think it is in her best interest to remain off hormonal therapy in case Arimidex was part of the reason for the liver injury.  If we wanted to try hormonal therapy in the future, we could consider Aromasin and  consider prophylactic dose Eliquis (we were previously using therapeutic dose Xarelto with Arimidex due to portal vein thrombosis and a slight increased risk of clotting from an AI).  However, at least for now I would probably favor remaining off hormonal therapy    *Elevated AFP, on labs drawn through Dr. Adilson Hunt.  CT 11/3/2021: No liver mass.  Patient states Dr. Hunt plans MRI abdomen and short-term follow-up with him.  She is continuing to follow up with Dr. Hunt regularly.    *4 or 5 bouts of pneumonia March 2023-July 2023 8/14/2023: States she is following with pulmonology and has an upcoming consultation with ID.  States she has already had 2 bronchoscopies revealing MRSA pneumonia    Plan:  Proceed with right breast ultrasound contrast with focus on the previous lumpectomy incision with new puckering and apparent fluid  Annual screening mammogram due September 2025  MD CBC CMP IPF 3 months (September).  (Seeing her more often than her previous 6-month follow-up due to intermittent drops in hemoglobin).  11/20/2023: Hb dropped to 9.3 but states she continues to have recurrent pneumonia.  Suspect further drop in Hb is due to multiple bouts of recurrent pneumonia.  However, I told her if she begins to need recurrent transfusions and may be a good idea to do a bone marrow biopsy.  She is agreeable to this.    12/20/2023: Hb up to 11.2.  Stretch out to 3-month follow-up from the 1 month follow-up from 11/20/2023      Plan of care was discussed and reviewed with Dr. Torres and Dr. Vishal winslow who is in agreement with this plan of care    Mable Marley, APRN  06/11/2025

## 2025-06-17 ENCOUNTER — TELEPHONE (OUTPATIENT)
Dept: ONCOLOGY | Facility: CLINIC | Age: 70
End: 2025-06-17
Payer: MEDICARE

## 2025-06-17 NOTE — TELEPHONE ENCOUNTER
Call placed to patient to clarify what I needed to do with the information she had sent to us regarding her right breast. I noted there is already an ultrasound of the breast scheduled for 06/19 at 0800. Informed the patient that we had received the information she had sent. Patient reported this info. Was from the CT scan she had at [facility] on Monday. She stated she was just passing the information along from her CT scan. Confirmed with her that her ultrasound of the breast is scheduled for 06/19 and she is aware she needs to arrive at 0745. Denies any further questions at this time.

## 2025-06-19 ENCOUNTER — HOSPITAL ENCOUNTER (OUTPATIENT)
Dept: ULTRASOUND IMAGING | Facility: HOSPITAL | Age: 70
Discharge: HOME | End: 2025-06-19
Admitting: NURSE PRACTITIONER
Payer: MEDICARE

## 2025-06-19 DIAGNOSIS — Z17.0 MALIGNANT NEOPLASM OF UPPER-INNER QUADRANT OF RIGHT BREAST IN FEMALE, ESTROGEN RECEPTOR POSITIVE: ICD-10-CM

## 2025-06-19 DIAGNOSIS — C50.211 MALIGNANT NEOPLASM OF UPPER-INNER QUADRANT OF RIGHT BREAST IN FEMALE, ESTROGEN RECEPTOR POSITIVE: ICD-10-CM

## 2025-06-19 DIAGNOSIS — N64.4 BREAST PAIN, RIGHT: ICD-10-CM

## 2025-06-19 PROCEDURE — 76642 ULTRASOUND BREAST LIMITED: CPT

## 2025-06-23 DIAGNOSIS — R92.8 ABNORMAL ULTRASOUND OF BREAST: ICD-10-CM

## 2025-06-23 DIAGNOSIS — N64.4 BREAST PAIN, RIGHT: ICD-10-CM

## 2025-06-23 DIAGNOSIS — C50.211 MALIGNANT NEOPLASM OF UPPER-INNER QUADRANT OF RIGHT BREAST IN FEMALE, ESTROGEN RECEPTOR POSITIVE: Primary | ICD-10-CM

## 2025-06-23 DIAGNOSIS — Z17.0 MALIGNANT NEOPLASM OF UPPER-INNER QUADRANT OF RIGHT BREAST IN FEMALE, ESTROGEN RECEPTOR POSITIVE: Primary | ICD-10-CM

## 2025-06-24 ENCOUNTER — TELEPHONE (OUTPATIENT)
Dept: ONCOLOGY | Facility: CLINIC | Age: 70
End: 2025-06-24
Payer: MEDICARE

## 2025-06-24 NOTE — TELEPHONE ENCOUNTER
----- Message from Etta COLE sent at 6/24/2025  9:39 AM EDT -----  Regarding: FW: breast recs  I have placed the order for MRI breasts.  ----- Message -----  From: Mable Marley APRN  Sent: 6/23/2025   2:49 PM EDT  To: Etta Silveira RN  Subject: FW: breast recs                                  Can you please enter an order for a MRI of the breast to get scheduled.     I called patient to leave her a message regarding the plan for an MRI.    Thanks!    Mable  ----- Message -----  From: Sherice Doherty MD  Sent: 6/19/2025   8:17 AM EDT  To: FAWAD Villa; Nicolette Powell#  Subject: breast recs                                      Good morning, On US her surg site and bx site again shows fat necrosis, which looks a little different, possibly due to evolving necrosis and/or superimposed bx changes. Given her persistent symptoms, rec breast MRI now. Her bilat mammo is due in Sept and should be diag if her symptoms persist. Could also do the diag mammo now..  Thanks

## 2025-07-06 ENCOUNTER — HOSPITAL ENCOUNTER (OUTPATIENT)
Dept: MRI IMAGING | Facility: HOSPITAL | Age: 70
Discharge: HOME | End: 2025-07-06
Admitting: NURSE PRACTITIONER
Payer: MEDICARE

## 2025-07-06 DIAGNOSIS — R92.8 ABNORMAL ULTRASOUND OF BREAST: ICD-10-CM

## 2025-07-06 DIAGNOSIS — N64.4 BREAST PAIN, RIGHT: ICD-10-CM

## 2025-07-06 DIAGNOSIS — C50.211 MALIGNANT NEOPLASM OF UPPER-INNER QUADRANT OF RIGHT BREAST IN FEMALE, ESTROGEN RECEPTOR POSITIVE: ICD-10-CM

## 2025-07-06 DIAGNOSIS — Z17.0 MALIGNANT NEOPLASM OF UPPER-INNER QUADRANT OF RIGHT BREAST IN FEMALE, ESTROGEN RECEPTOR POSITIVE: ICD-10-CM

## 2025-07-06 PROCEDURE — C8908 MRI W/O FOL W/CONT, BREAST,: HCPCS

## 2025-07-06 PROCEDURE — A9577 INJ MULTIHANCE: HCPCS | Performed by: NURSE PRACTITIONER

## 2025-07-06 PROCEDURE — 25510000002 GADOBENATE DIMEGLUMINE 529 MG/ML SOLUTION: Performed by: NURSE PRACTITIONER

## 2025-07-06 PROCEDURE — C8937 CAD BREAST MRI: HCPCS

## 2025-07-06 RX ADMIN — GADOBENATE DIMEGLUMINE 15 ML: 529 INJECTION, SOLUTION INTRAVENOUS at 09:44

## 2025-07-07 ENCOUNTER — DOCUMENTATION (OUTPATIENT)
Dept: ONCOLOGY | Facility: CLINIC | Age: 70
End: 2025-07-07
Payer: MEDICARE

## 2025-07-07 DIAGNOSIS — C50.211 MALIGNANT NEOPLASM OF UPPER-INNER QUADRANT OF RIGHT BREAST IN FEMALE, ESTROGEN RECEPTOR POSITIVE: Primary | ICD-10-CM

## 2025-07-07 DIAGNOSIS — Z17.0 MALIGNANT NEOPLASM OF UPPER-INNER QUADRANT OF RIGHT BREAST IN FEMALE, ESTROGEN RECEPTOR POSITIVE: Primary | ICD-10-CM

## 2025-07-07 NOTE — PROGRESS NOTES
I called the patient to review MRI findings.  We have discussed recommendations for referral to breast surgery.  A referral was placed.  Her previous lumpectomy was performed by Dr. Ramos, therefore we will asked Dr. Herbert Raya to evaluate the patient.  She does report ongoing puckering at her lumpectomy incision site which extends the entire incision. She is agreeable to plan for evaluation.     Mable Marley, APRN

## 2025-07-10 ENCOUNTER — TELEPHONE (OUTPATIENT)
Dept: SURGERY | Facility: CLINIC | Age: 70
End: 2025-07-10
Payer: MEDICARE

## 2025-07-15 NOTE — PROGRESS NOTES
[facility]     Referring Provider: Mable Marley     Chief complaint: abnormal breast imaging - History of right breast cancer 2019     Subjective   HPI: Ms. Taylor Larsen is a 69 y.o. yo woman, seen at the request of Mable Marley,*, for a new diagnosis of abnormal breast MRI in area of previous lumpectomy.      She has a personal history of right breast cancer treated with partial mastectomy sentinel lymph node biopsy.  pT1a N0-ER/NM positive, HER2 negative, Oncotype was 3.  She was treated with adjuvant radiation therapy.  Per Dr. Giordano's note trial of Arimidex for approximately 2 months was initiated but stopped for a possible drug-induced liver injury.  She never restarted hormonal blockade.  She continues to follow with Dr. Rodgers.  Additionally she follows with Dr. Hunt for her cirrhosis.  Follows with Dr. Vicente with [facility] and Dr. Childers with [facility]    Additional diagnostic workup of her right breast was prompted a couple months ago when she noted more retraction of her previous lumpectomy cavity.  In addition to the retraction she noted increased thickening of the surrounding tissue.  Her mammogram and ultrasound were completed.  A biopsy confirmed fat necrosis.  Further imaging workup was completed to include an MRI.  She has had no significant progression of the symptoms since the imaging workup started.  She has otherwise remained in her usual state of health.     She reports a pertinent PMH significant for right breast cancer, ongoing liver fibrosis, autoimmune hepatitis with stage III fibrosis, history of portal vein thrombosis unable to tolerate further anticoagulation, history of recurrent pneumonia on prophylactic antibiotics and regular albuterol.     She denies any family history of breast or ovarian cancer.     She was joined today in clinic by her  & daughter. She does consent for them to be present during her examination. They did  participate in the discussion.         Review of Systems   Constitutional:  Positive for fatigue. Negative for appetite change and fever.   Respiratory:  Positive for cough and shortness of breath.         When lying down   Gastrointestinal:  Negative for abdominal distention, diarrhea and nausea.   Musculoskeletal:  Negative for arthralgias and back pain.   Neurological:  Negative for dizziness, headaches and speech difficulty.   Psychiatric/Behavioral:  Negative for decreased concentration and sleep disturbance.        Medications:    Current Outpatient Medications:     acetaminophen (TYLENOL) 325 MG tablet, Take 2 tablets by mouth Every 6 (Six) Hours As Needed for Mild Pain., Disp: , Rfl:     albuterol (ACCUNEB) 1.25 MG/3ML nebulizer solution, Inhale 3 mL 2 (Two) Times a Day., Disp: , Rfl:     azaTHIOprine (IMURAN) 50 MG tablet, Take 2 tablets by mouth Daily., Disp: , Rfl:     baclofen (LIORESAL) 10 MG tablet, Take 1 tablet by mouth., Disp: , Rfl:     carvedilol (COREG) 6.25 MG tablet, 2 (Two) Times a Day With Meals., Disp: , Rfl:     clonazePAM (KlonoPIN) 0.5 MG tablet, Take  by mouth., Disp: , Rfl:     doxycycline (VIBRAMYCIN) 100 MG capsule, Take 1 capsule by mouth., Disp: , Rfl:     famotidine (PEPCID) 40 MG tablet, , Disp: , Rfl:     levETIRAcetam (KEPPRA) 500 MG tablet, Take 1 tablet by mouth., Disp: , Rfl:     melatonin 5 MG tablet tablet, Take 2 tablets by mouth At Night As Needed., Disp: , Rfl:     mupirocin (BACTROBAN) 2 % ointment, , Disp: , Rfl:     Probiotic Product (PROBIOTIC PO), Take 1 tablet by mouth Daily., Disp: , Rfl:     traZODone (DESYREL) 50 MG tablet, , Disp: , Rfl:     ursodiol (ACTIGALL) 500 MG tablet, Take 1 tablet by mouth., Disp: , Rfl:     Allergies:  Allergies   Allergen Reactions    Sulfamethoxazole-Trimethoprim Hives       Medical history:  Past Medical History:   Diagnosis Date    Blood clot of common bile duct of transplanted liver     Breast cancer 09/2019    Right IDC     Clostridium difficile infection 12/2019    GERD (gastroesophageal reflux disease)     PONV (postoperative nausea and vomiting)     Radiation-induced angiosarcoma of breast     Situational stress     DEATH OF BROTHER 10/2019, RECENT DIAGNOSIS    Stress headaches        Surgical History:  Past Surgical History:   Procedure Laterality Date    BILE DUCT STENT PLACEMENT  2014    BREAST BIOPSY Right 09/2019    malignant    BREAST CYST ASPIRATION      BREAST LUMPECTOMY Right     BREAST LUMPECTOMY WITH SENTINEL NODE BIOPSY Right 11/04/2019    Procedure: BREAST LUMPECTOMY WITH SENTINEL NODE BIOPSY AND NEEDLE LOCALIZATION And possible axillary dissection;  Surgeon: Jean-Paul Ramos MD;  [place] [facility] [place] [facility] [place]  Service: General    BRONCHOSCOPY      x2    COLONOSCOPY N/A 06/26/2020    Procedure: COLONOSCOPY TO TERMINAL ILEUM WITH BX;  Surgeon: Sage Gonzalez MD;  Location: [facility] ENDOSCOPY;  Service: Gastroenterology;  Laterality: N/A;  PREOP/ SCREENING  POSTOP/ VASCULAR PATTERN TO COLON    ESOPHAGOSCOPY / EGD N/A 10/06/2022    KNEE ARTHROSCOPY Left     LAPAROSCOPIC CHOLECYSTECTOMY  2014    TUBAL ABDOMINAL LIGATION         Family History:  Family History   Problem Relation Age of Onset    Heart failure Mother     Alcohol abuse Father     COPD Father     No Known Problems Sister     Diabetes Brother     Prostate cancer Brother     Throat cancer Brother     No Known Problems Brother     No Known Problems Brother     No Known Problems Brother     No Known Problems Daughter     Breast cancer Maternal Aunt     Breast cancer Maternal Cousin     Breast cancer Maternal Cousin     Breast cancer Maternal Aunt     Cancer Maternal Aunt         Breast    Breast cancer Maternal Aunt     Cancer Maternal Aunt         Breast    Breast cancer Maternal Aunt     Cancer Maternal Aunt         Breast    Diabetes Brother     Prostate cancer Brother     Cancer Brother         throat cancer    Early death Brother         Organ failure    Yfn  "Hyperthermia Neg Hx     Ovarian cancer Neg Hx     Uterine cancer Neg Hx     Colon cancer Neg Hx        Family Cancer History: relationship - (age of diagnosis/current age or age at death)  Breast: Self  Ovarian: No family history of ovarian cancer.  Colon: No family history of colon cancer.   Pancreas: No family history of pancreatic cancer.  Prostate: No family history of prostate cancer.   Lung Cancer: No family history of lung cancer.     Social History:   Social History     Socioeconomic History    Marital status:     Number of children: 2   Tobacco Use    Smoking status: Former     Current packs/day: 0.00     Average packs/day: 0.5 packs/day for 10.0 years (5.0 ttl pk-yrs)     Types: Cigarettes     Start date: 2004     Quit date:      Years since quittin.5    Smokeless tobacco: Former   Vaping Use    Vaping status: Never Used   Substance and Sexual Activity    Alcohol use: No    Drug use: No    Sexual activity: Not Currently     Partners: Male     Birth control/protection: None, Tubal ligation       She lives with   She is employed as         GYNECOLOGIC HISTORY:   . P: 2. AB: 0.  Last menstrual period: 44  Age at menarche: 13  Age at first childbirth: 22  Lactation: N/A  Age at menopause: 44  Total years of oral contraceptive use: 24  Total years of hormone replacement therapy: N/A      Objective   PHYSICAL EXAMINATION  /77   Pulse 56   Ht 157.5 cm (62.01\")   Wt 69.8 kg (153 lb 12.8 oz)   LMP  (LMP Unknown)   SpO2 95%   BMI 28.12 kg/m²   ECOG 1 - Symptomatic but completely ambulatory  General: NAD, well appearing  Psych: a&o x 3, normal mood and affect  Eyes: EOMI, no scleral icterus  ENMT: neck supple without masses or thyromegaly, mucus membranes moist  Resp: normal effort  CV: RRR, no edema   GI: soft, NT, ND  MSK: normal gait, normal ROM in bilateral shoulders  Lymph nodes:  no cervical, supraclavicular or axillary lymphadenopathy  Breast: Left larger " than right, small breast volume bilaterally, grade 2 ptosis  Right: Well-healed partial mastectomy incision and upper central breast, with associated retraction and SIMON lumpectomy fibrosis.  No skin thickening appreciated on exam.  No nipple abnormalities.  Left:  No visible abnormalities on inspection while seated, with arms raised or hands on hips. No masses, skin changes, or nipple abnormalities.      Imaging - documented images below have been personally reviewed:  9/26/2024 bilateral diagnostic mammogram and right breast ultrasound ([facility])  Imaging completed for right breast pain, puckering around previous lumpectomy scar  The breasts are almost entirely fatty. No abnormality is  seen in the left breast. There is postsurgical scarring in the upper  right breast extending near the chest wall and this shows further  increased density when compared to the study of 10/06/2021. The area of  increased density appears to surround an area of the lower density  suggesting progressive fat necrosis. This involves an area measuring up  to 1.9 x 2.6 cm on the mammogram with central lucency measuring up to  1.5 cm.     Correlating directed right breast ultrasound demonstrates an echogenic  area surrounding a hypoechoic center. The more echogenic component  measures approximately 2.3 x 2.7 cm with the central hypoechoic area  measuring 1.3 cm. There is some increased vascularity in the echogenic  component. This may relate to progressive fat necrosis since 2021 but  remains somewhat suspicious including the presence of increasing pain.  Further evaluation with ultrasound-guided core mammotome biopsy is  therefore recommended.     CONCLUSION: Suspicious right-sided mammogram showing possible  progressive changes of fat necrosis deep in the upper right breast but  showing significant change since 2021 and having associated pain. There  is some increased vascularity noted on ultrasound and further evaluation  with  ultrasound-guided core biopsy to the echogenic component is  recommended. These findings have been discussed with the patient.   BI-RADS CATEGORY 4    10/7/24 right breast us biopsy  The area of interest at 12:00, 6 cm from the nipple in the  right breast was targeted under ultrasound.  A small skin incision was then made to  permit passage of a 10 g needle with a vacuum-assisted biopsy device. 2  core specimens were obtained. A bowtie clip was then deployed at the  biopsy site. The patient tolerated the procedure well with no immediate  complications.    Post-procedural digital mammographic views of the right breast  demonstrate the bowtie clip at the expected location at the site of  biopsy in the upper central posterior right breast  Final Diagnosis  1.  Right breast, 12:00, ultrasound-guided biopsies ([place] clip):   -Fat necrosis.  -No atypical hyperplasia, in situ nor invasive carcinoma identified by  routine and immunohistochemical staining.  IMPRESSION:  1. Ultrasound-guided core needle biopsy of echogenicity surrounding an  area of fat necrosis at a prior surgical site at 12:00, 6 cm from the  nipple in the right breast, marked with a bowtie clip. Pathology is  benign and concordant with the imaging assessment. Recommend annual  screening mammogram in September 2025.    6/16/25 CT Chest - [facility]  Thyroid within normal limits. No suspicious axillary, supraclavicular,   or new mediastinal lymphadenopathy. Calcified right hilar lymph nodes,   likely the sequelae of prior granulomatous disease.     Heart size within normal limits. Moderate coronary artery   calcifications. No thoracic aortic aneurysm.     Central bronchial wall thickening. Similar scarring and bronchiectasis   within the medial segment of the right middle lobe and lingula.   Similar consolidation and bronchiectasis throughout the right lower   lobe with surrounding tree-in-bud nodularity. New ill-defined nodules   within the right lower lobe  measuring up to 6 mm (series 3, image 417)   and 8 mm (series 3, image 440). Similar small right pleural effusion.   Scattered calcified granulomas.     Cirrhotic liver morphology. Similar 3.4 cm hypodense lesion along the   posterior aspect of the liver with adjacent enlarging 8 mm hypodensity   more laterally (series 2, image 93). Cholecystectomy. Splenomegaly.   Postlumpectomy changes to the right breast with overlying skin   retraction and new biopsy clip within ill-defined soft tissue   measuring up to 2.2 cm. No acute osseous abnormality.     IMPRESSION:    1.Similar scarring and bronchiectasis within the medial segment of the   right middle lobe and lingula as well as throughout the dependent   aspect of the right lower lobe. There are new superimposed ill-defined   nodules within the right lower lobe which may be infectious or   inflammatory but are indeterminant. Per current Fleischner guidelines,   a follow-up CT of the chest is recommended in 3-6 months to evaluate   stability.   2.Similar chronic pleural thickening of the right inferior hemithorax   with small right pleural effusion.   3.Cirrhotic liver morphology with similar hypodense lesions within the   posterior right hepatic lobe. Recommend correlation with recent prior   outside abdominal MRI.   4.Postprocedural changes to the right breast with ill-defined soft   tissue nodularity, recently biopsied. Recommend correlation with prior   mammography/biopsy.   5.Moderate coronary artery calcifications.     6/19/25 Right Breast US  Targeted sonographic evaluation of the right breast was performed in the  area of concern indicated by the patient at 12:00, 6 cm from the nipple.  Benign-appearing postsurgical changes and fat necrosis are again  identified and are similar in size when accounting for differences in  technique. The central components are more echogenic compared to prior  ultrasound from September, possibly due to evolving fat necrosis  and/or  superimposed postbiopsy changes, including residual postbiopsy blood  products. There is some mild peripheral vascularity, which is grossly  similar to prior. There is no hypervascularity. No new suspicious mass  or collection is identified at this location. However, given the  patient's reported continued clinical symptoms at this site, recommend  further evaluation with contrast-enhanced breast MRI, in addition to  clinical follow-up/management. Additionally, the patient will be due for  annual bilateral mammogram in September. If the patient's symptoms  persist at that time, diagnostic mammogram would be recommended in place  of screening mammogram. Diagnostic mammogram could also be performed at  this time, if clinically indicated.  BI-RADS Category 2    7/6/25 Breast MRI  FINDINGS: There is scattered fibroglandular tissue. There is mild  background parenchymal enhancement.     RIGHT BREAST:    At 12:00 in the far posterior right breast, 8 cm posterior to the  nipple, there is a 2.5 x 2 x 2.3 cm irregular enhancing mass with  architectural distortion, which represents the site of prior surgery and  site of relatively recent benign biopsy demonstrating fat necrosis.  There is corresponding washout delayed enhancement on kinetic analysis.  In addition, there is corresponding linear non-mass enhancement  extending posterior and predominantly ventral to the mass, which  measures up to approximately 5.9 cm in maximum AP dimension. Enhancement  extends along the surface of the underlying pectoralis muscle  posteriorly with no suspicious enhancement within the muscle itself and  involves the overlying skin anteriorly. There is overlying skin  retraction. The degree of enhancement and reported clinical changes at  this location would be atypical for benign postoperative changes alone  and are suspicious for superimposed malignancy.   No suspicious enhancement is identified in the right nipple.  The visualized  axilla is within normal limits.     LEFT BREAST:    No suspicious enhancing mass or area of non-mass enhancement is  identified.  The visualized axilla is within normal limits.     EXTRAMAMMARY FINDINGS:  Small bilateral internal mammary chain lymph nodes are similar to 2019.   A prominent right cardiophrenic lymph node is also similar.  There is a trace right pleural effusion.      IMPRESSION AND RECOMMENDATION:   1.  A 2.5 cm irregular enhancing mass with architectural distortion and  corresponding 5.9 cm linear non-mass enhancement at the site of prior  surgery and relatively recent benign biopsy at 12:00 in the far  posterior right breast are greater than expected for benign  postoperative changes with fat necrosis alone and are suspicious for  superimposed malignancy. Recommend surgical consultation for excision.  2.  No MRI evidence of malignancy in the left breast.  BI-RADS Category 4      Pathology:  Lab Results   Component Value Date    FINALDX  10/07/2024     1.  Right breast, 12:00, ultrasound-guided biopsies ([place] clip):   -Fat necrosis.   -No atypical hyperplasia, in situ nor invasive carcinoma identified by routine and immunohistochemical staining.    [facility]           Assessment & Plan     Assessment:  Ms. Larsen is a 69 y.o. female with a new diagnosis of non-mass enhancement on breast MRI, otherwise normal breast imaging.  Previous breast biopsy at the site shows fat necrosis.  She has a personal history of right breast cancer treated with partial mastectomy sentinel lymph node biopsy and radiation.  Her personal medical history complicates her overall treatment including cirrhosis on azathioprine, history of portal vein thrombosis, thrombocytopenia, history of recurrent MRSA pneumonia on suppressive doxycycline.      Discussion:  Confirmation of imaging abnormalities recommended for surgical excisional biopsy by radiology due to the patient's symptoms and imaging findings.  Discussed options to follow  with additional MRI in 6 months to monitor this area versus surgical excision.  She and her family are interested in surgical excision.  Discussed higher risk of poor wound healing due to her personal history as above.    Plan:    -Consents completed and signed. Discussed the risks and benefits of right breast excisional biopsy, wire localized at length including estimated time for procedure, postoperative recovery, and postoperative instructions. Discussed the risks to include pain, bleeding, infection, nerve injury, numbness, seroma, skin complications including necrosis, breast asymmetry, need for re-excision, possible need for axillary dissection at time of surgery or at a later date, and scar.  Patient appears to understand and wishes to proceed.  All questions were answered.  - plan to excise previous breast scar down to the level of the biopsy clip to sample the area that matches to MRI changes. Increased wound healing risk due to history of radiation  - will obtain preoperative clearance from Dr. Hunt - Hepatology ([facility]). Dr Winkler - Pulmonology([facility]). Dr Marroquin - cardiology([place]).         Hanh Raya MD  Breast Surgical Oncology    I spent 60 minutes caring for Taylor on this date of service. This time includes time spent by me in the following activities: preparing for the visit, reviewing tests, obtaining and/or reviewing a separately obtained history, performing a medically appropriate examination and/or evaluation , counseling and educating the patient/family/caregiver, ordering medications, tests, or procedures, referring and communicating with other health care professionals , documenting information in the medical record, independently interpreting results and communicating that information with the patient/family/caregiver, and care coordination.      CC:  Mable Marley,*  Bam Umaña MD Dr Cave Dr Khan Dr S[facility]      Return for surgery.

## 2025-07-16 ENCOUNTER — OFFICE VISIT (OUTPATIENT)
Dept: SURGERY | Facility: CLINIC | Age: 70
End: 2025-07-16
Payer: MEDICARE

## 2025-07-16 VITALS
HEART RATE: 56 BPM | WEIGHT: 153.8 LBS | OXYGEN SATURATION: 95 % | BODY MASS INDEX: 28.3 KG/M2 | DIASTOLIC BLOOD PRESSURE: 77 MMHG | HEIGHT: 62 IN | SYSTOLIC BLOOD PRESSURE: 110 MMHG

## 2025-07-16 DIAGNOSIS — R92.8 ABNORMAL MRI, BREAST: Primary | ICD-10-CM

## 2025-07-16 PROBLEM — G51.39 HEMIFACIAL SPASM: Status: ACTIVE | Noted: 2022-05-02

## 2025-07-16 PROBLEM — I85.10 ESOPHAGEAL VARICES IN CIRRHOSIS: Status: ACTIVE | Noted: 2022-11-03

## 2025-07-16 PROBLEM — I85.00 ESOPHAGEAL VARIX: Status: ACTIVE | Noted: 2023-05-17

## 2025-07-16 PROBLEM — K21.00 GASTRO-ESOPHAGEAL REFLUX DISEASE WITH ESOPHAGITIS: Status: ACTIVE | Noted: 2023-03-22

## 2025-07-16 PROBLEM — J18.9 PNEUMONIA: Status: ACTIVE | Noted: 2023-02-27

## 2025-07-16 PROBLEM — K74.60 ESOPHAGEAL VARICES IN CIRRHOSIS: Status: ACTIVE | Noted: 2022-11-03

## 2025-07-16 PROBLEM — K75.4 AUTOIMMUNE HEPATITIS: Status: ACTIVE | Noted: 2021-11-02

## 2025-07-16 PROBLEM — K74.3 PRIMARY BILIARY CHOLANGITIS: Status: ACTIVE | Noted: 2023-03-22

## 2025-07-16 RX ORDER — BACLOFEN 10 MG/1
10 TABLET ORAL
COMMUNITY
Start: 2025-05-19 | End: 2026-05-19

## 2025-07-16 NOTE — LETTER
July 16, 2025     Bam Umaña MD  [address]  [facility] [address]    Patient: Taylor Larsen   YOB: 1955   Date of Visit: 7/16/2025     Dear Bam Umaña MD:       Thank you for referring Taylor Larsen to me for evaluation. Below are the relevant portions of my assessment and plan of care.    If you have questions, please do not hesitate to call me. I look forward to following Taylor along with you.         Sincerely,        Hanh Raya MD        CC: Mable Marlye, MD Jm Peralta MD Matthew Sousa, MD James N. Code II, Hanh Woodson MD  07/16/25 9077  Sign when Signing Visit  [facility]     Referring Provider: Mable Marley     Chief complaint: abnormal breast imaging - History of right breast cancer 2019     Subjective  HPI: Ms. Taylor Larsen is a 69 y.o. yo woman, seen at the request of Mable Marley,*, for a new diagnosis of abnormal breast MRI in area of previous lumpectomy.      She has a personal history of right breast cancer treated with partial mastectomy sentinel lymph node biopsy.  pT1a N0-ER/TX positive, HER2 negative, Oncotype was 3.  She was treated with adjuvant radiation therapy.  Per Dr. Giordano's note trial of Arimidex for approximately 2 months was initiated but stopped for a possible drug-induced liver injury.  She never restarted hormonal blockade.  She continues to follow with Dr. Rodgers.  Additionally she follows with Dr. Hunt for her cirrhosis.  Follows with Dr. Vicente with [facility] and Dr. Childers with [facility]    Additional diagnostic workup of her right breast was prompted a couple months ago when she noted more retraction of her previous lumpectomy cavity.  In addition to the retraction she noted increased thickening of the surrounding tissue.  Her mammogram and ultrasound were completed.  A biopsy confirmed fat necrosis.  Further imaging  workup was completed to include an MRI.  She has had no significant progression of the symptoms since the imaging workup started.  She has otherwise remained in her usual state of health.     She reports a pertinent PMH significant for right breast cancer, ongoing liver fibrosis, autoimmune hepatitis with stage III fibrosis, history of portal vein thrombosis unable to tolerate further anticoagulation, history of recurrent pneumonia on prophylactic antibiotics and regular albuterol.     She denies any family history of breast or ovarian cancer.     She was joined today in clinic by her  & daughter. She does consent for them to be present during her examination. They did participate in the discussion.         Review of Systems   Constitutional:  Positive for fatigue. Negative for appetite change and fever.   Respiratory:  Positive for cough and shortness of breath.         When lying down   Gastrointestinal:  Negative for abdominal distention, diarrhea and nausea.   Musculoskeletal:  Negative for arthralgias and back pain.   Neurological:  Negative for dizziness, headaches and speech difficulty.   Psychiatric/Behavioral:  Negative for decreased concentration and sleep disturbance.        Medications:    Current Outpatient Medications:   •  acetaminophen (TYLENOL) 325 MG tablet, Take 2 tablets by mouth Every 6 (Six) Hours As Needed for Mild Pain., Disp: , Rfl:   •  albuterol (ACCUNEB) 1.25 MG/3ML nebulizer solution, Inhale 3 mL 2 (Two) Times a Day., Disp: , Rfl:   •  azaTHIOprine (IMURAN) 50 MG tablet, Take 2 tablets by mouth Daily., Disp: , Rfl:   •  baclofen (LIORESAL) 10 MG tablet, Take 1 tablet by mouth., Disp: , Rfl:   •  carvedilol (COREG) 6.25 MG tablet, 2 (Two) Times a Day With Meals., Disp: , Rfl:   •  clonazePAM (KlonoPIN) 0.5 MG tablet, Take  by mouth., Disp: , Rfl:   •  doxycycline (VIBRAMYCIN) 100 MG capsule, Take 1 capsule by mouth., Disp: , Rfl:   •  famotidine (PEPCID) 40 MG tablet, , Disp: ,  Rfl:   •  levETIRAcetam (KEPPRA) 500 MG tablet, Take 1 tablet by mouth., Disp: , Rfl:   •  melatonin 5 MG tablet tablet, Take 2 tablets by mouth At Night As Needed., Disp: , Rfl:   •  mupirocin (BACTROBAN) 2 % ointment, , Disp: , Rfl:   •  Probiotic Product (PROBIOTIC PO), Take 1 tablet by mouth Daily., Disp: , Rfl:   •  traZODone (DESYREL) 50 MG tablet, , Disp: , Rfl:   •  ursodiol (ACTIGALL) 500 MG tablet, Take 1 tablet by mouth., Disp: , Rfl:     Allergies:  Allergies   Allergen Reactions   • Sulfamethoxazole-Trimethoprim Hives       Medical history:  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    • Breast cancer 09/2019    Right IDC   • Clostridium difficile infection 12/2019   • GERD (gastroesophageal reflux disease)    • PONV (postoperative nausea and vomiting)    • Radiation-induced angiosarcoma of breast    • Situational stress     DEATH OF BROTHER 10/2019, RECENT DIAGNOSIS   • Stress headaches        Surgical History:  Past Surgical History:   Procedure Laterality Date   • BILE DUCT STENT PLACEMENT  2014   • BREAST BIOPSY Right 09/2019    malignant   • BREAST CYST ASPIRATION     • BREAST LUMPECTOMY Right    • BREAST LUMPECTOMY WITH SENTINEL NODE BIOPSY Right 11/04/2019    Procedure: BREAST LUMPECTOMY WITH SENTINEL NODE BIOPSY AND NEEDLE LOCALIZATION And possible axillary dissection;  Surgeon: Jean-Paul Ramos MD;  Location: [facility] OR [facility];  Service: General   • BRONCHOSCOPY      x2   • COLONOSCOPY N/A 06/26/2020    Procedure: COLONOSCOPY TO TERMINAL ILEUM WITH BX;  Surgeon: Sage Gonzalez MD;  Location: [facility] ENDOSCOPY;  Service: Gastroenterology;  Laterality: N/A;  PREOP/ SCREENING  POSTOP/ VASCULAR PATTERN TO COLON   • ESOPHAGOSCOPY / EGD N/A 10/06/2022   • KNEE ARTHROSCOPY Left    • LAPAROSCOPIC CHOLECYSTECTOMY  2014   • TUBAL ABDOMINAL LIGATION         Family History:  Family History   Problem Relation Age of Onset   • Heart failure Mother    •  Alcohol abuse Father    • COPD Father    • No Known Problems Sister    • Diabetes Brother    • Prostate cancer Brother    • Throat cancer Brother    • No Known Problems Brother    • No Known Problems Brother    • No Known Problems Brother    • No Known Problems Daughter    • Breast cancer Maternal Aunt    • Breast cancer Maternal Cousin    • Breast cancer Maternal Cousin    • Breast cancer Maternal Aunt    • Cancer Maternal Aunt         Breast   • Breast cancer Maternal Aunt    • Cancer Maternal Aunt         Breast   • Breast cancer Maternal Aunt    • Cancer Maternal Aunt         Breast   • Diabetes Brother    • Prostate cancer Brother    • Cancer Brother         throat cancer   • Early death Brother         Organ failure   • Malig Hyperthermia Neg Hx    • Ovarian cancer Neg Hx    • Uterine cancer Neg Hx    • Colon cancer Neg Hx        Family Cancer History: relationship - (age of diagnosis/current age or age at death)  Breast: Self  Ovarian: No family history of ovarian cancer.  Colon: No family history of colon cancer.   Pancreas: No family history of pancreatic cancer.  Prostate: No family history of prostate cancer.   Lung Cancer: No family history of lung cancer.     Social History:   Social History     Socioeconomic History   • Marital status:    • Number of children: 2   Tobacco Use   • Smoking status: Former     Current packs/day: 0.00     Average packs/day: 0.5 packs/day for 10.0 years (5.0 ttl pk-yrs)     Types: Cigarettes     Start date: 2004     Quit date:      Years since quittin.5   • Smokeless tobacco: Former   Vaping Use   • Vaping status: Never Used   Substance and Sexual Activity   • Alcohol use: No   • Drug use: No   • Sexual activity: Not Currently     Partners: Male     Birth control/protection: None, Tubal ligation       She lives with   She is employed as [employer]        GYNECOLOGIC HISTORY:   . P: 2. AB: 0.  Last menstrual period: 44  Age at menarche: 13  Age  "at first childbirth: 22  Lactation: N/A  Age at menopause: 44  Total years of oral contraceptive use: 24  Total years of hormone replacement therapy: N/A      Objective  PHYSICAL EXAMINATION  /77   Pulse 56   Ht 157.5 cm (62.01\")   Wt 69.8 kg (153 lb 12.8 oz)   LMP  (LMP Unknown)   SpO2 95%   BMI 28.12 kg/m²   ECOG 1 - Symptomatic but completely ambulatory  General: NAD, well appearing  Psych: a&o x 3, normal mood and affect  Eyes: EOMI, no scleral icterus  ENMT: neck supple without masses or thyromegaly, mucus membranes moist  Resp: normal effort  CV: RRR, no edema   GI: soft, NT, ND  MSK: normal gait, normal ROM in bilateral shoulders  Lymph nodes:  no cervical, supraclavicular or axillary lymphadenopathy  Breast: Left larger than right, small breast volume bilaterally, grade 2 ptosis  Right: Well-healed partial mastectomy incision and upper central breast, with associated retraction and SIMON lumpectomy fibrosis.  No skin thickening appreciated on exam.  No nipple abnormalities.  Left:  No visible abnormalities on inspection while seated, with arms raised or hands on hips. No masses, skin changes, or nipple abnormalities.      Imaging - documented images below have been personally reviewed:  9/26/2024 bilateral diagnostic mammogram and right breast ultrasound ( [facility])  Imaging completed for right breast pain, puckering around previous lumpectomy scar  The breasts are almost entirely fatty. No abnormality is  seen in the left breast. There is postsurgical scarring in the upper  right breast extending near the chest wall and this shows further  increased density when compared to the study of 10/06/2021. The area of  increased density appears to surround an area of the lower density  suggesting progressive fat necrosis. This involves an area measuring up  to 1.9 x 2.6 cm on the mammogram with central lucency measuring up to  1.5 cm.     Correlating directed right breast ultrasound demonstrates an " echogenic  area surrounding a hypoechoic center. The more echogenic component  measures approximately 2.3 x 2.7 cm with the central hypoechoic area  measuring 1.3 cm. There is some increased vascularity in the echogenic  component. This may relate to progressive fat necrosis since 2021 but  remains somewhat suspicious including the presence of increasing pain.  Further evaluation with ultrasound-guided core mammotome biopsy is  therefore recommended.     CONCLUSION: Suspicious right-sided mammogram showing possible  progressive changes of fat necrosis deep in the upper right breast but  showing significant change since 2021 and having associated pain. There  is some increased vascularity noted on ultrasound and further evaluation  with ultrasound-guided core biopsy to the echogenic component is  recommended. These findings have been discussed with the patient.   BI-RADS CATEGORY 4    10/7/24 right breast us biopsy  The area of interest at 12:00, 6 cm from the nipple in the  right breast was targeted under ultrasound.  A small skin incision was then made to  permit passage of a 10 g needle with a vacuum-assisted biopsy device. 2  core specimens were obtained. A bowtie clip was then deployed at the  biopsy site. The patient tolerated the procedure well with no immediate  complications.    Post-procedural digital mammographic views of the right breast  demonstrate the bowtie clip at the expected location at the site of  biopsy in the upper central posterior right breast  Final Diagnosis  1.  Right breast, 12:00, ultrasound-guided biopsies ([place] clip):   -Fat necrosis.  -No atypical hyperplasia, in situ nor invasive carcinoma identified by  routine and immunohistochemical staining.  IMPRESSION:  1. Ultrasound-guided core needle biopsy of echogenicity surrounding an  area of fat necrosis at a prior surgical site at 12:00, 6 cm from the  nipple in the right breast, marked with a bowtie clip. Pathology is  benign and  concordant with the imaging assessment. Recommend annual  screening mammogram in September 2025.    6/16/25 CT Chest - [facility]  Thyroid within normal limits. No suspicious axillary, supraclavicular,   or new mediastinal lymphadenopathy. Calcified right hilar lymph nodes,   likely the sequelae of prior granulomatous disease.     Heart size within normal limits. Moderate coronary artery   calcifications. No thoracic aortic aneurysm.     Central bronchial wall thickening. Similar scarring and bronchiectasis   within the medial segment of the right middle lobe and lingula.   Similar consolidation and bronchiectasis throughout the right lower   lobe with surrounding tree-in-bud nodularity. New ill-defined nodules   within the right lower lobe measuring up to 6 mm (series 3, image 417)   and 8 mm (series 3, image 440). Similar small right pleural effusion.   Scattered calcified granulomas.     Cirrhotic liver morphology. Similar 3.4 cm hypodense lesion along the   posterior aspect of the liver with adjacent enlarging 8 mm hypodensity   more laterally (series 2, image 93). Cholecystectomy. Splenomegaly.   Postlumpectomy changes to the right breast with overlying skin   retraction and new biopsy clip within ill-defined soft tissue   measuring up to 2.2 cm. No acute osseous abnormality.     IMPRESSION:    1.Similar scarring and bronchiectasis within the medial segment of the   right middle lobe and lingula as well as throughout the dependent   aspect of the right lower lobe. There are new superimposed ill-defined   nodules within the right lower lobe which may be infectious or   inflammatory but are indeterminant. Per current Fleischner guidelines,   a follow-up CT of the chest is recommended in 3-6 months to evaluate   stability.   2.Similar chronic pleural thickening of the right inferior hemithorax   with small right pleural effusion.   3.Cirrhotic liver morphology with similar hypodense lesions within the   posterior  right hepatic lobe. Recommend correlation with recent prior   outside abdominal MRI.   4.Postprocedural changes to the right breast with ill-defined soft   tissue nodularity, recently biopsied. Recommend correlation with prior   mammography/biopsy.   5.Moderate coronary artery calcifications.     6/19/25 Right Breast US  Targeted sonographic evaluation of the right breast was performed in the  area of concern indicated by the patient at 12:00, 6 cm from the nipple.  Benign-appearing postsurgical changes and fat necrosis are again  identified and are similar in size when accounting for differences in  technique. The central components are more echogenic compared to prior  ultrasound from September, possibly due to evolving fat necrosis and/or  superimposed postbiopsy changes, including residual postbiopsy blood  products. There is some mild peripheral vascularity, which is grossly  similar to prior. There is no hypervascularity. No new suspicious mass  or collection is identified at this location. However, given the  patient's reported continued clinical symptoms at this site, recommend  further evaluation with contrast-enhanced breast MRI, in addition to  clinical follow-up/management. Additionally, the patient will be due for  annual bilateral mammogram in September. If the patient's symptoms  persist at that time, diagnostic mammogram would be recommended in place  of screening mammogram. Diagnostic mammogram could also be performed at  this time, if clinically indicated.  BI-RADS Category 2    7/6/25 Breast MRI  FINDINGS: There is scattered fibroglandular tissue. There is mild  background parenchymal enhancement.     RIGHT BREAST:    At 12:00 in the far posterior right breast, 8 cm posterior to the  nipple, there is a 2.5 x 2 x 2.3 cm irregular enhancing mass with  architectural distortion, which represents the site of prior surgery and  site of relatively recent benign biopsy demonstrating fat necrosis.  There  is corresponding washout delayed enhancement on kinetic analysis.  In addition, there is corresponding linear non-mass enhancement  extending posterior and predominantly ventral to the mass, which  measures up to approximately 5.9 cm in maximum AP dimension. Enhancement  extends along the surface of the underlying pectoralis muscle  posteriorly with no suspicious enhancement within the muscle itself and  involves the overlying skin anteriorly. There is overlying skin  retraction. The degree of enhancement and reported clinical changes at  this location would be atypical for benign postoperative changes alone  and are suspicious for superimposed malignancy.   No suspicious enhancement is identified in the right nipple.  The visualized axilla is within normal limits.     LEFT BREAST:    No suspicious enhancing mass or area of non-mass enhancement is  identified.  The visualized axilla is within normal limits.     EXTRAMAMMARY FINDINGS:  Small bilateral internal mammary chain lymph nodes are similar to 2019.   A prominent right cardiophrenic lymph node is also similar.  There is a trace right pleural effusion.      IMPRESSION AND RECOMMENDATION:   1.  A 2.5 cm irregular enhancing mass with architectural distortion and  corresponding 5.9 cm linear non-mass enhancement at the site of prior  surgery and relatively recent benign biopsy at 12:00 in the far  posterior right breast are greater than expected for benign  postoperative changes with fat necrosis alone and are suspicious for  superimposed malignancy. Recommend surgical consultation for excision.  2.  No MRI evidence of malignancy in the left breast.  BI-RADS Category 4      Pathology:  Lab Results   Component Value Date    FINALDX  10/07/2024     1.  Right breast, 12:00, ultrasound-guided biopsies ([place] clip):   -Fat necrosis.   -No atypical hyperplasia, in situ nor invasive carcinoma identified by routine and immunohistochemical staining.    JYOTSNA            Assessment & Plan    Assessment:  Ms. Larsen is a 69 y.o. female with a new diagnosis of non-mass enhancement on breast MRI, otherwise normal breast imaging.  Previous breast biopsy at the site shows fat necrosis.  She has a personal history of right breast cancer treated with partial mastectomy sentinel lymph node biopsy and radiation.  Her personal medical history complicates her overall treatment including cirrhosis on azathioprine, history of portal vein thrombosis, thrombocytopenia, history of recurrent MRSA pneumonia on suppressive doxycycline.      Discussion:  Confirmation of imaging abnormalities recommended for surgical excisional biopsy by radiology due to the patient's symptoms and imaging findings.  Discussed options to follow with additional MRI in 6 months to monitor this area versus surgical excision.  She and her family are interested in surgical excision.  Discussed higher risk of poor wound healing due to her personal history as above.    Plan:    -Consents completed and signed. Discussed the risks and benefits of right breast excisional biopsy, wire localized at length including estimated time for procedure, postoperative recovery, and postoperative instructions. Discussed the risks to include pain, bleeding, infection, nerve injury, numbness, seroma, skin complications including necrosis, breast asymmetry, need for re-excision, possible need for axillary dissection at time of surgery or at a later date, and scar.  Patient appears to understand and wishes to proceed.  All questions were answered.  - plan to excise previous breast scar down to the level of the biopsy clip to sample the area that matches to MRI changes. Increased wound healing risk due to history of radiation  - will obtain preoperative clearance from Dr. Hunt - [facility]atology ([facility] Dr Winkler - Pulmonology([place]). Dr Marroquin - cardiology([place]).         Hanh Raya MD  Breast Surgical Oncology    I spent 60 minutes caring for  Taylor on this date of service. This time includes time spent by me in the following activities: preparing for the visit, reviewing tests, obtaining and/or reviewing a separately obtained history, performing a medically appropriate examination and/or evaluation , counseling and educating the patient/family/caregiver, ordering medications, tests, or procedures, referring and communicating with other health care professionals , documenting information in the medical record, independently interpreting results and communicating that information with the patient/family/caregiver, and care coordination.      CC:  Mable Marley,Bam Mason MD Dr Cave Dr Khan Dr Sousa      Return for surgery.

## 2025-07-17 ENCOUNTER — DOCUMENTATION (OUTPATIENT)
Dept: ONCOLOGY | Facility: CLINIC | Age: 70
End: 2025-07-17
Payer: MEDICARE

## 2025-07-17 NOTE — PROGRESS NOTES
Vanessa please watch her chart.  Dr. Raya is planning excision of scarred area around prior lumpectomy site which has some skin puckering.    Vanessa if pathology shows active breast cancer, she will need to see one of the doctors in our practice who has a focus on breast cancer.

## 2025-07-18 ENCOUNTER — TELEPHONE (OUTPATIENT)
Dept: SURGERY | Facility: CLINIC | Age: 70
End: 2025-07-18
Payer: MEDICARE

## 2025-07-18 NOTE — TELEPHONE ENCOUNTER
Left Message-   Surgery 8/6 arrive at 9:30 surgery at 12:30  Pre Admission Testing 7/24 at 9:30   Post Op 8/19 at 1:45

## 2025-07-24 ENCOUNTER — PRE-ADMISSION TESTING (OUTPATIENT)
Dept: PREADMISSION TESTING | Facility: HOSPITAL | Age: 70
End: 2025-07-24
Payer: MEDICARE

## 2025-07-24 VITALS
BODY MASS INDEX: 27.64 KG/M2 | RESPIRATION RATE: 18 BRPM | WEIGHT: 156 LBS | SYSTOLIC BLOOD PRESSURE: 113 MMHG | HEART RATE: 71 BPM | HEIGHT: 63 IN | TEMPERATURE: 97.4 F | DIASTOLIC BLOOD PRESSURE: 61 MMHG | OXYGEN SATURATION: 96 %

## 2025-07-24 DIAGNOSIS — R92.8 ABNORMAL MRI, BREAST: ICD-10-CM

## 2025-07-24 LAB
ALBUMIN SERPL-MCNC: 3.5 G/DL (ref 3.5–5.2)
ALBUMIN/GLOB SERPL: 0.9 G/DL
ALP SERPL-CCNC: 107 U/L (ref 39–117)
ALT SERPL W P-5'-P-CCNC: 9 U/L (ref 1–33)
ANION GAP SERPL CALCULATED.3IONS-SCNC: 10.8 MMOL/L (ref 5–15)
ANION GAP SERPL CALCULATED.3IONS-SCNC: 11 MMOL/L (ref 5–15)
AST SERPL-CCNC: 25 U/L (ref 1–32)
BASOPHILS # BLD AUTO: 0.02 10*3/MM3 (ref 0–0.2)
BASOPHILS NFR BLD AUTO: 0.3 % (ref 0–1.5)
BILIRUB SERPL-MCNC: 0.4 MG/DL (ref 0–1.2)
BUN SERPL-MCNC: 22 MG/DL (ref 8–23)
BUN SERPL-MCNC: 23 MG/DL (ref 8–23)
BUN/CREAT SERPL: 27.1 (ref 7–25)
BUN/CREAT SERPL: 27.2 (ref 7–25)
CALCIUM SPEC-SCNC: 8.9 MG/DL (ref 8.6–10.5)
CALCIUM SPEC-SCNC: 9 MG/DL (ref 8.6–10.5)
CHLORIDE SERPL-SCNC: 102 MMOL/L (ref 98–107)
CHLORIDE SERPL-SCNC: 102 MMOL/L (ref 98–107)
CO2 SERPL-SCNC: 24 MMOL/L (ref 22–29)
CO2 SERPL-SCNC: 24.2 MMOL/L (ref 22–29)
CREAT SERPL-MCNC: 0.81 MG/DL (ref 0.57–1)
CREAT SERPL-MCNC: 0.85 MG/DL (ref 0.57–1)
DEPRECATED RDW RBC AUTO: 42.6 FL (ref 37–54)
EGFRCR SERPLBLD CKD-EPI 2021: 74.3 ML/MIN/1.73
EGFRCR SERPLBLD CKD-EPI 2021: 78.7 ML/MIN/1.73
EOSINOPHIL # BLD AUTO: 0.27 10*3/MM3 (ref 0–0.4)
EOSINOPHIL NFR BLD AUTO: 4.5 % (ref 0.3–6.2)
ERYTHROCYTE [DISTWIDTH] IN BLOOD BY AUTOMATED COUNT: 12.8 % (ref 12.3–15.4)
GLOBULIN UR ELPH-MCNC: 3.8 GM/DL
GLUCOSE SERPL-MCNC: 105 MG/DL (ref 65–99)
GLUCOSE SERPL-MCNC: 110 MG/DL (ref 65–99)
HCT VFR BLD AUTO: 36.6 % (ref 34–46.6)
HGB BLD-MCNC: 12.3 G/DL (ref 12–15.9)
IMM GRANULOCYTES # BLD AUTO: 0.03 10*3/MM3 (ref 0–0.05)
IMM GRANULOCYTES NFR BLD AUTO: 0.5 % (ref 0–0.5)
LYMPHOCYTES # BLD AUTO: 1.85 10*3/MM3 (ref 0.7–3.1)
LYMPHOCYTES NFR BLD AUTO: 30.6 % (ref 19.6–45.3)
MCH RBC QN AUTO: 30.8 PG (ref 26.6–33)
MCHC RBC AUTO-ENTMCNC: 33.6 G/DL (ref 31.5–35.7)
MCV RBC AUTO: 91.7 FL (ref 79–97)
MONOCYTES # BLD AUTO: 0.55 10*3/MM3 (ref 0.1–0.9)
MONOCYTES NFR BLD AUTO: 9.1 % (ref 5–12)
NEUTROPHILS NFR BLD AUTO: 3.32 10*3/MM3 (ref 1.7–7)
NEUTROPHILS NFR BLD AUTO: 55 % (ref 42.7–76)
PLATELET # BLD AUTO: 138 10*3/MM3 (ref 140–450)
PMV BLD AUTO: 10.7 FL (ref 6–12)
POTASSIUM SERPL-SCNC: 4 MMOL/L (ref 3.5–5.2)
POTASSIUM SERPL-SCNC: 4 MMOL/L (ref 3.5–5.2)
PROT SERPL-MCNC: 7.3 G/DL (ref 6–8.5)
QT INTERVAL: 404 MS
QTC INTERVAL: 429 MS
RBC # BLD AUTO: 3.99 10*6/MM3 (ref 3.77–5.28)
SODIUM SERPL-SCNC: 137 MMOL/L (ref 136–145)
SODIUM SERPL-SCNC: 137 MMOL/L (ref 136–145)
WBC NRBC COR # BLD AUTO: 6.04 10*3/MM3 (ref 3.4–10.8)

## 2025-07-24 PROCEDURE — 93005 ELECTROCARDIOGRAM TRACING: CPT

## 2025-07-24 PROCEDURE — 36415 COLL VENOUS BLD VENIPUNCTURE: CPT

## 2025-07-24 PROCEDURE — 85025 COMPLETE CBC W/AUTO DIFF WBC: CPT

## 2025-07-24 PROCEDURE — 80053 COMPREHEN METABOLIC PANEL: CPT

## 2025-07-24 PROCEDURE — 93010 ELECTROCARDIOGRAM REPORT: CPT | Performed by: INTERNAL MEDICINE

## 2025-07-24 RX ORDER — FAMOTIDINE 20 MG/1
20 TABLET, FILM COATED ORAL EVERY EVENING
COMMUNITY

## 2025-07-24 NOTE — DISCHARGE INSTRUCTIONS
Take the following medications the morning of surgery:    CARVEDILOL AND BACTROBAN TO YOUR NOSE.   BRING ALBUTEROL INHALER WITH YOU.      If you are on an Aspirin or a Blood Thinner please clarify with the surgeon and prescribing physician if and when you are to hold the medication or if you are to continue the medication.  If you are on prescription narcotic pain medication to control your pain you may also take that medication the morning of surgery.      General Instructions:     Do not eat solid food after midnight the night before surgery.  Clear liquids day of surgery are allowed but must be stopped at least two hours before your hospital arrival time.       Allowed clear liquids      Water, sodas, and tea or coffee with no cream or milk added.       12 to 20 ounces of a clear liquid that contains carbohydrates is recommended.  If non-diabetic, have Gatorade or Powerade.  If diabetic, have G2 or Powerade Zero.     Do not have liquids red in color.  Do not consume chicken, beef, pork or vegetable broth or bouillon cubes of any variety as they are not considered clear liquids and are not allowed.      Infants may have breast milk up to four hours before surgery.  Infants drinking formula may drink formula up to six hours before surgery.   Patients who avoid smoking, chewing tobacco and alcohol for 4 weeks prior to surgery have a reduced risk of post-operative complications.  Quit smoking as many days before surgery as you can.  Do not smoke, use chewing tobacco or drink alcohol the day of surgery.   If applicable bring your C-PAP/ BI-PAP machine in with you to preop day of surgery.  Bring any papers given to you in the doctor’s office.  Wear clean comfortable clothes.  Do not wear contact lenses, false eyelashes or make-up.  Bring a case for your glasses.   Bring crutches or walker if applicable.  Remove all piercings.  Leave jewelry and any other valuables at home.  Hair extensions with metal clips must be  removed prior to surgery.  The Pre-Admission Testing nurse will instruct you to bring medications if unable to obtain an accurate list in Pre-Admission Testing.    Day of surgery you will need to let the preoperative nurse know the last time you took each of your medications.  To ensure a safe environment for patients and staff, we kindly ask that children under the age of 16 not accompany patients.  If you must bring a dependent child or dependent adult please ensure a responsible adult, other than yourself, is present to supervise them.      Preventing a Surgical Site Infection:  For 2 to 3 days before surgery, avoid shaving with a razor because the razor can irritate skin and make it easier to develop an infection.    Any areas of open skin can increase the risk of a post-operative wound infection by allowing bacteria to enter and travel throughout the body.  Notify your surgeon if you have any skin wounds / rashes even if it is not near the expected surgical site.  The area will need assessed to determine if surgery should be delayed until it is healed.  The night prior to surgery shower using a fresh bar of anti-bacterial soap (such as Dial) and clean washcloth.  Sleep in a clean bed with clean clothing.  Do not allow pets to sleep with you.  Shower on the morning of surgery using a fresh bar of anti-bacterial soap (such as Dial) and clean washcloth.  Dry with a clean towel and dress in clean clothing.  Ask your surgeon if you will be receiving antibiotics prior to surgery.  Make sure you, your family, and all healthcare providers clean their hands with soap and water or an alcohol based hand  before caring for you or your wound.    Day of surgery:  Your arrival time is approximately two hours before your scheduled surgery time.  Please note if you have an early arrival time the surgery doors do not open before 5:00 AM.  Upon arrival, a Pre-op nurse and Anesthesiologist will review your health history,  obtain vital signs, and answer questions you may have.  The only belongings needed at this time will be a list of your home medications and if applicable your C-PAP/BI-PAP machine.  A Pre-op nurse will start an IV and you may receive medication in preparation for surgery, including something to help you relax.     Please be aware that surgery does come with discomfort.  We want to make every effort to control your discomfort so please discuss any uncontrolled symptoms with your nurse.   Your doctor will most likely have prescribed pain medications.      If you are going home after surgery you will receive individualized written care instructions before being discharged.  A responsible adult must drive you to and from the hospital on the day of your surgery and ideally stay with you through the night.   .  Discharge prescriptions can be filled by the hospital pharmacy during regular pharmacy hours.  If you are having surgery late in the day/evening your prescription may be e-prescribed to your pharmacy.  Please verify your pharmacy hours or chose a 24 hour pharmacy to avoid not having access to your prescription because your pharmacy has closed for the day.    If you are staying overnight following surgery, you will be transported to your hospital room following the recovery period.  [facility] has all private rooms.    If you have any questions please call Pre-Admission Testing at [phone].  Deductibles and co-payments are collected on the day of service. Please be prepared to pay the required co-pay, deductible or deposit on the day of service as defined by your plan.    Call your surgeon immediately if you experience any of the following symptoms:  Sore Throat  Shortness of Breath or difficulty breathing  Cough  Chills  Body soreness or muscle pain  Headache  Fever  New loss of taste or smell  Do not arrive for your surgery ill.  Your procedure will need to be rescheduled to another time.  You  will need to call your physician before the day of surgery to avoid any unnecessary exposure to hospital staff as well as other patients.

## 2025-08-05 ENCOUNTER — PATIENT MESSAGE (OUTPATIENT)
Dept: SURGERY | Facility: CLINIC | Age: 70
End: 2025-08-05
Payer: MEDICARE

## 2025-08-06 ENCOUNTER — ANESTHESIA (OUTPATIENT)
Dept: PERIOP | Facility: HOSPITAL | Age: 70
End: 2025-08-06
Payer: MEDICARE

## 2025-08-06 ENCOUNTER — HOSPITAL ENCOUNTER (OUTPATIENT)
Facility: HOSPITAL | Age: 70
Setting detail: HOSPITAL OUTPATIENT SURGERY
Discharge: HOME | End: 2025-08-06
Attending: STUDENT IN AN ORGANIZED HEALTH CARE EDUCATION/TRAINING PROGRAM | Admitting: STUDENT IN AN ORGANIZED HEALTH CARE EDUCATION/TRAINING PROGRAM
Payer: MEDICARE

## 2025-08-06 ENCOUNTER — APPOINTMENT (OUTPATIENT)
Dept: GENERAL RADIOLOGY | Facility: HOSPITAL | Age: 70
End: 2025-08-06
Payer: MEDICARE

## 2025-08-06 ENCOUNTER — HOSPITAL ENCOUNTER (OUTPATIENT)
Dept: MAMMOGRAPHY | Facility: HOSPITAL | Age: 70
Setting detail: HOSPITAL OUTPATIENT SURGERY
Discharge: HOME | End: 2025-08-06
Payer: MEDICARE

## 2025-08-06 ENCOUNTER — ANESTHESIA EVENT (OUTPATIENT)
Dept: PERIOP | Facility: HOSPITAL | Age: 70
End: 2025-08-06
Payer: MEDICARE

## 2025-08-06 ENCOUNTER — APPOINTMENT (OUTPATIENT)
Dept: ULTRASOUND IMAGING | Facility: HOSPITAL | Age: 70
End: 2025-08-06
Payer: MEDICARE

## 2025-08-06 ENCOUNTER — TRANSCRIBE ORDERS (OUTPATIENT)
Dept: LAB | Facility: HOSPITAL | Age: 70
End: 2025-08-06
Payer: MEDICARE

## 2025-08-06 ENCOUNTER — ANCILLARY PROCEDURE (OUTPATIENT)
Dept: LAB | Facility: HOSPITAL | Age: 70
End: 2025-08-06
Payer: MEDICARE

## 2025-08-06 VITALS
TEMPERATURE: 97.8 F | DIASTOLIC BLOOD PRESSURE: 61 MMHG | OXYGEN SATURATION: 96 % | HEART RATE: 65 BPM | RESPIRATION RATE: 16 BRPM | SYSTOLIC BLOOD PRESSURE: 109 MMHG | BODY MASS INDEX: 28.53 KG/M2 | HEIGHT: 62 IN

## 2025-08-06 DIAGNOSIS — N64.9 BREAST LESION: ICD-10-CM

## 2025-08-06 DIAGNOSIS — R92.8 ABNORMAL MRI, BREAST: ICD-10-CM

## 2025-08-06 DIAGNOSIS — N64.9 BREAST LESION: Primary | ICD-10-CM

## 2025-08-06 PROBLEM — Z85.3 HISTORY OF BREAST CANCER IN FEMALE: Status: ACTIVE | Noted: 2025-08-06

## 2025-08-06 PROCEDURE — 25010000002 FAMOTIDINE 10 MG/ML SOLUTION: Performed by: ANESTHESIOLOGY

## 2025-08-06 PROCEDURE — 25010000002 LIDOCAINE 1 % SOLUTION: Performed by: RADIOLOGY

## 2025-08-06 PROCEDURE — 25010000002 CEFAZOLIN PER 500 MG: Performed by: STUDENT IN AN ORGANIZED HEALTH CARE EDUCATION/TRAINING PROGRAM

## 2025-08-06 PROCEDURE — 25810000003 LACTATED RINGERS PER 1000 ML: Performed by: ANESTHESIOLOGY

## 2025-08-06 PROCEDURE — 25010000002 LIDOCAINE 2% SOLUTION: Performed by: NURSE ANESTHETIST, CERTIFIED REGISTERED

## 2025-08-06 PROCEDURE — 76098 X-RAY EXAM SURGICAL SPECIMEN: CPT

## 2025-08-06 PROCEDURE — 25010000002 BUPIVACAINE (PF) 0.5 % SOLUTION 10 ML VIAL: Performed by: STUDENT IN AN ORGANIZED HEALTH CARE EDUCATION/TRAINING PROGRAM

## 2025-08-06 PROCEDURE — 25010000002 PROPOFOL 500 MG/50ML EMULSION: Performed by: NURSE ANESTHETIST, CERTIFIED REGISTERED

## 2025-08-06 PROCEDURE — 88342 IMHCHEM/IMCYTCHM 1ST ANTB: CPT | Performed by: STUDENT IN AN ORGANIZED HEALTH CARE EDUCATION/TRAINING PROGRAM

## 2025-08-06 PROCEDURE — 25010000002 PROPOFOL 10 MG/ML EMULSION: Performed by: NURSE ANESTHETIST, CERTIFIED REGISTERED

## 2025-08-06 PROCEDURE — 25010000002 LIDOCAINE 1 % SOLUTION 20 ML VIAL: Performed by: STUDENT IN AN ORGANIZED HEALTH CARE EDUCATION/TRAINING PROGRAM

## 2025-08-06 PROCEDURE — C1819 TISSUE LOCALIZATION-EXCISION: HCPCS

## 2025-08-06 PROCEDURE — 25010000002 FENTANYL CITRATE (PF) 50 MCG/ML SOLUTION: Performed by: NURSE ANESTHETIST, CERTIFIED REGISTERED

## 2025-08-06 PROCEDURE — 19125 EXCISION BREAST LESION: CPT | Performed by: STUDENT IN AN ORGANIZED HEALTH CARE EDUCATION/TRAINING PROGRAM

## 2025-08-06 PROCEDURE — 88307 TISSUE EXAM BY PATHOLOGIST: CPT | Performed by: STUDENT IN AN ORGANIZED HEALTH CARE EDUCATION/TRAINING PROGRAM

## 2025-08-06 PROCEDURE — 88341 IMHCHEM/IMCYTCHM EA ADD ANTB: CPT | Performed by: STUDENT IN AN ORGANIZED HEALTH CARE EDUCATION/TRAINING PROGRAM

## 2025-08-06 RX ORDER — NALOXONE HCL 0.4 MG/ML
0.2 VIAL (ML) INJECTION AS NEEDED
Status: DISCONTINUED | OUTPATIENT
Start: 2025-08-06 | End: 2025-08-06 | Stop reason: HOSPADM

## 2025-08-06 RX ORDER — FENTANYL CITRATE 50 UG/ML
50 INJECTION, SOLUTION INTRAMUSCULAR; INTRAVENOUS
Status: DISCONTINUED | OUTPATIENT
Start: 2025-08-06 | End: 2025-08-06 | Stop reason: HOSPADM

## 2025-08-06 RX ORDER — OXYCODONE AND ACETAMINOPHEN 7.5; 325 MG/1; MG/1
1 TABLET ORAL EVERY 4 HOURS PRN
Status: DISCONTINUED | OUTPATIENT
Start: 2025-08-06 | End: 2025-08-06 | Stop reason: HOSPADM

## 2025-08-06 RX ORDER — EPHEDRINE SULFATE 50 MG/ML
5 INJECTION, SOLUTION INTRAVENOUS ONCE AS NEEDED
Status: DISCONTINUED | OUTPATIENT
Start: 2025-08-06 | End: 2025-08-06 | Stop reason: HOSPADM

## 2025-08-06 RX ORDER — FENTANYL CITRATE 50 UG/ML
INJECTION, SOLUTION INTRAMUSCULAR; INTRAVENOUS AS NEEDED
Status: DISCONTINUED | OUTPATIENT
Start: 2025-08-06 | End: 2025-08-06 | Stop reason: SURG

## 2025-08-06 RX ORDER — SODIUM CHLORIDE 0.9 % (FLUSH) 0.9 %
3 SYRINGE (ML) INJECTION EVERY 12 HOURS SCHEDULED
Status: DISCONTINUED | OUTPATIENT
Start: 2025-08-06 | End: 2025-08-06 | Stop reason: HOSPADM

## 2025-08-06 RX ORDER — PROPOFOL 10 MG/ML
VIAL (ML) INTRAVENOUS AS NEEDED
Status: DISCONTINUED | OUTPATIENT
Start: 2025-08-06 | End: 2025-08-06 | Stop reason: SURG

## 2025-08-06 RX ORDER — FENTANYL CITRATE 50 UG/ML
50 INJECTION, SOLUTION INTRAMUSCULAR; INTRAVENOUS ONCE AS NEEDED
Status: DISCONTINUED | OUTPATIENT
Start: 2025-08-06 | End: 2025-08-06 | Stop reason: HOSPADM

## 2025-08-06 RX ORDER — LIDOCAINE HYDROCHLORIDE 20 MG/ML
INJECTION, SOLUTION INFILTRATION; PERINEURAL AS NEEDED
Status: DISCONTINUED | OUTPATIENT
Start: 2025-08-06 | End: 2025-08-06 | Stop reason: SURG

## 2025-08-06 RX ORDER — LIDOCAINE HYDROCHLORIDE 10 MG/ML
10 INJECTION, SOLUTION INFILTRATION; PERINEURAL ONCE
Status: COMPLETED | OUTPATIENT
Start: 2025-08-06 | End: 2025-08-06

## 2025-08-06 RX ORDER — PROPOFOL 10 MG/ML
INJECTION, EMULSION INTRAVENOUS CONTINUOUS PRN
Status: DISCONTINUED | OUTPATIENT
Start: 2025-08-06 | End: 2025-08-06 | Stop reason: SURG

## 2025-08-06 RX ORDER — DROPERIDOL 2.5 MG/ML
0.62 INJECTION, SOLUTION INTRAMUSCULAR; INTRAVENOUS
Status: DISCONTINUED | OUTPATIENT
Start: 2025-08-06 | End: 2025-08-06 | Stop reason: HOSPADM

## 2025-08-06 RX ORDER — FLUMAZENIL 0.1 MG/ML
0.2 INJECTION INTRAVENOUS AS NEEDED
Status: DISCONTINUED | OUTPATIENT
Start: 2025-08-06 | End: 2025-08-06 | Stop reason: HOSPADM

## 2025-08-06 RX ORDER — IPRATROPIUM BROMIDE AND ALBUTEROL SULFATE 2.5; .5 MG/3ML; MG/3ML
3 SOLUTION RESPIRATORY (INHALATION) ONCE AS NEEDED
Status: DISCONTINUED | OUTPATIENT
Start: 2025-08-06 | End: 2025-08-06 | Stop reason: HOSPADM

## 2025-08-06 RX ORDER — FAMOTIDINE 10 MG/ML
20 INJECTION, SOLUTION INTRAVENOUS ONCE
Status: COMPLETED | OUTPATIENT
Start: 2025-08-06 | End: 2025-08-06

## 2025-08-06 RX ORDER — SODIUM CHLORIDE 0.9 % (FLUSH) 0.9 %
3-10 SYRINGE (ML) INJECTION AS NEEDED
Status: DISCONTINUED | OUTPATIENT
Start: 2025-08-06 | End: 2025-08-06 | Stop reason: HOSPADM

## 2025-08-06 RX ORDER — DIPHENHYDRAMINE HYDROCHLORIDE 50 MG/ML
12.5 INJECTION, SOLUTION INTRAMUSCULAR; INTRAVENOUS
Status: DISCONTINUED | OUTPATIENT
Start: 2025-08-06 | End: 2025-08-06 | Stop reason: HOSPADM

## 2025-08-06 RX ORDER — LIDOCAINE HYDROCHLORIDE 10 MG/ML
3 INJECTION, SOLUTION INFILTRATION; PERINEURAL ONCE
Status: DISCONTINUED | OUTPATIENT
Start: 2025-08-06 | End: 2025-08-07 | Stop reason: HOSPADM

## 2025-08-06 RX ORDER — PROMETHAZINE HYDROCHLORIDE 25 MG/1
25 SUPPOSITORY RECTAL ONCE AS NEEDED
Status: DISCONTINUED | OUTPATIENT
Start: 2025-08-06 | End: 2025-08-06 | Stop reason: HOSPADM

## 2025-08-06 RX ORDER — ONDANSETRON 2 MG/ML
4 INJECTION INTRAMUSCULAR; INTRAVENOUS ONCE AS NEEDED
Status: DISCONTINUED | OUTPATIENT
Start: 2025-08-06 | End: 2025-08-06 | Stop reason: HOSPADM

## 2025-08-06 RX ORDER — ATROPINE SULFATE 0.4 MG/ML
0.4 INJECTION, SOLUTION INTRAMUSCULAR; INTRAVENOUS; SUBCUTANEOUS ONCE AS NEEDED
Status: DISCONTINUED | OUTPATIENT
Start: 2025-08-06 | End: 2025-08-06 | Stop reason: HOSPADM

## 2025-08-06 RX ORDER — LABETALOL HYDROCHLORIDE 5 MG/ML
5 INJECTION, SOLUTION INTRAVENOUS
Status: DISCONTINUED | OUTPATIENT
Start: 2025-08-06 | End: 2025-08-06 | Stop reason: HOSPADM

## 2025-08-06 RX ORDER — HYDRALAZINE HYDROCHLORIDE 20 MG/ML
5 INJECTION INTRAMUSCULAR; INTRAVENOUS
Status: DISCONTINUED | OUTPATIENT
Start: 2025-08-06 | End: 2025-08-06 | Stop reason: HOSPADM

## 2025-08-06 RX ORDER — SODIUM CHLORIDE, SODIUM LACTATE, POTASSIUM CHLORIDE, CALCIUM CHLORIDE 600; 310; 30; 20 MG/100ML; MG/100ML; MG/100ML; MG/100ML
9 INJECTION, SOLUTION INTRAVENOUS CONTINUOUS
Status: DISCONTINUED | OUTPATIENT
Start: 2025-08-06 | End: 2025-08-06 | Stop reason: HOSPADM

## 2025-08-06 RX ORDER — PHENYLEPHRINE HCL IN 0.9% NACL 1 MG/10 ML
SYRINGE (ML) INTRAVENOUS AS NEEDED
Status: DISCONTINUED | OUTPATIENT
Start: 2025-08-06 | End: 2025-08-06 | Stop reason: SURG

## 2025-08-06 RX ORDER — DIAZEPAM 5 MG/1
5 TABLET ORAL ONCE
Status: COMPLETED | OUTPATIENT
Start: 2025-08-06 | End: 2025-08-06

## 2025-08-06 RX ORDER — PROMETHAZINE HYDROCHLORIDE 25 MG/1
25 TABLET ORAL ONCE AS NEEDED
Status: DISCONTINUED | OUTPATIENT
Start: 2025-08-06 | End: 2025-08-06 | Stop reason: HOSPADM

## 2025-08-06 RX ORDER — HYDROCODONE BITARTRATE AND ACETAMINOPHEN 5; 325 MG/1; MG/1
1 TABLET ORAL ONCE AS NEEDED
Status: DISCONTINUED | OUTPATIENT
Start: 2025-08-06 | End: 2025-08-06 | Stop reason: HOSPADM

## 2025-08-06 RX ORDER — LIDOCAINE HYDROCHLORIDE 10 MG/ML
0.5 INJECTION, SOLUTION INFILTRATION; PERINEURAL ONCE AS NEEDED
Status: DISCONTINUED | OUTPATIENT
Start: 2025-08-06 | End: 2025-08-06 | Stop reason: HOSPADM

## 2025-08-06 RX ORDER — HYDROMORPHONE HYDROCHLORIDE 1 MG/ML
0.5 INJECTION, SOLUTION INTRAMUSCULAR; INTRAVENOUS; SUBCUTANEOUS
Status: DISCONTINUED | OUTPATIENT
Start: 2025-08-06 | End: 2025-08-06 | Stop reason: HOSPADM

## 2025-08-06 RX ADMIN — Medication 200 MCG: at 15:28

## 2025-08-06 RX ADMIN — PROPOFOL 70 MG: 10 INJECTION, EMULSION INTRAVENOUS at 14:58

## 2025-08-06 RX ADMIN — Medication 150 MCG: at 15:11

## 2025-08-06 RX ADMIN — PROPOFOL 115 MCG/KG/MIN: 10 INJECTION, EMULSION INTRAVENOUS at 14:57

## 2025-08-06 RX ADMIN — DIAZEPAM 5 MG: 5 TABLET ORAL at 10:42

## 2025-08-06 RX ADMIN — CEFAZOLIN 2000 MG: 2 INJECTION, POWDER, FOR SOLUTION INTRAMUSCULAR; INTRAVENOUS at 14:44

## 2025-08-06 RX ADMIN — LIDOCAINE HYDROCHLORIDE 2 ML: 10 INJECTION, SOLUTION INFILTRATION; PERINEURAL at 11:38

## 2025-08-06 RX ADMIN — FAMOTIDINE 20 MG: 10 INJECTION INTRAVENOUS at 10:42

## 2025-08-06 RX ADMIN — FENTANYL CITRATE 50 MCG: 50 INJECTION, SOLUTION INTRAMUSCULAR; INTRAVENOUS at 14:57

## 2025-08-06 RX ADMIN — LIDOCAINE HYDROCHLORIDE 100 MG: 20 INJECTION, SOLUTION INFILTRATION; PERINEURAL at 14:57

## 2025-08-06 RX ADMIN — SODIUM CHLORIDE, POTASSIUM CHLORIDE, SODIUM LACTATE AND CALCIUM CHLORIDE 9 ML/HR: 600; 310; 30; 20 INJECTION, SOLUTION INTRAVENOUS at 10:45

## 2025-08-08 ENCOUNTER — RESULTS FOLLOW-UP (OUTPATIENT)
Dept: PERIOP | Facility: HOSPITAL | Age: 70
End: 2025-08-08
Payer: MEDICARE

## 2025-08-08 LAB
CYTO UR: NORMAL
LAB AP CASE REPORT: NORMAL
LAB AP DIAGNOSIS COMMENT: NORMAL
LAB AP SPECIAL STAINS: NORMAL
PATH REPORT.FINAL DX SPEC: NORMAL
PATH REPORT.GROSS SPEC: NORMAL

## 2025-08-15 ENCOUNTER — DOCUMENTATION (OUTPATIENT)
Dept: ONCOLOGY | Facility: CLINIC | Age: 70
End: 2025-08-15
Payer: MEDICARE

## 2025-08-19 ENCOUNTER — OFFICE VISIT (OUTPATIENT)
Dept: SURGERY | Facility: CLINIC | Age: 70
End: 2025-08-19
Payer: MEDICARE

## 2025-08-19 VITALS
WEIGHT: 154.8 LBS | DIASTOLIC BLOOD PRESSURE: 76 MMHG | HEART RATE: 75 BPM | HEIGHT: 62 IN | OXYGEN SATURATION: 94 % | BODY MASS INDEX: 28.49 KG/M2 | SYSTOLIC BLOOD PRESSURE: 132 MMHG

## 2025-08-19 DIAGNOSIS — Z85.3 HISTORY OF BREAST CANCER IN FEMALE: ICD-10-CM

## 2025-08-19 DIAGNOSIS — R92.8 ABNORMAL MRI, BREAST: Primary | ICD-10-CM

## (undated) DEVICE — ADAPT CLN BIOGUARD AIR/H2O DISP

## (undated) DEVICE — PK CHST BRST 40

## (undated) DEVICE — GAUZE,SPONGE,4"X4",16PLY,XRAY,STRL,LF: Brand: MEDLINE

## (undated) DEVICE — DRSNG SURESITE WNDW 4X4.5

## (undated) DEVICE — APPL CHLORAPREP HI/LITE 26ML ORNG

## (undated) DEVICE — KT ORCA ORCAPOD DISP STRL

## (undated) DEVICE — CVR TRANSD CIV FLX TPR 11.9 TO 3.8X61CM

## (undated) DEVICE — ELECTRD BLD EDGE/INSUL1P 2.4X5.1MM STRL

## (undated) DEVICE — 3M™ STERI-STRIP™ COMPOUND BENZOIN TINCTURE 40 BAGS/CARTON 4 CARTONS/CASE C1544: Brand: 3M™ STERI-STRIP™

## (undated) DEVICE — 3M™ STERI-STRIP™ REINFORCED ADHESIVE SKIN CLOSURES, R1547, 1/2 IN X 4 IN (12 MM X 100 MM), 6 STRIPS/ENVELOPE: Brand: 3M™ STERI-STRIP™

## (undated) DEVICE — ANTIBACTERIAL UNDYED BRAIDED (POLYGLACTIN 910), SYNTHETIC ABSORBABLE SUTURE: Brand: COATED VICRYL

## (undated) DEVICE — ELECTRD BLD EZ CLN MOD XLNG 2.75IN

## (undated) DEVICE — TUBING, SUCTION, 1/4" X 10', STRAIGHT: Brand: MEDLINE

## (undated) DEVICE — THE TORRENT IRRIGATION SCOPE CONNECTOR IS USED WITH THE TORRENT IRRIGATION TUBING TO PROVIDE IRRIGATION FLUIDS SUCH AS STERILE WATER DURING GASTROINTESTINAL ENDOSCOPIC PROCEDURES WHEN USED IN CONJUNCTION WITH AN IRRIGATION PUMP (OR ELECTROSURGICAL UNIT).: Brand: TORRENT

## (undated) DEVICE — MEDICINE CUP, GRADUATED, STER: Brand: MEDLINE

## (undated) DEVICE — FRCP BX RADJAW4 NDL 2.8 240CM LG OG BX40

## (undated) DEVICE — DRAPE,CHEST,FENES,15X10,STERIL: Brand: MEDLINE

## (undated) DEVICE — SMOKE EVACUATION TUBING WITH 7/8 IN TO 1/4 IN REDUCER: Brand: BUFFALO FILTER

## (undated) DEVICE — SUT SILK 3/0 TIES 18IN A184H

## (undated) DEVICE — PATIENT RETURN ELECTRODE, SINGLE-USE, CONTACT QUALITY MONITORING, ADULT, WITH 9FT CORD, FOR PATIENTS WEIGING OVER 33LBS. (15KG): Brand: MEGADYNE

## (undated) DEVICE — GLV SURG PREMIERPRO ORTHO LTX PF SZ8 BRN

## (undated) DEVICE — SYR LL TP 10ML STRL

## (undated) DEVICE — IRRIGATOR BULB ASEPTO 60CC STRL

## (undated) DEVICE — GLV SURG BIOGEL LTX PF 7

## (undated) DEVICE — SENSR O2 OXIMAX FNGR A/ 18IN NONSTR

## (undated) DEVICE — NDL HYPO PRECISIONGLIDE REG 25G 1 1/2

## (undated) DEVICE — PK PROC MINOR TOWER 40

## (undated) DEVICE — INTENDED FOR TISSUE SEPARATION, AND OTHER PROCEDURES THAT REQUIRE A SHARP SURGICAL BLADE TO PUNCTURE OR CUT.: Brand: BARD-PARKER ® CARBON RIB-BACK BLADES

## (undated) DEVICE — STPLR SKIN VISISTAT WD 35CT

## (undated) DEVICE — CANN O2 ETCO2 FITS ALL CONN CO2 SMPL A/ 7IN DISP LF

## (undated) DEVICE — UNDYED BRAIDED (POLYGLACTIN 910), SYNTHETIC ABSORBABLE SUTURE: Brand: COATED VICRYL

## (undated) DEVICE — CONN TBG Y 5 IN 1 LF STRL

## (undated) DEVICE — SKIN PREP TRAY W/CHG: Brand: MEDLINE INDUSTRIES, INC.

## (undated) DEVICE — EXOFIN PRECISION PEN HIGH VISCOSITY TOPICAL SKIN ADHESIVE: Brand: EXOFIN PRECISION PEN, 1G

## (undated) DEVICE — LEGGINGS, PAIR, 31X48, STERILE: Brand: MEDLINE

## (undated) DEVICE — SUT SILK 2/0 FS BLK 18IN 685G

## (undated) DEVICE — SUT VIC 3/0 SH 27IN J416H

## (undated) DEVICE — SUT MNCRYL PLS ANTIB UD 4/0 PS2 18IN

## (undated) DEVICE — SPNG GZ WOVN 4X4IN 12PLY 10/BX STRL

## (undated) DEVICE — LN SMPL CO2 SHTRM SD STREAM W/M LUER

## (undated) DEVICE — SUT SILK 2/0 SH 30IN K833H